# Patient Record
Sex: FEMALE | Race: WHITE | Employment: OTHER | URBAN - METROPOLITAN AREA
[De-identification: names, ages, dates, MRNs, and addresses within clinical notes are randomized per-mention and may not be internally consistent; named-entity substitution may affect disease eponyms.]

---

## 2017-01-20 ENCOUNTER — OFFICE VISIT (OUTPATIENT)
Dept: INTERNAL MEDICINE CLINIC | Facility: CLINIC | Age: 82
End: 2017-01-20

## 2017-01-20 VITALS
BODY MASS INDEX: 31.41 KG/M2 | DIASTOLIC BLOOD PRESSURE: 84 MMHG | HEIGHT: 64 IN | HEART RATE: 65 BPM | TEMPERATURE: 97 F | WEIGHT: 184 LBS | SYSTOLIC BLOOD PRESSURE: 131 MMHG

## 2017-01-20 DIAGNOSIS — I44.7 LEFT BUNDLE BRANCH BLOCK: Primary | ICD-10-CM

## 2017-01-20 DIAGNOSIS — I71.2 THORACIC AORTIC ANEURYSM WITHOUT RUPTURE (HCC): ICD-10-CM

## 2017-01-20 DIAGNOSIS — L98.9 SKIN LESION OF BACK: ICD-10-CM

## 2017-01-20 PROBLEM — I71.20 THORACIC AORTIC ANEURYSM WITHOUT RUPTURE: Status: ACTIVE | Noted: 2017-01-20

## 2017-01-20 PROBLEM — I71.20 THORACIC AORTIC ANEURYSM WITHOUT RUPTURE (HCC): Status: ACTIVE | Noted: 2017-01-20

## 2017-01-20 PROCEDURE — 99214 OFFICE O/P EST MOD 30 MIN: CPT | Performed by: INTERNAL MEDICINE

## 2017-01-20 PROCEDURE — G0463 HOSPITAL OUTPT CLINIC VISIT: HCPCS | Performed by: INTERNAL MEDICINE

## 2017-01-20 RX ORDER — ALENDRONATE SODIUM 70 MG/1
70 TABLET ORAL WEEKLY
Qty: 12 TABLET | Refills: 3 | Status: SHIPPED | OUTPATIENT
Start: 2017-01-20 | End: 2018-01-27

## 2017-01-20 RX ORDER — AMLODIPINE BESYLATE 5 MG/1
TABLET ORAL
Qty: 90 TABLET | Refills: 3 | Status: SHIPPED | OUTPATIENT
Start: 2017-01-20 | End: 2018-02-02

## 2017-01-20 RX ORDER — PRAVASTATIN SODIUM 40 MG
TABLET ORAL
Qty: 90 TABLET | Refills: 3 | Status: SHIPPED | OUTPATIENT
Start: 2017-01-20 | End: 2017-03-24

## 2017-01-20 NOTE — PROGRESS NOTES
See notes from Dr Mary Castro  Has a Thoracic Art Anuerysm  Has a LBB  Had a nl cardiac lexiscan 60%LVEF nl wall motion  To get a CT of Thoracic Aorat and see dr Ojeda Letters    Blood pressure 131/84, pulse 65, temperature 97.4 °F (36.3 °C), temperature source Vivian Morales

## 2017-02-03 ENCOUNTER — HOSPITAL ENCOUNTER (OUTPATIENT)
Dept: CT IMAGING | Facility: HOSPITAL | Age: 82
Discharge: HOME | End: 2017-02-03
Attending: INTERNAL MEDICINE
Payer: MEDICARE

## 2017-02-03 DIAGNOSIS — I71.2 THORACIC AORTIC ANEURYSM WITHOUT RUPTURE (HCC): ICD-10-CM

## 2017-02-03 LAB — CREAT BLD-MCNC: 1 MG/DL (ref 0.5–1.5)

## 2017-02-03 PROCEDURE — 82565 ASSAY OF CREATININE: CPT

## 2017-02-03 PROCEDURE — 71260 CT THORAX DX C+: CPT

## 2017-03-20 ENCOUNTER — TELEPHONE (OUTPATIENT)
Dept: INTERNAL MEDICINE CLINIC | Facility: CLINIC | Age: 82
End: 2017-03-20

## 2017-03-20 NOTE — TELEPHONE ENCOUNTER
Pt stts she needs refill on Rx Pravastatin 40 MG.  Please advise          Current outpatient prescriptions:     •  Pravastatin Sodium 40 MG Oral Tab, TAKE ONE TABLET BY MOUTH EVERY NIGHT AT BEDTIME, Disp: 90 tablet, Rfl: 3

## 2017-03-24 RX ORDER — PRAVASTATIN SODIUM 40 MG
TABLET ORAL
Qty: 90 TABLET | Refills: 0 | Status: SHIPPED | OUTPATIENT
Start: 2017-03-24 | End: 2018-03-26

## 2017-03-24 NOTE — TELEPHONE ENCOUNTER
Cholesterol Medications: Refilled per protocol    Protocol Criteria:  · Appointment scheduled in the past 12 months or in the next 3 months  · ALT & LDL on file in the past 12 months  · ALT result < 80  · LDL result <130   Recent Visits       Provider Verito

## 2017-06-02 RX ORDER — METOPROLOL SUCCINATE 50 MG/1
TABLET, EXTENDED RELEASE ORAL
Qty: 180 TABLET | Refills: 0 | Status: SHIPPED | OUTPATIENT
Start: 2017-06-02 | End: 2017-09-06

## 2017-06-21 ENCOUNTER — OFFICE VISIT (OUTPATIENT)
Dept: INTERNAL MEDICINE CLINIC | Facility: CLINIC | Age: 82
End: 2017-06-21

## 2017-06-21 VITALS
TEMPERATURE: 98 F | WEIGHT: 183.63 LBS | HEIGHT: 63 IN | OXYGEN SATURATION: 96 % | HEART RATE: 56 BPM | SYSTOLIC BLOOD PRESSURE: 152 MMHG | BODY MASS INDEX: 32.54 KG/M2 | DIASTOLIC BLOOD PRESSURE: 78 MMHG

## 2017-06-21 DIAGNOSIS — Z12.39 BREAST CANCER SCREENING: ICD-10-CM

## 2017-06-21 DIAGNOSIS — R53.83 MALAISE AND FATIGUE: ICD-10-CM

## 2017-06-21 DIAGNOSIS — R53.81 MALAISE AND FATIGUE: ICD-10-CM

## 2017-06-21 DIAGNOSIS — M80.80XD OTHER OSTEOPOROSIS WITH CURRENT PATHOLOGICAL FRACTURE WITH ROUTINE HEALING, SUBSEQUENT ENCOUNTER: ICD-10-CM

## 2017-06-21 DIAGNOSIS — I71.2 THORACIC AORTIC ANEURYSM WITHOUT RUPTURE (HCC): ICD-10-CM

## 2017-06-21 DIAGNOSIS — L98.9 SKIN LESION OF BACK: Primary | ICD-10-CM

## 2017-06-21 PROCEDURE — 99213 OFFICE O/P EST LOW 20 MIN: CPT | Performed by: INTERNAL MEDICINE

## 2017-06-21 PROCEDURE — G0463 HOSPITAL OUTPT CLINIC VISIT: HCPCS | Performed by: INTERNAL MEDICINE

## 2017-06-21 NOTE — PROGRESS NOTES
Stable  Patient Active Problem List:     Knee pain     Arthritis of knee, degenerative     Hip pain     Ankle arthritis     Lumbar radiculopathy     Lumbar stenosis     Abnormal EKG     ACE inhibitor intolerance     Left bundle branch block     Urinary tra

## 2017-06-22 ENCOUNTER — TELEPHONE (OUTPATIENT)
Dept: INTERNAL MEDICINE CLINIC | Facility: CLINIC | Age: 82
End: 2017-06-22

## 2017-06-22 ENCOUNTER — LAB ENCOUNTER (OUTPATIENT)
Dept: LAB | Age: 82
End: 2017-06-22
Attending: INTERNAL MEDICINE
Payer: MEDICARE

## 2017-06-22 DIAGNOSIS — R53.83 MALAISE AND FATIGUE: ICD-10-CM

## 2017-06-22 DIAGNOSIS — R53.81 MALAISE AND FATIGUE: ICD-10-CM

## 2017-06-22 DIAGNOSIS — E03.9 HYPOTHYROIDISM, UNSPECIFIED TYPE: Primary | ICD-10-CM

## 2017-06-22 DIAGNOSIS — Z12.39 BREAST CANCER SCREENING: ICD-10-CM

## 2017-06-22 DIAGNOSIS — M80.80XD OTHER OSTEOPOROSIS WITH CURRENT PATHOLOGICAL FRACTURE WITH ROUTINE HEALING, SUBSEQUENT ENCOUNTER: ICD-10-CM

## 2017-06-22 DIAGNOSIS — I71.2 THORACIC AORTIC ANEURYSM WITHOUT RUPTURE (HCC): ICD-10-CM

## 2017-06-22 PROCEDURE — 36415 COLL VENOUS BLD VENIPUNCTURE: CPT

## 2017-06-22 PROCEDURE — 84443 ASSAY THYROID STIM HORMONE: CPT | Performed by: INTERNAL MEDICINE

## 2017-06-22 PROCEDURE — 85025 COMPLETE CBC W/AUTO DIFF WBC: CPT

## 2017-06-22 PROCEDURE — 82306 VITAMIN D 25 HYDROXY: CPT

## 2017-06-22 PROCEDURE — 80053 COMPREHEN METABOLIC PANEL: CPT

## 2017-06-22 PROCEDURE — 81015 MICROSCOPIC EXAM OF URINE: CPT

## 2017-06-22 PROCEDURE — 84439 ASSAY OF FREE THYROXINE: CPT | Performed by: INTERNAL MEDICINE

## 2017-06-22 RX ORDER — LEVOTHYROXINE SODIUM 0.1 MG/1
100 TABLET ORAL DAILY
Qty: 90 TABLET | Refills: 3 | Status: SHIPPED | OUTPATIENT
Start: 2017-06-22 | End: 2017-06-26

## 2017-06-22 NOTE — TELEPHONE ENCOUNTER
Please see other 06/22 results telephone encounter. Pt already spoke with Dr. Grupo Wood regarding her results. Pt is asking if we could please mail her a copy of her results?  Home address has been verified

## 2017-06-26 ENCOUNTER — PATIENT MESSAGE (OUTPATIENT)
Dept: INTERNAL MEDICINE CLINIC | Facility: CLINIC | Age: 82
End: 2017-06-26

## 2017-06-26 ENCOUNTER — TELEPHONE (OUTPATIENT)
Dept: INTERNAL MEDICINE CLINIC | Facility: CLINIC | Age: 82
End: 2017-06-26

## 2017-06-26 RX ORDER — LEVOTHYROXINE SODIUM 0.05 MG/1
50 TABLET ORAL
Qty: 30 TABLET | Refills: 2 | Status: SHIPPED | OUTPATIENT
Start: 2017-06-26 | End: 2017-07-11

## 2017-06-26 RX ORDER — LEVOTHYROXINE SODIUM 0.03 MG/1
25 TABLET ORAL
Qty: 30 TABLET | Refills: 2 | Status: CANCELLED | OUTPATIENT
Start: 2017-06-26

## 2017-06-26 NOTE — TELEPHONE ENCOUNTER
Katie Martínez MD   [facility] 14 minutes ago (2:08 PM)      Completed call patient   (Routing comment)        LMTCB. May transfer to Triage any time. Dr Gómez Rayo sent levothyroxine 50 mcg dose instead.

## 2017-06-26 NOTE — TELEPHONE ENCOUNTER
Pt stts she asked her nephew who is a Dr as well and they are concerned with the RX Levothyroxine 100 MCG. She was thinking it should be 50 MCG.  Please advise          Current Outpatient Prescriptions:   •  Levothyroxine Sodium 100 MCG Oral Tab, Take 1 tab

## 2017-06-26 NOTE — TELEPHONE ENCOUNTER
Dr Medina Fails please see pt's message below and advise in regards the levothyroxine Rx you sent for pt on 6/22/17 because of TSH= 6.83, as per result note read by pt on MyChart:    Patient Result Comments     Entered by Sirisha Le MD at 6/22/2017  3:13 PM

## 2017-06-26 NOTE — TELEPHONE ENCOUNTER
Pt called back. She is reluctant to take 50mcg of levothyroxine. After consulting with her nephew who is a physician, she is requesting that her rx be reduced to 25mcg. \"I would like to be very conservative in the treatment of my thyroid. \"  She has no

## 2017-06-27 NOTE — TELEPHONE ENCOUNTER
From: Austin Figueredo  To: Angella Ruggiero MD  Sent: 6/26/2017 11:27 AM CDT  Subject: Prescription Question    I KNOW THAT YOU HAVE MY REQUEST FOR A LOWER DOSAGE OF THE THYROID MEDICINE WHICH YOU PRESCRIBED .  MICROGRAMS HAS PROBABLY YOCASTA BROUGHT TO Y

## 2017-06-27 NOTE — TELEPHONE ENCOUNTER
pt stated that it will take her some time to find a new PCP. She asked that I send you the message again asking if you can give her a 30 day supply of 25 mcg.  Pls Advise

## 2017-06-28 ENCOUNTER — PATIENT MESSAGE (OUTPATIENT)
Dept: INTERNAL MEDICINE CLINIC | Facility: CLINIC | Age: 82
End: 2017-06-28

## 2017-07-01 NOTE — TELEPHONE ENCOUNTER
From: Antoine Alvarez  To: Ted Desouza MD  Sent: 6/28/2017 12:50 PM CDT  Subject: Other    DEAR DR Foreign Stuart.  I DON't KNOW IF I ASKED YOU FOR A PRINTED COPY OF MY TEST RESULTS TO BE MAILEDTO MY. HOME AT [address] [place] [address] [address] [address]

## 2017-07-11 ENCOUNTER — OFFICE VISIT (OUTPATIENT)
Dept: INTERNAL MEDICINE CLINIC | Facility: CLINIC | Age: 82
End: 2017-07-11

## 2017-07-11 VITALS
HEIGHT: 63 IN | HEART RATE: 60 BPM | DIASTOLIC BLOOD PRESSURE: 81 MMHG | SYSTOLIC BLOOD PRESSURE: 153 MMHG | WEIGHT: 178 LBS | BODY MASS INDEX: 31.54 KG/M2

## 2017-07-11 DIAGNOSIS — E55.9 VITAMIN D DEFICIENCY: ICD-10-CM

## 2017-07-11 DIAGNOSIS — E78.2 MIXED HYPERLIPIDEMIA: ICD-10-CM

## 2017-07-11 DIAGNOSIS — R53.83 FATIGUE, UNSPECIFIED TYPE: Primary | ICD-10-CM

## 2017-07-11 PROCEDURE — 99214 OFFICE O/P EST MOD 30 MIN: CPT | Performed by: INTERNAL MEDICINE

## 2017-07-11 PROCEDURE — G0463 HOSPITAL OUTPT CLINIC VISIT: HCPCS | Performed by: INTERNAL MEDICINE

## 2017-07-11 NOTE — PROGRESS NOTES
Katt Daily is a [de-identified]year old female. HPI:   1. Fatigue, unspecified type    Has been somewhat tired despite taking exercise classes and being quite active. Recent TSH minimally elevated and ws going to start synthroid but hesitated to do so.      2. Mixe Smokeless tobacco: Never Used                      Alcohol use: Yes           0.0 oz/week     Comment: wine once every 2 mos       REVIEW OF SYSTEMS:   GENERAL HEALTH: feels well otherwise  SKIN: denies any unusual skin lesions or rashes to the plan.     The patient is asked to return in 3months

## 2017-07-15 ENCOUNTER — HOSPITAL ENCOUNTER (OUTPATIENT)
Dept: MAMMOGRAPHY | Facility: HOSPITAL | Age: 82
Discharge: HOME | End: 2017-07-15
Attending: INTERNAL MEDICINE
Payer: MEDICARE

## 2017-07-15 DIAGNOSIS — Z12.39 BREAST CANCER SCREENING: ICD-10-CM

## 2017-07-15 PROCEDURE — 77067 SCR MAMMO BI INCL CAD: CPT | Performed by: INTERNAL MEDICINE

## 2017-09-02 ENCOUNTER — TELEPHONE (OUTPATIENT)
Dept: INTERNAL MEDICINE CLINIC | Facility: CLINIC | Age: 82
End: 2017-09-02

## 2017-09-02 NOTE — TELEPHONE ENCOUNTER
Current Outpatient Prescriptions:   •  METOPROLOL SUCCINATE ER 50 MG Oral Tablet 24 Hr, TAKE 2 TABLETS BY MOUTH EVERY DAY, Disp: 180 tablet, Rfl: 0

## 2017-09-05 ENCOUNTER — OFFICE VISIT (OUTPATIENT)
Dept: INTERNAL MEDICINE CLINIC | Facility: CLINIC | Age: 82
End: 2017-09-05

## 2017-09-05 ENCOUNTER — NURSE TRIAGE (OUTPATIENT)
Dept: OTHER | Age: 82
End: 2017-09-05

## 2017-09-05 VITALS
DIASTOLIC BLOOD PRESSURE: 62 MMHG | RESPIRATION RATE: 16 BRPM | HEART RATE: 60 BPM | SYSTOLIC BLOOD PRESSURE: 122 MMHG | BODY MASS INDEX: 31.07 KG/M2 | WEIGHT: 182 LBS | HEIGHT: 64 IN

## 2017-09-05 DIAGNOSIS — R53.83 FATIGUE, UNSPECIFIED TYPE: ICD-10-CM

## 2017-09-05 DIAGNOSIS — B02.9 HERPES ZOSTER WITHOUT COMPLICATION: Primary | ICD-10-CM

## 2017-09-05 DIAGNOSIS — E78.2 MIXED HYPERLIPIDEMIA: ICD-10-CM

## 2017-09-05 PROCEDURE — G0463 HOSPITAL OUTPT CLINIC VISIT: HCPCS | Performed by: INTERNAL MEDICINE

## 2017-09-05 PROCEDURE — 99214 OFFICE O/P EST MOD 30 MIN: CPT | Performed by: INTERNAL MEDICINE

## 2017-09-05 RX ORDER — FAMCICLOVIR 500 MG/1
500 TABLET, FILM COATED ORAL 3 TIMES DAILY
Qty: 21 TABLET | Refills: 0 | Status: SHIPPED | OUTPATIENT
Start: 2017-09-05 | End: 2019-08-24 | Stop reason: ALTCHOICE

## 2017-09-05 NOTE — PROGRESS NOTES
Brayden Ball is a 81year old female. HPI:   1. Rash    Developed a rash on the left side of her face the last 3 days. Has felt no pain in the area. Was not anywhere she may have been bit by an insect.  Rash seems to be spreading into her left side of her Comment: wine once every 2 mos       REVIEW OF SYSTEMS:   GENERAL HEALTH: feels well otherwise  SKIN: denies any unusual skin lesions or rashes  RESPIRATORY: denies shortness of breath with exertion  CARDIOVASCULAR: denies chest pain on exertion  GI: denie exercise at least 3 times weekly to build strength, burn calories and help to achieve ideal body weight. The patient indicates understanding of these issues and agrees to the plan.     The patient is asked to return in 3months

## 2017-09-05 NOTE — TELEPHONE ENCOUNTER
LMTCB. When pt calls back please assist her in scheduling an appt with [facility] today at 1:30pm for insect bite, OK per PVR. Thank you.

## 2017-09-06 RX ORDER — METOPROLOL SUCCINATE 50 MG/1
100 TABLET, EXTENDED RELEASE ORAL
Qty: 180 TABLET | Refills: 0 | Status: SHIPPED | OUTPATIENT
Start: 2017-09-06 | End: 2017-11-15

## 2017-09-06 NOTE — TELEPHONE ENCOUNTER
Requesting Metoprolol refill    Prescription refilled per IM/FM refill protocol    Hypertensive Medications  Protocol Criteria:  · Appointment scheduled in the past 6 months or in the next 3 months  · BMP or CMP in the past 12 months  · Creatinine result <

## 2017-11-16 RX ORDER — METOPROLOL SUCCINATE 50 MG/1
TABLET, EXTENDED RELEASE ORAL
Qty: 180 TABLET | Refills: 0 | Status: SHIPPED | OUTPATIENT
Start: 2017-11-16 | End: 2018-03-04

## 2018-01-27 NOTE — TELEPHONE ENCOUNTER
Refill Protocol Appointment Criteria  · Appointment scheduled in the past 6 months or in the next 3 months  Recent Outpatient Visits            4 months ago Herpes zoster without complication    [facility], [facility], [address] [facility], [address]

## 2018-01-27 NOTE — TELEPHONE ENCOUNTER
Per pt she states she contacted the pharmacy to request a refill request and the pharmacy has attempted 3 times already with no response. It has been 4 days. pls advise.  Thank you      Current Outpatient Prescriptions:  Alendronate Sodium (FOSAMAX) 70 MG O

## 2018-01-30 RX ORDER — ALENDRONATE SODIUM 70 MG/1
70 TABLET ORAL WEEKLY
Qty: 12 TABLET | Refills: 0 | Status: SHIPPED | OUTPATIENT
Start: 2018-01-30 | End: 2018-04-14

## 2018-01-30 NOTE — TELEPHONE ENCOUNTER
Refill Protocol Appointment Criteria  · Appointment scheduled in the past 12 months or in the next 3 months  Recent Outpatient Visits            4 months ago Herpes zoster without complication    [facility], [facility], [address] [facility], [address]

## 2018-02-02 ENCOUNTER — TELEPHONE (OUTPATIENT)
Dept: INTERNAL MEDICINE CLINIC | Facility: CLINIC | Age: 83
End: 2018-02-02

## 2018-02-02 RX ORDER — AMLODIPINE BESYLATE 5 MG/1
TABLET ORAL
Qty: 90 TABLET | Refills: 3 | Status: SHIPPED | OUTPATIENT
Start: 2018-02-02 | End: 2019-02-21

## 2018-02-02 NOTE — TELEPHONE ENCOUNTER
Son 48 sudden death found in the bathroom today  Pt is shaking would like to know if she should take another BP med    I asked to monitor BP  I dont advised in adding BP med  But if it is high may take an extra amlodipine for today  Follow up with Dr Hope Rod

## 2018-02-28 RX ORDER — AMLODIPINE BESYLATE 5 MG/1
TABLET ORAL
Qty: 90 TABLET | Refills: 3
Start: 2018-02-28

## 2018-02-28 NOTE — TELEPHONE ENCOUNTER
[address] [facility], [phone]   Medication Detail    Disp Refills Start End    AmLODIPine Besylate 5 MG Oral Tab 90 tablet 3 2/2/2018     Sig: TAKE 1 TABLET BY MOUTH ONCE DAILY.     E-Prescribing Statu

## 2018-02-28 NOTE — TELEPHONE ENCOUNTER
From: Apollo Gibson  Sent: 2/27/2018 5:32 PM CST  Subject: Medication Renewal Request    Shyla REGGIE Griggs would like a refill of the following medications:      AmLODIPine Besylate 5 MG Oral Tab Raffaele Doe MD]   Patient Comment: I need a refill of this

## 2018-03-05 RX ORDER — METOPROLOL SUCCINATE 50 MG/1
TABLET, EXTENDED RELEASE ORAL
Qty: 180 TABLET | Refills: 0 | Status: SHIPPED | OUTPATIENT
Start: 2018-03-05 | End: 2018-06-02

## 2018-03-24 ENCOUNTER — PATIENT MESSAGE (OUTPATIENT)
Dept: INTERNAL MEDICINE CLINIC | Facility: CLINIC | Age: 83
End: 2018-03-24

## 2018-03-26 NOTE — TELEPHONE ENCOUNTER
From: Julius Benz  To: Anastasia Penny MD  Sent: 3/24/2018 8:16 PM CDT  Subject: Other    DEAR DR Amberly Devi. I NEED A REFILL ON PRAVASTATIN SODIUM 40mg tab. I USE THE [facility] [address] [facility]. THNK YOU.  Torrey Ill

## 2018-03-26 NOTE — TELEPHONE ENCOUNTER
Pending Prescriptions Disp Refills    Pravastatin Sodium 40 MG Oral Tab 90 tablet 0     Sig: TAKE ONE TABLET BY MOUTH EVERY NIGHT AT BEDTIME         Cholesterol Medication Protocol Failed NO LDL IN PAST 12 MONTHS      Cholesterol Medications  Protocol Cr

## 2018-03-27 RX ORDER — PRAVASTATIN SODIUM 40 MG
TABLET ORAL
Qty: 90 TABLET | Refills: 0 | Status: SHIPPED | OUTPATIENT
Start: 2018-03-27 | End: 2018-06-24

## 2018-03-27 NOTE — TELEPHONE ENCOUNTER
Pending Prescriptions Disp Refills    Pravastatin Sodium 40 MG Oral Tab 90 tablet 0     Sig: TAKE ONE TABLET BY MOUTH EVERY NIGHT AT BEDTIME       Cholesterol Medication Protocol Failed, no LDL in last 12 months  Cholesterol Medications  Protocol Criteri

## 2018-04-16 RX ORDER — ALENDRONATE SODIUM 70 MG/1
70 TABLET ORAL WEEKLY
Qty: 12 TABLET | Refills: 0 | Status: SHIPPED
Start: 2018-04-16 | End: 2018-07-07

## 2018-04-16 NOTE — TELEPHONE ENCOUNTER
From: Trina Stevens  Sent: 4/14/2018 10:48 AM CDT  Subject: Medication Renewal Request    Shyla Arriaza would like a refill of the following medications:     Alendronate Sodium (FOSAMAX) 70 MG Oral Tab [Scooby hSahid MD]    Preferred pharmacy: [facility]

## 2018-05-07 ENCOUNTER — PATIENT MESSAGE (OUTPATIENT)
Dept: INTERNAL MEDICINE CLINIC | Facility: CLINIC | Age: 83
End: 2018-05-07

## 2018-05-08 NOTE — TELEPHONE ENCOUNTER
From: Austin Figueredo  To: Primo Escamilla MD  Sent: 5/7/2018 1:53 PM CDT  Subject: Other    DEAR DR Enriqueta Saleh. I NEED TO HAVE MY HANDICAP PERMENANT  RENEWED. I WOULD LIKE TO DROP OFF THE FORM SO THAT YOU COULD FILL OUT THE FORM FOR.  THE RENEW

## 2018-05-09 ENCOUNTER — TELEPHONE (OUTPATIENT)
Dept: INTERNAL MEDICINE CLINIC | Facility: CLINIC | Age: 83
End: 2018-05-09

## 2018-05-15 NOTE — TELEPHONE ENCOUNTER
Sent as Selena Fallon    ----- Message -----     From: Asha Kerns.  Cinthia Gamble Sent: 9/84/6130  7:75 PM CDT       To: Nav Forman MD  Subject: RE: Other    DEAR DR Kimberlyn Chowdary  I DROPPED OFF THE FORM  LAST WEEK WHERE I LEFT IT AT THE FRONT [facility] [address]

## 2018-06-02 RX ORDER — METOPROLOL SUCCINATE 50 MG/1
TABLET, EXTENDED RELEASE ORAL
Qty: 180 TABLET | Refills: 0 | Status: SHIPPED | OUTPATIENT
Start: 2018-06-02 | End: 2018-09-01

## 2018-06-02 NOTE — TELEPHONE ENCOUNTER
Metoprolol Succinate ER 50 MG Oral Tablet 24 Hr  N/A       Disp: 180 tablet Refills: 0    Class: Script not printed Start: 6/2/2018   Documented:3 years ago  Hypertensive Medications Protocol Passed6/2 9:37 AM   CMP or BMP in past 12 months    Serum Creati

## 2018-06-06 ENCOUNTER — OFFICE VISIT (OUTPATIENT)
Dept: INTERNAL MEDICINE CLINIC | Facility: CLINIC | Age: 83
End: 2018-06-06

## 2018-06-06 ENCOUNTER — APPOINTMENT (OUTPATIENT)
Dept: LAB | Age: 83
End: 2018-06-06
Attending: INTERNAL MEDICINE
Payer: MEDICARE

## 2018-06-06 VITALS
DIASTOLIC BLOOD PRESSURE: 72 MMHG | HEART RATE: 67 BPM | HEIGHT: 65 IN | BODY MASS INDEX: 29.49 KG/M2 | SYSTOLIC BLOOD PRESSURE: 144 MMHG | RESPIRATION RATE: 16 BRPM | WEIGHT: 177 LBS

## 2018-06-06 DIAGNOSIS — E03.9 HYPOTHYROIDISM, UNSPECIFIED TYPE: Primary | ICD-10-CM

## 2018-06-06 DIAGNOSIS — E55.9 VITAMIN D DEFICIENCY: ICD-10-CM

## 2018-06-06 DIAGNOSIS — R53.83 FATIGUE, UNSPECIFIED TYPE: ICD-10-CM

## 2018-06-06 DIAGNOSIS — M54.50 MIDLINE LOW BACK PAIN WITHOUT SCIATICA, UNSPECIFIED CHRONICITY: ICD-10-CM

## 2018-06-06 DIAGNOSIS — E78.2 MIXED HYPERLIPIDEMIA: ICD-10-CM

## 2018-06-06 PROCEDURE — 82306 VITAMIN D 25 HYDROXY: CPT

## 2018-06-06 PROCEDURE — 99214 OFFICE O/P EST MOD 30 MIN: CPT | Performed by: INTERNAL MEDICINE

## 2018-06-06 PROCEDURE — 84443 ASSAY THYROID STIM HORMONE: CPT

## 2018-06-06 PROCEDURE — 36415 COLL VENOUS BLD VENIPUNCTURE: CPT

## 2018-06-06 PROCEDURE — 84439 ASSAY OF FREE THYROXINE: CPT

## 2018-06-06 PROCEDURE — G0463 HOSPITAL OUTPT CLINIC VISIT: HCPCS | Performed by: INTERNAL MEDICINE

## 2018-06-06 NOTE — PROGRESS NOTES
Thony Arce is a 81year old female. HPI:   1. Hypothyroidism, unspecified type    Has been somewhat tired despite taking exercise classes and being quite active. Recent TSH minimally elevated and ws going to start synthroid but hesitated to do so.  Will mouth daily.  Disp:  Rfl:       Past Medical History:   Diagnosis Date   • Enlarged aorta (HCC)    • Glaucoma    • Other and unspecified hyperlipidemia    • Right groin pain    • Unspecified essential hypertension       Social History:  Smoking status: Darvin Willett vitamin D deficiency. Taking vitamin D over the counter daily along with 1500 mg calcium. Advised to do weight bearing exercises as well to improve and maintain bone strength. - VITAMIN D, 25-HYDROXY; Future    4.  Midline low back pain without sciatica

## 2018-06-25 RX ORDER — PRAVASTATIN SODIUM 40 MG
TABLET ORAL
Qty: 90 TABLET | Refills: 0 | Status: SHIPPED
Start: 2018-06-25 | End: 2018-09-20

## 2018-06-25 NOTE — TELEPHONE ENCOUNTER
From: Thony Arce  Sent: 6/24/2018 5:01 PM CDT  Subject: Medication Renewal Request    Shyla Fuentes would like a refill of the following medications:     Pravastatin Sodium 40 MG Oral Tab [Scooby Bryant MD]   Patient Comment: PLEASE REFILL THIS

## 2018-07-07 RX ORDER — ALENDRONATE SODIUM 70 MG/1
70 TABLET ORAL WEEKLY
Qty: 12 TABLET | Refills: 0 | Status: SHIPPED
Start: 2018-07-07 | End: 2018-10-09

## 2018-07-07 NOTE — TELEPHONE ENCOUNTER
From: Nya Chavez  Sent: 7/7/2018 7:39 AM CDT  Subject: Medication Renewal Request    Shyla Linder would like a refill of the following medications:     Alendronate Sodium (FOSAMAX) 70 MG Oral Tab [Scooby Benson MD]   Patient Comment: PLEASE CARLOS

## 2018-07-07 NOTE — TELEPHONE ENCOUNTER
Pending Prescriptions Disp Refills    alendronate (FOSAMAX) 70 MG Oral Tab 12 tablet 0     Sig: Take 1 tablet (70 mg total) by mouth once a week. Refill approved per protocol.     Refill Protocol Appointment Criteria  · Appointment scheduled in

## 2018-08-28 NOTE — TELEPHONE ENCOUNTER
From: Dixon Chawla  Sent: 8/28/2018 8:15 AM CDT  Subject: Medication Renewal Request    Shlya REGGIE Pruett would like a refill of the following medications:     Metoprolol Succinate ER 50 MG Oral Tablet 24 Hr [Philip Darryle Colace, MD]    Preferred pharmacy: [facility]

## 2018-09-04 RX ORDER — METOPROLOL SUCCINATE 50 MG/1
TABLET, EXTENDED RELEASE ORAL
Qty: 180 TABLET | Refills: 0 | Status: SHIPPED | OUTPATIENT
Start: 2018-09-04 | End: 2019-02-22

## 2018-09-04 RX ORDER — METOPROLOL SUCCINATE 50 MG/1
TABLET, EXTENDED RELEASE ORAL
Qty: 180 TABLET | Refills: 0 | Status: SHIPPED | OUTPATIENT
Start: 2018-09-04 | End: 2019-08-24

## 2018-09-18 ENCOUNTER — PATIENT MESSAGE (OUTPATIENT)
Dept: INTERNAL MEDICINE CLINIC | Facility: CLINIC | Age: 83
End: 2018-09-18

## 2018-09-18 NOTE — PROGRESS NOTES
Cholesterol Medications  Protocol Criteria:  · Appointment scheduled in the past 12 months or in the next 3 months  · ALT & LDL on file in the past 12 months  · ALT result < 80  · LDL result <130   Recent Outpatient Visits            3 months ago Hypothyro

## 2018-09-18 NOTE — PROGRESS NOTES
See pt my chart request to refill Pravastatin. Pt failed refill protocol since last Lipid panel was 2016. Please advise. Med pended.

## 2018-09-18 NOTE — TELEPHONE ENCOUNTER
From: Hussein Spencer  To: Kayode Zeng MD  Sent: 9/18/2018 8:43 AM CDT  Subject: Prescription Question    DEAR DR Chan Riddle. I AM HAVING TROUBLE WITH MY CHART. I NEED A REFILL ON THE FOLLING MEDICINE PRAVASTATIN SODIUM 40 MG AT BEDTIME.  THE REFILL S

## 2018-09-20 RX ORDER — PRAVASTATIN SODIUM 40 MG
TABLET ORAL
Qty: 90 TABLET | Refills: 0 | Status: SHIPPED | OUTPATIENT
Start: 2018-09-20 | End: 2018-12-14

## 2018-10-06 ENCOUNTER — PATIENT MESSAGE (OUTPATIENT)
Dept: INTERNAL MEDICINE CLINIC | Facility: CLINIC | Age: 83
End: 2018-10-06

## 2018-10-06 NOTE — TELEPHONE ENCOUNTER
From: Thais Chow  To: Fany Mcpherson MD  Sent: 10/6/2018 1:00 PM CDT  Subject: Prescription Question    Dear DR Ramon Echavarria. I NEED A REFILL OF ALENDRONATE SODIUM 70MGTAB. I TRIED TO DO THIS THROUGH THE REFILL ON MY CHART, BUT IT DID NOT GO THROUGH.

## 2018-10-09 RX ORDER — ALENDRONATE SODIUM 70 MG/1
70 TABLET ORAL WEEKLY
Qty: 12 TABLET | Refills: 0 | Status: SHIPPED | OUTPATIENT
Start: 2018-10-09 | End: 2018-12-22

## 2018-12-14 RX ORDER — PRAVASTATIN SODIUM 40 MG
TABLET ORAL
Qty: 90 TABLET | Refills: 0 | Status: SHIPPED | OUTPATIENT
Start: 2018-12-14 | End: 2019-03-05

## 2018-12-23 NOTE — TELEPHONE ENCOUNTER
Refill Protocol Appointment Criteria  · Appointment scheduled in the past 12 months or in the next 3 months  Recent Outpatient Visits            6 months ago Hypothyroidism, unspecified type    [facility], [facility], [address] [facility], [address]

## 2018-12-26 RX ORDER — ALENDRONATE SODIUM 70 MG/1
TABLET ORAL
Qty: 12 TABLET | Refills: 0 | Status: SHIPPED | OUTPATIENT
Start: 2018-12-26 | End: 2019-03-24

## 2019-02-22 RX ORDER — AMLODIPINE BESYLATE 5 MG/1
TABLET ORAL
Qty: 90 TABLET | Refills: 0 | Status: SHIPPED | OUTPATIENT
Start: 2019-02-22 | End: 2019-05-20

## 2019-02-23 NOTE — TELEPHONE ENCOUNTER
Please review; protocol failed.     Hypertensive Medications  Protocol Criteria:  · Appointment scheduled in the past 6 months or in the next 3 months  · BMP or CMP in the past 12 months  · Creatinine result < 2  Recent Outpatient Visits            8 months

## 2019-02-25 RX ORDER — METOPROLOL SUCCINATE 50 MG/1
TABLET, EXTENDED RELEASE ORAL
Qty: 180 TABLET | Refills: 0 | Status: SHIPPED | OUTPATIENT
Start: 2019-02-25 | End: 2019-05-30

## 2019-03-05 RX ORDER — PRAVASTATIN SODIUM 40 MG
TABLET ORAL
Qty: 90 TABLET | Refills: 0 | Status: SHIPPED | OUTPATIENT
Start: 2019-03-05 | End: 2019-05-30

## 2019-03-05 NOTE — TELEPHONE ENCOUNTER
Okay to refill prescription for up to 90 days. Patient required to make appointment for further refills.

## 2019-03-07 ENCOUNTER — TELEPHONE (OUTPATIENT)
Dept: INTERNAL MEDICINE CLINIC | Facility: CLINIC | Age: 84
End: 2019-03-07

## 2019-03-25 RX ORDER — ALENDRONATE SODIUM 70 MG/1
TABLET ORAL
Qty: 12 TABLET | Refills: 0 | Status: SHIPPED | OUTPATIENT
Start: 2019-03-25 | End: 2019-06-15

## 2019-03-26 ENCOUNTER — TELEPHONE (OUTPATIENT)
Dept: INTERNAL MEDICINE CLINIC | Facility: CLINIC | Age: 84
End: 2019-03-26

## 2019-03-26 NOTE — TELEPHONE ENCOUNTER
PA for Alendronate Sodium 70 mg tab completed with Gideon via CMM response time 3-5 business days [address].

## 2019-03-29 ENCOUNTER — TELEPHONE (OUTPATIENT)
Dept: INTERNAL MEDICINE CLINIC | Facility: CLINIC | Age: 84
End: 2019-03-29

## 2019-03-29 DIAGNOSIS — Z12.31 VISIT FOR SCREENING MAMMOGRAM: Primary | ICD-10-CM

## 2019-04-01 NOTE — TELEPHONE ENCOUNTER
PA approved effective 3/26/2019-3/26/2020 #12/84 days; [address] notified of the approval.

## 2019-04-08 ENCOUNTER — OFFICE VISIT (OUTPATIENT)
Dept: INTERNAL MEDICINE CLINIC | Facility: CLINIC | Age: 84
End: 2019-04-08
Payer: MEDICARE

## 2019-04-08 VITALS
DIASTOLIC BLOOD PRESSURE: 82 MMHG | HEART RATE: 72 BPM | WEIGHT: 183 LBS | HEIGHT: 65 IN | SYSTOLIC BLOOD PRESSURE: 138 MMHG | RESPIRATION RATE: 16 BRPM | BODY MASS INDEX: 30.49 KG/M2

## 2019-04-08 DIAGNOSIS — E78.2 MIXED HYPERLIPIDEMIA: ICD-10-CM

## 2019-04-08 DIAGNOSIS — E55.9 VITAMIN D DEFICIENCY: ICD-10-CM

## 2019-04-08 DIAGNOSIS — B35.6 FUNGAL INFECTION OF THE GROIN: ICD-10-CM

## 2019-04-08 DIAGNOSIS — E03.9 ACQUIRED HYPOTHYROIDISM: Primary | ICD-10-CM

## 2019-04-08 PROCEDURE — G0463 HOSPITAL OUTPT CLINIC VISIT: HCPCS | Performed by: INTERNAL MEDICINE

## 2019-04-08 PROCEDURE — 99214 OFFICE O/P EST MOD 30 MIN: CPT | Performed by: INTERNAL MEDICINE

## 2019-04-08 NOTE — PROGRESS NOTES
Tyler Young is a 81year old female. HPI:   1. Hypothyroidism, unspecified type    Has been somewhat tired despite taking exercise classes and being quite active. Recent TSH minimally elevated and ws going to start synthroid but hesitated to do so. Social History:  Social History    Tobacco Use      Smoking status: Never Smoker      Smokeless tobacco: Never Used    Alcohol use:  Yes      Alcohol/week: 0.0 oz      Comment: wine once every 2 mos    Drug use: No       REVIEW OF SYSTEMS:     GENERAL HEA Fungal infection of the groin    Has some irritation in groin area from frequent pads for urinary incontinence. Will try anti fungal cream and will see GYN if not feeling better.     The patient indicates understanding of these issues and agrees to the plan

## 2019-05-20 RX ORDER — AMLODIPINE BESYLATE 5 MG/1
TABLET ORAL
Qty: 90 TABLET | Refills: 0 | Status: SHIPPED | OUTPATIENT
Start: 2019-05-20 | End: 2019-08-14

## 2019-05-20 NOTE — TELEPHONE ENCOUNTER
Please review; protocol failed.   Hypertensive Medications  Protocol Criteria:  · Appointment scheduled in the past 6 months or in the next 3 months  · BMP or CMP in the past 12 months  · Creatinine result < 2  Recent Outpatient Visits            1 month ag Willy Anders MD [facility]   Showing recent visits within past 540 days with a meds authorizing provider and meeting all other requirements     Future Appointments  Date Type Provider Dept   07/16/19 Appointment Emily Quintero, Laury Ontiveros MD [facility]

## 2019-05-21 RX ORDER — AMLODIPINE BESYLATE 5 MG/1
TABLET ORAL
Qty: 90 TABLET | Refills: 0 | OUTPATIENT
Start: 2019-05-21

## 2019-05-30 RX ORDER — PRAVASTATIN SODIUM 40 MG
40 TABLET ORAL NIGHTLY
Qty: 90 TABLET | Refills: 1 | Status: SHIPPED | OUTPATIENT
Start: 2019-05-30 | End: 2019-11-20

## 2019-05-30 RX ORDER — METOPROLOL SUCCINATE 50 MG/1
TABLET, EXTENDED RELEASE ORAL
Qty: 180 TABLET | Refills: 1 | Status: SHIPPED | OUTPATIENT
Start: 2019-05-30 | End: 2019-11-20

## 2019-05-30 NOTE — TELEPHONE ENCOUNTER
Please review; protocol failed.   No current labs    Cholesterol Medications  Protocol Criteria:  · Appointment scheduled in the past 12 months or in the next 3 months  · ALT & LDL on file in the past 12 months  · ALT result < 80  · LDL result <130   Recent ago Skin lesion of back    [address], Duncan Kendrick MD    Office Visit        Future Appointments       Provider Department Appt Notes    In 1 month Laury Mir MD [facility], [facility], [address] [place] [address]

## 2019-06-10 ENCOUNTER — TELEPHONE (OUTPATIENT)
Dept: OTHER | Age: 84
End: 2019-06-10

## 2019-06-10 NOTE — TELEPHONE ENCOUNTER
Pt states pharmacy told her they don't have refill for metoprolol. Per med chart was sent 5/30/19. Spoke with Pharmacist at [facility] and per pharmacist they do have refill and it is in process. Pharmacy will contact pt when ready for .

## 2019-06-15 RX ORDER — ALENDRONATE SODIUM 70 MG/1
TABLET ORAL
Qty: 12 TABLET | Refills: 1 | Status: SHIPPED | OUTPATIENT
Start: 2019-06-15 | End: 2019-12-03

## 2019-08-15 RX ORDER — AMLODIPINE BESYLATE 5 MG/1
TABLET ORAL
Qty: 90 TABLET | Refills: 1 | Status: SHIPPED | OUTPATIENT
Start: 2019-08-15 | End: 2020-02-10

## 2019-08-15 NOTE — TELEPHONE ENCOUNTER
Please review; protocol failed.     Requested Prescriptions   Pending Prescriptions Disp Refills   • AMLODIPINE BESYLATE 5 MG Oral Tab [Pharmacy Med Name: Amlodipine Besylate 5 Mg Tab Asce] 90 tablet 0     Sig: TAKE ONE TABLET BY MOUTH ONE TIME DAILY

## 2019-08-24 ENCOUNTER — NURSE TRIAGE (OUTPATIENT)
Dept: OTHER | Age: 84
End: 2019-08-24

## 2019-08-24 ENCOUNTER — OFFICE VISIT (OUTPATIENT)
Dept: FAMILY MEDICINE CLINIC | Facility: CLINIC | Age: 84
End: 2019-08-24
Payer: MEDICARE

## 2019-08-24 VITALS
HEIGHT: 65 IN | RESPIRATION RATE: 16 BRPM | OXYGEN SATURATION: 96 % | TEMPERATURE: 98 F | WEIGHT: 176 LBS | DIASTOLIC BLOOD PRESSURE: 76 MMHG | SYSTOLIC BLOOD PRESSURE: 144 MMHG | HEART RATE: 85 BPM | BODY MASS INDEX: 29.32 KG/M2

## 2019-08-24 DIAGNOSIS — B02.9 HERPES ZOSTER WITHOUT COMPLICATION: Primary | ICD-10-CM

## 2019-08-24 PROCEDURE — 99213 OFFICE O/P EST LOW 20 MIN: CPT | Performed by: NURSE PRACTITIONER

## 2019-08-24 RX ORDER — VALACYCLOVIR HYDROCHLORIDE 1 G/1
1 TABLET, FILM COATED ORAL 3 TIMES DAILY
Qty: 21 TABLET | Refills: 0 | Status: SHIPPED | OUTPATIENT
Start: 2019-08-24 | End: 2019-08-31

## 2019-08-24 NOTE — PATIENT INSTRUCTIONS
Patient Instructions      Follow up with your doctor tomorrow    Shingles:  AVOID contact with immunocompromised people, pregnant women, and infants, children or people with no history of chickenpox/chickenpox vaccine    Take antivirals as prescribed    Tr feel as if you have the flu, with fever and chills. · A red rash with small blisters appears within a few days. The rash may appear as follows:   ? The blisters can occur anywhere, but they’re most common on the back, chest, or abdomen. ?  They usually ap his or her healthcare provider right away. Shingles can cause serious problems with vision, and even blindness.   Very rarely shingles can also lead to pneumonia, hearing problems, brain inflammation, or even death.   When to seek medical care  Contact your infections include herpes and shingles. This medicine will not cure herpes. How should I use this medicine? Take this medicine by mouth with a glass of water. Follow the directions on the prescription label.  You can take this medicine with or without everett have any of these conditions:  · acquired immunodeficiency syndrome (AIDS)  · any other condition that may weaken the immune system  · bone marrow or kidney transplant  · kidney disease  · an unusual or allergic reaction to valacyclovir, acyclovir, gancicl

## 2019-08-24 NOTE — PROGRESS NOTES
CHIEF COMPLAINT:   Patient presents with:  Shingles: red spot on abd area, denies any pain or itching in area,  noticed 1 dy          HPI:    Braulio Ang is a 81year old female who presents for evaluation of a rash.   Per patient rash started in the past MULTIVITAMIN TAB/CAP Take 1 tablet by mouth daily.  Disp:  Rfl:       Past Medical History:   Diagnosis Date   • Enlarged aorta (HCC)    • Glaucoma    • Other and unspecified hyperlipidemia    • Right groin pain    • Unspecified essential hypertension EYES: PERRLA, EOMI, conjunctiva are clear  HENT: Head atraumatic, normocephalic. TM's WNL bilaterally. Normal external nose. Nasal mucosa pink without edema. No erythema of the throat. Oropharynx moist without lesions. NECK:  Supple. Non tender.   LUNGS: C May consider OTC tylenol as needed and directed on packaging     Risk and benefits and side effects of medication discussed. PT verbalized understanding.      Patient Instructions     Patient Instructions      Follow up with your doctor tomorrow    Shingles · Take medicines that weaken your immune system  What are the symptoms of shingles? · The first sign of shingles is usually pain, burning, tingling, or itching on one part of your face or body.  You may also feel as if you have the flu, with fever and chil · Postherpetic neuralgia. This is the most common complication. It is severe nerve pain at the place where the rash used to be. It can last for months, or even years after you have had shingles.  Medicines can be prescribed to help relieve the pain and impr © 1564-2393 [employer] [address]. [address] [facility], [address]. All rights reserved. This information is not intended as a substitute for professional medical care. Always follow your healthcare professional's instructions.         [facility] If you miss a dose, take it as soon as you can. If it is almost time for your next dose, take only that dose. Do not take double or extra doses. Where should I keep my medicine? Keep out of the reach of children.   Store at room temperature between 15 and

## 2019-08-24 NOTE — TELEPHONE ENCOUNTER
Action Requested: Summary for Provider     []  Critical Lab, Recommendations Needed  [] Need Additional Advice  []   FYI    []   Need Orders  [] Need Medications Sent to Pharmacy  []  Other     SUMMARY: Pt stated that yesterday she noticed that in between

## 2019-09-12 ENCOUNTER — OFFICE VISIT (OUTPATIENT)
Dept: INTERNAL MEDICINE CLINIC | Facility: CLINIC | Age: 84
End: 2019-09-12
Payer: MEDICARE

## 2019-09-12 VITALS
DIASTOLIC BLOOD PRESSURE: 83 MMHG | SYSTOLIC BLOOD PRESSURE: 138 MMHG | WEIGHT: 179 LBS | BODY MASS INDEX: 29.82 KG/M2 | RESPIRATION RATE: 16 BRPM | HEART RATE: 62 BPM | HEIGHT: 65 IN

## 2019-09-12 DIAGNOSIS — E78.2 MIXED HYPERLIPIDEMIA: ICD-10-CM

## 2019-09-12 DIAGNOSIS — Z00.00 ROUTINE HISTORY AND PHYSICAL EXAMINATION OF ADULT: Primary | ICD-10-CM

## 2019-09-12 DIAGNOSIS — E03.9 ACQUIRED HYPOTHYROIDISM: ICD-10-CM

## 2019-09-12 DIAGNOSIS — E55.9 VITAMIN D DEFICIENCY: ICD-10-CM

## 2019-09-12 PROCEDURE — G0439 PPPS, SUBSEQ VISIT: HCPCS | Performed by: INTERNAL MEDICINE

## 2019-09-12 NOTE — PROGRESS NOTES
REASON FOR VISIT:    Thais Chow is a 81year old female who presents for a Medicare Annual Wellness visit.      Patient Care Team: Patient Care Team:  Fany Mcpherson MD as PCP - General (Internal Medicine)  Fany Mcpherson MD as PCP - José Plummer 12/4/2012   Glucose 70 - 99 mg/dL 89 89 87 84     Cholesterol Latest Ref Rng & Units 4/28/2016 12/4/2012 10/14/2011   Total Cholesterol 110 - 200 mg/dL 170 144 149   Triglycerides 1 - 149 mg/dL 46 57 51   HDL mg/dL 74 67(H) 73(H)   LDL 0 - 99 mg/dL 87 66 6 daily activities? : No  Memory Problems?: No      Fall/Risk Assessment     Have you fallen in the last 12 months?: 0-No  Fall/Risk Scorin        Depression Screening (PHQ-2/PHQ-9): Over the LAST 2 WEEKS   Little interest or pleasure in doing things (ov • Right groin pain    • Unspecified essential hypertension       Past Surgical History:   Procedure Laterality Date   • CORRECT BUNION,SIMPLE Right 1999    Bunionectomy   • ELECTROCARDIOGRAM, COMPLETE  08/18/2010    Scanned to media tab    • KNEE SURGERY atraumatic, normocephalic, ears and throat are clear                Hearing Assessed via: [place]  EYES: PERRLA, EOMI, conjunctiva are clear    NECK: supple, no adenopathy, no bruits  CHEST: no chest tenderness  BREAST:deferred   LUNGS: clear to auscul REFLEX TO FREE T4; Future    4. Vitamin D deficiency    Has been diagnosed with vitamin D deficiency Taking vitamin D over the counter daily along with 1500 mg calcium. Advised to do weight bearing exercises as well to improve and maintain bone strength.

## 2019-09-16 ENCOUNTER — HOSPITAL ENCOUNTER (EMERGENCY)
Facility: HOSPITAL | Age: 84
Discharge: HOME | End: 2019-09-16
Payer: MEDICARE

## 2019-09-16 ENCOUNTER — NURSE TRIAGE (OUTPATIENT)
Dept: INTERNAL MEDICINE CLINIC | Facility: CLINIC | Age: 84
End: 2019-09-16

## 2019-09-16 ENCOUNTER — APPOINTMENT (OUTPATIENT)
Dept: GENERAL RADIOLOGY | Facility: HOSPITAL | Age: 84
End: 2019-09-16
Payer: MEDICARE

## 2019-09-16 ENCOUNTER — APPOINTMENT (OUTPATIENT)
Dept: GENERAL RADIOLOGY | Facility: HOSPITAL | Age: 84
End: 2019-09-16
Attending: EMERGENCY MEDICINE
Payer: MEDICARE

## 2019-09-16 VITALS
BODY MASS INDEX: 29.32 KG/M2 | OXYGEN SATURATION: 97 % | WEIGHT: 176 LBS | HEIGHT: 65 IN | SYSTOLIC BLOOD PRESSURE: 143 MMHG | DIASTOLIC BLOOD PRESSURE: 75 MMHG | RESPIRATION RATE: 17 BRPM | TEMPERATURE: 98 F | HEART RATE: 63 BPM

## 2019-09-16 DIAGNOSIS — S52.501A CLOSED FRACTURE OF DISTAL END OF RIGHT RADIUS, UNSPECIFIED FRACTURE MORPHOLOGY, INITIAL ENCOUNTER: Primary | ICD-10-CM

## 2019-09-16 DIAGNOSIS — S20.212A RIB CONTUSION, LEFT, INITIAL ENCOUNTER: ICD-10-CM

## 2019-09-16 PROCEDURE — 73110 X-RAY EXAM OF WRIST: CPT

## 2019-09-16 PROCEDURE — 29125 APPL SHORT ARM SPLINT STATIC: CPT

## 2019-09-16 PROCEDURE — 99284 EMERGENCY DEPT VISIT MOD MDM: CPT

## 2019-09-16 PROCEDURE — 71101 X-RAY EXAM UNILAT RIBS/CHEST: CPT | Performed by: EMERGENCY MEDICINE

## 2019-09-16 NOTE — ED INITIAL ASSESSMENT (HPI)
Patient presents to ER with c/o right wrist pain/deformity after falling due to \"floppy slippers\". Denies head injury. CMS intact.

## 2019-09-16 NOTE — ED PROVIDER NOTES
Patient Seen in: [facility] Emergency Department    History   Patient presents with:  Fall (musculoskeletal, neurologic)    Stated Complaint:     HPI    61-year-old female with history of hypertension and hyperlipidemia presents with complaints of round no injection  Respiratory: chest is tender to palpation to the left lateral rib cage at the areas of ribs 6 through 8 at the posterior axillary line, breath sounds are equal  Cardiac: regular rate and rhythm  Gastrointestinal:  soft and non tender, t MD  [address] [facility]  [address]  [place] [address] [address]  [phone]          We recommend that you schedule follow up care with a primary care provider within the next three months to obtain basic health screening including reassessment of your blood pressure.

## 2019-09-16 NOTE — TELEPHONE ENCOUNTER
Action Requested: Summary for Provider     []  Critical Lab, Recommendations Needed  [] Need Additional Advice  []   FYI    []   Need Orders  [] Need Medications Sent to Pharmacy  []  Other     SUMMARY: Pt c/o R wrist pain, following a fall this morning, t

## 2019-09-16 NOTE — ED NOTES
Pt to ER with c/o right wrist pain and right rib pain s/p trip and fall in her slippers today prior to arrival.  No respiratory distress noted. 96% on room air. Chest expansion equal. Pt denies cough or fever. Pt denies hitting head or LOC.  Pt denies thinn

## 2019-09-17 NOTE — TELEPHONE ENCOUNTER
Noted and closing encounter:    Jolie Gregg Rn Triage Just now (9:58 AM)      Pt declined the apt.      Routing comment

## 2019-09-17 NOTE — TELEPHONE ENCOUNTER
Per chart review, pt was seen yesterday at [facility] ER. CSS, please contact pt and assist in scheduling f/u as per ER recommendation copied below--for blood pressure f/u (was referred to ortho for fracture):      Disposition and Plan      Clinical Impression:

## 2019-09-18 ENCOUNTER — TELEPHONE (OUTPATIENT)
Dept: OTHER | Age: 84
End: 2019-09-18

## 2019-09-18 NOTE — TELEPHONE ENCOUNTER
Pt states she did not go to the ED. Pt states her daughter cut the cast where she still have support and placed ice and the swelling went down. Pt has half a cast. Pt has an appt with ortho tomorrow.  DARREN

## 2019-09-18 NOTE — TELEPHONE ENCOUNTER
Pt report fractured left hand after fall. Pt states her left hand is swollen with a huge lump below the cast and fingers are purple. pt states she has been icing the area. Has appt tomorrow with ortho.  Advised pt to go back to the ED for them to loosing the

## 2019-09-19 ENCOUNTER — OFFICE VISIT (OUTPATIENT)
Dept: ORTHOPEDICS CLINIC | Facility: CLINIC | Age: 84
End: 2019-09-19
Payer: MEDICARE

## 2019-09-19 VITALS — WEIGHT: 180 LBS | HEIGHT: 62.5 IN | BODY MASS INDEX: 32.3 KG/M2

## 2019-09-19 DIAGNOSIS — S52.571A OTHER CLOSED INTRA-ARTICULAR FRACTURE OF DISTAL END OF RIGHT RADIUS, INITIAL ENCOUNTER: Primary | ICD-10-CM

## 2019-09-19 PROCEDURE — 25600 CLTX DST RDL FX/EPHYS SEP WO: CPT | Performed by: ORTHOPAEDIC SURGERY

## 2019-09-19 PROCEDURE — G0463 HOSPITAL OUTPT CLINIC VISIT: HCPCS | Performed by: ORTHOPAEDIC SURGERY

## 2019-09-19 PROCEDURE — 99204 OFFICE O/P NEW MOD 45 MIN: CPT | Performed by: ORTHOPAEDIC SURGERY

## 2019-09-19 NOTE — H&P
NURSING INTAKE COMMENTS: Patient presents with:  Consult: right wrist pain -- Went to [facility] ED and xrays taken 09/16/19. Onset 09/16/19 from tripping slippers at home. Rates pain 5/10. Denies any numbness or tingling. Taking 1 Tylenol 500 mg every 4 hours.  Constance Garcia Disease   • Heart Attack Mother    • Hypertension Mother    • Breast Cancer Sister 66        2 sisters   • Arthritis Sister    • Heart Attack Brother    • Cancer Brother         pancreatic cancer       Social History    Occupational History      Not on becca RIGHT (CPT=73110)  COMPARISON: None. INDICATIONS: Right medial wrist pain, bruising, and swelling post fall today. TECHNIQUE: 3 views were obtained.    FINDINGS:  BONES: Mildly impacted fracture of the distal radius with slight comminution and extension o 06/22/2017    HGB 12.7 06/22/2017     06/22/2017      Lab Results   Component Value Date    GLU 89 06/22/2017    BUN 23 (H) 06/22/2017    CREATSERUM 0.90 06/22/2017    GFRNAA 60 06/22/2017    GFRAA >60 06/22/2017        Assessment and Plan:  There a

## 2019-09-19 NOTE — PROGRESS NOTES
NURSING INTAKE COMMENTS: Patient presents with:  Consult: right wrist pain -- Went to [facility] ED and xrays taken 09/16/19. Onset 09/16/19 from tripping slippers at home. Rates pain 5/10. Denies any numbness or tingling. Taking 1 Tylenol 500 mg every 4 hours.  Josh Leon Amoxicillin             NAUSEA ONLY  Family History   Problem Relation Age of Onset   • Heart Attack Father    • Heart Disease Father         Coronary Artery Disease   • Heart Attack Mother    • Hypertension Mother    • Breast Cancer Sister 66        2 Right wrist diffusely swollen, skin intact. No significant deformity. Thumb extension intact. Tender over distal radius and distal ulna. Neurological: Fingers neurologically intact light touch sensory motor strength testing.     Imaging:   Xr Wrist Comp Moderate tortuosity ascending and descending thoracic aorta. Large retrocardiac hiatal hernia. PLEURA: Normal. OTHER: Negative. CONCLUSION:  1. No left-sided rib fracture.     Dictated by (CST): Nina Gauthier MD on 9/16/2019 at 14:39     Approved

## 2019-10-03 ENCOUNTER — OFFICE VISIT (OUTPATIENT)
Dept: ORTHOPEDICS CLINIC | Facility: CLINIC | Age: 84
End: 2019-10-03
Payer: MEDICARE

## 2019-10-03 ENCOUNTER — HOSPITAL ENCOUNTER (OUTPATIENT)
Dept: GENERAL RADIOLOGY | Facility: HOSPITAL | Age: 84
Discharge: HOME | End: 2019-10-03
Attending: ORTHOPAEDIC SURGERY | Admitting: PHYSICIAN ASSISTANT
Payer: MEDICARE

## 2019-10-03 DIAGNOSIS — S52.614D CLOSED NONDISPLACED FRACTURE OF STYLOID PROCESS OF RIGHT ULNA WITH ROUTINE HEALING, SUBSEQUENT ENCOUNTER: ICD-10-CM

## 2019-10-03 DIAGNOSIS — Z47.89 ORTHOPEDIC AFTERCARE: ICD-10-CM

## 2019-10-03 DIAGNOSIS — S52.571D OTHER CLOSED INTRA-ARTICULAR FRACTURE OF DISTAL END OF RIGHT RADIUS WITH ROUTINE HEALING, SUBSEQUENT ENCOUNTER: Primary | ICD-10-CM

## 2019-10-03 PROCEDURE — 73110 X-RAY EXAM OF WRIST: CPT | Performed by: ORTHOPAEDIC SURGERY

## 2019-10-03 PROCEDURE — G0463 HOSPITAL OUTPT CLINIC VISIT: HCPCS | Performed by: ORTHOPAEDIC SURGERY

## 2019-10-03 PROCEDURE — 99024 POSTOP FOLLOW-UP VISIT: CPT | Performed by: ORTHOPAEDIC SURGERY

## 2019-10-03 PROCEDURE — 29075 APPL CST ELBW FNGR SHORT ARM: CPT | Performed by: PHYSICIAN ASSISTANT

## 2019-10-03 NOTE — PROGRESS NOTES
NURSING INTAKE COMMENTS: Patient presents with: Follow - Up: 2 week f/u on right wrist fracture.   Stts she has noticed an on/off ache in the wrist.  Denies any swelling or numbness    HPI: Ms. Nir Barros is an 81year old female who presents to the office tod twice daily Disp: 21 g Rfl: 1   Calcium Carb-Cholecalciferol (CALCIUM 600+D3) 600-200 MG-UNIT Oral Tab Take  by mouth. Disp:  Rfl:    Sennosides-Docusate Sodium (STOOL SOFTENER & LAXATIVE) 8.6-50 MG Oral Tab Take 1 tablet by mouth daily.  Disp:  Rfl:    TRA supination of the right forearm. The right hand is grossly neurologically intact to light touch sensory and motor strength testing in the median, ulnar, and radial nerve distributions.  She is able to actively flex, extend, adduct, and abduct all the digits right upper extremity bony prominences well with cast padding and placed her in a short arm cast on the right. I discussed cast care with her. I advised her to keep the cast clean and dry.  I recommended elevation of the right upper extremity for pain and s

## 2019-10-03 NOTE — PATIENT INSTRUCTIONS
Keep right upper extremity cast clean and dry. Remain non-weightbearing on the right upper extremity. No lifting, pushing, or pulling with the right upper extremity. Continue to elevate the right upper extremity for swelling control.  Continue with Tylenol

## 2019-10-17 ENCOUNTER — HOSPITAL ENCOUNTER (OUTPATIENT)
Dept: GENERAL RADIOLOGY | Facility: HOSPITAL | Age: 84
Discharge: HOME | End: 2019-10-17
Attending: ORTHOPAEDIC SURGERY
Payer: MEDICARE

## 2019-10-17 ENCOUNTER — OFFICE VISIT (OUTPATIENT)
Dept: ORTHOPEDICS CLINIC | Facility: CLINIC | Age: 84
End: 2019-10-17
Payer: MEDICARE

## 2019-10-17 DIAGNOSIS — Z47.89 ORTHOPEDIC AFTERCARE: ICD-10-CM

## 2019-10-17 DIAGNOSIS — S52.571D OTHER CLOSED INTRA-ARTICULAR FRACTURE OF DISTAL END OF RIGHT RADIUS WITH ROUTINE HEALING, SUBSEQUENT ENCOUNTER: Primary | ICD-10-CM

## 2019-10-17 DIAGNOSIS — S52.614D CLOSED NONDISPLACED FRACTURE OF STYLOID PROCESS OF RIGHT ULNA WITH ROUTINE HEALING, SUBSEQUENT ENCOUNTER: ICD-10-CM

## 2019-10-17 PROCEDURE — L3908 WHO COCK-UP NONMOLDE PRE OTS: HCPCS | Performed by: ORTHOPAEDIC SURGERY

## 2019-10-17 PROCEDURE — 99024 POSTOP FOLLOW-UP VISIT: CPT | Performed by: ORTHOPAEDIC SURGERY

## 2019-10-17 PROCEDURE — 73110 X-RAY EXAM OF WRIST: CPT | Performed by: ORTHOPAEDIC SURGERY

## 2019-10-17 PROCEDURE — G0463 HOSPITAL OUTPT CLINIC VISIT: HCPCS | Performed by: ORTHOPAEDIC SURGERY

## 2019-10-17 NOTE — PROGRESS NOTES
NURSING INTAKE COMMENTS: Patient presents with: Follow - Up: 2 week f/u fractured right wrist.  Denies any swelling or pain. Has noticed some tingling in the fingers but it'll go away.         HPI: This 81year old female presents today with complaints of Sister 66        2 sisters   • Arthritis Sister    • Heart Attack Brother    • Cancer Brother         pancreatic cancer       Social History    Occupational History      Not on file    Tobacco Use      Smoking status: Never Smoker      Smokeless tobacco: N Right (cpt=73110)    Result Date: 10/3/2019  PROCEDURE: XR WRIST COMPLETE (MIN 3 VIEWS), RIGHT (CPT=73110)  COMPARISON: [facility], XR WRIST COMPLETE (MIN 3 VIEWS), RIGHT (CPT=73110), 9/16/2019, 10:57.   INDICATIONS: Follow up right radial f the office today. They demonstrate good maintenance alignment of the fracture site there is suggestion of callus formation.     Labs:  Lab Results   Component Value Date    WBC 5.3 06/22/2017    HGB 12.7 06/22/2017     06/22/2017      Lab Results

## 2019-10-23 ENCOUNTER — TELEPHONE (OUTPATIENT)
Dept: ORTHOPEDICS CLINIC | Facility: CLINIC | Age: 84
End: 2019-10-23

## 2019-10-23 NOTE — TELEPHONE ENCOUNTER
Symptoms are likely related to swelling and mild median nerve irritation. Advise pt to ice wrist and wear splint full time until follow up appt.

## 2019-10-23 NOTE — TELEPHONE ENCOUNTER
Spoke to pt and she states that her hand, fingers and wrist have tingling and pain. States there is a little bit of swelling. Rates pain 6/10. Taking one 500 mg Tylenol 3 times per day and applying Aspercreme. Wearing splint.  Denies applying splint too tig

## 2019-10-24 NOTE — TELEPHONE ENCOUNTER
Spoke to pt and informed her of Rudi's and Javier [place], messages and instructions. Pt states she cannot take the Advil because of some of the meds she takes. Pt will continue taking Tylenol and using the Aspercreme for pain. Pt verbalized understanding.

## 2019-10-24 NOTE — TELEPHONE ENCOUNTER
Per Pepe Motley [place]:  - Pt may take Advil 400 mg every 8 hours prn pain, Tylenol 500 mg, and apply Aspercreme to wrist.   - Wear splint full time to decrease pressure on nerve  - Elevate as needed for swelling.

## 2019-11-01 ENCOUNTER — HOSPITAL ENCOUNTER (OUTPATIENT)
Dept: GENERAL RADIOLOGY | Facility: HOSPITAL | Age: 84
Discharge: HOME | End: 2019-11-01
Attending: ORTHOPAEDIC SURGERY | Admitting: ORTHOPAEDIC SURGERY
Payer: MEDICARE

## 2019-11-01 ENCOUNTER — OFFICE VISIT (OUTPATIENT)
Dept: ORTHOPEDICS CLINIC | Facility: CLINIC | Age: 84
End: 2019-11-01
Payer: MEDICARE

## 2019-11-01 DIAGNOSIS — Z47.89 ORTHOPEDIC AFTERCARE: ICD-10-CM

## 2019-11-01 DIAGNOSIS — S52.571D OTHER CLOSED INTRA-ARTICULAR FRACTURE OF DISTAL END OF RIGHT RADIUS WITH ROUTINE HEALING, SUBSEQUENT ENCOUNTER: Primary | ICD-10-CM

## 2019-11-01 DIAGNOSIS — S52.614D CLOSED NONDISPLACED FRACTURE OF STYLOID PROCESS OF RIGHT ULNA WITH ROUTINE HEALING, SUBSEQUENT ENCOUNTER: ICD-10-CM

## 2019-11-01 PROCEDURE — 99024 POSTOP FOLLOW-UP VISIT: CPT | Performed by: ORTHOPAEDIC SURGERY

## 2019-11-01 PROCEDURE — G0463 HOSPITAL OUTPT CLINIC VISIT: HCPCS | Performed by: ORTHOPAEDIC SURGERY

## 2019-11-01 PROCEDURE — 73110 X-RAY EXAM OF WRIST: CPT | Performed by: ORTHOPAEDIC SURGERY

## 2019-11-01 NOTE — PROGRESS NOTES
NURSING INTAKE COMMENTS: Patient presents with: Follow - Up: LOV 10/17/19, fx right wrist. pt to office wearing removable splint.  pt states her wrist is stiff. sometimes her fingers throb and tingle, but she takes a tylenol and uses aspercream and that se Heart Attack Father    • Heart Disease Father         Coronary Artery Disease   • Heart Attack Mother    • Hypertension Mother    • Breast Cancer Sister 66        2 sisters   • Arthritis Sister    • Heart Attack Brother    • Cancer Brother         pancreat fully flexing the fingers into the palm. She flexion measures 50 degrees palmar flexion 40 degrees. Neurological: Things are neurologically intact light at sensory motor strength testing.     Imaging:   Xr Wrist Complete (min 3 Views), Right (cpt=73110) VIEWS), RIGHT (CPT=73110)  COMPARISON: [facility], XR WRIST COMPLETE (MIN 3 VIEWS), RIGHT (CPT=73110), 9/16/2019, 10:57. INDICATIONS: Follow up right radial fracture on 9/16/19. TECHNIQUE: 3 views were obtained.    FINDINGS:  BONES: Mayank Hobbs the fracture site. There is callus formation consistent with healing both involving the radius and ulna.       Labs:  Lab Results   Component Value Date    WBC 5.3 06/22/2017    HGB 12.7 06/22/2017     06/22/2017      Lab Results   Component Value D

## 2019-11-05 ENCOUNTER — OFFICE VISIT (OUTPATIENT)
Dept: OCCUPATIONAL MEDICINE | Facility: HOSPITAL | Age: 84
End: 2019-11-05
Attending: ORTHOPAEDIC SURGERY
Payer: MEDICARE

## 2019-11-05 DIAGNOSIS — S52.571D OTHER CLOSED INTRA-ARTICULAR FRACTURE OF DISTAL END OF RIGHT RADIUS WITH ROUTINE HEALING, SUBSEQUENT ENCOUNTER: ICD-10-CM

## 2019-11-05 PROCEDURE — 97110 THERAPEUTIC EXERCISES: CPT | Performed by: OCCUPATIONAL THERAPIST

## 2019-11-05 PROCEDURE — 97167 OT EVAL HIGH COMPLEX 60 MIN: CPT | Performed by: OCCUPATIONAL THERAPIST

## 2019-11-05 NOTE — PROGRESS NOTES
OCCUPATIONAL THERAPY UPPER EXTREMITY EVALUATION:   Referring Physician: Dr. Saavedra Loss  Date of onset: 09/16/19  Diagnosis: Closed intra-articular fracture of distal end of right radius with routine healing, subsequent encounter as well as ulna fracture Duane also does own food prep and housekeeping tasks. Currently family members assist with housekeeping and meal prep. Assessment indicates significant edema in wrist and digits as well as decreased digit, wrist and forearm AROM.  Patient also complains of parest 5 min                                                         Ther ex                   Tendon glides  x 5                 Wrist AAROM  x10                  digit PROM   x5 each digit possible to [phone].  If you have any questions, please contact me at Dept: [phone]    Sincerely,  Electronically signed by therapist: HANNAH Durbin CHT    [de-identified] certification required: Yes  I certify the need for these services fur

## 2019-11-07 ENCOUNTER — OFFICE VISIT (OUTPATIENT)
Dept: OCCUPATIONAL MEDICINE | Facility: HOSPITAL | Age: 84
End: 2019-11-07
Attending: ORTHOPAEDIC SURGERY
Payer: MEDICARE

## 2019-11-07 PROCEDURE — 97110 THERAPEUTIC EXERCISES: CPT | Performed by: OCCUPATIONAL THERAPIST

## 2019-11-07 PROCEDURE — 97140 MANUAL THERAPY 1/> REGIONS: CPT | Performed by: OCCUPATIONAL THERAPIST

## 2019-11-07 NOTE — PROGRESS NOTES
Dx:   Closed intra-articular fracture of distal end of right radius with routine healing, subsequent encounter as well as ulna fracture   Authorized # of Visits:  12        Next MD visit: none scheduled  Fall Risk: standard         Precautions: n/a digits will decrease at worst to 1/10. Pt will be independent and compliant with comprehensive HEP to maintain progress achieved in OT. Patient will demonstrate increase in right digit D2- D5 CAGLE to at least 200 degrees for ease in holding a knife.   Jessica

## 2019-11-13 ENCOUNTER — APPOINTMENT (OUTPATIENT)
Dept: OCCUPATIONAL MEDICINE | Facility: HOSPITAL | Age: 84
End: 2019-11-13
Attending: ORTHOPAEDIC SURGERY
Payer: MEDICARE

## 2019-11-14 ENCOUNTER — OFFICE VISIT (OUTPATIENT)
Dept: OCCUPATIONAL MEDICINE | Facility: HOSPITAL | Age: 84
End: 2019-11-14
Attending: ORTHOPAEDIC SURGERY
Payer: MEDICARE

## 2019-11-14 PROCEDURE — 97110 THERAPEUTIC EXERCISES: CPT | Performed by: OCCUPATIONAL THERAPIST

## 2019-11-14 PROCEDURE — 97140 MANUAL THERAPY 1/> REGIONS: CPT | Performed by: OCCUPATIONAL THERAPIST

## 2019-11-14 NOTE — PROGRESS NOTES
Dx:   Closed intra-articular fracture of distal end of right radius with routine healing, subsequent encounter as well as ulna fracture   Authorized # of Visits:  12        Next MD visit: December?   Fall Risk: standard         Precautions: None     Medic Improved right wrist CAGLE by 40 degrees and edema reduced in wrist by 0.7 cm. kinesiotape across dorsum of wrist to release pressure in carpal tunnel    Goals:   Pt complaints of pain in right wrist and digits will decrease at worst to 1/10.   Pt will be ind

## 2019-11-15 ENCOUNTER — APPOINTMENT (OUTPATIENT)
Dept: OCCUPATIONAL MEDICINE | Facility: HOSPITAL | Age: 84
End: 2019-11-15
Attending: ORTHOPAEDIC SURGERY
Payer: MEDICARE

## 2019-11-18 ENCOUNTER — TELEPHONE (OUTPATIENT)
Dept: OCCUPATIONAL MEDICINE | Facility: HOSPITAL | Age: 84
End: 2019-11-18

## 2019-11-19 ENCOUNTER — APPOINTMENT (OUTPATIENT)
Dept: OCCUPATIONAL MEDICINE | Facility: HOSPITAL | Age: 84
End: 2019-11-19
Attending: ORTHOPAEDIC SURGERY
Payer: MEDICARE

## 2019-11-21 ENCOUNTER — OFFICE VISIT (OUTPATIENT)
Dept: OCCUPATIONAL MEDICINE | Facility: HOSPITAL | Age: 84
End: 2019-11-21
Attending: ORTHOPAEDIC SURGERY
Payer: MEDICARE

## 2019-11-21 DIAGNOSIS — S52.571D OTHER CLOSED INTRA-ARTICULAR FRACTURE OF DISTAL END OF RIGHT RADIUS WITH ROUTINE HEALING, SUBSEQUENT ENCOUNTER: ICD-10-CM

## 2019-11-21 PROCEDURE — 97110 THERAPEUTIC EXERCISES: CPT | Performed by: OCCUPATIONAL THERAPIST

## 2019-11-21 PROCEDURE — 97140 MANUAL THERAPY 1/> REGIONS: CPT | Performed by: OCCUPATIONAL THERAPIST

## 2019-11-21 RX ORDER — METOPROLOL SUCCINATE 50 MG/1
TABLET, EXTENDED RELEASE ORAL
Qty: 180 TABLET | Refills: 0 | Status: SHIPPED | OUTPATIENT
Start: 2019-11-21 | End: 2020-02-16

## 2019-11-21 RX ORDER — PRAVASTATIN SODIUM 40 MG
TABLET ORAL
Qty: 90 TABLET | Refills: 0 | Status: SHIPPED | OUTPATIENT
Start: 2019-11-21 | End: 2020-02-16

## 2019-11-21 NOTE — PROGRESS NOTES
Dx:   Closed intra-articular fracture of distal end of right radius with routine healing, subsequent encounter as well as ulna fracture   Authorized # of Visits:  12        Next MD visit: December?   Fall Risk: standard         Precautions: HTN  Medicatio Modalities                    Moist heat  5 min  5 min  5 min  5 min                                       Assessment: Patient complaining of shoulder pain lately.  Observed her functional reaching technique with right hand- patient beginning to compensat

## 2019-11-21 NOTE — TELEPHONE ENCOUNTER
Please review; protocol failed.     Requested Prescriptions     Pending Prescriptions Disp Refills   • PRAVASTATIN SODIUM 40 MG Oral Tab [Pharmacy Med Name: Pravastatin Sodium 40 Mg Tab ] 90 tablet 0     Sig: TAKE ONE TABLET BY MOUTH AT BEDTIME   • DAYSI

## 2019-12-03 ENCOUNTER — OFFICE VISIT (OUTPATIENT)
Dept: OCCUPATIONAL MEDICINE | Facility: HOSPITAL | Age: 84
End: 2019-12-03
Attending: ORTHOPAEDIC SURGERY
Payer: MEDICARE

## 2019-12-03 DIAGNOSIS — S52.571D OTHER CLOSED INTRA-ARTICULAR FRACTURE OF DISTAL END OF RIGHT RADIUS WITH ROUTINE HEALING, SUBSEQUENT ENCOUNTER: ICD-10-CM

## 2019-12-03 PROCEDURE — 97140 MANUAL THERAPY 1/> REGIONS: CPT | Performed by: OCCUPATIONAL THERAPIST

## 2019-12-03 PROCEDURE — 97110 THERAPEUTIC EXERCISES: CPT | Performed by: OCCUPATIONAL THERAPIST

## 2019-12-03 RX ORDER — ALENDRONATE SODIUM 70 MG/1
TABLET ORAL
Qty: 12 TABLET | Refills: 0 | Status: SHIPPED | OUTPATIENT
Start: 2019-12-03 | End: 2020-02-17

## 2019-12-03 NOTE — PROGRESS NOTES
Dx:   Closed intra-articular fracture of distal end of right radius with routine healing, subsequent encounter as well as ulna fracture   Authorized # of Visits:  12        Next MD visit: December?   Fall Risk: standard         Precautions: HTN  Medicatio Neuromuscular Re-education                                                             Modalities                    Moist heat  5 min  5 min  5 min  5 min  5 min                                     Assessment: Patient initially reports absence of parest worst to 1/10. Markel Mackenzieo ..(made progress toward)  Pt will be independent and compliant with comprehensive HEP to maintain progress achieved in OT. ...(ongoing)  Patient will demonstrate increase in right digit D2- D5 CAGLE to at least 200 degrees for ease in holding a

## 2019-12-04 ENCOUNTER — OFFICE VISIT (OUTPATIENT)
Dept: ORTHOPEDICS CLINIC | Facility: CLINIC | Age: 84
End: 2019-12-04
Payer: MEDICARE

## 2019-12-04 DIAGNOSIS — S52.614D CLOSED NONDISPLACED FRACTURE OF STYLOID PROCESS OF RIGHT ULNA WITH ROUTINE HEALING, SUBSEQUENT ENCOUNTER: Primary | ICD-10-CM

## 2019-12-04 PROCEDURE — 99024 POSTOP FOLLOW-UP VISIT: CPT | Performed by: ORTHOPAEDIC SURGERY

## 2019-12-04 PROCEDURE — G0463 HOSPITAL OUTPT CLINIC VISIT: HCPCS | Performed by: ORTHOPAEDIC SURGERY

## 2019-12-04 NOTE — PROGRESS NOTES
NURSING INTAKE COMMENTS: Patient presents with: Follow - Up: F/u on right wrist fracture. Has been going to physical therapy twice a week with improvement. Denies any swelling, or pain. Has not been wearing splint.       HPI: This 81year old female pre Breast Cancer Sister 66        2 sisters   • Arthritis Sister    • Heart Attack Brother    • Cancer Brother         pancreatic cancer       Social History    Occupational History      Not on file    Tobacco Use      Smoking status: Never Smoker      Smokel Normal    Imaging:   No results found. X-rays of the right wrist were obtained in the office today AP and lateral views demonstrate good maintenance of alignment of the fracture site. There is mild apex volar angulatory deformity.   There is abundant c

## 2019-12-10 ENCOUNTER — OFFICE VISIT (OUTPATIENT)
Dept: OCCUPATIONAL MEDICINE | Facility: HOSPITAL | Age: 84
End: 2019-12-10
Attending: ORTHOPAEDIC SURGERY
Payer: MEDICARE

## 2019-12-10 PROCEDURE — 97110 THERAPEUTIC EXERCISES: CPT | Performed by: OCCUPATIONAL THERAPIST

## 2019-12-10 PROCEDURE — 97140 MANUAL THERAPY 1/> REGIONS: CPT | Performed by: OCCUPATIONAL THERAPIST

## 2019-12-10 NOTE — PROGRESS NOTES
Dx:   Closed intra-articular fracture of distal end of right radius with routine healing, subsequent encounter as well as ulna fracture   Authorized # of Visits:  12        Next MD visit: none scheduled  Fall Risk: standard         Precautions: HTN  Medi remove nut from hammer and replace               Digit blocking ex, x 10 eacgh  Digit blocking ex, x 10 eacgh  Digit blocking ex, x 10 each  Digit blocking ex, x 10 each       HEP instruction             forearm/pronation with 1 wt x 20        wrist and di Time: 50 min

## 2019-12-12 ENCOUNTER — OFFICE VISIT (OUTPATIENT)
Dept: OCCUPATIONAL MEDICINE | Facility: HOSPITAL | Age: 84
End: 2019-12-12
Attending: ORTHOPAEDIC SURGERY
Payer: MEDICARE

## 2019-12-12 PROCEDURE — 97140 MANUAL THERAPY 1/> REGIONS: CPT | Performed by: OCCUPATIONAL THERAPIST

## 2019-12-12 PROCEDURE — 97110 THERAPEUTIC EXERCISES: CPT | Performed by: OCCUPATIONAL THERAPIST

## 2019-12-12 NOTE — PROGRESS NOTES
Dx:   Closed intra-articular fracture of distal end of right radius with routine healing, subsequent encounter as well as ulna fracture   Authorized # of Visits:  12        Next MD visit: none scheduled  Fall Risk: standard         Precautions: HTN  Medi Digit blocking ex, x 10 eacgh  Digit blocking ex, x 10 eacgh  Digit blocking ex, x 10 each  Digit blocking ex, x 10 each  Digit blocking ex, x 10 each     HEP instruction             forearm/pronation with 1 wt x 20  Forearm/pronation with 1 wt x groceries. Plan:Continue to focus on pain management and promoting AROM and strengthening for functional grasping. Paraffin heated stretch next visit.     Charges: MT,TE2      Total Timed Treatment: 45 min    Total Treatment Time: 50 min

## 2019-12-16 ENCOUNTER — OFFICE VISIT (OUTPATIENT)
Dept: OCCUPATIONAL MEDICINE | Facility: HOSPITAL | Age: 84
End: 2019-12-16
Attending: ORTHOPAEDIC SURGERY
Payer: MEDICARE

## 2019-12-16 PROCEDURE — 97110 THERAPEUTIC EXERCISES: CPT | Performed by: OCCUPATIONAL THERAPIST

## 2019-12-16 PROCEDURE — 97140 MANUAL THERAPY 1/> REGIONS: CPT | Performed by: OCCUPATIONAL THERAPIST

## 2019-12-16 NOTE — PROGRESS NOTES
Dx:   Closed intra-articular fracture of distal end of right radius with routine healing, subsequent encounter as well as ulna fracture   Authorized # of Visits:  12        Next MD visit: none scheduled  Fall Risk: standard         Precautions: HTN  Medi remove nut from hammer and replace   Fine motor remove nut from hammer and replace   Fine motor remove nut from hammer and replace          Digit blocking ex, x 10 eacgh  Digit blocking ex, x 10 eacgh  Digit blocking ex, x 10 each  Digit blocking ex, x 10 nicole...(Achieved)    12/03/19 new Goal  Patient to be assessed for  and pinch strength when appropriate. Sandra Smith .(Achieved)  12/10/19 new goal  Patient will demonstrate increase in right  strength to at least 15 lbs for ease in carrying bag of groceries.

## 2019-12-18 ENCOUNTER — OFFICE VISIT (OUTPATIENT)
Dept: OCCUPATIONAL MEDICINE | Facility: HOSPITAL | Age: 84
End: 2019-12-18
Attending: ORTHOPAEDIC SURGERY
Payer: MEDICARE

## 2019-12-18 PROCEDURE — 97110 THERAPEUTIC EXERCISES: CPT | Performed by: OCCUPATIONAL THERAPIST

## 2019-12-18 PROCEDURE — 97140 MANUAL THERAPY 1/> REGIONS: CPT | Performed by: OCCUPATIONAL THERAPIST

## 2019-12-18 NOTE — PROGRESS NOTES
Dx:  Closed intra-articular fracture of distal end of right radius with routine healing, subsequent encounter as well as ulna fracture     Authorized # of Visits:  12    Next MD visit: none scheduled  Fall Risk: standard         Precautions: n/a demonstrate increase in right wrist flexion to at least 65 degrees and extension to 70 degrees for ease in holding steering wheel while driving. ...(made progress toward)  Patient will demonstrate decrease in right wrist circumference by 1.0 cm. Cecily Castellon .(Achieved)

## 2019-12-30 ENCOUNTER — OFFICE VISIT (OUTPATIENT)
Dept: OCCUPATIONAL MEDICINE | Facility: HOSPITAL | Age: 84
End: 2019-12-30
Attending: ORTHOPAEDIC SURGERY
Payer: MEDICARE

## 2019-12-30 PROCEDURE — 97110 THERAPEUTIC EXERCISES: CPT | Performed by: OCCUPATIONAL THERAPIST

## 2019-12-30 PROCEDURE — 97140 MANUAL THERAPY 1/> REGIONS: CPT | Performed by: OCCUPATIONAL THERAPIST

## 2019-12-30 NOTE — PROGRESS NOTES
Dx:  Closed intra-articular fracture of distal end of right radius with routine healing, subsequent encounter as well as ulna fracture     Authorized # of Visits:  12    Next MD visit: none scheduled  Fall Risk: standard         Precautions: n/a will be independent and compliant with comprehensive HEP to maintain progress achieved in OT. ...(ongoing)  Patient will demonstrate increase in right digit D2- D5 CAGLE to at least 200 degrees for ease in holding a knife. .. Mahogany Spar (Achieved for D2, D4 and D5)  Jessica

## 2020-01-02 ENCOUNTER — OFFICE VISIT (OUTPATIENT)
Dept: OCCUPATIONAL MEDICINE | Facility: HOSPITAL | Age: 85
End: 2020-01-02
Attending: ORTHOPAEDIC SURGERY
Payer: MEDICARE

## 2020-01-02 PROCEDURE — 97110 THERAPEUTIC EXERCISES: CPT | Performed by: OCCUPATIONAL THERAPIST

## 2020-01-02 PROCEDURE — 97140 MANUAL THERAPY 1/> REGIONS: CPT | Performed by: OCCUPATIONAL THERAPIST

## 2020-01-02 NOTE — PROGRESS NOTES
Dx:  Closed intra-articular fracture of distal end of right radius with routine healing, subsequent encounter as well as ulna fracture     Authorized # of Visits:  12    Next MD visit: none scheduled  Fall Risk: standard         Precautions: n/a volar D1- D3 digits. Goals:   Pt complaints of pain in right wrist and digits will decrease at worst to 1/10. Rubi [place] ..(made progress toward)  Pt will be independent and compliant with comprehensive HEP to maintain progress achieved in OT. ...(ongoing)  Patient

## 2020-01-08 ENCOUNTER — OFFICE VISIT (OUTPATIENT)
Dept: OCCUPATIONAL MEDICINE | Facility: HOSPITAL | Age: 85
End: 2020-01-08
Attending: ORTHOPAEDIC SURGERY
Payer: MEDICARE

## 2020-01-08 PROCEDURE — 97110 THERAPEUTIC EXERCISES: CPT | Performed by: OCCUPATIONAL THERAPIST

## 2020-01-08 PROCEDURE — 97140 MANUAL THERAPY 1/> REGIONS: CPT | Performed by: OCCUPATIONAL THERAPIST

## 2020-01-08 NOTE — PROGRESS NOTES
Dx:  Closed intra-articular fracture of distal end of right radius with routine healing, subsequent encounter as well as ulna fracture     Authorized # of Visits:  12    Next MD visit: none scheduled  Fall Risk: standard         Precautions: n/a Modalities                    Moist heat  5 min  5 min  5 min                                     Assessment: Patient successful with completing all resistive exercises, however on several occasions compla complaints of pain in right wrist and digits will decrease at worst to 1/10. .. .(Achieved)  Pt will be independent and compliant with comprehensive HEP to maintain progress achieved in OT. ...(ongoing)  Patient will demonstrate increase in right digit D2- D5

## 2020-02-10 RX ORDER — AMLODIPINE BESYLATE 5 MG/1
TABLET ORAL
Qty: 90 TABLET | Refills: 1 | Status: SHIPPED | OUTPATIENT
Start: 2020-02-10 | End: 2020-08-09

## 2020-02-17 RX ORDER — PRAVASTATIN SODIUM 40 MG
TABLET ORAL
Qty: 90 TABLET | Refills: 1 | Status: SHIPPED | OUTPATIENT
Start: 2020-02-17 | End: 2020-08-09

## 2020-02-17 RX ORDER — METOPROLOL SUCCINATE 50 MG/1
TABLET, EXTENDED RELEASE ORAL
Qty: 180 TABLET | Refills: 1 | Status: SHIPPED | OUTPATIENT
Start: 2020-02-17 | End: 2020-08-09

## 2020-02-17 RX ORDER — ALENDRONATE SODIUM 70 MG/1
TABLET ORAL
Qty: 12 TABLET | Refills: 1 | Status: SHIPPED | OUTPATIENT
Start: 2020-02-17

## 2020-02-17 NOTE — TELEPHONE ENCOUNTER
Refill passed per [facility], [facility] protocol.   Refill Protocol Appointment Criteria  · Appointment scheduled in the past 12 months or in the next 3 months  Recent Outpatient Visits            1 month ago     [address]

## 2020-06-03 NOTE — TELEPHONE ENCOUNTER
Failed per nursing protocol - please advise on refill request     Hypertensive Medications  Protocol Criteria:  · Appointment scheduled in the past 6 months or in the next 3 months  · BMP or CMP in the past 12 months  · Creatinine result < 2  Recent Outpa Patient slept greater than 5 hours on most rounds.       Will continue to monitor.

## 2020-08-10 RX ORDER — METOPROLOL SUCCINATE 50 MG/1
100 TABLET, EXTENDED RELEASE ORAL DAILY
Qty: 180 TABLET | Refills: 1 | Status: SHIPPED | OUTPATIENT
Start: 2020-08-10 | End: 2021-02-15

## 2020-08-10 RX ORDER — PRAVASTATIN SODIUM 40 MG
40 TABLET ORAL NIGHTLY
Qty: 90 TABLET | Refills: 1 | Status: SHIPPED | OUTPATIENT
Start: 2020-08-10 | End: 2021-03-11

## 2020-08-10 RX ORDER — AMLODIPINE BESYLATE 5 MG/1
5 TABLET ORAL DAILY
Qty: 90 TABLET | Refills: 1 | Status: SHIPPED | OUTPATIENT
Start: 2020-08-10 | End: 2021-01-27

## 2020-09-10 DIAGNOSIS — E78.2 MIXED HYPERLIPIDEMIA: Primary | ICD-10-CM

## 2021-01-27 RX ORDER — AMLODIPINE BESYLATE 5 MG/1
5 TABLET ORAL DAILY
Qty: 90 TABLET | Refills: 1 | Status: SHIPPED | OUTPATIENT
Start: 2021-01-27 | End: 2021-08-06

## 2021-02-15 RX ORDER — METOPROLOL SUCCINATE 50 MG/1
100 TABLET, EXTENDED RELEASE ORAL DAILY
Qty: 180 TABLET | Refills: 1 | Status: SHIPPED | OUTPATIENT
Start: 2021-02-15 | End: 2021-02-17

## 2021-02-15 NOTE — TELEPHONE ENCOUNTER
Current Outpatient Medications:   •  Metoprolol Succinate ER 50 MG Oral Tablet 24 Hr, Take 2 tablets (100 mg total) by mouth daily. , Disp: 180 tablet, Rfl: 1

## 2021-02-18 RX ORDER — METOPROLOL SUCCINATE 50 MG/1
100 TABLET, EXTENDED RELEASE ORAL DAILY
Qty: 180 TABLET | Refills: 1 | Status: SHIPPED | OUTPATIENT
Start: 2021-02-18

## 2021-03-10 ENCOUNTER — PATIENT MESSAGE (OUTPATIENT)
Dept: INTERNAL MEDICINE CLINIC | Facility: CLINIC | Age: 86
End: 2021-03-10

## 2021-03-11 RX ORDER — PRAVASTATIN SODIUM 40 MG
40 TABLET ORAL NIGHTLY
Qty: 90 TABLET | Refills: 1 | Status: SHIPPED | OUTPATIENT
Start: 2021-03-11 | End: 2021-09-08

## 2021-03-11 NOTE — TELEPHONE ENCOUNTER
Brennan sent for COVID VACCINE first dose. .  RN=please update the immunization record     Omar Brigitte Kam=medication pended.     From: Ok Beams  To: Ricki Carlson MD  Sent: 3/10/2021 12:50 PM CST  Subject: Prescription Question    DEAR DR Brigitte Cruz

## 2021-06-29 ENCOUNTER — PATIENT MESSAGE (OUTPATIENT)
Dept: INTERNAL MEDICINE CLINIC | Facility: CLINIC | Age: 86
End: 2021-06-29

## 2021-06-30 NOTE — TELEPHONE ENCOUNTER
From: Trina Stevens  To: Yehuda Soulier, MD  Sent: 6/29/2021 5:55 PM CDT  Subject: Referral Request    DEAR DR Nuvia Johnston. I WOULD APPRECIATE IT IF YOU COULD RECOMMEND AN ORTHOPEDIC DOCTOR FOR MY. BACK ISSUES.  I WOULD APPRECIATE IT IF IT WAS ON BRUSH H

## 2021-07-04 ENCOUNTER — HOSPITAL ENCOUNTER (EMERGENCY)
Facility: HOSPITAL | Age: 86
Discharge: HOME | End: 2021-07-04
Attending: EMERGENCY MEDICINE
Payer: MEDICARE

## 2021-07-04 ENCOUNTER — APPOINTMENT (OUTPATIENT)
Dept: GENERAL RADIOLOGY | Facility: HOSPITAL | Age: 86
End: 2021-07-04
Attending: EMERGENCY MEDICINE
Payer: MEDICARE

## 2021-07-04 VITALS
BODY MASS INDEX: 28.32 KG/M2 | SYSTOLIC BLOOD PRESSURE: 154 MMHG | HEIGHT: 65 IN | HEART RATE: 64 BPM | DIASTOLIC BLOOD PRESSURE: 73 MMHG | RESPIRATION RATE: 20 BRPM | WEIGHT: 170 LBS | TEMPERATURE: 98 F | OXYGEN SATURATION: 95 %

## 2021-07-04 DIAGNOSIS — B37.2 CUTANEOUS CANDIDIASIS: ICD-10-CM

## 2021-07-04 DIAGNOSIS — K59.00 CONSTIPATION, UNSPECIFIED CONSTIPATION TYPE: ICD-10-CM

## 2021-07-04 DIAGNOSIS — N30.00 ACUTE CYSTITIS WITHOUT HEMATURIA: ICD-10-CM

## 2021-07-04 DIAGNOSIS — S39.012A STRAIN OF LUMBAR REGION, INITIAL ENCOUNTER: Primary | ICD-10-CM

## 2021-07-04 LAB
BILIRUB UR QL: NEGATIVE
CLARITY UR: CLEAR
COLOR UR: YELLOW
GLUCOSE UR-MCNC: NEGATIVE MG/DL
HGB UR QL STRIP.AUTO: NEGATIVE
NITRITE UR QL STRIP.AUTO: NEGATIVE
PH UR: 6 [PH] (ref 5–8)
PROT UR-MCNC: NEGATIVE MG/DL
SP GR UR STRIP: 1.02 (ref 1–1.03)
UROBILINOGEN UR STRIP-ACNC: <2
WBC #/AREA URNS AUTO: >50 /HPF

## 2021-07-04 PROCEDURE — 72100 X-RAY EXAM L-S SPINE 2/3 VWS: CPT | Performed by: EMERGENCY MEDICINE

## 2021-07-04 PROCEDURE — 74018 RADEX ABDOMEN 1 VIEW: CPT | Performed by: EMERGENCY MEDICINE

## 2021-07-04 PROCEDURE — 87086 URINE CULTURE/COLONY COUNT: CPT | Performed by: EMERGENCY MEDICINE

## 2021-07-04 PROCEDURE — 81001 URINALYSIS AUTO W/SCOPE: CPT | Performed by: EMERGENCY MEDICINE

## 2021-07-04 PROCEDURE — 99284 EMERGENCY DEPT VISIT MOD MDM: CPT

## 2021-07-04 RX ORDER — ASPIRIN 81 MG/1
81 TABLET ORAL DAILY
COMMUNITY

## 2021-07-04 RX ORDER — HYDROCODONE BITARTRATE AND ACETAMINOPHEN 5; 325 MG/1; MG/1
1 TABLET ORAL ONCE
Status: COMPLETED | OUTPATIENT
Start: 2021-07-04 | End: 2021-07-04

## 2021-07-04 RX ORDER — HYDROCODONE BITARTRATE AND ACETAMINOPHEN 5; 325 MG/1; MG/1
1 TABLET ORAL EVERY 6 HOURS PRN
Qty: 16 TABLET | Refills: 0 | Status: SHIPPED | OUTPATIENT
Start: 2021-07-04 | End: 2021-07-07

## 2021-07-04 RX ORDER — IBUPROFEN 600 MG/1
600 TABLET ORAL ONCE
Status: DISCONTINUED | OUTPATIENT
Start: 2021-07-04 | End: 2021-07-05

## 2021-07-04 RX ORDER — CEPHALEXIN 500 MG/1
500 CAPSULE ORAL 2 TIMES DAILY
Qty: 10 CAPSULE | Refills: 0 | Status: SHIPPED | OUTPATIENT
Start: 2021-07-04 | End: 2021-07-09

## 2021-07-04 RX ORDER — POLYETHYLENE GLYCOL 3350 17 G/17G
17 POWDER, FOR SOLUTION ORAL 2 TIMES DAILY PRN
Qty: 30 EACH | Refills: 0 | Status: SHIPPED | OUTPATIENT
Start: 2021-07-04 | End: 2021-08-03

## 2021-07-04 RX ORDER — CLOTRIMAZOLE 1 %
1 CREAM (GRAM) TOPICAL 2 TIMES DAILY
Qty: 14 G | Refills: 0 | Status: SHIPPED | OUTPATIENT
Start: 2021-07-04

## 2021-07-04 NOTE — ED PROVIDER NOTES
Patient Seen in: [facility] AND [facility] Emergency Department      History   Patient presents with:  Back Pain    Stated Complaint: back pain/ constipation     HPI/Subjective:   HPI    52-year-old female with past medical history significant for high blood pre 98.6 °F (37 °C)   Temp src Oral   SpO2 95 %   O2 Device None (Room air)       Current:/73   Pulse 64   Temp 97.5 °F (36.4 °C) (Oral)   Resp 20   Ht 165.1 cm (5' 5\")   Wt 77.1 kg   SpO2 95%   BMI 28.29 kg/m²         Physical Exam    Physical Exam   C of L1 and L2. 3. Dextroscoliosis and advanced multilevel degenerative disease. 4. Old right pubic fracture. 5. Large hiatal hernia.     Dictated by (CST): Patt Olszewski, MD on 7/04/2021 at 8:54 PM     Finalized by (CST): Patt Olszewski, MD on 7/04/2021 at 8:

## 2021-07-04 NOTE — ED INITIAL ASSESSMENT (HPI)
Patient here with back pain for approximately 1 week which is affecting her ability to ambulate. Patient denies trauma or injury. Patient also states she has been constipated for 3 days.

## 2021-07-07 ENCOUNTER — VIRTUAL PHONE E/M (OUTPATIENT)
Dept: INTERNAL MEDICINE CLINIC | Facility: CLINIC | Age: 86
End: 2021-07-07
Payer: MEDICARE

## 2021-07-07 DIAGNOSIS — M54.50 ACUTE BILATERAL LOW BACK PAIN WITHOUT SCIATICA: Primary | ICD-10-CM

## 2021-07-07 PROCEDURE — 99202 OFFICE O/P NEW SF 15 MIN: CPT | Performed by: NURSE PRACTITIONER

## 2021-07-07 RX ORDER — HYDROCODONE BITARTRATE AND ACETAMINOPHEN 5; 325 MG/1; MG/1
1 TABLET ORAL EVERY 6 HOURS PRN
Qty: 10 TABLET | Refills: 0 | Status: SHIPPED | OUTPATIENT
Start: 2021-07-07 | End: 2021-07-14

## 2021-07-07 NOTE — PROGRESS NOTES
HPI:    Patient ID: Elizabeth Jose is a 81year old female. Please note that the following visit was completed using two-way, real-time interactive audio and video communication.   This has been done in good steven to provide continuity of care in the best i that the hydrocodone has helped her sleep. She is requesting more pain medication. She is taking miralax and is having a daily bowel movement. Her daughter is on the call. We discussed the importance of weaning off opiates.     Plan  1) Ice the back 15 to 2 constipation, diarrhea, nausea and vomiting. Endocrine: Negative for cold intolerance and heat intolerance. Genitourinary: Negative for dysuria and hematuria. Musculoskeletal: Positive for back pain. Negative for joint swelling.    Skin: Negative for Amoxicillin             NAUSEA ONLY   PHYSICAL EXAM:   Physical Exam  Constitutional:       Appearance: Normal appearance. HENT:      Head: Normocephalic.       Mouth/Throat:      Mouth: Mucous membranes are moist.   Pulmonary:      Effort: Pulm Sig: Take 1 tablet by mouth every 6 (six) hours as needed for Pain.        Imaging & Referrals:  None         Alexis Healy, APRN

## 2021-07-07 NOTE — ASSESSMENT & PLAN NOTE
A/P 81year old female who went to the emergency room for low back pain. She also had a UTI and is currently taking Keflex. She states she is still having the pain with movement. She said that the hydrocodone has helped her sleep.  She is requesting more pa

## 2021-07-20 ENCOUNTER — TELEPHONE (OUTPATIENT)
Dept: INTERNAL MEDICINE CLINIC | Facility: CLINIC | Age: 86
End: 2021-07-20

## 2021-07-20 NOTE — TELEPHONE ENCOUNTER
Patient called and all her questions answered.     Stacia Avendano, ANP  Working with Veronica Alexander

## 2021-07-20 NOTE — TELEPHONE ENCOUNTER
Ro Blevinsu  Patient would like to speak to you today. She has less back pain and has decreased Norco to once a day . Although not sharp pain, she still has tightness, stiffness, pain with movement.  She also notices incontinence of urine and has noticed dime siz

## 2021-08-06 NOTE — TELEPHONE ENCOUNTER
•  amLODIPine Besylate 5 MG Oral Tab, Take 1 tablet (5 mg total) by mouth daily. , Disp: 90 tablet, Rfl: 1

## 2021-08-07 NOTE — TELEPHONE ENCOUNTER
CSS=please call and assist to schedule for an establish care appointment-former patient of Dr Azra Hare. No future appointments. Please review; protocol failed/no protocol.      Requested Prescriptions   Pending Prescriptions Disp Refills    amLOD

## 2021-08-09 RX ORDER — AMLODIPINE BESYLATE 5 MG/1
5 TABLET ORAL DAILY
Qty: 90 TABLET | Refills: 0 | Status: SHIPPED | OUTPATIENT
Start: 2021-08-09 | End: 2021-10-27

## 2021-08-09 NOTE — TELEPHONE ENCOUNTER
Please schedule a phone visit, video visit or in person visit.     MARQUEZ Graham with Dr. Vincent Lee

## 2021-08-10 RX ORDER — AMLODIPINE BESYLATE 5 MG/1
5 TABLET ORAL DAILY
Qty: 90 TABLET | Refills: 0 | OUTPATIENT
Start: 2021-08-10

## 2021-08-13 ENCOUNTER — TELEPHONE (OUTPATIENT)
Dept: INTERNAL MEDICINE CLINIC | Facility: CLINIC | Age: 86
End: 2021-08-13

## 2021-08-13 NOTE — TELEPHONE ENCOUNTER
I have not seen the patient yet. The patient has been evaluated via a virtual visit by LOR Overton. It can wait for her return. However we may need to see her in person before we decide what is safe for her at home. Last time she was seen in person here is a couple of years ago by her former primary care doctor.

## 2021-08-19 NOTE — TELEPHONE ENCOUNTER
Spoke, with the patient and informed her of the message below. Patient, states that she will check with her daughter and call back to schedule an appointment.

## 2021-09-08 NOTE — TELEPHONE ENCOUNTER
Please review. Protocol failed / No protocol. Requested Prescriptions   Pending Prescriptions Disp Refills    pravastatin 40 MG Oral Tab 90 tablet 1     Sig: Take 1 tablet (40 mg total) by mouth nightly.         Cholesterol Medication Protocol Failed - 9/8/2021  2:05 PM        Failed - ALT in past 12 months        Failed - LDL in past 12 months        Failed - Last ALT < 80       Lab Results   Component Value Date    ALT 19 06/22/2017             Failed - Last LDL < 130     Lab Results   Component Value Date    LDL 87 04/28/2016             Failed - Appointment in past 12 or next 3 months                  Recent Outpatient Visits              2 months ago Acute bilateral low back pain without sciatica    Lisa Alves Diana, APRN    Virtual Phone E/M    1 year ago     [address] [place] [address] Yadi Fernandez, OT    Office Visit    1 year ago     [address] [place] [address] Yadi Fernandez, OT    Office Visit    1 year ago     [address] [place], [place] [address]Yadi, OT    Office Visit    1 year ago     [address] [place], [place] [address]Yadi, [place]    Office Visit

## 2021-09-09 RX ORDER — PRAVASTATIN SODIUM 40 MG
40 TABLET ORAL NIGHTLY
Qty: 90 TABLET | Refills: 1 | Status: SHIPPED | OUTPATIENT
Start: 2021-09-09 | End: 2021-10-04

## 2021-09-30 ENCOUNTER — TELEPHONE (OUTPATIENT)
Dept: INTERNAL MEDICINE CLINIC | Facility: CLINIC | Age: 86
End: 2021-09-30

## 2021-09-30 DIAGNOSIS — E78.2 MIXED HYPERLIPIDEMIA: Primary | ICD-10-CM

## 2021-10-04 RX ORDER — PRAVASTATIN SODIUM 40 MG
40 TABLET ORAL NIGHTLY
Qty: 90 TABLET | Refills: 0 | Status: SHIPPED | OUTPATIENT
Start: 2021-10-04 | End: 2021-12-29

## 2021-10-04 NOTE — TELEPHONE ENCOUNTER
Please review. Protocol failed / No protocol. Requested Prescriptions   Pending Prescriptions Disp Refills    pravastatin 40 MG Oral Tab 90 tablet 1     Sig: Take 1 tablet (40 mg total) by mouth nightly.         Cholesterol Medication Protocol Failed - 10/4/2021  8:24 AM        Failed - ALT in past 12 months        Failed - LDL in past 12 months        Failed - Last ALT < 80       Lab Results   Component Value Date    ALT 19 06/22/2017             Failed - Last LDL < 130     Lab Results   Component Value Date    LDL 87 04/28/2016               Failed - Appointment in past 12 or next 3 months                  Recent Outpatient Visits              2 months ago Acute bilateral low back pain without sciatica    [facility], [facility]    Virtual Phone E/M    1 year ago     [address] [place], [place] [address]Aureliano, OT    Office Visit    1 year ago     [address] [place], [place] [address]Aureliano, OT    Office Visit    1 year ago     [address] [place], [place] [address]Aureliano, OT    Office Visit    1 year ago     [address] [place], [place] [address]Aureliano [place]    Office Visit

## 2021-10-15 ENCOUNTER — LAB ENCOUNTER (OUTPATIENT)
Dept: LAB | Facility: HOSPITAL | Age: 86
End: 2021-10-15
Attending: INTERNAL MEDICINE
Payer: MEDICARE

## 2021-10-15 DIAGNOSIS — E78.2 MIXED HYPERLIPIDEMIA: ICD-10-CM

## 2021-10-15 PROCEDURE — 84439 ASSAY OF FREE THYROXINE: CPT

## 2021-10-15 PROCEDURE — 36415 COLL VENOUS BLD VENIPUNCTURE: CPT

## 2021-10-15 PROCEDURE — 84443 ASSAY THYROID STIM HORMONE: CPT

## 2021-10-15 PROCEDURE — 85025 COMPLETE CBC W/AUTO DIFF WBC: CPT

## 2021-10-15 PROCEDURE — 80061 LIPID PANEL: CPT

## 2021-10-15 PROCEDURE — 80053 COMPREHEN METABOLIC PANEL: CPT

## 2021-10-15 PROCEDURE — 81001 URINALYSIS AUTO W/SCOPE: CPT

## 2021-10-28 RX ORDER — AMLODIPINE BESYLATE 5 MG/1
5 TABLET ORAL DAILY
Qty: 30 TABLET | Refills: 1 | Status: SHIPPED | OUTPATIENT
Start: 2021-10-28 | End: 2021-11-24

## 2021-10-28 NOTE — TELEPHONE ENCOUNTER
Note to pharmacy: Patient has not been seen in the office in two years. Her previous doctor has retired. She will be given decreasing amounts of refills until she is seen.

## 2021-11-08 ENCOUNTER — OFFICE VISIT (OUTPATIENT)
Dept: INTERNAL MEDICINE CLINIC | Facility: CLINIC | Age: 86
End: 2021-11-08
Payer: MEDICARE

## 2021-11-08 VITALS
SYSTOLIC BLOOD PRESSURE: 165 MMHG | BODY MASS INDEX: 28.82 KG/M2 | DIASTOLIC BLOOD PRESSURE: 85 MMHG | HEIGHT: 65 IN | HEART RATE: 64 BPM | WEIGHT: 173 LBS

## 2021-11-08 DIAGNOSIS — N39.44 NOCTURNAL ENURESIS: ICD-10-CM

## 2021-11-08 DIAGNOSIS — N39.498 OTHER URINARY INCONTINENCE: Primary | ICD-10-CM

## 2021-11-08 PROCEDURE — 99213 OFFICE O/P EST LOW 20 MIN: CPT | Performed by: NURSE PRACTITIONER

## 2021-11-08 NOTE — ASSESSMENT & PLAN NOTE
18-year-old female who was having urge incontinence and nocturnal incontinence. This has been going on for almost two years. She lives alone and her daughter lives near by. She has some excoriation in her perineal area from the incontinence.   Patient is not

## 2021-11-08 NOTE — PROGRESS NOTES
HPI:    Patient ID: Eros Turipn is a 81year old female. HPI Urinary  Incontinence  This started one year and half ago. 18-year-old female I saw several months ago for back pain when she was getting tizanidine.   She is here today for urinary incontin nausea and vomiting. Endocrine: Negative for cold intolerance, heat intolerance, polydipsia, polyphagia and polyuria. Genitourinary: Negative for difficulty urinating, dysuria, pelvic pain and vaginal bleeding.    Musculoskeletal: Negative for back pain tracheal deviation. Cardiovascular:      Rate and Rhythm: Normal rate and regular rhythm. Chest Wall: PMI is not displaced. Pulses: Normal pulses. Dorsalis pedis pulses are 2+ on the right side and 2+ on the left side.         Posterio Coordination: Coordination normal.      Gait: Gait normal.      Deep Tendon Reflexes:      Reflex Scores:       Tricep reflexes are 2+ on the right side and 2+ on the left side. Bicep reflexes are 2+ on the right side and 2+ on the left side.   Psychi ASSESSMENT/PLAN:     Problem List Items Addressed This Visit        Unprioritized    Nocturnal enuresis     60-year-old female who was having urge incontinence and nocturnal incontinence. This has been going on for almost two years.  She lives alone and he

## 2021-11-24 RX ORDER — AMLODIPINE BESYLATE 5 MG/1
5 TABLET ORAL DAILY
Qty: 90 TABLET | Refills: 1 | Status: SHIPPED | OUTPATIENT
Start: 2021-11-24

## 2021-11-24 NOTE — TELEPHONE ENCOUNTER
Refill passed per [place] [employer], [facility] protocol. Requested Prescriptions   Pending Prescriptions Disp Refills    amLODIPine 5 MG Oral Tab 30 tablet 1     Sig: Take 1 tablet (5 mg total) by mouth daily.         Hypertensive Medications Protocol Passed - 11/24/2

## 2021-12-29 RX ORDER — PRAVASTATIN SODIUM 40 MG
40 TABLET ORAL NIGHTLY
Qty: 90 TABLET | Refills: 0 | Status: SHIPPED | OUTPATIENT
Start: 2021-12-29

## 2022-02-17 RX ORDER — METOPROLOL SUCCINATE 50 MG/1
100 TABLET, EXTENDED RELEASE ORAL DAILY
Qty: 180 TABLET | Refills: 1 | Status: SHIPPED | OUTPATIENT
Start: 2022-02-17

## 2022-02-17 NOTE — TELEPHONE ENCOUNTER
Refill passed per [facility] protocol. Requested Prescriptions   Pending Prescriptions Disp Refills    metoprolol succinate 50 MG Oral Tablet 24 Hr 180 tablet 1     Sig: Take 2 tablets (100 mg total) by mouth daily.         Hypertensive Medications Protocol Passed - 2/17/2022  9:00 AM        Passed - CMP or BMP in past 12 months        Passed - Appointment in past 6 or next 3 months        Passed - GFR Non- > 50     Lab Results   Component Value Date    GFRNAA 56 (L) 10/15/2021                      Recent Outpatient Visits              3 months ago Other urinary incontinence    [facility], [place]    Office Visit    7 months ago Acute bilateral low back pain without sciatica    [facility], [place] APR[place]    Virtual Phone E/M    2 years ago     [facility], [facility], [address] [place] [address] Visit    2 years ago     [facility], [address] [place] [address] Visit    2 years ago     [facility], [facility]    Office Visit

## 2022-02-17 NOTE — TELEPHONE ENCOUNTER
Patient called asking for refill on Metoprolol Succinate ER 50 MG Oral Tablet 24 Hr    Late fill 02/18/2021 - #180 tablets, 1 refill    Please review and sign if appropriate.

## 2022-04-01 RX ORDER — PRAVASTATIN SODIUM 40 MG
40 TABLET ORAL NIGHTLY
Qty: 90 TABLET | Refills: 1 | Status: SHIPPED | OUTPATIENT
Start: 2022-04-01 | End: 2022-10-03

## 2022-04-01 NOTE — TELEPHONE ENCOUNTER
Refill passed per [place] [employer] [place], [facility] protocol. Requested Prescriptions   Pending Prescriptions Disp Refills    PRAVASTATIN 40 MG Oral Tab [Pharmacy Med Name: Pravastatin Sodium 40 Mg Tab Arnoldo] 90 tablet 0     Sig: Take 1 tablet by mouth nightly.         Cholesterol Medication Protocol Passed - 4/1/2022  8:02 AM        Passed - ALT in past 12 months        Passed - LDL in past 12 months        Passed - Last ALT < 80       Lab Results   Component Value Date    ALT 22 10/15/2021             Passed - Last LDL < 130     Lab Results   Component Value Date    LDL 70 10/15/2021               Passed - Appointment in past 12 or next 3 months                Recent Outpatient Visits              4 months ago Other urinary incontinence    [facility], [facility], [address] [facility], Stephanie Gonzalez APRN    Office Visit    8 months ago Acute bilateral low back pain without sciatica    [address] [facility], Rupinder Gonzalez APRN    Virtual Phone E/M    2 years ago     [address] [place] [address], [address] [place] [address] Visit    2 years ago     [address] [place] [address], [address] [place] [address] Visit    2 years ago     [address] [place] [address]    Office Visit

## 2022-06-01 RX ORDER — AMLODIPINE BESYLATE 5 MG/1
TABLET ORAL
Qty: 90 TABLET | Refills: 0 | Status: SHIPPED | OUTPATIENT
Start: 2022-06-01 | End: 2022-08-26

## 2022-06-02 NOTE — TELEPHONE ENCOUNTER
1st attempt - ZOOM TVt message sent for patient to contact the office to schedule an appointment; see notes below.

## 2022-08-15 RX ORDER — METOPROLOL SUCCINATE 50 MG/1
TABLET, EXTENDED RELEASE ORAL
Qty: 180 TABLET | Refills: 0 | Status: SHIPPED | OUTPATIENT
Start: 2022-08-15 | End: 2022-11-15

## 2022-08-26 RX ORDER — AMLODIPINE BESYLATE 5 MG/1
TABLET ORAL
Qty: 90 TABLET | Refills: 0 | Status: SHIPPED | OUTPATIENT
Start: 2022-08-26

## 2022-08-26 NOTE — TELEPHONE ENCOUNTER
Please review Protocol Failed/No Protocol        Requested Prescriptions   Pending Prescriptions Disp Refills    AMLODIPINE 5 MG Oral Tab [Pharmacy Med Name: Amlodipine Besylate 5 Mg Tab Unic] 90 tablet 0     Sig: Take 1 tablet by mouth daily. Hypertensive Medications Protocol Failed - 8/25/2022  8:27 AM        Failed - Last BP reading less than 140/90     BP Readings from Last 1 Encounters:  11/08/21 : (!) 165/85                Failed - CMP or BMP in past 6 months     No results found for this or any previous visit (from the past 4392 hour(s)).               Failed - In person appointment or virtual visit in the past 6 months       Recent Outpatient Visits              9 months ago Other urinary incontinence    [address] [place], [address], [place], [place], APRN    Office Visit    1 year ago Acute bilateral low back pain without sciatica    [address] [place], [address], [place], [place], APRN    Virtual Phone E/M    2 years ago     [address], [place], [address] Visit    2 years ago     [address], [place], [address] Visit    2 years ago     [address] Jana Nerudy Adelita - In person appointment in the past 12 or next 3 months       Recent Outpatient Visits              9 months ago Other urinary incontinence    [address] [place], [address], [place], [place], APRN    Office Visit    1 year ago Acute bilateral low back pain without sciatica    [address] [place], [address], [place], Rupinder, APRN    Virtual Phone E/M    2 years ago     [address], [place], [address] Visit    2 years ago     [address], [place], [address] Visit    2 years ago     [address] [place]    Office Visit Passed - GFR > 50     No results found for: [facility]                        Recent Outpatient Visits              9 months ago Other urinary incontinence    [facility] [place]    Office Visit    1 year ago Acute bilateral low back pain without sciatica    [facility], Rupinder Gonzalez APRN    Virtual Phone E/M    2 years ago     [address], [place], OT    Office Visit    2 years ago     [address], [place], OT    Office Visit    2 years ago     [address], [place], [facility]    Office Visit

## 2022-10-03 RX ORDER — PRAVASTATIN SODIUM 40 MG
40 TABLET ORAL NIGHTLY
Qty: 90 TABLET | Refills: 1 | Status: SHIPPED | OUTPATIENT
Start: 2022-10-03

## 2022-10-03 NOTE — TELEPHONE ENCOUNTER
Refill passed per [address] [place] protocol.    Requested Prescriptions   Pending Prescriptions Disp Refills    PRAVASTATIN 40 MG Oral Tab [Pharmacy Med Name: Pravastatin Sodium 40 Mg Tab Arnoldo] 90 tablet 0     Sig: TAKE ONE TABLET BY MOUTH AT BEDTIME        Cholesterol Medication Protocol Passed - 10/3/2022  1:33 AM        Passed - ALT in past 12 months        Passed - LDL in past 12 months        Passed - Last ALT < 80       Lab Results   Component Value Date    ALT 22 10/15/2021             Passed - Last LDL < 130     Lab Results   Component Value Date    LDL 70 10/15/2021               Passed - In person appointment or virtual visit in the past 12 mos or appointment in next 3 mos       Recent Outpatient Visits              10 months ago Other urinary incontinence    [address] [place], [address], [place], [place], APRN    Office Visit    1 year ago Acute bilateral low back pain without sciatica    [address] [place], [address], [place], Rupinder, APRN    Virtual Phone E/M    2 years ago     [address], [place], [address] Visit    2 years ago     [address], [place], [address] Visit    2 years ago     [address], [place], [address] Visit                      Recent Outpatient Visits              10 months ago Other urinary incontinence    [address] [place], [address], [place], [place], APRN    Office Visit    1 year ago Acute bilateral low back pain without sciatica    [address] [place], [address], [place], Rupinder, APRN    Virtual Phone E/M    2 years ago     [address], [place], [address] Visit    2 years ago     [address], [place], [address] Visit    2 years ago     [address], [place], [facility]    Office Visit

## 2022-11-28 RX ORDER — AMLODIPINE BESYLATE 5 MG/1
TABLET ORAL
Qty: 90 TABLET | Refills: 0 | Status: SHIPPED | OUTPATIENT
Start: 2022-11-28

## 2023-03-09 RX ORDER — AMLODIPINE BESYLATE 5 MG/1
5 TABLET ORAL DAILY
Qty: 90 TABLET | Refills: 0 | OUTPATIENT
Start: 2023-03-09

## 2023-03-11 RX ORDER — AMLODIPINE BESYLATE 5 MG/1
5 TABLET ORAL DAILY
Qty: 90 TABLET | Refills: 1 | Status: SHIPPED | OUTPATIENT
Start: 2023-03-11

## 2023-03-11 NOTE — TELEPHONE ENCOUNTER
Please review. Protocol failed / No protocol. Requested Prescriptions   Pending Prescriptions Disp Refills    amLODIPine 5 MG Oral Tab 90 tablet 0     Sig: Take 1 tablet (5 mg total) by mouth daily. Hypertensive Medications Protocol Failed - 3/11/2023 11:33 AM        Failed - Last BP reading less than 140/90     BP Readings from Last 1 Encounters:  11/08/21 : (!) 165/85              Failed - CMP or BMP in past 6 months     No results found for this or any previous visit (from the past 4392 hour(s)).             Failed - In person appointment or virtual visit in the past 6 months     Recent Outpatient Visits              1 year ago Other urinary incontinence    [address] [facility], [place], APRN    Office Visit    1 year ago Acute bilateral low back pain without sciatica    [facility], [address], [place], RAUL    Virtual Phone E/M    3 years ago     [facility]    Office Visit    3 years ago     [facility] [facility]    Office Visit    3 years ago     [facility], Ej TRONCOSO, [facility]    Office Visit          Future Appointments         Provider Department Appt Notes    In 2 months Guy Overton MD [facility], [address] [facility], [place], \"policy informed\"               Failed - EGFRCR or GFRNAA > 50     GFR Evaluation            Passed - In person appointment in the past 12 or next 3 months     Recent Outpatient Visits              1 year ago Other urinary incontinence    [address] [facility], [place], [place], APRN    Office Visit    1 year ago Acute bilateral low back pain without sciatica    [facility], [address], [place], APRN    Virtual Phone E/M    3 years ago     [facility] [address] Judy Clark, OT    Office Visit    3 years ago     [address], [place], [place]    Office Visit    3 years ago     [address], [place], [place]    Office Visit          Future Appointments         Provider Department Appt Notes    In 2 months MD Jose Arriola, [facility] [place], \"policy informed\"                     Recent Outpatient Visits              1 year ago Other urinary incontinence    [facility], [address], [place], APRN    Office Visit    1 year ago Acute bilateral low back pain without sciatica    [facility], [address], [place], [facility], APRN    Virtual Phone E/M    3 years ago     [address] [place], OT    Office Visit    3 years ago     [address], OT    Office Visit    3 years ago     [address], Zam[place], [place]    Office Visit             Future Appointments         Provider Department Appt Notes    In 2 months MD Jose Arriola, [facility] [place], Laura Gonzalez informed\"

## 2023-03-11 NOTE — TELEPHONE ENCOUNTER
Future Appointments   Date Time Provider Wilda Schaefer   5/31/2023  2:40 PM Jose Weathers MD [facility] Zari Bettencourt

## 2023-03-11 NOTE — TELEPHONE ENCOUNTER
Patient is calling to request a refill on her medication below. She has scheduled her Px, for 5-31. She is placed on a wait list to come sooner.     Patient is out of medication    AMLODIPINE 5 MG Oral Tab

## 2023-04-13 ENCOUNTER — TELEPHONE (OUTPATIENT)
Dept: INTERNAL MEDICINE CLINIC | Facility: CLINIC | Age: 88
End: 2023-04-13

## 2023-04-13 RX ORDER — METOPROLOL SUCCINATE 50 MG/1
100 TABLET, EXTENDED RELEASE ORAL DAILY
Qty: 60 TABLET | Refills: 1 | Status: SHIPPED | OUTPATIENT
Start: 2023-04-13

## 2023-04-13 NOTE — TELEPHONE ENCOUNTER
Protocol failed or has No Protocol, please review  Requested Prescriptions   Pending Prescriptions Disp Refills    metoprolol succinate ER 50 MG Oral Tablet 24 Hr 60 tablet 1     Sig: Take 2 tablets (100 mg total) by mouth daily. Hypertensive Medications Protocol Failed - 4/13/2023  5:04 PM        Failed - Last BP reading less than 140/90     BP Readings from Last 1 Encounters:  11/08/21 : (!) 165/85                Failed - CMP or BMP in past 6 months     No results found for this or any previous visit (from the past 4392 hour(s)).               Failed - In person appointment or virtual visit in the past 6 months     Recent Outpatient Visits              1 year ago Other urinary incontinence    Lisa Br[place], APRN    Office Visit    1 year ago Acute bilateral low back pain without sciatica    [facility], [address], [place]phanie [place], RAUL    Virtual Phone E/M    3 years ago     [address], [place], OT    Office Visit    3 years ago     [facility]    Office Visit    3 years ago     [facility] [facility], [facility]    Office Visit          Future Appointments         Provider Department Appt Notes    In 1 month Jose Weathers MD [address] [place],[address], [address] [facility] [facility], \"policy informed\"               Failed - EGFRCR or GFRNAA > 50     GFR Evaluation              Passed - In person appointment in the past 12 or next 3 months     Recent Outpatient Visits              1 year ago Other urinary incontinence    Lisa Briones [place], APRN    Office Visit    1 year ago Acute bilateral low back pain without sciatica    [facility], [address], Stephanie Gonzalez, RAUL    [employer] E/M    3 years ago     [address] [facility] Jeni Tavera, OT    Office Visit    3 years ago     [address] [place]    Office Visit    3 years ago     Camron Schuster [place]    Office Visit          Future Appointments         Provider Department Appt Notes    In 1 month Guy Overton MD [address], [address] [place], [facility] Liliana Passer, [employer] informed\"                  Future Appointments         Provider Department Appt Notes    In 1 month Guy Overton MD [address], [address] [place], [facility] Liliana Hiltoner, \"policy informed\"          Recent Outpatient Visits              1 year ago Other urinary incontinence    [facility], [address], [place], [facility]    Office Visit    1 year ago Acute bilateral low back pain without sciatica    [facility], [address], [facility], APRN    Virtual Phone E/M    3 years ago     Camron Schuster, CHRISTIANA    Office Visit    3 years ago     Camron Schuster OT    Office Visit    3 years ago     [address] [place]    Office Visit

## 2023-04-20 RX ORDER — PRAVASTATIN SODIUM 40 MG
40 TABLET ORAL NIGHTLY
Qty: 90 TABLET | Refills: 0 | Status: SHIPPED | OUTPATIENT
Start: 2023-04-20

## 2023-04-20 NOTE — TELEPHONE ENCOUNTER
Calli Brochure  =are you able to refill a short supply until next visit ? Last office visit was 11/8/2021. See also medication refill  request 4/10/23. Future Appointments   Date Time Provider Wilda Schaefer   5/31/2023  2:40 PM Lissette Sandy MD [facility] MACK [place]         Please review; protocol failed/no protocol. Requested Prescriptions   Pending Prescriptions Disp Refills    pravastatin 40 MG Oral Tab 90 tablet 1     Sig: Take 1 tablet (40 mg total) by mouth nightly.        Cholesterol Medication Protocol Failed - 4/20/2023  1:26 PM        Failed - ALT in past 12 months        Failed - LDL in past 12 months        Failed - Last ALT < 80     Lab Results   Component Value Date    ALT 22 10/15/2021             Failed - Last LDL < 130     Lab Results   Component Value Date    LDL 70 10/15/2021               Passed - In person appointment or virtual visit in the past 12 mos or appointment in next 3 mos     Recent Outpatient Visits              1 year ago Other urinary incontinence    [facility], [place], [place], APRN    Office Visit    1 year ago Acute bilateral low back pain without sciatica    [facility], [address], [place], Rupinder, APRN    Virtual Phone E/M    3 years ago     [facility], OT    Office Visit    3 years ago     [address], OT    Office Visit    3 years ago     [facility], [facility], [facility]    Office Visit          Future Appointments         Provider Department Appt Notes    In 1 month Lissette Sandy MD [address] [place], [address] [facility], \"policy informed\"                     Recent Outpatient Visits              1 year ago Other urinary incontinence    [address] [facility], [place], [place], APRN    Office Visit    1 year ago Acute bilateral low back pain without sciatica    [facility], [address], Katerina Gonzalez, RAUL    Virtual Phone E/M    3 years ago     [address] [place] [address] Street, [place], [place]    Office Visit    3 years ago     [address] [place], [place] [address]Ej,     Office Visit    3 years ago     [address] [place], [place] [address]Ej, [place]    Office Visit             Future Appointments         Provider Department Appt Notes    In 1 month Luis Arenas MD [facility], [address] [place] [address], Laura Gonzalez informed\"

## 2023-06-11 NOTE — TELEPHONE ENCOUNTER
Please review. Protocol failed / No protocol. Future Appointments   Date Time Provider Wilda Schaefer   8/7/2023  9:40 AM Roney Mcadams MD [facility] Miguel MENESES       Requested Prescriptions   Pending Prescriptions Disp Refills    METOPROLOL SUCCINATE ER 50 MG Oral Tablet 24 Hr [Pharmacy Med Name: Metoprolol Succinate Er 24hr 50 Mg Tab Nort] 60 tablet 0     Sig: Take 2 tablets (100 mg total) by mouth daily. Hypertensive Medications Protocol Failed - 6/10/2023  6:40 PM        Failed - In person appointment in the past 12 or next 3 months     Recent Outpatient Visits              1 year ago Other urinary incontinence    [facility], APRN    Office Visit    1 year ago Acute bilateral low back pain without sciatica    [facility], [address] [place] [address],[address], [facility], APR    Virtual Phone E/M    3 years ago     [facility], Ej K, OT    Office Visit    3 years ago     [facility]    Office Visit    3 years ago     [facility], Ej TRONCOSO, [facility]    Office Visit          Future Appointments         Provider Department Appt Notes    In 1 month Roney Mcadams MD [address] [facility],[address], [address] last px 0/95/2069  \"policy informed\"               Failed - Last BP reading less than 140/90     BP Readings from Last 1 Encounters:  11/08/21 : (!) 165/85                Failed - CMP or BMP in past 6 months     No results found for this or any previous visit (from the past 4392 hour(s)).               Failed - In person appointment or virtual visit in the past 6 months     Recent Outpatient Visits              1 year ago Other urinary incontinence    [facility], [place], APRN    Office Visit    1 year ago Acute bilateral low back pain without sciatica    [facility], [address], [place], Marta Renae, APRN    Virtual Phone E/M    3 years ago     [address], [place], [place]    Office Visit    3 years ago     [address], [place], [place]    Office Visit    3 years ago     [address], [place], [place]    Office Visit          Future Appointments         Provider Department Appt Notes    In 1 month Rusty Dubois MD [employer], [facility] last px 5/81/8186  \"policy informed\"               Failed - EGFRCR or GFRNAA > 50     GFR Evaluation                  Future Appointments         Provider Department Appt Notes    In 1 month Rusty Dubois MD [employer], [address] [place] last px 0/14/8586  \"policy informed\"           Recent Outpatient Visits              1 year ago Other urinary incontinence    [facility], [address], [place], APRN    Office Visit    1 year ago Acute bilateral low back pain without sciatica    [facility], [address], [place], Marta Renae, APRN    Virtual Phone E/M    3 years ago     [address], [place], OT    Office Visit    3 years ago     [address], [place], OT    Office Visit    3 years ago     [address], [place], [place]    Office Visit

## 2023-06-12 RX ORDER — METOPROLOL SUCCINATE 50 MG/1
100 TABLET, EXTENDED RELEASE ORAL DAILY
Qty: 120 TABLET | Refills: 0 | Status: SHIPPED | OUTPATIENT
Start: 2023-06-12

## 2023-06-13 NOTE — TELEPHONE ENCOUNTER
Please call patient and schedule patient for an annual physical with Dr. Jones Hubbard or Sergey Borden DNP.

## 2023-07-12 RX ORDER — PRAVASTATIN SODIUM 40 MG
40 TABLET ORAL NIGHTLY
Qty: 90 TABLET | Refills: 0 | Status: SHIPPED | OUTPATIENT
Start: 2023-07-12

## 2023-07-12 NOTE — TELEPHONE ENCOUNTER
Call center please call and schedule an appointment. Thank You. Also  Please request who her PCP is.    Listed as RAUL Mcclain   to [facility] Triage Support         7/12/23  2:02 PM  Please make an Appointment with her PCP

## 2023-07-12 NOTE — TELEPHONE ENCOUNTER
Please review. Protocol failed or has no protocol.     Requested Prescriptions   Pending Prescriptions Disp Refills    PRAVASTATIN 40 MG Oral Tab [Pharmacy Med Name: Pravastatin Sodium 40 Mg Tab Nort] 90 tablet 0     Sig: TAKE ONE TABLET BY MOUTH AT BEDTIME       Cholesterol Medication Protocol Failed - 7/10/2023  6:18 PM        Failed - ALT in past 12 months        Failed - LDL in past 12 months        Failed - Last ALT < 80     Lab Results   Component Value Date    ALT 22 10/15/2021             Failed - Last LDL < 130     Lab Results   Component Value Date    LDL 70 10/15/2021             Failed - In person appointment or virtual visit in the past 12 mos or appointment in next 3 mos     Recent Outpatient Visits              1 year ago Other urinary incontinence    [facility], [address], [place]    Office Visit    2 years ago Acute bilateral low back pain without sciatica    [facility], [address], [place], RAUL    Virtual Phone E/M    3 years ago     [address], [place], OT    Office Visit    3 years ago     [address], [place], OT    Office Visit    3 years ago     [address], [place], [place]    Office Visit          Future Appointments         Provider Department Appt Notes    In 3 weeks Bhargavi Walters MD [address] [facility],[address], [address] [place] [address] last px 2/12/8825  \"policy informed\"                    Recent Outpatient Visits              1 year ago Other urinary incontinence    [address] [facility], Stephanie Gonzalez, RAUL    Office Visit    2 years ago Acute bilateral low back pain without sciatica    [facility], [address], [place], RAUL    Virtual Phone E/M    3 years ago     [address] [place] Office Visit    3 years ago     [address] [facility], Lisa House, [facility] Visit    3 years ago     [address] [place] [address], Lisa House [facility]    Office Visit            Future Appointments         Provider Department Appt Notes    In 3 weeks Cardell Essex, MD [address], [address] [facility] last px 9/47/5475  \"policy informed\"

## 2023-08-07 ENCOUNTER — OFFICE VISIT (OUTPATIENT)
Facility: CLINIC | Age: 88
End: 2023-08-07

## 2023-08-07 VITALS
BODY MASS INDEX: 28.49 KG/M2 | WEIGHT: 171 LBS | SYSTOLIC BLOOD PRESSURE: 146 MMHG | OXYGEN SATURATION: 95 % | HEIGHT: 65 IN | DIASTOLIC BLOOD PRESSURE: 76 MMHG | HEART RATE: 67 BPM

## 2023-08-07 DIAGNOSIS — E78.5 HYPERLIPIDEMIA, UNSPECIFIED HYPERLIPIDEMIA TYPE: ICD-10-CM

## 2023-08-07 DIAGNOSIS — R32 URINARY INCONTINENCE, UNSPECIFIED TYPE: ICD-10-CM

## 2023-08-07 DIAGNOSIS — Z00.00 ENCOUNTER FOR ANNUAL HEALTH EXAMINATION: Primary | ICD-10-CM

## 2023-08-07 DIAGNOSIS — E55.9 VITAMIN D DEFICIENCY: ICD-10-CM

## 2023-08-07 DIAGNOSIS — Z78.0 POST-MENOPAUSAL: ICD-10-CM

## 2023-08-07 DIAGNOSIS — M81.0 OSTEOPOROSIS, UNSPECIFIED OSTEOPOROSIS TYPE, UNSPECIFIED PATHOLOGICAL FRACTURE PRESENCE: ICD-10-CM

## 2023-08-07 DIAGNOSIS — Z12.31 ENCOUNTER FOR SCREENING MAMMOGRAM FOR BREAST CANCER: ICD-10-CM

## 2023-08-07 DIAGNOSIS — I10 ESSENTIAL HYPERTENSION: ICD-10-CM

## 2023-08-07 RX ORDER — METOPROLOL SUCCINATE 50 MG/1
100 TABLET, EXTENDED RELEASE ORAL DAILY
Qty: 120 TABLET | Refills: 1 | Status: SHIPPED | OUTPATIENT
Start: 2023-08-07

## 2023-08-29 RX ORDER — AMLODIPINE BESYLATE 5 MG/1
5 TABLET ORAL DAILY
Qty: 90 TABLET | Refills: 1 | Status: SHIPPED | OUTPATIENT
Start: 2023-08-29

## 2023-10-22 NOTE — TELEPHONE ENCOUNTER
Please review. Protocol failed/ No protocol.     Requested Prescriptions   Pending Prescriptions Disp Refills    PRAVASTATIN 40 MG Oral Tab [Pharmacy Med Name: Pravastatin Sodium 40 Mg Tab Nort] 90 tablet 0     Sig: TAKE ONE TABLET BY MOUTH AT BEDTIME       Cholesterol Medication Protocol Failed - 10/21/2023  8:08 AM        Failed - ALT in past 12 months        Failed - LDL in past 12 months        Failed - Last ALT < 80     Lab Results   Component Value Date    ALT 22 10/15/2021             Failed - Last LDL < 130     Lab Results   Component Value Date    LDL 70 10/15/2021             Failed - In person appointment or virtual visit in the past 12 mos or appointment in next 3 mos     Recent Outpatient Visits              2 months ago Encounter for annual health examination    [facility] Seth, Kalin Eli MD    Office Visit    1 year ago Other urinary incontinence    [address] [facility], Stephanie Goznalez APRN    Office Visit    2 years ago Acute bilateral low back pain without sciatica    [facility], [address] [facility],[address], [place] Gonzalez APRN    Virtual Phone E/M    3 years ago     [address] [facility]     Office Visit    3 years ago     [address] [facility]    Office Visit

## 2023-10-23 RX ORDER — PRAVASTATIN SODIUM 40 MG
40 TABLET ORAL NIGHTLY
Qty: 90 TABLET | Refills: 0 | Status: SHIPPED | OUTPATIENT
Start: 2023-10-23

## 2023-12-04 RX ORDER — METOPROLOL SUCCINATE 50 MG/1
100 TABLET, EXTENDED RELEASE ORAL DAILY
Qty: 120 TABLET | Refills: 0 | Status: SHIPPED | OUTPATIENT
Start: 2023-12-04

## 2023-12-04 NOTE — TELEPHONE ENCOUNTER
Please review; protocol failed. Message sent for patient to have labs done. Requested Prescriptions   Pending Prescriptions Disp Refills    METOPROLOL SUCCINATE ER 50 MG Oral Tablet 24 Hr [Pharmacy Med Name: Metoprolol Succinate Er 24hr 50 Mg Tab Nort] 120 tablet 0     Sig: Take 2 tablets  by mouth daily. Hypertensive Medications Protocol Failed - 12/1/2023 12:47 PM        Failed - Last BP reading less than 140/90     BP Readings from Last 1 Encounters:   08/07/23 146/76               Failed - CMP or BMP in past 6 months     No results found for this or any previous visit (from the past 4392 hour(s)).             Failed - EGFRCR or GFRNAA > 50     GFR Evaluation            Passed - In person appointment in the past 12 or next 3 months     Recent Outpatient Visits              3 months ago Encounter for annual health examination    Wyatt Edmond Lan Brownie, MD    Office Visit    2 years ago Other urinary incontinence    [address] [facility], [place] RAUL    Office Visit    2 years ago Acute bilateral low back pain without sciatica    [facility], [address] [place] [address],[address], [place], RAUL    Virtual Phone E/M    3 years ago     [address] [facility]    Office Visit    3 years ago     [address] [facility], [address] [place] [address] - In person appointment or virtual visit in the past 6 months     Recent Outpatient Visits              3 months ago Encounter for annual health examination    Jon Edmond MD    Office Visit    2 years ago Other urinary incontinence    [address] [facility], Stephanie Gonzalez, APRKEM    Office Visit    2 years ago Acute bilateral low back pain without sciatica    [facility], [address] [facility] [address], Rup[place] Gonzalez, APRN    Virtual Phone E/M    3 years ago     [address], [place], [place]    Office Visit    3 years ago     [address], [place], [place]    Office Visit                         Recent Outpatient Visits              3 months ago Encounter for annual health examination    [facility]yatt Nevarez, Lore Alanis MD    Office Visit    2 years ago Other urinary incontinence    Lisa Rodríguez [place], APRN    Office Visit    2 years ago Acute bilateral low back pain without sciatica    [facility], [address] [facility] [address], [place], [place], APRN    Virtual Phone E/M    3 years ago     [address], [place], OT    Office Visit    3 years ago     [address], [place], [place]    Office Visit

## 2023-12-04 NOTE — TELEPHONE ENCOUNTER
Please contact patient to see if she is monitoring her BP at home and what is her current readings. Her BP was elevated at the time of the last visit and she was told to follow up in 3 months.

## 2023-12-10 RX ORDER — METOPROLOL SUCCINATE 50 MG/1
100 TABLET, EXTENDED RELEASE ORAL DAILY
Qty: 120 TABLET | Refills: 0 | OUTPATIENT
Start: 2023-12-10

## 2023-12-10 NOTE — TELEPHONE ENCOUNTER
Per 12/1 refill request. Rx was sent.   Mychart sent  Mago RubiRAUL         12/4/23  1:37 PM  Note  Please contact patient to see if she is monitoring her BP at home and what is her current readings. Her BP was elevated at the time of the last visit and she was told to follow up in 3 months.          Disp Refills Start End    metoprolol succinate ER 50 MG Oral Tablet 24 Hr 120 tablet 0 12/4/2023     Sig - Route: Take 2 tablets (100 mg total) by mouth daily. - Oral    Sent to pharmacy as: Metoprolol Succinate ER 50 MG Oral Tablet Extended Release 24 Hour (Toprol XL)    E-Prescribing Status: Receipt confirmed by pharmacy (12/4/2023  1:37 PM CST)    Renewals    Renewal provider: Rya Goldman MD         Pharmacy    [id number] - [address] [phone], [phone]

## 2024-01-17 RX ORDER — PRAVASTATIN SODIUM 40 MG
40 TABLET ORAL NIGHTLY
Qty: 90 TABLET | Refills: 1 | Status: SHIPPED | OUTPATIENT
Start: 2024-01-17

## 2024-01-17 NOTE — TELEPHONE ENCOUNTER
Please review.  Protocol failed / No protocol.    Requested Prescriptions   Pending Prescriptions Disp Refills    PRAVASTATIN 40 MG Oral Tab [Pharmacy Med Name: Pravastatin Sodium 40 Mg Tab Nort] 90 tablet 0     Sig: TAKE ONE TABLET BY MOUTH AT BEDTIME       Cholesterol Medication Protocol Failed - 1/16/2024  5:25 PM        Failed - ALT in past 12 months        Failed - LDL in past 12 months        Failed - Last ALT < 80     Lab Results   Component Value Date    ALT 22 10/15/2021             Failed - Last LDL < 130     Lab Results   Component Value Date    LDL 70 10/15/2021             Failed - In person appointment or virtual visit in the past 12 mos or appointment in next 3 mos     Recent Outpatient Visits              5 months ago Encounter for annual health examination    [facility] Ray Goldman MD    Office Visit    2 years ago [facility]    [facility]    2 years ago Acute bilateral low back pain without sciatica    [facility] Rupinder Unger APRN    Virtual Phone E/M    4 years ago     [facility] Sh[place] Duncan, [place]    Office Visit    4 years ago     [facility] Sh[facility] Duncan, OT    Office Visit                               Recent Outpatient Visits              5 months ago Encounter for annual health examination    [facility] [place] Ray Goldman MD    Office Visit    2 years ago Other urinary incontinence    [facility]    Office Visit    2 years ago Acute bilateral low back pain without sciatica    [facility]    Virtual Phone E/M    4 years ago     [facility]  Shama Duncan, OT    Office Visit    4 years ago     [facility] Shama Duncan, OT    Office Visit

## 2024-01-18 NOTE — ADDENDUM NOTE
Addended by: CONRAD LEVINE on: 1/17/2024 06:47 PM     Modules accepted: Orders     terminal meconium/abnormal fetal heart rate tracing/meconium stained fluid

## 2024-02-07 RX ORDER — METOPROLOL SUCCINATE 50 MG/1
100 TABLET, EXTENDED RELEASE ORAL DAILY
Qty: 180 TABLET | Refills: 1 | Status: SHIPPED | OUTPATIENT
Start: 2024-02-07

## 2024-02-07 NOTE — TELEPHONE ENCOUNTER
Please review; protocol failed/ has no protocol    Message sent for patient to have labs done.    Requested Prescriptions   Pending Prescriptions Disp Refills    METOPROLOL SUCCINATE ER 50 MG Oral Tablet 24 Hr [Pharmacy Med Name: Metoprolol Succinate Er 24hr 50 Mg Tab Nort] 120 tablet 0     Sig: Take 2 tablets  by mouth daily.       Hypertension Medications Protocol Failed - 2/7/2024  3:31 PM        Failed - CMP or BMP in past 12 months        Failed - Last BP reading less than 140/90     BP Readings from Last 1 Encounters:   08/07/23 146/76               Failed - EGFRCR or GFRNAA > 50     GFR Evaluation            Passed - In person appointment or virtual visit in the past 12 mos or appointment in next 3 mos     Recent Outpatient Visits              6 months ago Encounter for annual health examination    [facility] Ray Goldman MD    Office Visit    2 years ago [facility] incontin[facility]    Office Visit    2 years ago Acute bilateral low back pain without sciatica    [facility]    Virtual Phone E/M    4 years ago     [facility]    Office Visit    4 years ago     [facility] Duncan, [facility]    Office Visit                         Recent Outpatient Visits              6 months ago Encounter for annual health examination    [facility]    Office Visit    2 years ago Other urinary incontinence    [facility]    Office Visit    2 years ago Acute bilateral low back pain without sciatica    [facility]    Virtual Phone E/M    4 years ago     [facility] Shama Duncan, OT    Office Visit    4 years ago     [facility] Shama Duncan, OT    Office Visit

## 2024-02-27 NOTE — TELEPHONE ENCOUNTER
Current Outpatient Medications:       amLODIPine 5 MG Oral Tab, Take 1 tablet (5 mg total) by mouth daily., Disp: 90 tablet, Rfl: 1

## 2024-02-28 RX ORDER — AMLODIPINE BESYLATE 5 MG/1
5 TABLET ORAL DAILY
Qty: 90 TABLET | Refills: 0 | Status: SHIPPED | OUTPATIENT
Start: 2024-02-28

## 2024-02-28 NOTE — TELEPHONE ENCOUNTER
Please review. Protocol failed or has no protocol.    Requested Prescriptions   Pending Prescriptions Disp Refills    amLODIPine 5 MG Oral Tab 90 tablet 1     Sig: Take 1 tablet (5 mg total) by mouth daily.       Hypertension Medications Protocol Failed - 2/27/2024  2:59 PM        Failed - CMP or BMP in past 12 months        Failed - Last BP reading less than 140/90     BP Readings from Last 1 Encounters:   08/07/23 146/76               Failed - EGFRCR or GFRNAA > 50     GFR Evaluation            Passed - In person appointment or virtual visit in the past 12 mos or appointment in next 3 mos     Recent Outpatient Visits              6 months ago Encounter for annual health examination    [facility] [place] Ray Goldman MD    Office Visit    2 years ago [facility] incontinence    [facility]    [facility]    2 years ago Acute bilateral low back pain without sciatica    [facility]    Virtual Phone E/M    4 years ago     [facility] Shama Duncan, CHRISTIANA    Office Visit    4 years ago     [facility] Shama Duncan, OT    Office Visit                           Recent Outpatient Visits              6 months ago Encounter for annual health examination    [facility] [place] Ray Goldman MD    Office Visit    2 years ago Other urinary incontinence    [facility]    Office Visit    2 years ago Acute bilateral low back pain without sciatica    [facility]    Virtual Phone E/M    4 years ago     [facility] Sh[facility] Duncan, [facility]    Office Visit    4 years ago     [facility]  Shama Duncan, OT    Office Visit

## 2024-04-30 RX ORDER — PRAVASTATIN SODIUM 40 MG
40 TABLET ORAL NIGHTLY
Qty: 90 TABLET | Refills: 1 | OUTPATIENT
Start: 2024-04-30

## 2024-06-04 RX ORDER — AMLODIPINE BESYLATE 5 MG/1
5 TABLET ORAL DAILY
Qty: 90 TABLET | Refills: 0 | Status: SHIPPED | OUTPATIENT
Start: 2024-06-04

## 2024-06-04 NOTE — TELEPHONE ENCOUNTER
Please Review. Protocol Failed; No Protocol   My chart messaged patient to complete labs   Requested Prescriptions   Pending Prescriptions Disp Refills    AMLODIPINE 5 MG Oral Tab [Pharmacy Med Name: Amlodipine Besylate 5 Mg Tab Unic] 90 tablet 0     Sig: TAKE ONE TABLET BY MOUTH DAILY.       Hypertension Medications Protocol Failed - 5/31/2024  7:29 AM        Failed - CMP or BMP in past 12 months        Failed - Last BP reading less than 140/90     BP Readings from Last 1 Encounters:   08/07/23 146/76               Failed - EGFRCR or GFRNAA > 50     GFR Evaluation            Passed - In person appointment or virtual visit in the past 12 mos or appointment in next 3 mos     Recent Outpatient Visits              10 months ago Encounter for annual health examination    [facility] [place] Ray Goldman MD    Office Visit    2 years ago Other urinary incontinence    [facility] [place] [address] [facility]    Office Visit    2 years ago Acute bilateral low back pain without sciatica    [facility] Unger [facility]    Virtual Phone E/M    4 years ago     [facility]    Office Visit    4 years ago     [facility] Sh[facility] Duncan OT    Office Visit                               Recent Outpatient Visits              10 months ago Encounter for annual health examination    [facility], Ray Ritter MD    Office Visit    2 years ago Other urinary incontinence    [facility] [place] [facility]    Office Visit    2 years ago Acute bilateral low back pain without sciatica    [facility]    Virtual Phone E/M    4 years ago     [facility] [place]  Shama, OT    Office Visit    4 years ago     [facility] Occup[facility] Shama Duncan, OT    Office Visit

## 2024-06-05 NOTE — TELEPHONE ENCOUNTER
Patient called back, will talk with her daughter when she can make an appointment since she brings her

## 2024-07-25 ENCOUNTER — TELEPHONE (OUTPATIENT)
Dept: INTERNAL MEDICINE CLINIC | Facility: CLINIC | Age: 89
End: 2024-07-25

## 2024-07-29 RX ORDER — PRAVASTATIN SODIUM 40 MG
40 TABLET ORAL NIGHTLY
Qty: 90 TABLET | Refills: 0 | Status: SHIPPED | OUTPATIENT
Start: 2024-07-29 | End: 2024-10-21

## 2024-07-29 NOTE — TELEPHONE ENCOUNTER
Please Review. Protocol Failed; No Protocol   Lab Results   Component Value Date     ALT 22 10/15/2021       ALT resulted within past year    Requested Prescriptions   Pending Prescriptions Disp Refills    PRAVASTATIN 40 MG Oral Tab [Pharmacy Med Name: Pravastatin Sodium 40 Mg Tab Nort] 90 tablet 0     Sig: Take 1 tablet by mouth nightly.       Cholesterol Medication Protocol Failed - 7/25/2024  8:35 AM        Failed - ALT < 80     Lab Results   Component Value Date    ALT 22 10/15/2021             Failed - ALT resulted within past year        Failed - Lipid panel within past 12 months     Lab Results   Component Value Date    CHOLEST 163 10/15/2021    TRIG 51 10/15/2021    HDL 82 (H) 10/15/2021    LDL 70 10/15/2021    VLDL 8 10/15/2021    NONHDLC 81 10/15/2021             Passed - In person appointment or virtual visit in the past 12 mos or appointment in next 3 mos     Recent Outpatient Visits              11 months ago Encounter for annual health examination    [facility] Ray Goldman MD    Office Visit    2 years ago Other urinary incontinence    [facility] Rupinder Unger APRN    Office Visit    3 years ago Acute bilateral low back pain without sciatica    [facility] Rupinder nUger APRN    Virtual Phone E/M    4 years ago     [facility] Occup[facility] Shama Duncan OT    Office Visit    4 years ago     [facility]    Office Visit          Future Appointments         Provider Department Appt Notes    In 1 week Michelle Arevalo DO [facility] for [facility] - [facility]ro[facility]ne[facility] incont.                           Future Appointments         Provider Department Appt Notes    In 1 week Michelle Arevalo DO [facility] - [facility] Urogynecology incont.           Recent Outpatient Visits              11 months ago Encounter for annual health examination    [facility] [place] Ray Goldman MD    Office Visit    2 years ago Other urinary incontinence    [facility] [place] APRN    Office Visit    3 years ago Acute bilateral low back pain without sciatica    [facility], Rupinder Castellano APRN    Virtual Phone E/M    4 years ago     [facility] Occupational Therapy Shama Duncan, CHRISTIANA    Office Visit    4 years ago     [facility] Occupational Therapy Shama Duncan, CHRISTIANA    Office Visit

## 2024-07-31 RX ORDER — PRAVASTATIN SODIUM 40 MG
40 TABLET ORAL NIGHTLY
Qty: 90 TABLET | Refills: 1 | OUTPATIENT
Start: 2024-07-31

## 2024-08-02 ENCOUNTER — TELEPHONE (OUTPATIENT)
Facility: CLINIC | Age: 89
End: 2024-08-02

## 2024-08-05 ENCOUNTER — LAB ENCOUNTER (OUTPATIENT)
Dept: LAB | Age: 89
End: 2024-08-05
Attending: INTERNAL MEDICINE
Payer: MEDICARE

## 2024-08-05 DIAGNOSIS — E55.9 VITAMIN D DEFICIENCY: ICD-10-CM

## 2024-08-05 DIAGNOSIS — I10 ESSENTIAL HYPERTENSION: ICD-10-CM

## 2024-08-05 LAB
ALBUMIN SERPL-MCNC: 4.4 G/DL (ref 3.2–4.8)
ALBUMIN/GLOB SERPL: 1.4 {RATIO} (ref 1–2)
ALP LIVER SERPL-CCNC: 71 U/L
ALT SERPL-CCNC: 12 U/L
ANION GAP SERPL CALC-SCNC: 8 MMOL/L (ref 0–18)
AST SERPL-CCNC: 22 U/L (ref ?–34)
BASOPHILS # BLD AUTO: 0.04 X10(3) UL (ref 0–0.2)
BASOPHILS NFR BLD AUTO: 0.6 %
BILIRUB SERPL-MCNC: 0.7 MG/DL (ref 0.2–1.1)
BILIRUB UR QL: NEGATIVE
BUN BLD-MCNC: 27 MG/DL (ref 9–23)
BUN/CREAT SERPL: 29.3 (ref 10–20)
CALCIUM BLD-MCNC: 9.9 MG/DL (ref 8.7–10.4)
CHLORIDE SERPL-SCNC: 108 MMOL/L (ref 98–112)
CHOLEST SERPL-MCNC: 157 MG/DL (ref ?–200)
CLARITY UR: CLEAR
CO2 SERPL-SCNC: 26 MMOL/L (ref 21–32)
CREAT BLD-MCNC: 0.92 MG/DL
DEPRECATED RDW RBC AUTO: 50.8 FL (ref 35.1–46.3)
EGFRCR SERPLBLD CKD-EPI 2021: 60 ML/MIN/1.73M2 (ref 60–?)
EOSINOPHIL # BLD AUTO: 0.32 X10(3) UL (ref 0–0.7)
EOSINOPHIL NFR BLD AUTO: 4.9 %
ERYTHROCYTE [DISTWIDTH] IN BLOOD BY AUTOMATED COUNT: 14.5 % (ref 11–15)
FASTING PATIENT LIPID ANSWER: YES
FASTING STATUS PATIENT QL REPORTED: YES
GLOBULIN PLAS-MCNC: 3.2 G/DL (ref 2–3.5)
GLUCOSE BLD-MCNC: 89 MG/DL (ref 70–99)
GLUCOSE UR-MCNC: NORMAL MG/DL
HCT VFR BLD AUTO: 38.5 %
HDLC SERPL-MCNC: 71 MG/DL (ref 40–59)
HGB BLD-MCNC: 12.4 G/DL
HGB UR QL STRIP.AUTO: NEGATIVE
IMM GRANULOCYTES # BLD AUTO: 0.02 X10(3) UL (ref 0–1)
IMM GRANULOCYTES NFR BLD: 0.3 %
KETONES UR-MCNC: NEGATIVE MG/DL
LDLC SERPL CALC-MCNC: 75 MG/DL (ref ?–100)
LEUKOCYTE ESTERASE UR QL STRIP.AUTO: NEGATIVE
LYMPHOCYTES # BLD AUTO: 1.05 X10(3) UL (ref 1–4)
LYMPHOCYTES NFR BLD AUTO: 16 %
MCH RBC QN AUTO: 30.8 PG (ref 26–34)
MCHC RBC AUTO-ENTMCNC: 32.2 G/DL (ref 31–37)
MCV RBC AUTO: 95.8 FL
MONOCYTES # BLD AUTO: 0.48 X10(3) UL (ref 0.1–1)
MONOCYTES NFR BLD AUTO: 7.3 %
NEUTROPHILS # BLD AUTO: 4.64 X10 (3) UL (ref 1.5–7.7)
NEUTROPHILS # BLD AUTO: 4.64 X10(3) UL (ref 1.5–7.7)
NEUTROPHILS NFR BLD AUTO: 70.9 %
NITRITE UR QL STRIP.AUTO: NEGATIVE
NONHDLC SERPL-MCNC: 86 MG/DL (ref ?–130)
OSMOLALITY SERPL CALC.SUM OF ELEC: 299 MOSM/KG (ref 275–295)
PH UR: 5.5 [PH] (ref 5–8)
PLATELET # BLD AUTO: 290 10(3)UL (ref 150–450)
POTASSIUM SERPL-SCNC: 4.6 MMOL/L (ref 3.5–5.1)
PROT SERPL-MCNC: 7.6 G/DL (ref 5.7–8.2)
PROT UR-MCNC: NEGATIVE MG/DL
RBC # BLD AUTO: 4.02 X10(6)UL
SODIUM SERPL-SCNC: 142 MMOL/L (ref 136–145)
SP GR UR STRIP: 1.02 (ref 1–1.03)
T4 FREE SERPL-MCNC: 1.1 NG/DL (ref 0.8–1.7)
TRIGL SERPL-MCNC: 54 MG/DL (ref 30–149)
TSI SER-ACNC: 7.32 MIU/ML (ref 0.55–4.78)
UROBILINOGEN UR STRIP-ACNC: NORMAL
VIT B12 SERPL-MCNC: 562 PG/ML (ref 211–911)
VIT D+METAB SERPL-MCNC: 39.5 NG/ML (ref 30–100)
VLDLC SERPL CALC-MCNC: 8 MG/DL (ref 0–30)
WBC # BLD AUTO: 6.6 X10(3) UL (ref 4–11)

## 2024-08-05 PROCEDURE — 80053 COMPREHEN METABOLIC PANEL: CPT

## 2024-08-05 PROCEDURE — 84443 ASSAY THYROID STIM HORMONE: CPT

## 2024-08-05 PROCEDURE — 82306 VITAMIN D 25 HYDROXY: CPT

## 2024-08-05 PROCEDURE — 82607 VITAMIN B-12: CPT

## 2024-08-05 PROCEDURE — 36415 COLL VENOUS BLD VENIPUNCTURE: CPT

## 2024-08-05 PROCEDURE — 84439 ASSAY OF FREE THYROXINE: CPT

## 2024-08-05 PROCEDURE — 80061 LIPID PANEL: CPT

## 2024-08-05 PROCEDURE — 81003 URINALYSIS AUTO W/O SCOPE: CPT | Performed by: INTERNAL MEDICINE

## 2024-08-05 PROCEDURE — 85025 COMPLETE CBC W/AUTO DIFF WBC: CPT

## 2024-08-06 RX ORDER — METOPROLOL SUCCINATE 50 MG/1
100 TABLET, EXTENDED RELEASE ORAL DAILY
Qty: 180 TABLET | Refills: 0 | Status: SHIPPED | OUTPATIENT
Start: 2024-08-06

## 2024-08-06 NOTE — TELEPHONE ENCOUNTER
Please Review. Protocol Failed; No Protocol   Mycharted patient to schedule an appointment.     Requested Prescriptions   Pending Prescriptions Disp Refills    METOPROLOL SUCCINATE ER 50 MG Oral Tablet 24 Hr [Pharmacy Med Name: Metoprolol Succinate Er 24hr 50 Mg Tab Nort] 180 tablet 0     Sig: Take 2 tablets (100 mg total) by mouth daily.       Hypertension Medications Protocol Failed - 8/6/2024 10:20 AM        Failed - Last BP reading less than 140/90     BP Readings from Last 1 Encounters:   08/07/23 146/76               Failed - In person appointment or virtual visit in the past 12 mos or appointment in next 3 mos     Recent Outpatient Visits              1 year ago Encounter for annual health examination    [facility] [place] Ray Goldman MD    Office Visit    2 years ago Other urinary incontinence    [facility] Rupinder Castellano APRN    Office Visit    3 years ago Acute bilateral low back pain without sciatica    [facility] Rupinder Castellano APRN    Virtual Phone E/M    4 years ago     [facility]    Office Visit    4 years ago     [facility]    [facility] Visit                      Passed - CMP or BMP in past 12 months        Passed - EGFRCR or GFRNAA > 50     GFR Evaluation  EGFRCR: 60 , resulted on 8/5/2024                   Recent Outpatient Visits              1 year ago Encounter for annual health examination    [facility] [place] Ray Goldman MD    Office Visit    2 years ago Other urinary incontinence    [facility] Rupinder Castellano APRN    Office Visit    3 years ago Acute bilateral low back pain without sciatica    [facility]    Virtual Phone E/M     4 years ago     [facility] Occupational Therapy Shama Duncan, OT    Office Visit    4 years ago     [facility] Occupational Therapy Shama Duncan, OT    Office Visit

## 2024-08-09 RX ORDER — METOPROLOL SUCCINATE 50 MG/1
100 TABLET, EXTENDED RELEASE ORAL DAILY
Qty: 180 TABLET | Refills: 1 | OUTPATIENT
Start: 2024-08-09

## 2024-08-09 RX ORDER — METOPROLOL SUCCINATE 50 MG/1
100 TABLET, EXTENDED RELEASE ORAL DAILY
Qty: 180 TABLET | Refills: 0 | OUTPATIENT
Start: 2024-08-09

## 2024-09-02 NOTE — TELEPHONE ENCOUNTER
Please review. Protocol Failed; No Protocol    No future appointments.    [employer] message sent to patient to schedule an office visit with Provider and/or  Routed to Patient  for assistance with appointment.     Requested Prescriptions   Pending Prescriptions Disp Refills    AMLODIPINE 5 MG Oral Tab [Pharmacy Med Name: Amlodipine Besylate 5 Mg Tab Unic] 90 tablet 0     Sig: Take 1 tablet  by mouth daily.       Hypertension Medications Protocol Failed - 8/30/2024  8:58 AM        Failed - Last BP reading less than 140/90     BP Readings from Last 1 Encounters:   08/07/23 146/76               Failed - In person appointment or virtual visit in the past 12 mos or appointment in next 3 mos     Recent Outpatient Visits              1 year ago Encounter for annual health examination    [facility], Ray Ritter MD    Office Visit    2 years ago Other urinary incontinence    [facility] Rupinder Castellano APRN    Office Visit    3 years ago Acute bilateral low back pain without sciatica    [facility] Rupinder Castellano APRN    Virtual Phone E/M    4 years ago     [facility] Shama Duncan, [facility]    Office Visit    4 years ago     [facility] HeShama jewell, OT    Office Visit                      Passed - CMP or BMP in past 12 months        Passed - EGFRCR or GFRNAA > 50     GFR Evaluation  EGFRCR: 60 , resulted on 8/5/2024                   Recent Outpatient Visits              1 year ago Encounter for annual health examination    [facility] Ray Ritter MD    Office Visit    2 years ago Other urinary incontinence    [facility] Rupinder Castellano APRN    Office Visit    3 years ago Acute bilateral low back pain without sciatica     [facility] Rupinder Unger APRN    Virtual Phone E/M    4 years ago     [facility] Occupational Therapy Shama Duncan, OT    Office Visit    4 years ago     [facility] Occupational Therapy Shama Duncan, OT    Office Visit

## 2024-09-03 RX ORDER — AMLODIPINE BESYLATE 5 MG/1
5 TABLET ORAL DAILY
Qty: 90 TABLET | Refills: 0 | Status: SHIPPED | OUTPATIENT
Start: 2024-09-03

## 2024-09-03 NOTE — TELEPHONE ENCOUNTER
Talked to patient told her message below understood and she says she will call back to make an appointment but for the meantime she needs her prescription medication.

## 2024-10-23 RX ORDER — PRAVASTATIN SODIUM 40 MG
40 TABLET ORAL NIGHTLY
Qty: 90 TABLET | Refills: 1 | Status: SHIPPED | OUTPATIENT
Start: 2024-10-23

## 2024-10-23 NOTE — TELEPHONE ENCOUNTER
Asking for refill to get to appointment date:  Future Appointments   Date Time Provider Department Center   1/22/2025  1:20 PM Ray Goldman MD [facility]

## 2024-10-23 NOTE — TELEPHONE ENCOUNTER
Please review; protocol failed/ has no protocol    Message sent for patient to make an appointment.     Requested Prescriptions   Pending Prescriptions Disp Refills    pravastatin 40 MG Oral Tab 90 tablet 0     Sig: Take 1 tablet (40 mg total) by mouth nightly.       Cholesterol Medication Protocol Failed - 10/23/2024  9:01 AM        Failed - In person appointment or virtual visit in the past 12 mos or appointment in next 3 mos     Recent Outpatient Visits              1 year ago Encounter for annual health examination    [facility] Rya Goldman MD    Office Visit    2 [facility]    3 years ago Acute bilateral low back pain without sciatica    [facility] APRN    Virtual Phone E/M    4 years [facility]    Office Visit    4 [facility]     [facility]    Office Visit                      Passed - ALT < 80     Lab Results   Component Value Date    ALT 12 08/05/2024             Passed - ALT resulted within past year        Passed - Lipid panel within past 12 months     Lab Results   Component Value Date    CHOLEST 157 08/05/2024    TRIG 54 08/05/2024    HDL 71 (H) 08/05/2024    LDL 75 08/05/2024    VLDL 8 08/05/2024    NONHDLC 86 08/05/2024                Recent Outpatient Visits              1 year ago Encounter for annual health examination    [facility] Ray Goldman MD    Office Visit    2 years ago Other urinary incontinence    [facility] Rupinder Castellano APRN    Office Visit    3 years ago Acute bilateral low back pain without sciatica    [facility] Steven Unger  RAUL Bucio    Virtual Phone E/M    4 years ago     [facility] Shama Duncan, OT    Office Visit    4 years ago     [facility] Shama Duncan, OT    Office Visit

## 2024-10-25 RX ORDER — PRAVASTATIN SODIUM 40 MG
40 TABLET ORAL NIGHTLY
Qty: 90 TABLET | Refills: 0 | OUTPATIENT
Start: 2024-10-25

## 2024-11-05 RX ORDER — METOPROLOL SUCCINATE 50 MG/1
100 TABLET, EXTENDED RELEASE ORAL DAILY
Qty: 180 TABLET | Refills: 3 | Status: SHIPPED | OUTPATIENT
Start: 2024-11-05

## 2024-11-05 NOTE — TELEPHONE ENCOUNTER
Please review; protocol failed/ has no protocol      Please see message below for upcoming appointment.    Future Appointments   Date Time Provider Department Center   1/22/2025  1:20 PM Ray Goldman MD [facility]       Requested Prescriptions   Pending Prescriptions Disp Refills    metoprolol succinate ER 50 MG Oral Tablet 24 Hr 180 tablet 0     Sig: Take 2 tablets (100 mg total) by mouth daily.       Hypertension Medications Protocol Failed - 11/5/2024  3:05 PM        Failed - Last BP reading less than 140/90     BP Readings from Last 1 Encounters:   08/07/23 146/76               Passed - CMP or BMP in past 12 months        Passed - In person appointment or virtual visit in the past 12 mos or appointment in next 3 mos     Recent Outpatient Visits              1 year ago Encounter for annual health examination    [facility]    Office Visit    2 years ago Other urinary incontinence    [facility] Rupinder Unger APRN    Office Visit    3 years ago Acute bilateral low back pain without sciatica    [facility] Rupinder Unger APRN    Virtual Phone E/M    4 years ago     [facility] Shama Duncan, OT    Office Visit    4 years ago     [facility] Shama Duncan, OT    Office Visit          Future Appointments         Provider Department Appt Notes    In 2 months Ray Goldman MD [facility] px, last px: 08/07/23, policy informed                    Passed - EGFRCR or GFRNAA > 50     GFR Evaluation  EGFRCR: 60 , resulted on 8/5/2024             Recent Outpatient Visits              1 year ago Encounter for annual health examination    [facility] Capps MD    Office Visit    2 years ago Other  urinary incontinence    [facility] Rupinder Unger APRN    Office Visit    3 years ago Acute bilateral low back pain without sciatica    [facility] Rupinder Unger APRN    Virtual Phone E/M    4 years ago     [facility] Occupational Therapy Shama Duncan, OT    Office Visit    4 years ago     [facility] Occupational [facility] Shama Duncan, OT    Office Visit          Future Appointments         Provider Department Appt Notes    In 2 months Ray Goldman MD [facility] px, last px: 08/07/23, policy informed

## 2024-12-02 ENCOUNTER — TELEPHONE (OUTPATIENT)
Dept: INTERNAL MEDICINE CLINIC | Facility: CLINIC | Age: 89
End: 2024-12-02

## 2024-12-02 NOTE — TELEPHONE ENCOUNTER
Current Outpatient Medications:     amLODIPine 5 MG Oral Tab, Take 1 tablet (5 mg total) by mouth daily., Disp: 90 tablet, Rfl: 0

## 2024-12-05 RX ORDER — AMLODIPINE BESYLATE 5 MG/1
5 TABLET ORAL DAILY
Qty: 90 TABLET | Refills: 0 | OUTPATIENT
Start: 2024-12-05

## 2024-12-05 NOTE — TELEPHONE ENCOUNTER
Please review; protocol failed/No Protocol    Last Office Visit: 08/07/2023  Future Appointments   Date Time Provider Department Center   1/22/2025  1:20 PM Ray Goldman MD [facility]       Requested Prescriptions   Pending Prescriptions Disp Refills    amLODIPine 5 MG Oral Tab 90 tablet 0     Sig: Take 1 tablet (5 mg total) by mouth daily.       Hypertension Medications Protocol Failed - 12/5/2024  9:13 AM        Failed - Last BP reading less than 140/90     BP Readings from Last 1 Encounters:   08/07/23 146/76               Passed - CMP or BMP in past 12 months        Passed - In person appointment or virtual visit in the past 12 mos or appointment in next 3 mos     Recent Outpatient Visits              1 year ago Encounter for annual health examination    [facility] Ray Goldman MD    Office Visit    3 years ago Other urinary incontinence    [facility] Rupinder Unger APRN    Office Visit    3 years ago Acute bilateral low back pain without sciatica    [facility] Rupinder Unger APRN    Virtual Phone E/M    4 years ago     [facility] Re[facility] Occupational Therapy Shama Duncan, OT    Office Visit    4 years ago     [facility] Occupational Therapy Shama Duncan, OT    Office Visit          Future Appointments         Provider Department Appt Notes    In 1 month Ray Goldman MD [facility] px, last px: 08/07/23, policy informed                    Passed - EGFRCR or GFRNAA > 50     GFR Evaluation  EGFRCR: 60 , resulted on 8/5/2024             Future Appointments         Provider Department Appt Notes    In 1 month Ray Goldman MD [facility] px, last px: 08/07/23, policy informed          Recent Outpatient Visits              1 year ago Encounter for annual  health examination    [facility] Ray Goldman MD    Office Visit    3 years ago Other urinary incontinence    [facility] Rupinder [facility]ger APRN    Office Visit    3 years ago Acute bilateral low back pain without sciatica    [facility]    Virtual Phone E/M    4 years ago     [facility] Occupational Therapy Shama Duncan, CHRISTIANA    Office Visit    4 years ago     [facility] Re[facility] Occupational Therapy Shama Duncan, CHRISTIANA    Office Visit

## 2024-12-06 RX ORDER — AMLODIPINE BESYLATE 5 MG/1
5 TABLET ORAL DAILY
Qty: 90 TABLET | Refills: 0 | Status: SHIPPED | OUTPATIENT
Start: 2024-12-06

## 2025-01-01 ENCOUNTER — EXTERNAL LAB (OUTPATIENT)
Dept: HEALTH INFORMATION MANAGEMENT | Facility: OTHER | Age: 89
End: 2025-01-01

## 2025-01-01 ENCOUNTER — APPOINTMENT (OUTPATIENT)
Dept: GENERAL RADIOLOGY | Age: 89
DRG: 871 | End: 2025-01-01
Attending: INTERNAL MEDICINE

## 2025-01-01 ENCOUNTER — APPOINTMENT (OUTPATIENT)
Dept: ULTRASOUND IMAGING | Age: 89
DRG: 871 | End: 2025-01-01
Attending: INTERNAL MEDICINE

## 2025-01-01 ENCOUNTER — TELEPHONE (OUTPATIENT)
Dept: INTERNAL MEDICINE CLINIC | Facility: CLINIC | Age: 89
End: 2025-01-01

## 2025-01-01 ENCOUNTER — APPOINTMENT (OUTPATIENT)
Dept: GENERAL RADIOLOGY | Age: 89
DRG: 871 | End: 2025-01-01
Attending: EMERGENCY MEDICINE

## 2025-01-01 LAB
ALBUMIN SERPL-MCNC: 3.8 G/DL (ref 3.2–4.8)
ALBUMIN/GLOB SERPL: 1.5 {RATIO} (ref 1–2)
ALP SERPL-CCNC: 135 U/L (ref 55–142)
ALT SERPL-CCNC: 95 U/L (ref 10–49)
ANION GAP SERPL CALC-SCNC: 11 MMOL/L (ref 0–18)
AST SERPL-CCNC: 57 U/L
BASOPHILS # BLD: 0.02 X10(3) UL (ref 0–0.2)
BASOPHILS NFR BLD: 0.3 %
BILIRUB SERPL-MCNC: 1.1 MG/DL (ref 0.2–1.1)
BNP SERPL-MCNC: ABNORMAL PG/ML
BUN SERPL-MCNC: 51 MG/DL (ref 9–23)
BUN/CREAT SERPL: 25.5 (ref 10–20)
CALCIUM SERPL-MCNC: 9.6 MG/DL (ref 8.7–10.4)
CALCULATED OSMO: 304 MOSM/KG (ref 275–295)
CHLORIDE SERPL-SCNC: 99 MMOL/L (ref 98–112)
CO2 SERPL-SCNC: 29 MMOL/L (ref 21–32)
CREAT SERPL-MCNC: 2 MG/DL (ref 0.55–1.02)
EOSINOPHIL # BLD: 0.01 X10(3) UL (ref 0–0.7)
EOSINOPHIL NFR BLD: 0.2 %
ERYTHROCYTE [DISTWIDTH] IN BLOOD BY AUTOMATED COUNT: 54.8 FL (ref 35.1–46.3)
ERYTHROCYTE [DISTWIDTH] IN BLOOD: 16.6 % (ref 11–15)
GFR SERPLBLD SCHWARTZ-ARVRAT: 24 ML/MIN/1.73M2
GLOBULIN SER-MCNC: 2.5 G/DL (ref 2–3.5)
GLUCOSE SERPL-MCNC: 134 MG/DL (ref 70–99)
HCT VFR BLD CALC: 38.1 % (ref 35–48)
HGB BLD-MCNC: 12.6 G/DL (ref 12–16)
IMM GRANULOCYTES # BLD: 0.02 X10(3) UL (ref 0–1)
IMM GRANULOCYTES NFR BLD: 0.3 %
LENGTH OF FAST TIME PATIENT: NO H
LYMPHOCYTES # BLD: 0.8 X10(3) UL (ref 1–4)
LYMPHOCYTES NFR BLD: 13.2 %
MCH RBC QN AUTO: 30.5 PG (ref 26–34)
MCHC RBC AUTO-ENTMCNC: 33.1 G/DL (ref 31–37)
MCV RBC AUTO: 92.3 FL (ref 80–100)
MONOCYTES # BLD: 0.38 X10(3) UL (ref 0.1–1)
MONOCYTES NFR BLD: 6.3 %
NEUTROPHILS # BLD: 4.83 X10(3) UL (ref 1.5–7.7)
NEUTROPHILS NFR BLD: 79.7 %
PLATELET # BLD: 196 10(3)UL (ref 150–450)
POTASSIUM SERPL-SCNC: 4.9 MMOL/L (ref 3.5–5.1)
PROT SERPL-MCNC: 6.3 G/DL (ref 5.7–8.2)
RBC # BLD: 4.13 X10(6)UL (ref 3.8–5.3)
SODIUM SERPL-SCNC: 139 MMOL/L (ref 136–145)
WBC # BLD: 6.1 X10(3) UL (ref 4–11)

## 2025-01-01 PROCEDURE — 74230 X-RAY XM SWLNG FUNCJ C+: CPT

## 2025-01-01 PROCEDURE — 76775 US EXAM ABDO BACK WALL LIM: CPT

## 2025-01-01 PROCEDURE — 71045 X-RAY EXAM CHEST 1 VIEW: CPT

## 2025-01-18 ENCOUNTER — APPOINTMENT (OUTPATIENT)
Dept: CT IMAGING | Facility: HOSPITAL | Age: 89
End: 2025-01-18
Attending: EMERGENCY MEDICINE
Payer: MEDICARE

## 2025-01-18 ENCOUNTER — APPOINTMENT (OUTPATIENT)
Dept: GENERAL RADIOLOGY | Facility: HOSPITAL | Age: 89
End: 2025-01-18
Attending: EMERGENCY MEDICINE
Payer: MEDICARE

## 2025-01-18 ENCOUNTER — HOSPITAL ENCOUNTER (INPATIENT)
Facility: HOSPITAL | Age: 89
LOS: 3 days | Discharge: HOME | End: 2025-01-21
Attending: EMERGENCY MEDICINE | Admitting: HOSPITALIST
Payer: MEDICARE

## 2025-01-18 DIAGNOSIS — I48.91 ATRIAL FIBRILLATION, NEW ONSET (HCC): Primary | ICD-10-CM

## 2025-01-18 DIAGNOSIS — J96.01 ACUTE HYPOXEMIC RESPIRATORY FAILURE (HCC): ICD-10-CM

## 2025-01-18 DIAGNOSIS — I48.91 ATRIAL FIBRILLATION WITH RAPID VENTRICULAR RESPONSE (HCC): ICD-10-CM

## 2025-01-18 DIAGNOSIS — I50.9 ACUTE ON CHRONIC CONGESTIVE HEART FAILURE, UNSPECIFIED HEART FAILURE TYPE (HCC): ICD-10-CM

## 2025-01-18 LAB
ALBUMIN SERPL-MCNC: 4.4 G/DL (ref 3.2–4.8)
ALBUMIN/GLOB SERPL: 1.4 {RATIO} (ref 1–2)
ALP LIVER SERPL-CCNC: 68 U/L
ALT SERPL-CCNC: 63 U/L
ANION GAP SERPL CALC-SCNC: 11 MMOL/L (ref 0–18)
APTT PPP: 24.4 SECONDS (ref 23–36)
AST SERPL-CCNC: 68 U/L (ref ?–34)
BASE EXCESS BLD CALC-SCNC: 1.4 MMOL/L (ref ?–2)
BASOPHILS # BLD AUTO: 0.02 X10(3) UL (ref 0–0.2)
BASOPHILS NFR BLD AUTO: 0.2 %
BILIRUB SERPL-MCNC: 0.9 MG/DL (ref 0.2–1.1)
BILIRUB UR QL: NEGATIVE
BUN BLD-MCNC: 42 MG/DL (ref 9–23)
BUN/CREAT SERPL: 42.4 (ref 10–20)
CALCIUM BLD-MCNC: 9.6 MG/DL (ref 8.7–10.4)
CHLORIDE SERPL-SCNC: 108 MMOL/L (ref 98–112)
CLARITY UR: CLEAR
CO2 SERPL-SCNC: 21 MMOL/L (ref 21–32)
CREAT BLD-MCNC: 0.99 MG/DL
DEPRECATED RDW RBC AUTO: 46.9 FL (ref 35.1–46.3)
EGFRCR SERPLBLD CKD-EPI 2021: 55 ML/MIN/1.73M2 (ref 60–?)
EOSINOPHIL # BLD AUTO: 0 X10(3) UL (ref 0–0.7)
EOSINOPHIL NFR BLD AUTO: 0 %
ERYTHROCYTE [DISTWIDTH] IN BLOOD BY AUTOMATED COUNT: 13.9 % (ref 11–15)
FLUAV + FLUBV RNA SPEC NAA+PROBE: NEGATIVE
FLUAV + FLUBV RNA SPEC NAA+PROBE: NEGATIVE
GLOBULIN PLAS-MCNC: 3.1 G/DL (ref 2–3.5)
GLUCOSE BLD-MCNC: 147 MG/DL (ref 70–99)
GLUCOSE UR-MCNC: NORMAL MG/DL
HCO3 BLDV-SCNC: 25.3 MEQ/L (ref 22–26)
HCT VFR BLD AUTO: 36.2 %
HGB BLD-MCNC: 12.2 G/DL
HGB UR QL STRIP.AUTO: NEGATIVE
IMM GRANULOCYTES # BLD AUTO: 0.05 X10(3) UL (ref 0–1)
IMM GRANULOCYTES NFR BLD: 0.4 %
LEUKOCYTE ESTERASE UR QL STRIP.AUTO: NEGATIVE
LYMPHOCYTES # BLD AUTO: 0.46 X10(3) UL (ref 1–4)
LYMPHOCYTES NFR BLD AUTO: 3.9 %
MAGNESIUM SERPL-MCNC: 2.1 MG/DL (ref 1.6–2.6)
MCH RBC QN AUTO: 30.8 PG (ref 26–34)
MCHC RBC AUTO-ENTMCNC: 33.7 G/DL (ref 31–37)
MCV RBC AUTO: 91.4 FL
MONOCYTES # BLD AUTO: 0.33 X10(3) UL (ref 0.1–1)
MONOCYTES NFR BLD AUTO: 2.8 %
NEUTROPHILS # BLD AUTO: 11.07 X10 (3) UL (ref 1.5–7.7)
NEUTROPHILS # BLD AUTO: 11.07 X10(3) UL (ref 1.5–7.7)
NEUTROPHILS NFR BLD AUTO: 92.7 %
NITRITE UR QL STRIP.AUTO: NEGATIVE
NT-PROBNP SERPL-MCNC: ABNORMAL PG/ML (ref ?–450)
OSMOLALITY SERPL CALC.SUM OF ELEC: 303 MOSM/KG (ref 275–295)
PCO2 BLDV: 35 MM HG (ref 38–50)
PH BLDV: 7.46 [PH] (ref 7.32–7.43)
PH UR: 5.5 [PH] (ref 5–8)
PLATELET # BLD AUTO: 241 10(3)UL (ref 150–450)
PO2 BLDV: 34 MM HG (ref 35–40)
POTASSIUM SERPL-SCNC: 4.8 MMOL/L (ref 3.5–5.1)
PROCALCITONIN SERPL-MCNC: 0.08 NG/ML (ref ?–0.05)
PROT SERPL-MCNC: 7.5 G/DL (ref 5.7–8.2)
PROT UR-MCNC: NEGATIVE MG/DL
PUNCTURE CHARGE: YES
Q-T INTERVAL: 364 MS
QRS DURATION: 146 MS
QTC CALCULATION (BEZET): 527 MS
R AXIS: -39 DEGREES
RBC # BLD AUTO: 3.96 X10(6)UL
RSV RNA SPEC NAA+PROBE: NEGATIVE
SAO2 % BLDV: 67.6 % (ref 60–85)
SARS-COV-2 RNA RESP QL NAA+PROBE: NOT DETECTED
SODIUM SERPL-SCNC: 140 MMOL/L (ref 136–145)
SP GR UR STRIP: 1.01 (ref 1–1.03)
T AXIS: 126 DEGREES
TROPONIN I SERPL HS-MCNC: 16 NG/L
TSI SER-ACNC: 4.42 UIU/ML (ref 0.55–4.78)
UROBILINOGEN UR STRIP-ACNC: NORMAL
VENTRICULAR RATE: 126 BPM
WBC # BLD AUTO: 11.9 X10(3) UL (ref 4–11)

## 2025-01-18 PROCEDURE — 71275 CT ANGIOGRAPHY CHEST: CPT | Performed by: EMERGENCY MEDICINE

## 2025-01-18 PROCEDURE — 71045 X-RAY EXAM CHEST 1 VIEW: CPT | Performed by: EMERGENCY MEDICINE

## 2025-01-18 PROCEDURE — 99223 1ST HOSP IP/OBS HIGH 75: CPT | Performed by: HOSPITALIST

## 2025-01-18 RX ORDER — AZITHROMYCIN 250 MG/1
250 TABLET, FILM COATED ORAL DAILY
Status: DISCONTINUED | OUTPATIENT
Start: 2025-01-19 | End: 2025-01-21

## 2025-01-18 RX ORDER — AZITHROMYCIN 250 MG/1
500 TABLET, FILM COATED ORAL DAILY
Status: COMPLETED | OUTPATIENT
Start: 2025-01-18 | End: 2025-01-18

## 2025-01-18 RX ORDER — LATANOPROST 50 UG/ML
1 SOLUTION/ DROPS OPHTHALMIC NIGHTLY
Status: DISCONTINUED | OUTPATIENT
Start: 2025-01-18 | End: 2025-01-21

## 2025-01-18 RX ORDER — METOPROLOL SUCCINATE 100 MG/1
100 TABLET, EXTENDED RELEASE ORAL
Status: DISCONTINUED | OUTPATIENT
Start: 2025-01-19 | End: 2025-01-21

## 2025-01-18 RX ORDER — DILTIAZEM HYDROCHLORIDE 5 MG/ML
10 INJECTION INTRAVENOUS ONCE
Status: COMPLETED | OUTPATIENT
Start: 2025-01-18 | End: 2025-01-18

## 2025-01-18 RX ORDER — PRAVASTATIN SODIUM 20 MG
20 TABLET ORAL NIGHTLY
Status: DISCONTINUED | OUTPATIENT
Start: 2025-01-18 | End: 2025-01-21

## 2025-01-18 RX ORDER — FUROSEMIDE 10 MG/ML
40 INJECTION INTRAMUSCULAR; INTRAVENOUS ONCE
Status: COMPLETED | OUTPATIENT
Start: 2025-01-18 | End: 2025-01-18

## 2025-01-18 NOTE — ED INITIAL ASSESSMENT (HPI)
Via wheelchair c/o worsening sob, weakness, and cough xmonths. Patient went to IC and diagnosed with new onset atrial fibrillation with rvr.

## 2025-01-18 NOTE — ED PROVIDER NOTES
Oh patient Seen in: [facility] Emergency Department    History     Chief Complaint   Patient presents with    Difficulty Breathing    Arrythmia/Palpitations     Stated Complaint: new a fib/ from IC; EKG done 20 min ago     HPI    89 yo F with PMH HTN, HL presenting with several months of cough/dyspnea now with worsneing symptoms over the past several days.  No fevers or chills.  No sick contacts/recent travel.  No pain. No urinary change. No vomiting/diarrhea.    Past Medical History:    Enlarged aorta (HCC)    Glaucoma    Other and unspecified hyperlipidemia    Right groin pain    Unspecified essential hypertension       Past Surgical History:   Procedure Laterality Date    Correct bunion,simple Right 1999    Bunionectomy    Electrocardiogram, complete  08/18/2010    Scanned to media tab     Knee surgery      right knee            Family History   Problem Relation Age of Onset    Heart Attack Father     Heart Disease Father         Coronary Artery Disease    Heart Attack Mother     Hypertension Mother     Breast Cancer Sister 78        2 sisters    Arthritis Sister     Heart Attack Brother     Cancer Brother         pancreatic cancer       Social History     Socioeconomic History    Marital status:    Tobacco Use    Smoking status: Never    Smokeless tobacco: Never   Substance and Sexual Activity    Alcohol use: Yes     Alcohol/week: 0.0 standard drinks of alcohol     Comment: wine once every 2 mos    Drug use: No   Other Topics Concern    Caffeine Concern No     Comment: decaf coffee daily    Exercise Yes     Comment: water aerobics 2-3x week   Social History Narrative    The patient uses the following assistive device(s):  rolling walker.      The patient does live in a home with stairs. 12 stairs       Review of Systems :  Constitutional: As per HPI  Respiratory: (+) cough and shortness of breath.    Cardiovascular: Negative for chest pain and palpitations.   Gastrointestinal: Negative for vomiting  and abdominal pain.     Positive for stated complaint: new a fib/ from IC; EKG done 20 min ago  Other systems are as noted in HPI.  Constitutional and vital signs reviewed.      All other systems reviewed and negative except as noted above.    PSFH elements reviewed from today and agreed except as otherwise stated in HPI.    Physical Exam     ED Triage Vitals [01/18/25 0929]   /85   Pulse (!) 154   Resp 22   Temp 97.6 °F (36.4 °C)   Temp src Oral   SpO2 91 %   O2 Device None (Room air)       Current:/85   Pulse (!) 154   Temp 97.6 °F (36.4 °C) (Oral)   Resp 22   Ht 165.1 cm (5' 5\")   Wt 73.3 kg   SpO2 91%   BMI 26.89 kg/m²         Physical Exam   Constitutional: No distress.   HEENT: MMM.  Head: Normocephalic.   Eyes: No injection.   Cardiovascular: Tachycardic, irregular irregular.   Pulmonary/Chest: Tachypneic, basilar crackles.  Abdominal: Soft. Nontender.  Musculoskeletal: No gross deformity. BLE with 1+ edema.  Neurological: Alert.   Skin: Skin is warm.   Psychiatric: Cooperative.  Nursing note and vitals reviewed.        ED Course     Labs Reviewed   CBC WITH DIFFERENTIAL WITH PLATELET - Abnormal; Notable for the following components:       Result Value    WBC 11.9 (*)     RDW-SD 46.9 (*)     Neutrophil Absolute Prelim 11.07 (*)     Neutrophil Absolute 11.07 (*)     Lymphocyte Absolute 0.46 (*)     All other components within normal limits   COMP METABOLIC PANEL (14)   PRO BETA NATRIURETIC PEPTIDE   MAGNESIUM   TSH W REFLEX TO FREE T4   RAINBOW DRAW LAVENDER   RAINBOW DRAW LIGHT GREEN   RAINBOW DRAW BLUE   SARS-COV-2/FLU A AND B/RSV BY PCR (GENEXPERT)     CTA CHEST (CPT=71275)    Result Date: 1/18/2025  PROCEDURE: CT ANGIOGRAPHY CHEST (CPT=71275)  COMPARISON: [facility], CT CHEST(CONTRAST ONLY) (CPT=71260), 2/03/2017, 10:19 AM.  [facility], CT CHEST WWO CONTRAST, 12/03/2010, 9:24 AM.  INDICATIONS: Abnormal electrocardiogram (ECG/EKG; R94.31).  New-onset atrial fibrillation.  TECHNIQUE: Multidetector CT images of the chest were obtained delete with non-ionic intravenous contrast material. Multi-planar reformatted and 3-D images were created with vessel analysis performed on an independent workstation. Volumetric 3D reconstructions were rendered on independent workstation under concurrent supervision, and images were archived. Automated exposure control for dose reduction was used. Adjustment of the mA and/or kV was done based on the patient's size. Iterative reconstruction technique for dose reduction was employed. Dose information was transmitted to the ACR (American College of Radiology) NRDR (National Radiology Data Registry), which includes the Dose Index Registry.   FINDINGS:  CARDIAC: The heart is significantly enlarged with biatrial dilatation. There is no bowing of the interventricular septum to suggest right ventricular strain. Mitral annular calcifications are observed.  Atherosclerotic vascular calcifications are present  in the coronary vessels. THORACIC AORTA: Unremarkable configuration without gross evidence of dissection flap. Atherosclerotic vascular calcifications are seen about the aortic arch. The aortic root measures 3.4 x 3.5 cm. The sino-tubular junction measures 3.5 x 3.3 cm. The mid ascending aorta measures 5.7 cm in diameter. At the level of the anterior arch, the aorta measures 4.2 cm. At the mid-arch, the aorta measures 4.0 cm. The isthmic portion measures 2.8 cm. The descending thoracic aorta measures 2.5 cm. At the level of the  esophageal hiatus, the aorta measures 2.4 cm.  VASCULATURE: There is adequate opacification of the pulmonary arterial tree. No suspicious filling defects are identified in the main, lobar, or proximal segmental pulmonary artery branches to suggest acute pulmonary embolism. The distal segmental and subsegmental branches are less well assessed. The main pulmonary artery trunk is substantially dilated in  caliber, measuring 4.0 cm. LUNGS/PLEURA: Multifocal patchy ground-glass airspace consolidation is demonstrated in the upper lobes bilaterally and multifocal nodular and partially confluent ground-glass opacities involving the lower lobes bilaterally. Interlobular septal thickening  is demonstrated with the dependent and basilar predominance. Trace bilateral pleural effusions are seen with associated compressive atelectasis, with or without superimposed airspace consolidation. No pneumothorax is detected.  AIRWAYS: The tracheobronchial tree is without central mass or obstructing lesion. MEDIASTINUM/FRANCOISE: Right upper paratracheal lymphadenopathy measures 1.5 x 2.4 cm in aggregate. Right lower paratracheal lymphadenopathy is seen measuring up to 1.4 x 3.3 cm in aggregate and a 1.8 x 1.7 cm.  An aortopulmonary window lymph node measures 1.3 x 2.0 cm.  CHEST WALL: No axillary mass or lymphadenopathy.  LIMITED ABDOMEN: Within the parameters of the phase of contrast-enhancement, the included portion of the upper abdomen is notable for small splenule in left upper quadrant.  BONES: Kyphoplasty cement material is demonstrated the lower thoracic levels. Moderate compression fracture deformity of the T7 vertebral segment appears chronic. Multilevel degenerative are seen throughout the spine associated vacuum disc phenomena. Degenerative changes of the shoulders are evident bilaterally. OTHER: A large hiatal hernia is evident with near-complete intrathoracic herniation of the stomach.          CONCLUSION:  1. No evidence of aortic dissection.  2. Ascending thoracic aortic aneurysm measuring up to 5.7 cm.  3. No evidence of acute pulmonary embolism to the level of the proximal segmental pulmonary artery branches.  4. Extensive multifocal bilateral airspace opacities, which may reflect multifocal pneumonia. Follow-up is recommended to document resolution.  5. Small bilateral pleural effusions and associated basilar atelectasis,  with or without concurrent infectious process.  6. Mediastinal lymphadenopathy, potentially reactive.  7. Mild interlobular septal thickening, suggesting pulmonary interstitial edema.  8. Dilatation of the main pulmonary artery trunk may relate to underlying pulmonary hypertension.   9. Large hiatal hernia with essentially complete intrathoracic herniation of the stomach.  10. Cardiomegaly with biatrial dilatation.  11. Kyphoplasty changes of the lower thoracic spine.  12. Lesser incidental findings as above.    Dictated by (CST): Ricardo Pdailla MD on 1/18/2025 at 1:22 PM     Finalized by (CST): Ricardo Padilla MD on 1/18/2025 at 1:31 PM          XR CHEST AP PORTABLE  (CPT=71045)    Result Date: 1/18/2025  PROCEDURE: XR CHEST AP PORTABLE  (CPT=71045) TIME: 1022.   COMPARISON: [facility], XR RIBS WITH CHEST (3 VIEWS), LEFT (CPT=71101), 9/16/2019, 1:51 PM.  [facility], X CHEST PORTABLE, 1/07/2015, 1:44 PM.  INDICATIONS: Atrial fibrillation.  TECHNIQUE:   Single view.   FINDINGS:  CARDIAC/VASC: The cardiomediastinal silhouette is accentuated by AP technique, but likely stably enlarged. There is moderate tortuosity of the thoracic aorta with peripheral atherosclerotic vascular calcification. The pulmonary vascularity is borderline caliber. MEDIAST/FRANCOISE: No visible mass or adenopathy. LUNGS/PLEURA: Elevation right hemidiaphragm is seen. A background of mild pulmonary interstitial opacities is suggested. Blunting of the costophrenic angles is seen, compatible with accumulating pleural effusions. Associated bibasilar opacities may reflect compressive atelectasis, with or without superimposed airspace consolidation. No pneumothorax is evident.  BONES: Mild scoliosis and multilevel degenerative changes of the thoracic spine are apparent. There is suggestion thoracolumbar kyphoplasty material. Degenerative changes of shoulders are OTHER: Leads overlie the chest and obscure underlying detail.            CONCLUSION:  1. Minimal blunting of the costophrenic angles suggests small pleural effusions and associated basilar atelectasis, with or without superimposed pneumonia.  2. Cardiomegaly with borderline pulmonary vascular congestion and mild pulmonary interstitial edema.    Dictated by (CST): Ricardo Padilla MD on 1/18/2025 at 10:49 AM     Finalized by (CST): Ricardo Padilla MD on 1/18/2025 at 10:51 AM           EKG    Rate, intervals and axes as noted on EKG Report.  Rate: 126  Rhythm: Atrial Fibrillation  Reading: afib 120s, LBBB unchanged from 11/206 without Sgarbossa change as independently interpreted by myself                MDM   DIFFERENTIAL DIAGNOSIS: After history and physical exam differential diagnosis includes but is not limited to ACS, myocarditis, VTE, CHF, dysrhythmia.    Pulse ox: 91%:Abnormal and Improved after treatment on RA/NA, as independently interpreted by myself    Cardiac Monitor Interpretation:   Pulse Readings from Last 1 Encounters:   01/18/25 (!) 154   , atrial fibrillation, with rapid ventiricular response     Medical Decision Making  Evaluation for subacute cough/dypsnea found to be in new onset AF/RVR and HF with borderline oxygenation. Oxygen/diuresis initiated in addition to rate control - will admit for ongoing management; case d/w hospitalist Dr. Arredondo for admission, YANY Blum in consultation.    Problems Addressed:  Acute hypoxemic respiratory failure (HCC): acute illness or injury  Acute on chronic congestive heart failure, unspecified heart failure type (HCC): acute illness or injury  Atrial fibrillation with rapid ventricular response (HCC): acute illness or injury  Atrial fibrillation, new onset (HCC): acute illness or injury    Amount and/or Complexity of Data Reviewed  Independent Historian: caregiver     Details: Son at bedside for collateral history  External Data Reviewed: ECG and notes.     Details: IC paper note/EKG reviewed  Labs: ordered.  Decision-making details documented in ED Course.  Radiology: ordered and independent interpretation performed. Decision-making details documented in ED Course.     Details: CXR without obvious pneumothorax as independently interpreted by myself    ECG/medicine tests: ordered and independent interpretation performed.  Discussion of management or test interpretation with external provider(s): Case d/w hospitalist Dr. Arredondo for admission, YANY Blum in consultation    Risk  Prescription drug management.  Drug therapy requiring intensive monitoring for toxicity.  Decision regarding hospitalization.    Critical Care  Total time providing critical care: 33 minutes        A total of 33 minutes of critical care time (exclusive of billable procedures) was administered to manage the patient's unstable vital signs, respiratory instability, and cardiovascular instability due to her new onset atrial fibrillation with rapid ventricular response, acute congestive heart failure, acute hypoxemic respiratory failure.  This involved direct patient intervention, complex decision making, and/or extensive discussions with the patient, family, and clinical staff.      I was wearing at minimum a facemask and eye protection throughout this encounter with handwashing performed prior and after patient evaluation without personal hand/facial/oropharyngeal contact and gloves worn throughout encounter. See note and/or contact this provider for further PPE details.    Disposition and Plan     Clinical Impression:  1. Atrial fibrillation, new onset (HCC)    2. Acute hypoxemic respiratory failure (HCC)    3. Acute on chronic congestive heart failure, unspecified heart failure type (HCC)    4. Atrial fibrillation with rapid ventricular response (HCC)        Disposition:  Admit    Follow-up:  No follow-up provider specified.    Medications Prescribed:  Current Discharge Medication List

## 2025-01-18 NOTE — ED QUICK NOTES
Orders for admission, patient is aware of plan and ready to go upstairs. Any questions, please call ED RN Kathy at extension [phone].     Patient Covid vaccination status: Fully vaccinated     COVID Test Ordered in ED: SARS-CoV-2/Flu A and B/RSV by PCR ([facility])    COVID Suspicion at Admission: N/A    Running Infusions:    [id number]      None    Mental Status/LOC at time of transport: AOx4    Other pertinent information:   CIWA score: N/A   NIH score:  N/A

## 2025-01-18 NOTE — PLAN OF CARE
Pt. Shyla is A&Ox 4. Lives at home alone. Patient is a max assist. Pt states she walks without a walker at home. Patient isn't experiencing pain. Continent of bowel. Incontinent of bladder, purewick. Cardizem gtt running. Call light within reach, fall precautions maintained.     Plan is PT/OT eval and cardizem gtt.    Problem: Patient Centered Care  Goal: Patient preferences are identified and integrated in the patient's plan of care  Description: Interventions:  - What would you like us to know as we care for you? I live at home alone  - Provide timely, complete, and accurate information to patient/family  - Incorporate patient and family knowledge, values, beliefs, and cultural backgrounds into the planning and delivery of care  - Encourage patient/family to participate in care and decision-making at the level they choose  - Honor patient and family perspectives and choices  Outcome: Progressing     Problem: CARDIOVASCULAR - ADULT  Goal: Maintains optimal cardiac output and hemodynamic stability  Description: INTERVENTIONS:  - Monitor vital signs, rhythm, and trends  - Monitor for bleeding, hypotension and signs of decreased cardiac output  - Evaluate effectiveness of vasoactive medications to optimize hemodynamic stability  - Monitor arterial and/or venous puncture sites for bleeding and/or hematoma  - Assess quality of pulses, skin color and temperature  - Assess for signs of decreased coronary artery perfusion - ex. Angina  - Evaluate fluid balance, assess for edema, trend weights  Outcome: Progressing  Goal: Absence of cardiac arrhythmias or at baseline  Description: INTERVENTIONS:  - Continuous cardiac monitoring, monitor vital signs, obtain 12 lead EKG if indicated  - Evaluate effectiveness of antiarrhythmic and heart rate control medications as ordered  - Initiate emergency measures for life threatening arrhythmias  - Monitor electrolytes and administer replacement therapy as ordered  Outcome: Progressing

## 2025-01-18 NOTE — CONSULTS
[place]/[facility]  Cardiology Consultation    Shyla Wheeler Patient Status:  Emergency    1935 MRN [id number]   Location [facility] EMERGENCY DEPARTMENT Attending Jorge L Weller MD   Hosp Day # 0 PCP Ray Goldman MD     Reason for Consultation:  A-fib with controlled ventricular response, new diagnosis    History of Present Illness:  Shyla Wheeler is a a(n) 89 year old female who presents with complaint of not feeling well for last few months, short of breath for last 2 to 3 days.  Presented to the emergency room, noted to have A-fib with controlled ventricular response.  Has history of chronic left bundle branch block.  Denies any chest pain shortness of breath dizziness lightheadedness syncope near syncope.  Denies any history of CVA or TIA.    History:  Past Medical History:    Enlarged aorta (HCC)    Glaucoma    Other and unspecified hyperlipidemia    Right groin pain    Unspecified essential hypertension     Past Surgical History:   Procedure Laterality Date    Correct bunion,simple Right 1999    Bunionectomy    Electrocardiogram, complete  2010    Scanned to media tab     Knee surgery      right knee     Family History   Problem Relation Age of Onset    Heart Attack Father     Heart Disease Father         Coronary Artery Disease    Heart Attack Mother     Hypertension Mother     Breast Cancer Sister 78        2 sisters    Arthritis Sister     Heart Attack Brother     Cancer Brother         pancreatic cancer      reports that she has never smoked. She has never used smokeless tobacco. She reports current alcohol use. She reports that she does not use drugs.    Allergies:  Allergies[1]    Medications:    Current Facility-Administered Medications:     dilTIAZem 10 mg BOLUS FROM BAG infusion, 10 mg, Intravenous, Q1H PRN    dilTIAZem (cardIZEM) 100 mg in sodium chloride 0.9% 100 mL IVPB-ADDV, 2.5-20 mg/hr, Intravenous, Continuous    Review of Systems:  A comprehensive review of  systems was negative if not otherwise mention in above HPI.    /69   Pulse 78   Temp 97.6 °F (36.4 °C) (Oral)   Resp 17   Ht 5' 5\" (1.651 m)   Wt 161 lb 9.6 oz (73.3 kg)   SpO2 96%   BMI 26.89 kg/m²   Temp (24hrs), Av.6 °F (36.4 °C), Min:97.6 °F (36.4 °C), Max:97.6 °F (36.4 °C)     No intake or output data in the 24 hours ending 25 1422  Wt Readings from Last 3 Encounters:   25 161 lb 9.6 oz (73.3 kg)   23 171 lb (77.6 kg)   21 173 lb (78.5 kg)       Physical Exam:   General: Alert and oriented x 3. No apparent distress. No respiratory or constitutional distress.  HEENT: Normocephalic, anicteric sclera, neck supple.  Neck: No JVD, carotids 2+, no bruits.  Cardiac: Regular rate and rhythm. S1, S2 normal. No murmur, pericardial rub, S3.  Lungs: Clear without wheezes, rales, rhonchi or dullness.  Normal excursions and effort.  Abdomen: Soft, non-tender. BS-present.  Extremities: Without clubbing, cyanosis or edema.  Peripheral pulses are 2+.  Neurologic: Alert and oriented, normal affect.  Skin: Warm and dry.     Laboratory Data:  Lab Results   Component Value Date    WBC 11.9 2025    HGB 12.2 2025    HCT 36.2 2025    .0 2025    CREATSERUM 0.99 2025    BUN 42 2025     2025    K 4.8 2025     2025    CO2 21.0 2025     2025    CA 9.6 2025    ALB 4.4 2025    ALKPHO 68 2025    BILT 0.9 2025    TP 7.5 2025    AST 68 2025    ALT 63 2025    PTT 24.4 2025    TSH 4.417 2025    MG 2.1 2025       Imaging:  EKG, blood test and chest x-ray reviewed  Impression:  New A-fib diagnosis, unknown duration  Left bundle branch block    Recommendations:  Continue current medications, heart rate is well-controlled  Discussed briefly regarding starting anticoagulation, patient and family agreeable  Start Eliquis 5 mg twice daily      Thank you for  allowing me to participate in the care of your patient.    Minh Mcintyre MD  1/18/2025  2:22 PM    5       [1]   Allergies  Allergen Reactions    Ace Inhibitors     Amoxicillin NAUSEA ONLY

## 2025-01-18 NOTE — H&P
[facility]  part of [facility]    History & Physical    Shyla Wheeler Patient Status:  Inpatient    1935 MRN [id number]   Location [facility] 3W/SW Attending Bhumika Arredondo MD   Hosp Day # 0 PCP Ray Goldman MD     Date:  2025  Date of Admission:  2025    History provided by:patient and dtr  Chief Complaint:     Chief Complaint   Patient presents with    Difficulty Breathing    Arrythmia/Palpitations       HPI:   Shyla Wheeler is a(n) 89 year old female who lives independently, able to take care of her daily activities, walks, cleans, goes shopping with her family, presents with complaint of not feeling well for a while, but much worse over the last few days, mostly short of breath and coughing, both with phlegm and dry cough.  No high fevers.  In the ER noted to have A-fib with controlled ventricular response.  Has history of chronic left bundle branch block.  Cardiology called.  Denies any chest pain or dizziness lightheadedness syncope near syncope.  Denies any history of CVA or TIA.     Seen with daughter at bedside    History     Past Medical History:    Enlarged aorta (HCC)    Glaucoma    Other and unspecified hyperlipidemia    Right groin pain    Unspecified essential hypertension     Past Surgical History:   Procedure Laterality Date    Correct bunion,simple Right 1999    Bunionectomy    Electrocardiogram, complete  2010    Scanned to media tab     Knee surgery      right knee     Family History   Problem Relation Age of Onset    Heart Attack Father     Heart Disease Father         Coronary Artery Disease    Heart Attack Mother     Hypertension Mother     Breast Cancer Sister 78        2 sisters    Arthritis Sister     Heart Attack Brother     Cancer Brother         pancreatic cancer   Son  6 years ago from acute MI in young age  Social History:  Social History     Socioeconomic History    Marital status:    Tobacco Use    Smoking status:  Never    Smokeless tobacco: Never   Substance and Sexual Activity    Alcohol use: Yes     Alcohol/week: 0.0 standard drinks of alcohol     Comment: wine once every 2 mos    Drug use: No   Other Topics Concern    Caffeine Concern No     Comment: decaf coffee daily    Exercise Yes     Comment: water aerobics 2-3x week   Social History Narrative    The patient uses the following assistive device(s):  rolling walker.      The patient does live in a home with stairs. 12 stairs     Social Drivers of Health     Food Insecurity: No Food Insecurity (1/18/2025)    Food Insecurity     Food Insecurity: Never true   Transportation Needs: No Transportation Needs (1/18/2025)    Transportation Needs     Lack of Transportation: No   Housing Stability: Low Risk  (1/18/2025)    Housing Stability     Housing Instability: No     Allergies/Medications:   Allergies: Allergies[1]  Medications Prior to Admission   Medication Sig    amLODIPine 5 MG Oral Tab Take 1 tablet (5 mg total) by mouth daily.    metoprolol succinate ER 50 MG Oral Tablet 24 Hr Take 2 tablets (100 mg total) by mouth daily.    pravastatin 40 MG Oral Tab Take 1 tablet (40 mg total) by mouth nightly.    aspirin 81 MG Oral Tab EC Take 1 tablet (81 mg total) by mouth daily.    Calcium Carb-Cholecalciferol 600-200 MG-UNIT Oral Tab Take 1 tablet by mouth daily.    TRAVATAN Z 0.004 % Ophthalmic Solution Place 1 drop into both eyes at bedtime.    MULTIVITAMIN TAB/CAP Take 1 tablet by mouth daily.       Review of Systems:   Pertinent items are noted in HPI.  Otherwise negative  Comprehensive 12 point ROS performed      Physical Exam:   Vital Signs:  Blood pressure 125/86, pulse 96, temperature 98 °F (36.7 °C), temperature source Oral, resp. rate 20, height 5' 5\" (1.651 m), weight 156 lb 6.4 oz (70.9 kg), SpO2 96%, not currently breastfeeding.     General:  Alert and oriented.  Conversant, somewhat dyspneic  Diffuse skin problem:  None.  Eye:  Pupils are equal, round and reactive  to light, extraocular movements are intact, Normal conjunctiva.  HENT:  Normocephalic, oral mucosa is moist.  Head:  Normocephalic, atraumatic.  Neck:  Supple, non-tender  Respiratory:  Lungs are congested, diminished with crackles in lower lungs bilaterally to auscultation, respirations are mildly labored,  symmetrical chest wall expansion.  Cardiovascular:  irregular rhythm, no murmur, trace edema.  Gastrointestinal:  Soft, non-tender, non-distended, normal bowel sounds, no organomegaly.  Musculoskeletal: Normal range of motion.  normal strength.  Feet:  Normal pulses.  Neurologic:  Alert, oriented, no focal deficits, cranial nerves II-XII are grossly intact.  Cognition and Speech:  Oriented, speech clear and coherent.  Psychiatric:  Cooperative, appropriate mood & affect.    Cervical Papanicolaou to be done in MD's office    Results:     Lab Results   Component Value Date    WBC 11.9 (H) 01/18/2025    HGB 12.2 01/18/2025    HCT 36.2 01/18/2025    .0 01/18/2025    CREATSERUM 0.99 01/18/2025    BUN 42 (H) 01/18/2025     01/18/2025    K 4.8 01/18/2025     01/18/2025    CO2 21.0 01/18/2025     (H) 01/18/2025    CA 9.6 01/18/2025    ALB 4.4 01/18/2025    ALKPHO 68 01/18/2025    BILT 0.9 01/18/2025    TP 7.5 01/18/2025    AST 68 (H) 01/18/2025    ALT 63 (H) 01/18/2025    PTT 24.4 01/18/2025    T4F 1.1 08/05/2024    TSH 4.417 01/18/2025    MG 2.1 01/18/2025    B12 562 08/05/2024       CTA CHEST (CPT=71275)    Result Date: 1/18/2025  CONCLUSION:  1. No evidence of aortic dissection.  2. Ascending thoracic aortic aneurysm measuring up to 5.7 cm.  3. No evidence of acute pulmonary embolism to the level of the proximal segmental pulmonary artery branches.  4. Extensive multifocal bilateral airspace opacities, which may reflect multifocal pneumonia. Follow-up is recommended to document resolution.  5. Small bilateral pleural effusions and associated basilar atelectasis, with or without concurrent  infectious process.  6. Mediastinal lymphadenopathy, potentially reactive.  7. Mild interlobular septal thickening, suggesting pulmonary interstitial edema.  8. Dilatation of the main pulmonary artery trunk may relate to underlying pulmonary hypertension.   9. Large hiatal hernia with essentially complete intrathoracic herniation of the stomach.  10. Cardiomegaly with biatrial dilatation.  11. Kyphoplasty changes of the lower thoracic spine.  12. Lesser incidental findings as above.    Dictated by (CST): Ricardo Padilla MD on 1/18/2025 at 1:22 PM     Finalized by (CST): Ricardo Padilla MD on 1/18/2025 at 1:31 PM          XR CHEST AP PORTABLE  (CPT=71045)    Result Date: 1/18/2025  CONCLUSION:  1. Minimal blunting of the costophrenic angles suggests small pleural effusions and associated basilar atelectasis, with or without superimposed pneumonia.  2. Cardiomegaly with borderline pulmonary vascular congestion and mild pulmonary interstitial edema.    Dictated by (CST): Ricardo Padilla MD on 1/18/2025 at 10:49 AM     Finalized by (CST): Ricardo Padilla MD on 1/18/2025 at 10:51 AM         EKG 12 Lead    Result Date: 1/18/2025  Atrial fibrillation with rapid ventricular response Left axis deviation Left bundle branch block Abnormal ECG No previous ECGs found in [facility] Confirmed by MANNY MCINTYRE, FRED ([id number]) on 1/18/2025 2:33:20 PM     Assessment/Plan:       Atrial fibrillation, new onset (HCC), initial RVR with tachycardia 154, improved quickly  LBBB  Electrolytes, troponin, TSH wnl  -Rates reasonably controlled now  -Seen by cardiology in the ER, Dr. Mcintyre  -Continue home medication with metoprolol  -Hold amlodipine for now, BP on lower side  -Started on Eliquis  -Hold aspirin for now, will clarify with cardiology ifaspirin also needed  -Telemetry  -Echo      Acute hypoxemic respiratory failure (HCC)  Possible pneumonia, h/o cough and congestion, leukocytosis  CHF/pulmonary edema contributing  -CXR   1. Minimal blunting  of the costophrenic angles suggests small pleural effusions and associated basilar atelectasis, with or without superimposed pneumonia.   2. Cardiomegaly with borderline pulmonary vascular congestion and mild pulmonary interstitial edema.   -CT chest: Negative for PE. Extensive multifocal bilateral airspace opacities, which may reflect multifocal pneumonia. Mediastinal lymphadenopathy, potentially reactive. Follow-up is recommended to document resolution. Small bilateral pleural effusions and associated basilar atelectasis, with or without concurrent infectious process.   Ascending thoracic aortic aneurysm measuring up to 5.7 cm. -->f/u cardiology  Dilatation of the main pulmonary artery trunk may relate to underlying pulmonary hypertension. -O2 protocol, currently on 2 L  -Antibiotics  -Diuretics per cardiology, s/p dose in the ER, reevaluate in the morning      Acute on chronic congestive heart failure, unspecified heart failure type (HCC)  -proBNP over 13,000  -Per cardiology  -Echo  -Diuretics per cardiology  -Monitor electrolytes and renal functions    Generalized fatigue  Cough/congestion  -UA nl  -Procalcitonin borderline at 0.08  -Respiratory panel normal  -Mild leukocytosis, WBC 11.9, repeat in the morning  -Abx  -TSH, recent B12 normal    Abnormal liver function tests with elevated ALT/AST  Previously normal, ?Secondary congestion from CHF vs primary liver issue  -Repeat in the morning  -Liver/GB ultrasound    DVT prophylaxis: Eliquis      Chart reviewed, including current admission vitals, notes, labs and imaging  Pertinent past medical records reviewed  Labs ordered and medications adjusted as outlined above  Coordinate care with care team/consultants  Discussed with patient and family at bedside available results of tests, management plan as outlined above, and the need for hospitalization  D/w RN     MDM high  severe acute illness/or exacerbation of chronic illness posing a threat to life, IV  medication, requiring close monitoring in hospital.    JOSE DO MD  1/18/2025    **Certification      PHYSICIAN Certification of Need for Inpatient Hospitalization - Initial Certification    Patient will require inpatient services that will reasonably be expected to span two midnight's based on the clinical documentation in H+P.   Based on patients current state of illness, I anticipate that, after discharge, patient will require TBD.         [1]   Allergies  Allergen Reactions    Benadryl [Diphenhydramine] SWELLING    Ace Inhibitors     Amoxicillin NAUSEA ONLY

## 2025-01-18 NOTE — ED QUICK NOTES
Escorted pt to room with transport and family    Once on the floor, this RN picked up remote tele and switched out ED cardiac monitors

## 2025-01-19 ENCOUNTER — APPOINTMENT (OUTPATIENT)
Dept: ULTRASOUND IMAGING | Facility: HOSPITAL | Age: 89
End: 2025-01-19
Attending: HOSPITALIST
Payer: MEDICARE

## 2025-01-19 LAB
ALBUMIN SERPL-MCNC: 4.1 G/DL (ref 3.2–4.8)
ALBUMIN/GLOB SERPL: 1.6 {RATIO} (ref 1–2)
ALP LIVER SERPL-CCNC: 60 U/L
ALT SERPL-CCNC: 40 U/L
ANION GAP SERPL CALC-SCNC: 8 MMOL/L (ref 0–18)
AST SERPL-CCNC: 36 U/L (ref ?–34)
BASOPHILS # BLD AUTO: 0.03 X10(3) UL (ref 0–0.2)
BASOPHILS NFR BLD AUTO: 0.4 %
BILIRUB SERPL-MCNC: 0.6 MG/DL (ref 0.2–1.1)
BUN BLD-MCNC: 44 MG/DL (ref 9–23)
BUN/CREAT SERPL: 40.4 (ref 10–20)
CALCIUM BLD-MCNC: 9.1 MG/DL (ref 8.7–10.4)
CHLORIDE SERPL-SCNC: 104 MMOL/L (ref 98–112)
CO2 SERPL-SCNC: 28 MMOL/L (ref 21–32)
CREAT BLD-MCNC: 1.09 MG/DL
DEPRECATED RDW RBC AUTO: 48 FL (ref 35.1–46.3)
EGFRCR SERPLBLD CKD-EPI 2021: 49 ML/MIN/1.73M2 (ref 60–?)
EOSINOPHIL # BLD AUTO: 0.11 X10(3) UL (ref 0–0.7)
EOSINOPHIL NFR BLD AUTO: 1.4 %
ERYTHROCYTE [DISTWIDTH] IN BLOOD BY AUTOMATED COUNT: 14.1 % (ref 11–15)
GLOBULIN PLAS-MCNC: 2.6 G/DL (ref 2–3.5)
GLUCOSE BLD-MCNC: 107 MG/DL (ref 70–99)
HCT VFR BLD AUTO: 35 %
HGB BLD-MCNC: 11.4 G/DL
IMM GRANULOCYTES # BLD AUTO: 0.02 X10(3) UL (ref 0–1)
IMM GRANULOCYTES NFR BLD: 0.3 %
LYMPHOCYTES # BLD AUTO: 0.79 X10(3) UL (ref 1–4)
LYMPHOCYTES NFR BLD AUTO: 10 %
MCH RBC QN AUTO: 30.2 PG (ref 26–34)
MCHC RBC AUTO-ENTMCNC: 32.6 G/DL (ref 31–37)
MCV RBC AUTO: 92.8 FL
MONOCYTES # BLD AUTO: 0.53 X10(3) UL (ref 0.1–1)
MONOCYTES NFR BLD AUTO: 6.7 %
NEUTROPHILS # BLD AUTO: 6.4 X10 (3) UL (ref 1.5–7.7)
NEUTROPHILS # BLD AUTO: 6.4 X10(3) UL (ref 1.5–7.7)
NEUTROPHILS NFR BLD AUTO: 81.2 %
OSMOLALITY SERPL CALC.SUM OF ELEC: 302 MOSM/KG (ref 275–295)
PLATELET # BLD AUTO: 230 10(3)UL (ref 150–450)
POTASSIUM SERPL-SCNC: 4.8 MMOL/L (ref 3.5–5.1)
PROT SERPL-MCNC: 6.7 G/DL (ref 5.7–8.2)
RBC # BLD AUTO: 3.77 X10(6)UL
SODIUM SERPL-SCNC: 140 MMOL/L (ref 136–145)
WBC # BLD AUTO: 7.9 X10(3) UL (ref 4–11)

## 2025-01-19 PROCEDURE — 99233 SBSQ HOSP IP/OBS HIGH 50: CPT | Performed by: HOSPITALIST

## 2025-01-19 PROCEDURE — 76705 ECHO EXAM OF ABDOMEN: CPT | Performed by: HOSPITALIST

## 2025-01-19 RX ORDER — ACETAMINOPHEN 325 MG/1
650 TABLET ORAL EVERY 6 HOURS PRN
Status: DISCONTINUED | OUTPATIENT
Start: 2025-01-19 | End: 2025-01-21

## 2025-01-19 RX ORDER — DILTIAZEM HCL 60 MG
60 TABLET ORAL EVERY 6 HOURS SCHEDULED
Status: DISCONTINUED | OUTPATIENT
Start: 2025-01-19 | End: 2025-01-21

## 2025-01-19 NOTE — PHYSICAL THERAPY NOTE
Physical Therapy Contact Note    Orders received and chart reviewed. Attempted to see patient for Physical Therapy services. Patient not available secondary to patient off unit at ultrasound. Will re-attempt pending patient availability/appropriateness as schedule allows, otherwise will re-schedule visit.    PHYSICAL THERAPY EVALUATION - INPATIENT     Room Number: 303/303-A  Evaluation Date: 1/19/2025  Type of Evaluation: Initial   Physician Order: PT Eval and Treat (functional mobility screen)    Presenting Problem: Afib, respiratory failure, CHF exacerbation  Co-Morbidities : HTN, enlarged aorta, R knee surgery  Reason for Therapy: Mobility Dysfunction and Discharge Planning    PHYSICAL THERAPY ASSESSMENT   Patient is a 89 year old female admitted 1/18/2025 for Afib, respiratory failure, CHF exacerbation.  Prior to admission, patient's baseline is independent without an assistive device.  Patient is currently functioning near baseline with bed mobility, transfers, and gait.  Patient is requiring stand-by assist and contact guard assist using a rolling walker as a result of the following impairments: decreased endurance/aerobic capacity, medical status, and increased O2 needs from baseline.  Physical Therapy will continue to follow for duration of hospitalization.    Patient will benefit from continued skilled PT Services for duration of hospitalization, however, given the patient is functioning near baseline level do not anticipate skilled therapy needs at discharge .    PLAN DURING HOSPITALIZATION  Nursing Mobility Recommendation : 1 Assist     PT Treatment Plan: Body mechanics;Endurance;Gait training;Stoop training  Rehab Potential : Good  Frequency (Obs): 3-5x/week     PHYSICAL THERAPY MEDICAL/SOCIAL HISTORY     Problem List  Principal Problem:    Atrial fibrillation, new onset (HCC)  Active Problems:    Acute hypoxemic respiratory failure (HCC)    Acute on chronic congestive heart failure, unspecified heart  failure type (HCC)      HOME SITUATION  Type of Home: House  Home Layout: One level  Stairs to Enter : 1   Railing: No    Stairs to Bedroom: 0    Railing: No    Lives With: Alone (daughter lives nearby)    Drives: Yes   Patient Regularly Uses: Rolling walker;Glasses (walker occasionally at night)     Prior Level of [place]: independent no device    SUBJECTIVE  \"I manage it.\"    PHYSICAL THERAPY EXAMINATION   OBJECTIVE  Precautions: Cardiac  Fall Risk: Standard fall risk    WEIGHT BEARING RESTRICTION  none    PAIN ASSESSMENT  Ratin  Location: denies       COGNITION  Overall Cognitive Status:  WFL - within functional limits    RANGE OF MOTION AND STRENGTH ASSESSMENT  Upper extremity ROM and strength are within functional limits  BUEs  Lower extremity ROM is within functional limits  BLEs  Lower extremity strength is within functional limits  BLEs    BALANCE  Static Sitting: Normal  Dynamic Sitting: Normal  Static Standing: Normal  Dynamic Standing: Good    ACTIVITY TOLERANCE  Pulse: 76  Heart Rate Source: Monitor     BP: 145/69  BP Location: Right arm  BP Method: Automatic  Patient Position: Lying    O2 WALK  Oxygen Therapy  SPO2% on Oxygen at Rest: 93  At rest oxygen flow (liters per minute): 2    AM-PAC '6-Clicks' INPATIENT SHORT FORM - BASIC MOBILITY  How much difficulty does the patient currently have...  Patient Difficulty: Turning over in bed (including adjusting bedclothes, sheets and blankets)?: None   Patient Difficulty: Sitting down on and standing up from a chair with arms (e.g., wheelchair, bedside commode, etc.): None   Patient Difficulty: Moving from lying on back to sitting on the side of the bed?: A Little   How much help from another person does the patient currently need...   Help from Another: Moving to and from a bed to a chair (including a wheelchair)?: A Little   Help from Another: Need to walk in hospital room?: A Little   Help from Another: Climbing 3-5 steps with a railing?: A Little      AM-PAC Score:  Raw Score: 20   Approx Degree of Impairment: 35.83%   Standardized Score (AM-PAC Scale): 47.67   CMS Modifier (G-Code): CJ    FUNCTIONAL ABILITY STATUS  Functional Mobility/Gait Assessment  Gait Assistance: Other (Comment);Supervision (SBA)  Distance (ft): 48  Assistive Device: Rolling walker  Pattern: Within Functional Limits  Supine to Sit: modified independent - head of bed elevated  Sit to Stand: modified independent    Exercise/Education Provided:  Education Provided To: Patient  Patient Education: Role of Physical Therapy;Plan of Care;Discharge Recommendations;Functional Transfer Techniques;Fall Prevention;Energy Conservation;Proper Body Mechanics;Gait Training  Patient's Response to Education: Verbalized Understanding;Returned Demonstration          Skilled Therapy Provided: Patient tolerating household distances using rolling walker, distance limited at this time 2/2 hunger, declining to attempt ambulating dorado but doing laps in room.  Patient received semi-fowlers in bed, agreeable to physical therapy evaluation. Vital signs monitored as noted above, no adverse symptoms and patient stable during session. Next session anticipate to progress gait and stair negotiation.    Patient history and/or personal factors that may impact the plan of care include limited social support and co-morbidities (HTN, enlarged aorta, R knee surgery) affecting endurance . Based on the physical therapy examination of the noted systems and functional activity/participation limitations, the patient presentation is stable given the patient demonstrates no significant barriers to meeting therapy goals.    The patient's Approx Degree of Impairment: 35.83% has been calculated based on documentation in the [facility] '6 clicks' Inpatient Basic Mobility Short Form.  Research supports that patients with this level of impairment may benefit from no services.  Final disposition will be made by interdisciplinary medical  team.    Patient End of Session: Up in chair;Needs met;Call light within reach;RN aware of session/findings;All patient questions and concerns addressed;Hospital anti-slip socks    CURRENT GOALS  Goals to be met by: 2/5/25  Patient Goal Patient's self-stated goal is: return home safely   Goal #1 Patient is able to demonstrate supine - sit EOB @ level: independent     Goal #1   Current Status    Goal #2 Patient is able to demonstrate transfers EOB to/from [facility] at assistance level: modified independent with  least restrictive assistive device     Goal #2  Current Status    Goal #3 Patient is able to ambulate 150 feet with assist device:  least restrictive assistive device  at assistance level: modified independent   Goal #3   Current Status    Goal #4 Patient will negotiate 1 stairs/one curb w/ assistive device and supervision   Goal #4   Current Status    Goal #5 Patient to demonstrate independence with home activity/exercise instructions provided to patient in preparation for discharge.   Goal #5   Current Status    Goal #6    Goal #6  Current Status      Patient Evaluation Complexity Level:  History Moderate - 1 or 2 personal factors and/or co-morbidities   Examination of body systems Moderate - addressing a total of 3 or more elements   Clinical Presentation Low- Stable   Clinical Decision Making  Low Complexity     Gait Training: 15 minutes        Katya Santana, PT, DPT  [facility]  Rehab Services - Physical Therapy  [phone]

## 2025-01-19 NOTE — PLAN OF CARE
Patient affirms pain and discomfort right neck lidocaine patch place, Cardizem drip. Frequent rounding ,Safety measures in place call light with reach.plan ultrasound abd.  Problem: Patient Centered Care  Goal: Patient preferences are identified and integrated in the patient's plan of care  Description: Interventions:  - What would you like us to know as we care for you? Home alone  - Provide timely, complete, and accurate information to patient/family  - Incorporate patient and family knowledge, values, beliefs, and cultural backgrounds into the planning and delivery of care  - Encourage patient/family to participate in care and decision-making at the level they choose  - Honor patient and family perspectives and choices  Outcome: Progressing     Problem: Patient/Family Goals  Goal: Patient/Family Long Term Goal  Description: Patient's Long Term Goal: go home    Interventions:  - see MD plan of care  - See additional Care Plan goals for specific interventions  Outcome: Progressing  Goal: Patient/Family Short Term Goal  Description: Patient's Short Term Goal: feel better    Interventions:   - see MD plan of care  - See additional Care Plan goals for specific interventions  Outcome: Progressing     Problem: CARDIOVASCULAR - ADULT  Goal: Maintains optimal cardiac output and hemodynamic stability  Description: INTERVENTIONS:  - Monitor vital signs, rhythm, and trends  - Monitor for bleeding, hypotension and signs of decreased cardiac output  - Evaluate effectiveness of vasoactive medications to optimize hemodynamic stability  - Monitor arterial and/or venous puncture sites for bleeding and/or hematoma  - Assess quality of pulses, skin color and temperature  - Assess for signs of decreased coronary artery perfusion - ex. Angina  - Evaluate fluid balance, assess for edema, trend weights  Outcome: Progressing  Goal: Absence of cardiac arrhythmias or at baseline  Description: INTERVENTIONS:  - Continuous cardiac  monitoring, monitor vital signs, obtain 12 lead EKG if indicated  - Evaluate effectiveness of antiarrhythmic and heart rate control medications as ordered  - Initiate emergency measures for life threatening arrhythmias  - Monitor electrolytes and administer replacement therapy as ordered  Outcome: Progressing

## 2025-01-19 NOTE — PLAN OF CARE
Called by the nurse because patient is refusing to take a therapeutic dose of Eliquis because she had a \"very scary bleeding event\" earlier today. Per nursing report patient has a nose bleed and cracked dry lip that was bleeding. It did take good 10 min for nose bleed to stop.     Patient and patient's dtr were educated about the increased risk for stroke even with the lower dose of eliquis given she is currently in afib. Patient verbalized understanding and accepts the risk. Will decrease eliquis to 2.5 mg bid.

## 2025-01-19 NOTE — PROGRESS NOTES
Cardiology Progress Note    Shyla Wheeler Patient Status:  Inpatient    1935 MRN [id number]   Location [facility] 3W/SW Attending Bhumika Arredondo MD   Hosp Day # 1 PCP Ray Goldman MD       Subjective: Doing better, eager to eat    Objective:   Temp: 97.8 °F (36.6 °C)  Pulse: 94  Resp: 20  BP: 132/69    Intake/Output:     Intake/Output Summary (Last 24 hours) at 2025 0849  Last data filed at 2025 0700  Gross per 24 hour   Intake 240 ml   Output 300 ml   Net -60 ml       Last 3 Weights   25 0501 163 lb 6.4 oz (74.1 kg)   25 1457 156 lb 6.4 oz (70.9 kg)   25 1451 156 lb 6.4 oz (70.9 kg)   25 0956 161 lb 9.6 oz (73.3 kg)   25 0929 162 lb (73.5 kg)   23 0936 171 lb (77.6 kg)   21 1417 173 lb (78.5 kg)       Tele: A-fib with controlled ventricular response    Physical Exam:     General: Alert and oriented x 3. No apparent distress. No respiratory or constitutional distress.  HEENT: Normocephalic, anicteric sclera, neck supple.  Neck: No JVD, carotids 2+, no bruits.  Cardiac: Regular rate and rhythm. S1, S2 normal. No murmur, pericardial rub, S3.  Lungs: Clear without wheezes, rales, rhonchi or dullness.  Normal excursions and effort.  Abdomen: Soft, non-tender. BS-present.  Extremities: Without clubbing, cyanosis or edema.  Peripheral pulses are 2+.  Neurologic: Alert and oriented, normal affect.  Skin: Warm and dry.     Laboratory/Data:    Labs:    Lab Results   Component Value Date    TSH 4.417 2025       Recent Labs   Lab 25  0940   WBC 11.9*   HGB 12.2   MCV 91.4   .0       Recent Labs   Lab 25  0940      K 4.8      CO2 21.0   BUN 42*   CREATSERUM 0.99   CA 9.6   MG 2.1   *       Recent Labs   Lab 25  0940   ALT 63*   AST 68*   ALB 4.4       No results for input(s): \"TROP\" in the last 168 hours.    Allergies:   Allergies[1]    Medications:  Current Facility-Administered Medications    Medication Dose Route Frequency    acetaminophen (Tylenol) tab 650 mg  650 mg Oral Q6H PRN    dilTIAZem (cardIZEM) 100 mg in sodium chloride 0.9% 100 mL IVPB-ADDV  2.5-20 mg/hr Intravenous Continuous    apixaban (Eliquis) tab 5 mg  5 mg Oral BID    metoprolol succinate ER (Toprol XL) 24 hr tab 100 mg  100 mg Oral Daily Beta Blocker    pravastatin (Pravachol) tab 20 mg  20 mg Oral Nightly    latanoprost (Xalatan) 0.005 % ophthalmic solution 1 drop  1 drop Both Eyes Nightly    cefTRIAXone (Rocephin) 2 g in sodium chloride 0.9% 100 mL IVPB-ADDV  2 g Intravenous Q24H    azithromycin (Zithromax) tab 250 mg  250 mg Oral Daily    lidocaine-menthol 4-1 % patch 1 patch  1 patch Transdermal Daily         Assessment:    New diagnosis of A-fib  Echo pending      Plan:  Continue Eliquis  Start Cardizem, discontinue drip  Await echo    Minh Mcitnyre MD  1/19/2025  8:49 AM    3         [1]   Allergies  Allergen Reactions    Benadryl [Diphenhydramine] SWELLING    Ace Inhibitors     Amoxicillin NAUSEA ONLY

## 2025-01-20 ENCOUNTER — APPOINTMENT (OUTPATIENT)
Dept: CV DIAGNOSTICS | Facility: HOSPITAL | Age: 89
End: 2025-01-20
Attending: EMERGENCY MEDICINE
Payer: MEDICARE

## 2025-01-20 PROCEDURE — 93306 TTE W/DOPPLER COMPLETE: CPT | Performed by: EMERGENCY MEDICINE

## 2025-01-20 PROCEDURE — 99233 SBSQ HOSP IP/OBS HIGH 50: CPT | Performed by: HOSPITALIST

## 2025-01-20 RX ORDER — FUROSEMIDE 10 MG/ML
20 INJECTION INTRAMUSCULAR; INTRAVENOUS ONCE
Status: COMPLETED | OUTPATIENT
Start: 2025-01-20 | End: 2025-01-20

## 2025-01-20 NOTE — PLAN OF CARE
Problem: Patient Centered Care  Goal: Patient preferences are identified and integrated in the patient's plan of care  Description: Interventions:  - What would you like us to know as we care for you? From home alone   - Provide timely, complete, and accurate information to patient/family  - Incorporate patient and family knowledge, values, beliefs, and cultural backgrounds into the planning and delivery of care  - Encourage patient/family to participate in care and decision-making at the level they choose  - Honor patient and family perspectives and choices  Outcome: Progressing     Problem: Patient/Family Goals  Goal: Patient/Family Long Term Goal  Description: Patient's Long Term Goal: to go home     Interventions:  - follow MD orders  - See additional Care Plan goals for specific interventions  Outcome: Progressing  Goal: Patient/Family Short Term Goal  Description: Patient's Short Term Goal: manage sob     Interventions:   - follow MD orders  - See additional Care Plan goals for specific interventions  Outcome: Progressing     Problem: CARDIOVASCULAR - ADULT  Goal: Maintains optimal cardiac output and hemodynamic stability  Description: INTERVENTIONS:  - Monitor vital signs, rhythm, and trends  - Monitor for bleeding, hypotension and signs of decreased cardiac output  - Evaluate effectiveness of vasoactive medications to optimize hemodynamic stability  - Monitor arterial and/or venous puncture sites for bleeding and/or hematoma  - Assess quality of pulses, skin color and temperature  - Assess for signs of decreased coronary artery perfusion - ex. Angina  - Evaluate fluid balance, assess for edema, trend weights  Outcome: Progressing  Goal: Absence of cardiac arrhythmias or at baseline  Description: INTERVENTIONS:  - Continuous cardiac monitoring, monitor vital signs, obtain 12 lead EKG if indicated  - Evaluate effectiveness of antiarrhythmic and heart rate control medications as ordered  - Initiate emergency  measures for life threatening arrhythmias  - Monitor electrolytes and administer replacement therapy as ordered  Outcome: Progressing

## 2025-01-20 NOTE — PROGRESS NOTES
Progress Note  Shyla Wheeler Patient Status:  Inpatient    1935 MRN [id number]   Location [facility] 3W/SW Attending Bhumika Arredondo MD   Hosp Day # 2 PCP Ray Goldman MD     Subjective:  Pt c/o SOB, fatigue. Denies chest pain, palpitations, orthopnea    Objective:  /78 (BP Location: Right arm)   Pulse 81   Temp 97.6 °F (36.4 °C) (Oral)   Resp 18   Ht 5' 5\" (1.651 m)   Wt 163 lb 6.4 oz (74.1 kg)   SpO2 94%   BMI 27.19 kg/m²     Telemetry: afib 80's    Intake/Output:    Intake/Output Summary (Last 24 hours) at 2025 0744  Last data filed at 2025 0347  Gross per 24 hour   Intake 220 ml   Output 300 ml   Net -80 ml       Last 3 Weights   25 0501 163 lb 6.4 oz (74.1 kg)   25 1457 156 lb 6.4 oz (70.9 kg)   25 1451 156 lb 6.4 oz (70.9 kg)   25 0956 161 lb 9.6 oz (73.3 kg)   25 0929 162 lb (73.5 kg)   23 0936 171 lb (77.6 kg)   21 1417 173 lb (78.5 kg)       Labs:  Recent Labs   Lab 25  0940 25  0817   * 107*   BUN 42* 44*   CREATSERUM 0.99 1.09*   EGFRCR 55* 49*   CA 9.6 9.1    140   K 4.8 4.8    104   CO2 21.0 28.0     Recent Labs   Lab 25  0940 25  0817   RBC 3.96 3.77*   HGB 12.2 11.4*   HCT 36.2 35.0   MCV 91.4 92.8   MCH 30.8 30.2   MCHC 33.7 32.6   RDW 13.9 14.1   NEPRELIM 11.07* 6.40   WBC 11.9* 7.9   .0 230.0         Recent Labs   Lab 25  0940   TROPHS 16       Diagnostics:  No results found.   Review of Systems   Constitutional: Positive for malaise/fatigue.   Cardiovascular:  Positive for dyspnea on exertion. Negative for chest pain, leg swelling, orthopnea and palpitations.   Respiratory:  Positive for cough and shortness of breath.        Physical Exam:    Gen: alert, oriented x 3, NAD  Heent: pupils equal, reactive. Mucous membranes moist.   Neck: no jvd  Cardiac: irregular rate and rhythm, normal S1,S2, no murmur, clicks, rub or gallop  Lungs: diminished  Abd: soft,  NT/ND +bs  Ext: trace BLE edema  Skin: Warm, dry  Neuro: No focal deficits      Medications:     dilTIAZem  60 mg Oral 4 times per day    apixaban  2.5 mg Oral BID    metoprolol succinate ER  100 mg Oral Daily Beta Blocker    pravastatin  20 mg Oral Nightly    latanoprost  1 drop Both Eyes Nightly    cefTRIAXone  2 g Intravenous Q24H    azithromycin  250 mg Oral Daily    lidocaine-menthol  1 patch Transdermal Daily       Assessment:  Acute Hypoxemic Respiratory Failure - Possible Multifocal PNA  CTA chest neg for PE, multifocal opacities, small bilateral pleural effusions  Antibx per primary  S/P IV lasix x1   New Onset Atrial Fibrillation, Initially RVR  Remains afib, now controlled HR  On toprol 100mg po daily  TSH unremarkable  Echo pending this am   2016 Stress test w/normal perfusion  [id number] - initially on Eliquis 5mg po bid; now on 2.5mg po bid d/t oral bleeding yesterday  Chronic LBBB  Acute on Chronic HFpEF  proBNP elevated, CXR w/mild pulm edema, small kal effusion  S/P IV Lasix x1 w/slight bump in Cr  Net neg 140ml  Hypertension  On toprol 100mg po daily  Hx on amlodipine - on hold  Ascending Aortic Aneurysm - 5.7cm    Plan:  Pt is rate controlled on toprol 100mg po daily  Continue eliquis - initially on 5mg po BID; now reduced to 2.5mg po BID d/t nose/oral bleeding yesterday. Pt is aware that this is below recommended therapeutic dosing and verbalizes understanding of elevated risk of stroke. We discussed future evaluation for possible Watchman as outpatient.  Antibiotics per primary  Echocardiogram pending - further recommendations to follow    ADDENDUM 1415:  Echo results reviewed - reduced LVEF 30-35% w/akinesis of anteroseptal, inferoseptal walls. Severe hypokinesis of entire inferior wall. These findings are new compared to previous echo in 2016. Will discuss further recommendations and possible ischemic evaluation with attending cardiologist.     Plan of care discussed with patient,  RN.    Sommer Tripp, APRN  1/20/2025  7:44 AM  [phone] [facility]  [phone] [facility]        Seen/examined patient independently.  Agree with findings, assessment and plan as outlined above.     TTE reviewed, compared to prior echo in 2016 there is new LV dysfunction with EF down to 30-35% with regional wall motion abnormalities  Telemetry with rate controlled atrial fibrillation  Patient reports improved breathing, back to baseline.  Denies any chest pain.    In regards to new LV dysfunction, there is concern for tachycardia induced cardiomyopathy, however given the regional wall motion abnormalities and multiple CV risk factors, obstructive CAD is likely.  Extensively discussed ischemic evaluation, however patient would like to defer at later time, understanding risks/benefits.    Rest of plan, as above.    Ray Daniel, DO

## 2025-01-20 NOTE — PROGRESS NOTES
[facility]  part of [facility]    Progress Note    Shyla Wheeler Patient Status:  Inpatient    1935 MRN [id number]   Location [facility] 3W/SW Attending Bhumika Arredondo MD   Hosp Day # 1 PCP Ray Goldman MD     Chief complaint: sob  Subjective:     Overall feels a little bit more comfortable less short of breath, still some cough, no high fevers  Weaned off Cardizem drip  No dizziness    Daughter and granddaughter at the bedside    Objective:   Blood pressure 128/81, pulse 97, temperature 98.2 °F (36.8 °C), temperature source Oral, resp. rate 23, height 5' 5\" (1.651 m), weight 163 lb 6.4 oz (74.1 kg), SpO2 93%, not currently breastfeeding.    HEENT: conjunctivae/corneas clear. PERRL, EOM's intact.  Neck: no adenopathy, no carotid bruit, no JVD, supple  Pulmonary: Diminished to auscultation bilaterally, aeration improved compared to yesterday, still some bibasilar crepitations  Cardiovascular: irregular rate and rhythm  Abdominal: soft, non-tender; bowel sounds normal; no masses,  no organomegaly  Extremities: no cyanosis or edema  Pulses: palpable and symmetric  Skin: No rashes or lesions, warm and dry  Neurologic: Alert and oriented X 3, moves all extremities  Psychiatric: calm, cooperative    Assessment and Plan:     Atrial fibrillation, new onset (HCC), initial RVR with tachycardia 154, improved quickly  LBBB  Electrolytes, troponin, TSH wnl  -Rates reasonably controlled now  -Seen by cardiology in the ER, Dr. Mcintyre  -Continue home medication with metoprolol  -Hold amlodipine for now, BP on lower side  -Started on Eliquis  -Hold aspirin for now, will clarify with cardiology if aspirin also needed  -Telemetry  -Echo        Acute hypoxemic respiratory failure (HCC)  Possible pneumonia, h/o cough and congestion, leukocytosis  CHF/pulmonary edema contributing  -CXR   1. Minimal blunting of the costophrenic angles suggests small pleural effusions and associated basilar  atelectasis, with or without superimposed pneumonia.   2. Cardiomegaly with borderline pulmonary vascular congestion and mild pulmonary interstitial edema.   -CT chest: Negative for PE. Extensive multifocal bilateral airspace opacities, which may reflect multifocal pneumonia. Mediastinal lymphadenopathy, potentially reactive. Follow-up is recommended to document resolution. Small bilateral pleural effusions and associated basilar atelectasis, with or without concurrent infectious process.   Ascending thoracic aortic aneurysm measuring up to 5.7 cm. -->f/u cardiology  Dilatation of the main pulmonary artery trunk may relate to underlying pulmonary hypertension. -O2 protocol, currently on 2 L  -Antibiotics  -Diuretics per cardiology, s/p dose in the ER, reevaluate in the morning       Acute on chronic congestive heart failure, unspecified heart failure type (HCC)  -proBNP over 13,000  -Per cardiology  -Echo  -Diuretics per cardiology  -Monitor electrolytes and renal functions     Generalized fatigue  Cough/congestion  -UA nl  -Procalcitonin borderline at 0.08  -Respiratory panel normal  -Mild leukocytosis, WBC 11.9, repeat in the morning  -Abx (ceftriaxone and azithromycin)  -TSH, recent B12 normal     Abnormal liver function tests with elevated ALT/AST  Previously normal, ?Secondary congestion from CHF vs primary liver issue  -Repeat in the morning better  -Liver/GB ultrasound unremarkable     DVT prophylaxis: Eliquis          Dispo: Home once okay with cardiology, possibly tomorrow        Supplementary Documentation:   DVT Mechanical Prophylaxis:        DVT Pharmacologic Prophylaxis   Medication    apixaban (Eliquis) tab 2.5 mg                Code Status: Not on file  Vázquez: External urinary catheter in place  Vázquez Duration (in days):   Central line:    HILLARY:                         Chart reviewed, including current vitals, notes, labs and imaging  Pertinent past medical records reviewed  Labs ordered and  medications adjusted as outlined above  Home medications reconciliation completed  Coordinate care with care team/consultants  Discussed with patient and family at bedside results of tests, management plan as outlined above, and the need for ongoing hospitalization  D/w RN     MDM high  severe acute illness/or exacerbation of chronic illness posing a threat to life, IV medications, requiring close monitoring in hospital.       Results:     Lab Results   Component Value Date    WBC 7.9 01/19/2025    HGB 11.4 (L) 01/19/2025    HCT 35.0 01/19/2025    .0 01/19/2025    CREATSERUM 1.09 (H) 01/19/2025    BUN 44 (H) 01/19/2025     01/19/2025    K 4.8 01/19/2025     01/19/2025    CO2 28.0 01/19/2025     (H) 01/19/2025    CA 9.1 01/19/2025    ALB 4.1 01/19/2025    ALKPHO 60 01/19/2025    BILT 0.6 01/19/2025    TP 6.7 01/19/2025    AST 36 (H) 01/19/2025    ALT 40 01/19/2025    PTT 24.4 01/18/2025    T4F 1.1 08/05/2024    TSH 4.417 01/18/2025    MG 2.1 01/18/2025    B12 562 08/05/2024       CTA CHEST (CPT=71275)    Result Date: 1/18/2025  CONCLUSION:  1. No evidence of aortic dissection.  2. Ascending thoracic aortic aneurysm measuring up to 5.7 cm.  3. No evidence of acute pulmonary embolism to the level of the proximal segmental pulmonary artery branches.  4. Extensive multifocal bilateral airspace opacities, which may reflect multifocal pneumonia. Follow-up is recommended to document resolution.  5. Small bilateral pleural effusions and associated basilar atelectasis, with or without concurrent infectious process.  6. Mediastinal lymphadenopathy, potentially reactive.  7. Mild interlobular septal thickening, suggesting pulmonary interstitial edema.  8. Dilatation of the main pulmonary artery trunk may relate to underlying pulmonary hypertension.   9. Large hiatal hernia with essentially complete intrathoracic herniation of the stomach.  10. Cardiomegaly with biatrial dilatation.  11. Kyphoplasty  changes of the lower thoracic spine.  12. Lesser incidental findings as above.    Dictated by (CST): Ricardo Padilla MD on 1/18/2025 at 1:22 PM     Finalized by (CST): Ricardo Padilla MD on 1/18/2025 at 1:31 PM          XR CHEST AP PORTABLE  (CPT=71045)    Result Date: 1/18/2025  CONCLUSION:  1. Minimal blunting of the costophrenic angles suggests small pleural effusions and associated basilar atelectasis, with or without superimposed pneumonia.  2. Cardiomegaly with borderline pulmonary vascular congestion and mild pulmonary interstitial edema.    Dictated by (CST): Ricardo Padilla MD on 1/18/2025 at 10:49 AM     Finalized by (CST): Ricardo Padilla MD on 1/18/2025 at 10:51 AM         EKG 12 Lead    Result Date: 1/18/2025  Atrial fibrillation with rapid ventricular response Left axis deviation Left bundle branch block Abnormal ECG No previous ECGs found in [facility] Confirmed by MANNY MANNING, FRED ([id number]) on 1/18/2025 2:33:20 PM       JOSE DO MD  1/19/2025

## 2025-01-20 NOTE — PLAN OF CARE
Pt. Shyla is A&Ox 4. Lives at home alone. Patient is ambulating using SBA with a walker. Patient isn't experiencing pain. Continent of bowel. Incontinent of bladder. Purewick. IV antibitoics given. Ultrasound abdomen completed, results in chart. Seen by PT, results in chart. Jose Roberto light within reach, fall precautions maintained.     Plan is OT eval and echo.     Problem: Patient Centered Care  Goal: Patient preferences are identified and integrated in the patient's plan of care  Description: Interventions:  - What would you like us to know as we care for you? I live at home alone  - Provide timely, complete, and accurate information to patient/family  - Incorporate patient and family knowledge, values, beliefs, and cultural backgrounds into the planning and delivery of care  - Encourage patient/family to participate in care and decision-making at the level they choose  - Honor patient and family perspectives and choices  Outcome: Progressing     Problem: CARDIOVASCULAR - ADULT  Goal: Maintains optimal cardiac output and hemodynamic stability  Description: INTERVENTIONS:  - Monitor vital signs, rhythm, and trends  - Monitor for bleeding, hypotension and signs of decreased cardiac output  - Evaluate effectiveness of vasoactive medications to optimize hemodynamic stability  - Monitor arterial and/or venous puncture sites for bleeding and/or hematoma  - Assess quality of pulses, skin color and temperature  - Assess for signs of decreased coronary artery perfusion - ex. Angina  - Evaluate fluid balance, assess for edema, trend weights  Outcome: Progressing  Goal: Absence of cardiac arrhythmias or at baseline  Description: INTERVENTIONS:  - Continuous cardiac monitoring, monitor vital signs, obtain 12 lead EKG if indicated  - Evaluate effectiveness of antiarrhythmic and heart rate control medications as ordered  - Initiate emergency measures for life threatening arrhythmias  - Monitor electrolytes and administer replacement  therapy as ordered  Outcome: Progressing

## 2025-01-20 NOTE — PROGRESS NOTES
[facility]  part of [facility]    Progress Note    Shyla Wheeler Patient Status:  Inpatient    1935 MRN [id number]   Location [facility] 3W/SW Attending Bhumika Arredondo MD   Hosp Day # 2 PCP Ray Goldman MD     Chief complaint: sob  Subjective:     Overall feels a little bit more comfortable, but still winded  Didn't sleep well (\"wind was so loud\")  no high fevers  Weaned off Cardizem drip, rates ok  No dizziness  Still on supplemental O2    GUERLINE at the bedside    Objective:   Blood pressure (!) 140/92, pulse 85, temperature 97.6 °F (36.4 °C), temperature source Oral, resp. rate 18, height 5' 5\" (1.651 m), weight 163 lb 6.4 oz (74.1 kg), SpO2 95%, not currently breastfeeding.    HEENT: conjunctivae/corneas clear. PERRL, EOM's intact.  Neck: no adenopathy, no carotid bruit, no JVD, supple  Pulmonary: Diminished to auscultation bilaterally, aeration improved compared to yesterday, still some bibasilar crepitations  Cardiovascular: irregular rate and rhythm  Abdominal: soft, non-tender; bowel sounds normal; no masses,  no organomegaly  Extremities: no cyanosis or edema  Pulses: palpable and symmetric  Skin: No rashes or lesions, warm and dry  Neurologic: Alert and oriented X 3, moves all extremities  Psychiatric: calm, cooperative    Assessment and Plan:     Atrial fibrillation, new onset (HCC), initial RVR with tachycardia 154, improved quickly  LBBB  Electrolytes, troponin, TSH wnl  -Rates reasonably controlled now  -Seen by cardiology in the ER, Dr. Mcintyre  -Continue home medication with metoprolol  -Hold amlodipine for now, BP on lower side  -Started on Eliquis-->dose decr because of nosebleed yesterday  -Hold aspirin for now, will clarify with cardiology if aspirin also needed  -Telemetry  -Echo        Acute hypoxemic respiratory failure (HCC)  Possible pneumonia, h/o cough and congestion, leukocytosis  CHF/pulmonary edema contributing  -CXR   1. Minimal blunting of the  costophrenic angles suggests small pleural effusions and associated basilar atelectasis, with or without superimposed pneumonia.   2. Cardiomegaly with borderline pulmonary vascular congestion and mild pulmonary interstitial edema.   -CT chest: Negative for PE. Extensive multifocal bilateral airspace opacities, which may reflect multifocal pneumonia. Mediastinal lymphadenopathy, potentially reactive. Follow-up is recommended to document resolution. Small bilateral pleural effusions and associated basilar atelectasis, with or without concurrent infectious process.   Ascending thoracic aortic aneurysm measuring up to 5.7 cm. -->f/u cardiology  Dilatation of the main pulmonary artery trunk may relate to underlying pulmonary hypertension. -O2 protocol, currently on 2 L  -Antibiotics  -discussed diuretics with cardiology, s/p 40 mg dose x1 in the ER, none since, Cr slightly up yesterday, clinically pt still overloaded, dyspneic, still on supplemental O2-->lasix 20 mg x1 today, reevaluate in the morning  -CXR and labs in am       Acute on chronic congestive heart failure, unspecified heart failure type (HCC)  -proBNP over 13,000  -Per cardiology  -Echo  -Diuretics per cardiology  -Monitor electrolytes and renal functions     Generalized fatigue  Cough/congestion  -UA nl  -Procalcitonin borderline at 0.08  -Respiratory panel normal  -Mild leukocytosis, WBC 11.9, repeat in the morning  -Abx (ceftriaxone and azithromycin)  -TSH, recent B12 normal     Abnormal liver function tests with elevated ALT/AST  Previously normal, ?Secondary congestion from CHF vs primary liver issue  -Repeat in the morning better  -Liver/GB ultrasound unremarkable     DVT prophylaxis: Eliquis          Dispo: Home once breathing improves, and okay with cardiology, possibly tomorrow        Supplementary Documentation:   DVT Mechanical Prophylaxis:        DVT Pharmacologic Prophylaxis   Medication    apixaban (Eliquis) tab 2.5 mg                Code  Status: Not on file  Vázquez: External urinary catheter in place  Vázquez Duration (in days):   Central line:    HILLARY: 1/21/2025                        Chart reviewed, including current vitals, notes, labs and imaging  Pertinent past medical records reviewed  Labs ordered and medications adjusted as outlined above  Home medications reconciliation completed  Coordinate care with care team/consultants  Discussed with patient and family at bedside results of tests, management plan as outlined above, and the need for ongoing hospitalization  D/w RN     MDM high  severe acute illness/or exacerbation of chronic illness posing a threat to life, IV medications, requiring close monitoring in hospital.       Results:     Lab Results   Component Value Date    WBC 7.9 01/19/2025    HGB 11.4 (L) 01/19/2025    HCT 35.0 01/19/2025    .0 01/19/2025    CREATSERUM 1.09 (H) 01/19/2025    BUN 44 (H) 01/19/2025     01/19/2025    K 4.8 01/19/2025     01/19/2025    CO2 28.0 01/19/2025     (H) 01/19/2025    CA 9.1 01/19/2025    ALB 4.1 01/19/2025    ALKPHO 60 01/19/2025    BILT 0.6 01/19/2025    TP 6.7 01/19/2025    AST 36 (H) 01/19/2025    ALT 40 01/19/2025    PTT 24.4 01/18/2025    T4F 1.1 08/05/2024    TSH 4.417 01/18/2025    MG 2.1 01/18/2025    B12 562 08/05/2024       CTA CHEST (CPT=71275)    Result Date: 1/18/2025  CONCLUSION:  1. No evidence of aortic dissection.  2. Ascending thoracic aortic aneurysm measuring up to 5.7 cm.  3. No evidence of acute pulmonary embolism to the level of the proximal segmental pulmonary artery branches.  4. Extensive multifocal bilateral airspace opacities, which may reflect multifocal pneumonia. Follow-up is recommended to document resolution.  5. Small bilateral pleural effusions and associated basilar atelectasis, with or without concurrent infectious process.  6. Mediastinal lymphadenopathy, potentially reactive.  7. Mild interlobular septal thickening, suggesting pulmonary  interstitial edema.  8. Dilatation of the main pulmonary artery trunk may relate to underlying pulmonary hypertension.   9. Large hiatal hernia with essentially complete intrathoracic herniation of the stomach.  10. Cardiomegaly with biatrial dilatation.  11. Kyphoplasty changes of the lower thoracic spine.  12. Lesser incidental findings as above.    Dictated by (CST): Ricardo Padilla MD on 1/18/2025 at 1:22 PM     Finalized by (CST): Ricardo Padilla MD on 1/18/2025 at 1:31 PM                 JOSE DO MD  1/20/2025

## 2025-01-21 ENCOUNTER — APPOINTMENT (OUTPATIENT)
Dept: GENERAL RADIOLOGY | Facility: HOSPITAL | Age: 89
End: 2025-01-21
Attending: HOSPITALIST
Payer: MEDICARE

## 2025-01-21 VITALS
OXYGEN SATURATION: 92 % | HEIGHT: 65 IN | HEART RATE: 70 BPM | WEIGHT: 167.5 LBS | RESPIRATION RATE: 18 BRPM | TEMPERATURE: 98 F | SYSTOLIC BLOOD PRESSURE: 117 MMHG | BODY MASS INDEX: 27.91 KG/M2 | DIASTOLIC BLOOD PRESSURE: 64 MMHG

## 2025-01-21 LAB
ALBUMIN SERPL-MCNC: 4.1 G/DL (ref 3.2–4.8)
ANION GAP SERPL CALC-SCNC: 10 MMOL/L (ref 0–18)
BASOPHILS # BLD AUTO: 0.03 X10(3) UL (ref 0–0.2)
BASOPHILS NFR BLD AUTO: 0.4 %
BUN BLD-MCNC: 29 MG/DL (ref 9–23)
BUN/CREAT SERPL: 33 (ref 10–20)
CALCIUM BLD-MCNC: 8.9 MG/DL (ref 8.7–10.4)
CHLORIDE SERPL-SCNC: 101 MMOL/L (ref 98–112)
CO2 SERPL-SCNC: 27 MMOL/L (ref 21–32)
CREAT BLD-MCNC: 0.88 MG/DL
DEPRECATED RDW RBC AUTO: 47.4 FL (ref 35.1–46.3)
EGFRCR SERPLBLD CKD-EPI 2021: 63 ML/MIN/1.73M2 (ref 60–?)
EOSINOPHIL # BLD AUTO: 0.26 X10(3) UL (ref 0–0.7)
EOSINOPHIL NFR BLD AUTO: 3.8 %
ERYTHROCYTE [DISTWIDTH] IN BLOOD BY AUTOMATED COUNT: 13.7 % (ref 11–15)
GLUCOSE BLD-MCNC: 104 MG/DL (ref 70–99)
HCT VFR BLD AUTO: 36.4 %
HGB BLD-MCNC: 12 G/DL
IMM GRANULOCYTES # BLD AUTO: 0.02 X10(3) UL (ref 0–1)
IMM GRANULOCYTES NFR BLD: 0.3 %
LYMPHOCYTES # BLD AUTO: 0.71 X10(3) UL (ref 1–4)
LYMPHOCYTES NFR BLD AUTO: 10.5 %
MAGNESIUM SERPL-MCNC: 2.2 MG/DL (ref 1.6–2.6)
MCH RBC QN AUTO: 31.3 PG (ref 26–34)
MCHC RBC AUTO-ENTMCNC: 33 G/DL (ref 31–37)
MCV RBC AUTO: 94.8 FL
MONOCYTES # BLD AUTO: 0.47 X10(3) UL (ref 0.1–1)
MONOCYTES NFR BLD AUTO: 6.9 %
NEUTROPHILS # BLD AUTO: 5.3 X10 (3) UL (ref 1.5–7.7)
NEUTROPHILS # BLD AUTO: 5.3 X10(3) UL (ref 1.5–7.7)
NEUTROPHILS NFR BLD AUTO: 78.1 %
OSMOLALITY SERPL CALC.SUM OF ELEC: 292 MOSM/KG (ref 275–295)
PHOSPHATE SERPL-MCNC: 3.2 MG/DL (ref 2.4–5.1)
PLATELET # BLD AUTO: 267 10(3)UL (ref 150–450)
POTASSIUM SERPL-SCNC: 3.9 MMOL/L (ref 3.5–5.1)
RBC # BLD AUTO: 3.84 X10(6)UL
SODIUM SERPL-SCNC: 138 MMOL/L (ref 136–145)
WBC # BLD AUTO: 6.8 X10(3) UL (ref 4–11)

## 2025-01-21 PROCEDURE — 99239 HOSP IP/OBS DSCHRG MGMT >30: CPT | Performed by: HOSPITALIST

## 2025-01-21 PROCEDURE — 71045 X-RAY EXAM CHEST 1 VIEW: CPT | Performed by: HOSPITALIST

## 2025-01-21 RX ORDER — FUROSEMIDE 20 MG/1
20 TABLET ORAL EVERY OTHER DAY
Status: SHIPPED | COMMUNITY
Start: 2025-01-22 | End: 2025-01-21

## 2025-01-21 RX ORDER — FUROSEMIDE 20 MG/1
20 TABLET ORAL DAILY
Qty: 30 TABLET | Refills: 2 | Status: SHIPPED | OUTPATIENT
Start: 2025-01-21 | End: 2025-01-21

## 2025-01-21 RX ORDER — FUROSEMIDE 20 MG/1
20 TABLET ORAL DAILY
Status: DISCONTINUED | OUTPATIENT
Start: 2025-01-21 | End: 2025-01-21

## 2025-01-21 RX ORDER — FUROSEMIDE 20 MG/1
20 TABLET ORAL EVERY OTHER DAY
Qty: 30 TABLET | Refills: 1 | Status: ON HOLD | OUTPATIENT
Start: 2025-01-22

## 2025-01-21 RX ORDER — LEVOFLOXACIN 500 MG/1
500 TABLET, FILM COATED ORAL DAILY
Qty: 3 TABLET | Refills: 0 | Status: SHIPPED | OUTPATIENT
Start: 2025-01-22 | End: 2025-01-25

## 2025-01-21 NOTE — DISCHARGE INSTRUCTIONS
YOUR CT CHEST was abnormal, need to repeat chest CT in 1-2 months to assure improvement after your antibiotics course and heart medications. Have a referral from your PCP.

## 2025-01-21 NOTE — DISCHARGE PLANNING
Patient was provided with discharge instructions, education, and follow up information. Patient's daughter present for discharge instructions with patient's consent. Prescriptions were already sent electronically to patient's pharmacy. Patient verbalizes understanding of follow up information, specifically understanding of afib, medications prescribed and when to take them, stroke prevention, bleeding precautions, follow ups, signs and symptoms of when to call MD or EMS. Patient has no questions after reviewing all instructions and will be going home with her daughter.     Natalia ATKINSON, Discharge Leader [phone]

## 2025-01-21 NOTE — PLAN OF CARE
Patient alert and oriented x 4. Vitals stable on 1L O2. Patient denies pain. Patient received IV diuretics dose today. Plan to continue to monitor and discharge once medically cleared. Call light within reach.    Problem: Patient Centered Care  Goal: Patient preferences are identified and integrated in the patient's plan of care  Description: Interventions:  - What would you like us to know as we care for you? From home alone  - Provide timely, complete, and accurate information to patient/family  - Incorporate patient and family knowledge, values, beliefs, and cultural backgrounds into the planning and delivery of care  - Encourage patient/family to participate in care and decision-making at the level they choose  - Honor patient and family perspectives and choices  Outcome: Progressing     Problem: Patient/Family Goals  Goal: Patient/Family Long Term Goal  Description: Patient's Long Term Goal: Be able to discharge    Interventions:  - medication administration  - monitoring labs  - See additional Care Plan goals for specific interventions  Outcome: Progressing  Goal: Patient/Family Short Term Goal  Description: Patient's Short Term Goal: Resolve O2 demand    Interventions:   - Wean as tolerated  - See additional Care Plan goals for specific interventions  Outcome: Progressing     Problem: CARDIOVASCULAR - ADULT  Goal: Maintains optimal cardiac output and hemodynamic stability  Description: INTERVENTIONS:  - Monitor vital signs, rhythm, and trends  - Monitor for bleeding, hypotension and signs of decreased cardiac output  - Evaluate effectiveness of vasoactive medications to optimize hemodynamic stability  - Monitor arterial and/or venous puncture sites for bleeding and/or hematoma  - Assess quality of pulses, skin color and temperature  - Assess for signs of decreased coronary artery perfusion - ex. Angina  - Evaluate fluid balance, assess for edema, trend weights  Outcome: Progressing  Goal: Absence of cardiac  arrhythmias or at baseline  Description: INTERVENTIONS:  - Continuous cardiac monitoring, monitor vital signs, obtain 12 lead EKG if indicated  - Evaluate effectiveness of antiarrhythmic and heart rate control medications as ordered  - Initiate emergency measures for life threatening arrhythmias  - Monitor electrolytes and administer replacement therapy as ordered  Outcome: Progressing

## 2025-01-21 NOTE — DISCHARGE PLANNING
I called the pharmacy below to inquire about the copay of Eliquis before patient discharges.    [id number] - [address] [phone]     The patient's copay is $567.89 for 30 day supply. Pharmacy tech reports that it is in stock and almost ready. Unsure if the high copay is due to an unmet deductible.     Patient notified of cost and provided a 30 day free trial coupon for eliquis.     Natalia ATKINSON, Discharge Leader [phone]

## 2025-01-21 NOTE — DISCHARGE SUMMARY
[facility]     Shyla Wheleer Patient Status:  Inpatient    1935 MRN [id number]   Location [facility] 3W/SW Attending No att. providers found   Hosp Day # 3 PCP Ray Goldman MD     Date of Admission: 2025  Date of Discharge: 2025  Discharge Disposition: Home or Self Care    Admitting Chief Complaint:   Atrial fibrillation, new onset (HCC) [I48.91]  Acute hypoxemic respiratory failure (HCC) [J96.01]  Acute on chronic congestive heart failure, unspecified heart failure type (HCC) [I50.9]    PCP: Ray Goldman MD    Discharge Diagnosis:   Atrial fibrillation, new onset (HCC),  RVR        Acute hypoxemic respiratory failure (HCC), resolved  Possible pneumonia   Acute on chronic systolic congestive heart failure, (HCC)     Generalized fatigue            History of Present Illness:   89 year old female who lives independently, able to take care of her daily activities, walks, cleans, goes shopping with her family, presents with complaint of not feeling well for a while, but much worse over the last few days, mostly short of breath and coughing, both with phlegm and dry cough.  No high fevers.  In the ER noted to have A-fib with controlled ventricular response.  Has history of chronic left bundle branch block.  Cardiology called.  Denies any chest pain or dizziness lightheadedness syncope near syncope.  Denies any history of CVA or TIA.      Seen with daughter at bedside    Brief Synopsis:   Atrial fibrillation, new onset (HCC), initial RVR with tachycardia 154, improved quickly  LBBB  Electrolytes, troponin, TSH wnl  -Rates reasonably controlled now  -Seen by cardiology in the ER, Dr. Mcintyre  -Continue home medication with metoprolol  -Hold amlodipine for now, BP on lower side  -Started on Eliquis-->dose decr because of nosebleed yesterday  -Hold aspirin for now   -Telemetry      Acute hypoxemic respiratory failure (HCC)  Possible pneumonia, h/o cough and  congestion, leukocytosis  CHF/pulmonary edema contributing  -CXR   1. Minimal blunting of the costophrenic angles suggests small pleural effusions and associated basilar atelectasis, with or without superimposed pneumonia.   2. Cardiomegaly with borderline pulmonary vascular congestion and mild pulmonary interstitial edema.   -CT chest: Negative for PE. Extensive multifocal bilateral airspace opacities, which may reflect multifocal pneumonia. Mediastinal lymphadenopathy, potentially reactive. Follow-up is recommended to document resolution. Small bilateral pleural effusions and associated basilar atelectasis, with or without concurrent infectious process.   Ascending thoracic aortic aneurysm measuring up to 5.7 cm. -->f/u cardiology  Dilatation of the main pulmonary artery trunk may relate to underlying pulmonary hypertension. -O2 protocol, currently on 2 L  -Antibiotics  -discussed diuretics with cardiology, s/p 40 mg dose x1 in the ER, none since, Cr slightly up yesterday, clinically pt still overloaded, dyspneic, still on supplemental O2-->lasix 20 mg x1 today, reevaluate in the morning  -CXR and labs in am       Acute on chronic congestive heart failure, unspecified heart failure type (HCC)  -proBNP over 13,000  -Per cardiology  -Echo shows reduced LVEF 30-35% w/akinesis of anteroseptal, inferoseptal walls. Severe hypokinesis of entire inferior wall. These findings are new compared to previous echo in 2016.   -per cardiology, Dr. Daniel--> concern for tachycardia induced cardiomyopathy, however given the regional wall motion abnormalities and multiple CV risk factors, obstructive CAD is likely. Extensively discussed ischemic evaluation, however patient would like to defer at later time, understanding risks/benefits.   ---> Follow-up outpatient with cardiology  -Dyspnea improved with Lasix, seems like patient is sensitive to small doses of medications, will continue reduced dose outpatient every other day, follow  closely with PCP and cardiology to monitor electrolytes and renal functions     Generalized fatigue  Cough/congestion  -UA nl  -Procalcitonin borderline at 0.08  -Respiratory panel normal  -Mild leukocytosis, WBC 11.9, repeat in the morning  -Abx (ceftriaxone and azithromycin)  -TSH, recent B12 normal     Abnormal liver function tests with elevated ALT/AST  Previously normal, ?Secondary congestion from CHF vs primary liver issue  -Repeat in the morning better  -Liver/GB ultrasound unremarkable     DVT prophylaxis: Eliquis           Dispo: Home           Discharge Medication List:     Discharge Medications        START taking these medications        Instructions Prescription details   apixaban 2.5 MG Tabs  Commonly known as: Eliquis      Take 1 tablet (2.5 mg total) by mouth 2 (two) times daily.   Quantity: 60 tablet  Refills: 2     furosemide 20 MG Tabs  Commonly known as: Lasix  Start taking on: January 22, 2025      Take 1 tablet (20 mg total) by mouth every other day. OR as directed by cardiology   Quantity: 30 tablet  Refills: 1     levoFLOXacin 500 MG Tabs  Commonly known as: Levaquin  Start taking on: January 22, 2025      Take 1 tablet (500 mg total) by mouth daily for 3 days.   Stop taking on: January 25, 2025  Quantity: 3 tablet  Refills: 0            CONTINUE taking these medications        Instructions Prescription details   aspirin 81 MG Tbec      Take 1 tablet (81 mg total) by mouth daily.   Refills: 0     Calcium Carb-Cholecalciferol 600-200 MG-UNIT Tabs      Take 1 tablet by mouth daily.   Refills: 0     metoprolol succinate ER 50 MG Tb24  Commonly known as: Toprol XL      Take 2 tablets (100 mg total) by mouth daily.   Quantity: 180 tablet  Refills: 3     MULTIVITAMIN TAB/CAP      Take 1 tablet by mouth daily.   Refills: 0     pravastatin 40 MG Tabs  Commonly known as: Pravachol      Take 1 tablet (40 mg total) by mouth nightly.   Quantity: 90 tablet  Refills: 1     Travatan Z 0.004 % Soln  Generic  drug: travoprost (MARLEY free)      Place 1 drop into both eyes at bedtime.   Refills: 0            STOP taking these medications      amLODIPine 5 MG Tabs  Commonly known as: Norvasc                  Where to Get Your Medications        These medications were sent to [id number] - [place], [place] - [address] [phone], [phone]  [address], [facility] [address]      Phone: [phone]   apixaban 2.5 MG Tabs  furosemide 20 MG Tabs  levoFLOXacin 500 MG Tabs         Follow-up appointment:   Minh Mcintyre MD  [address] [facility]  [address]  [facility] [address]  [phone]    Follow up in 1 week(s)  Office will call you to schedule follow up    Ray Goldman MD  [address]  [place] [address]  [phone]    Follow up        Vital signs:  Temp:  [97.5 °F (36.4 °C)-98.3 °F (36.8 °C)] 98.3 °F (36.8 °C)  Pulse:  [67-91] 70  Resp:  [18] 18  BP: (108-130)/(62-82) 117/64  SpO2:  [92 %-96 %] 92 %    Physical Exam:    General: No acute distress.  Much better today, eager to go home  Respiratory: Clear to auscultation bilaterally. No wheezes. No rhonchi.  Cardiovascular: irregular   Abdomen: Soft, nontender, nondistended.  Positive bowel sounds. No rebound or guarding.  Neurologic: No new focal neurological deficits.   Musculoskeletal: Moves all extremities.  Extremities: No edema.  -----------------------------------------------------------------------------------------------  PATIENT DISCHARGE INSTRUCTIONS: See electronic chart    I d/w pt and family the available results, management plan, medications changes, and discharge instructions including follow ups as outlined above.   Scripts sent to pharmacy.      Hospital Discharge Diagnoses:  afib    Lace+ Score: 64  59-90 High Risk  29-58 Medium Risk  0-28   Low Risk.    TCM Follow-Up Recommendation:  LACE > 58: High Risk of readmission after discharge from the hospital.        JOSE DO MD 1/21/2025    Time spent:  > 30 minutes

## 2025-01-21 NOTE — OCCUPATIONAL THERAPY NOTE
OCCUPATIONAL THERAPY EVALUATION - INPATIENT     Room Number: 303/303-A  Evaluation Date: 2025  Type of Evaluation: Initial  Presenting Problem: Afib-new onset    Physician Order: IP Consult to Occupational Therapy  Reason for Therapy: ADL/IADL Dysfunction and Discharge Planning    OCCUPATIONAL THERAPY ASSESSMENT   Patient is a 89 year old female admitted 2025 for new onset of A-fib.  Prior to admission, patient's baseline is home alone and independent.  Patient is currently functioning at baseline with self care and basic mobility; pt has been evaluated by OT and presents with no functional needs at the acute care level; she ambulated with RW >150 feet, managing self care at functional baseline and completing all transfers at baseline; no further OT contact planned and pt verbalizes no concerns with returning home; if change in function occurs, please re-order OT.    PLAN  Patient has been evaluated and presents with no skilled Occupational Therapy needs  at this time.  Patient will be discharged from Occupational Therapy services. Please re-order if a new functional limitation presents during this admission.    OT Device Recommendations: None    OCCUPATIONAL THERAPY MEDICAL/SOCIAL HISTORY   Problem List  Principal Problem:    Atrial fibrillation, new onset (HCC)  Active Problems:    Acute hypoxemic respiratory failure (HCC)    Acute on chronic congestive heart failure, unspecified heart failure type (HCC)    HOME SITUATION  Type of Home: House  Home Layout: One level  Lives With: Alone  Drives: Yes  Patient Regularly Uses: Rolling walker    SUBJECTIVE  I play my cello, I go out with my friends, I stay busy     OCCUPATIONAL THERAPY EXAMINATION    OBJECTIVE  Precautions: Cardiac  Fall Risk: Standard fall risk    WEIGHT BEARING RESTRICTION     PAIN ASSESSMENT  Ratin      ACTIVITIES OF DAILY LIVING ASSESSMENT  AM-PAC ‘6-Clicks’ Inpatient Daily Activity Short Form  How much help from another person does  the patient currently need…  -   Putting on and taking off regular lower body clothing?: None  -   Bathing (including washing, rinsing, drying)?: None  -   Toileting, which includes using toilet, bedpan or urinal? : None  -   Putting on and taking off regular upper body clothing?: None  -   Taking care of personal grooming such as brushing teeth?: None  -   Eating meals?: None    AM-PAC Score:  Score: 24  Approx Degree of Impairment: 0%  Standardized Score (AM-PAC Scale): 57.54  CMS Modifier (G-Code): CH    FUNCTIONAL TRANSFER ASSESSMENT  Sit to Stand: Chair  Chair: Supervision  Toilet Transfer: Supervision    BED MOBILITY     BALANCE ASSESSMENT  Static Sitting: Supervision  Static Standing: Supervision    FUNCTIONAL ADL ASSESSMENT  Eating: Independent  Grooming Seated: Supervision  Bathing Seated: Supervision  UB Dressing Seated: Supervision  LB Dressing Seated: Supervision  Toileting Seated: Supervision    EDUCATION PROVIDED  Patient Education : Role of Occupational Therapy; Plan of Care; Discharge Recommendations; Functional Transfer Techniques; Posture/Positioning  Patient's Response to Education: Verbalized Understanding    The patient's Approx Degree of Impairment: 0% has been calculated based on documentation in the [facility] '6 clicks' Inpatient Daily Activity Short Form.  Research supports that patients with this level of impairment may benefit from return home with support as needed; no further OT needs..  Final disposition will be made by interdisciplinary medical team.    Patient End of Session: Up in chair;Needs met;Call light within reach;All patient questions and concerns addressed;RN aware of session/findings        Patient Evaluation Complexity Level:   Occupational Profile/Medical History LOW - Brief history including review of medical or therapy records    Specific performance deficits impacting engagement in ADL/IADL LOW  1 - 3 performance deficits    Client Assessment/Performance Deficits LOW - No  comorbidities nor modifications of tasks    Clinical Decision Making LOW - Analysis of occupational profile, problem-focused assessments, limited treatment options    Overall Complexity LOW     OT Session Time: 18 minutes  Self-Care Home Management: 18 minutes    Nilson Chau, Occupational Therapist, OTR/L ext [phone]

## 2025-01-21 NOTE — PLAN OF CARE
Problem: Patient Centered Care  Goal: Patient preferences are identified and integrated in the patient's plan of care  Description: Interventions:  - What would you like us to know as we care for you? From home alone   - Provide timely, complete, and accurate information to patient/family  - Incorporate patient and family knowledge, values, beliefs, and cultural backgrounds into the planning and delivery of care  - Encourage patient/family to participate in care and decision-making at the level they choose  - Honor patient and family perspectives and choices  Outcome: Progressing     Problem: Patient/Family Goals  Goal: Patient/Family Long Term Goal  Description: Patient's Long Term Goal: to go home     Interventions:  - follow MD orders  - See additional Care Plan goals for specific interventions  Outcome: Progressing  Goal: Patient/Family Short Term Goal  Description: Patient's Short Term Goal: manage sob     Interventions:   - follow MD orders  - See additional Care Plan goals for specific interventions  Outcome: Progressing     Problem: CARDIOVASCULAR - ADULT  Goal: Maintains optimal cardiac output and hemodynamic stability  Description: INTERVENTIONS:  - Monitor vital signs, rhythm, and trends  - Monitor for bleeding, hypotension and signs of decreased cardiac output  - Evaluate effectiveness of vasoactive medications to optimize hemodynamic stability  - Monitor arterial and/or venous puncture sites for bleeding and/or hematoma  - Assess quality of pulses, skin color and temperature  - Assess for signs of decreased coronary artery perfusion - ex. Angina  - Evaluate fluid balance, assess for edema, trend weights  Outcome: Progressing  Goal: Absence of cardiac arrhythmias or at baseline  Description: INTERVENTIONS:  - Continuous cardiac monitoring, monitor vital signs, obtain 12 lead EKG if indicated  - Evaluate effectiveness of antiarrhythmic and heart rate control medications as ordered  - Initiate emergency  measures for life threatening arrhythmias  - Monitor electrolytes and administer replacement therapy as ordered  Outcome: Progressing      Unknown if ever smoked

## 2025-01-21 NOTE — PLAN OF CARE
Shyla A&Demetrio4. Family at bedside during discharge Patient being discharge home with family. IV and telebox removed at time of discharge. All discharge paperwork reviewed and all questions and concerned answered and all paper prescription sent with patient if applicable Natalia discharge RN. Patient brought down to exit via wheelchair by medical staff.

## 2025-01-21 NOTE — PROGRESS NOTES
Progress Note  Shyla Wheeler Patient Status:  Inpatient    1935 MRN [id number]   Location [facility] 3W/SW Attending Bhumika Arredondo MD   Hosp Day # 3 PCP Ray Goldman MD     Subjective:  Pt sitting up in chair. Denies chest pain, SOB or palpitations. No recurrence of nose bleed    Objective:  /62 (BP Location: Right arm)   Pulse 74   Temp 98.3 °F (36.8 °C) (Oral)   Resp 18   Ht 5' 5\" (1.651 m)   Wt 167 lb 8 oz (76 kg)   SpO2 94%   BMI 27.87 kg/m²     Telemetry: afib 60's-70's, occasional nocturnal renetta    Intake/Output:    Intake/Output Summary (Last 24 hours) at 2025 0906  Last data filed at 2025 0535  Gross per 24 hour   Intake 480 ml   Output 600 ml   Net -120 ml       Last 3 Weights   25 0313 167 lb 8 oz (76 kg)   25 0501 163 lb 6.4 oz (74.1 kg)   25 1457 156 lb 6.4 oz (70.9 kg)   25 1451 156 lb 6.4 oz (70.9 kg)   25 0956 161 lb 9.6 oz (73.3 kg)   25 0929 162 lb (73.5 kg)   23 0936 171 lb (77.6 kg)   21 1417 173 lb (78.5 kg)       Labs:  Recent Labs   Lab 25  0940 25  0817 25  0641   * 107* 104*   BUN 42* 44* 29*   CREATSERUM 0.99 1.09* 0.88   EGFRCR 55* 49* 63   CA 9.6 9.1 8.9    140 138   K 4.8 4.8 3.9    104 101   CO2 21.0 28.0 27.0     Recent Labs   Lab 25  0940 25  0817 25  0641   RBC 3.96 3.77* 3.84   HGB 12.2 11.4* 12.0   HCT 36.2 35.0 36.4   MCV 91.4 92.8 94.8   MCH 30.8 30.2 31.3   MCHC 33.7 32.6 33.0   RDW 13.9 14.1 13.7   NEPRELIM 11.07* 6.40 5.30   WBC 11.9* 7.9 6.8   .0 230.0 267.0         Recent Labs   Lab 25  0940   TROPHS 16       Diagnostics:  No results found.   Review of Systems   Cardiovascular:  Negative for chest pain, dyspnea on exertion, leg swelling and palpitations.   Respiratory:  Negative for cough and shortness of breath.        Physical Exam:    Gen: alert, oriented x 3, NAD  Heent: pupils equal, reactive. Mucous  membranes moist.   Neck: no jvd  Cardiac: irregular rate and rhythm, normal S1,S2, no murmur, clicks, rub or gallop  Lungs: diminished  Abd: soft, NT/ND +bs  Ext: no edema  Skin: Warm, dry  Neuro: No focal deficits      Medications:     melatonin  3 mg Oral Nightly    dilTIAZem  60 mg Oral 4 times per day    apixaban  2.5 mg Oral BID    metoprolol succinate ER  100 mg Oral Daily Beta Blocker    pravastatin  20 mg Oral Nightly    latanoprost  1 drop Both Eyes Nightly    cefTRIAXone  2 g Intravenous Q24H    azithromycin  250 mg Oral Daily    lidocaine-menthol  1 patch Transdermal Daily       Assessment:  Acute Hypoxemic Respiratory Failure - Possible Multifocal PNA  CTA chest neg for PE, multifocal opacities, small bilateral pleural effusions  Antibx per primary  S/P IV lasix x1   New Onset Atrial Fibrillation, Initially RVR  Remains afib, rate controlled  On toprol 100mg po daily, diltiazem 60mg q 6 hrs  TSH unremarkable  2016 Stress test w/normal perfusion  [id number] - initially on Eliquis 5mg po bid; now on 2.5mg po bid d/t nasal/oral bleeding   Chronic LBBB  Acute on Chronic HFpEF  proBNP elevated, CXR w/mild pulm edema, small kal effusion  Echo LVEF 30-35% w/akinesis of anteroseptal, inferoseptal walls; severe hypokinesis of inferior wall (new since 2016)  S/P IV Lasix - I&O incomplete; appears compensated on exam  Hypertension  On toprol 100mg po daily, diltiazem 60mg q 6 hrs  Hx on amlodipine - on hold  Ascending Aortic Aneurysm - 5.7cm    Plan:  Echo results w/newly reduced LVEF and wall motion abnormalities reviewed with patient. We have recommended an angiogram for ischemic evaluation - pt would prefer to defer evaluation until outpatient.  Pt is improved from volume standpoint - transition to lasix 20mg po daily  Continue toprol 100mg po daily  Discontinue oral diltiazem w/intermittent bradycardia (nocturnal) and reduced LVEF.   Continue eliquis - initially on 5mg po BID; now reduced to 2.5mg po BID d/t  nose/oral bleeding episode. Pt is aware that this is below recommended therapeutic dosing and verbalizes understanding of elevated risk of stroke. We discussed future evaluation for possible Watchman as outpatient.  Pt feeling great this morning and hopeful to go home - ok from cardiology standpoint. F/U in [facility] clinic in 1 week with Dr Mcintyre.     Plan of care discussed with patient, RN.    Sommer Tripp, APRN  1/21/2025  9:06 AM  [phone] [facility]  [phone] [facility]

## 2025-01-22 ENCOUNTER — PATIENT OUTREACH (OUTPATIENT)
Dept: CASE MANAGEMENT | Age: 89
End: 2025-01-22

## 2025-01-22 NOTE — PROGRESS NOTES
Transitions of Care Navigation  Discharge Date: 25  Contact Date: 2025    Transitions of Care Assessment:  FREEMAN Initial Assessment    General:  Assessment completed with: Patient  Patient Subjective: NCM spoke with pt states continues to feel tired. Patient has 2 more days left of the antibiotic. She denies any chest pain, shortness of breath, heart palpitations or any other cardiac complaints. She denies any fevers, chills, nausea, vomiting, or any others. She declined PCP appointment, states will have daughter schedule cardiology appointment. Pt denies any further questions or concerns.  Chief Complaint: Atrial fibrillation, new onset  Verify patient name and  with patient/ caregiver: Yes    Hospital Stay/Discharge:  Tell me what you understand of why you were in the hospital or emergency department: Pt reports to ED with c/o not feeling well for a while.  Prior to leaving the hospital were your Discharge Instructions reviewed with you?: Yes  Did you receive a copy of your written Discharge Instructions?: Yes  What questions do you have about your Discharge Instructions?: none  Do you feel better or worse since you left the hospital or emergency department?: Better    Follow - Up Appointment:  Do you have a follow-up appointment?: No  Are there any barriers to getting to your follow-up appointment?: No    Home Health/DME:  Prior to leaving the hospital was Home Health (HH) arranged for you?: N/A  Are HH needs identified by staff during the assessment?: No     Prior to leaving the hospital or emergency department was Durable Medical Equipment (DME), medical supplies, or infusions arranged for you?: N/A  Are DME/medical supply/infusions needs identified by staff during this assessment?: No     Medications/Diet:  Did any of your medications change, during or after your hospital stay or ED visit?: Yes  Do you have your new or updated medications?: Yes  Do you understand what your medications are for and  possible side effects?: Yes  Are there any reasons that keep you from taking your medication as prescribed?: No  Any concerns about medication refills?: No    Were you given a different diet per your Discharge Instructions?: No  Reason: n/a     Questions/Concerns:  Do you have any questions or concerns that have not been discussed?: No   FREEMAN Follow-up Assessment    General:  Assessment completed with: Patient  Patient Subjective: NCM spoke with pt states continues to feel tired. Patient has 2 more days left of the antibiotic. She denies any chest pain, shortness of breath, heart palpitations or any other cardiac complaints. She denies any fevers, chills, nausea, vomiting, or any others. She declined PCP appointment, states will have daughter schedule cardiology appointment. Pt denies any further questions or concerns.  Chief Complaint: Atrial fibrillation, new onset      Nursing Interventions:  All discharge instructions reviewed with the patient. Reviewed when to call MD vs when to call 911 or go the ED. Educated patient on the importance of taking all meds as prescribed as well as close f/u with PCP/specialists. Pt verbalized understanding and will contact the office with any further questions or concerns. Patient denies fevers, chills, nausea, vomiting, shortness of breath, chest pain, or any other symptoms at this time.  NCM attempted to schedule HFU, patient declined will call PCP/TCC office directly. NC sent TE to PCP office re: assistance in scheduling HFU appt. [facility] provided contact information for any further questions/concerns. Patient verbalized understanding and agreeable.         Medications:  Medication Reconciliation:  I am aware of an inpatient discharge within the last 30 days.  The discharge medication list has been reconciled with the patient's current medication list and reviewed by me. See medication list for additions of new medication, and changes to current doses of medications and  discontinued medications.  Current Outpatient Medications   Medication Sig Dispense Refill    apixaban 2.5 MG Oral Tab Take 1 tablet (2.5 mg total) by mouth 2 (two) times daily. 60 tablet 2    levoFLOXacin 500 MG Oral Tab Take 1 tablet (500 mg total) by mouth daily for 3 days. 3 tablet 0    furosemide 20 MG Oral Tab Take 1 tablet (20 mg total) by mouth every other day. OR as directed by cardiology 30 tablet 1    metoprolol succinate ER 50 MG Oral Tablet 24 Hr Take 2 tablets (100 mg total) by mouth daily. 180 tablet 3    pravastatin 40 MG Oral Tab Take 1 tablet (40 mg total) by mouth nightly. 90 tablet 1    aspirin 81 MG Oral Tab EC Take 1 tablet (81 mg total) by mouth daily.      Calcium Carb-Cholecalciferol 600-200 MG-UNIT Oral Tab Take 1 tablet by mouth daily.      TRAVATAN Z 0.004 % Ophthalmic Solution Place 1 drop into both eyes at bedtime.      MULTIVITAMIN TAB/CAP Take 1 tablet by mouth daily.         Diagnosis specifics:  (Please enter following SmartPhrase as applicable)  AMI: .TOCAMI  CHF: .TOCCHF  COPD: .TOCCOPD  CVA: .TOCCVA  CV surgery: .TOCCVSURGERY  Pneumonia: .TOCPNEUMONIA  Re-admit: .TOCREADMIT  Warfarin/Coumadin: .TOCWARFARIN    Follow-up Appointments:  Your appointments       Date & Time Appointment Department (Center)    May 12, 2025 9:20 AM T Medicare Annual Well Visit with Ray Goldman MD [facility]              [address] [address]  [phone]            Transitional Care Clinic  Was TCC Ordered: No      Primary Care Provider (If no TCC appointment)  Does patient already have a PCP appointment scheduled? No  Nurse Care Manager Attempted to schedule PCP office TCM/FREEMAN appointment with patient   -If no appointment scheduled: Explain : pt declined will have daughter schedule her appointment.     Specialist  Does  the patient have any other follow-up appointment(s) need to be scheduled? Yes   -If yes: Nurse Care Manager reviewed upcoming specialist appointments with patient: Yes   -Does the patient need assistance scheduling appointment(s): No    [x]  Patient verbally agrees to additional follow-up calls from Nurse Care Manager    Book By Date: 1/28/25

## 2025-01-22 NOTE — TELEPHONE ENCOUNTER
Spoke to patient for Transitions of Care call today.  Patient does not have an appointment scheduled at this time.  TCM/FREEMAN appointment needed by 1/28/25.  Please advise.    BOOK BY DATE: 1/28/25  NCM attempted to schedule HFU, pt declined. FYI.   Clinical staff:  Please follow-up with patient and try to get them to schedule as patient would greatly benefit from TCM/FREEMAN.  Thank you!

## 2025-01-26 ENCOUNTER — APPOINTMENT (OUTPATIENT)
Dept: GENERAL RADIOLOGY | Facility: HOSPITAL | Age: 89
End: 2025-01-26
Attending: EMERGENCY MEDICINE
Payer: MEDICARE

## 2025-01-26 ENCOUNTER — HOSPITAL ENCOUNTER (INPATIENT)
Facility: HOSPITAL | Age: 89
LOS: 13 days | Discharge: HOME HEALTH | End: 2025-02-08
Attending: EMERGENCY MEDICINE | Admitting: HOSPITALIST
Payer: MEDICARE

## 2025-01-26 DIAGNOSIS — J96.01 ACUTE HYPOXEMIC RESPIRATORY FAILURE (HCC): Primary | ICD-10-CM

## 2025-01-26 DIAGNOSIS — I50.9 ACUTE ON CHRONIC HEART FAILURE, UNSPECIFIED HEART FAILURE TYPE (HCC): ICD-10-CM

## 2025-01-26 LAB
ALBUMIN SERPL-MCNC: 4.2 G/DL (ref 3.2–4.8)
ALBUMIN/GLOB SERPL: 1.6 {RATIO} (ref 1–2)
ALP LIVER SERPL-CCNC: 115 U/L
ALT SERPL-CCNC: 144 U/L
ANION GAP SERPL CALC-SCNC: 11 MMOL/L (ref 0–18)
AST SERPL-CCNC: 135 U/L (ref ?–34)
BASE EXCESS BLD CALC-SCNC: 1 MMOL/L (ref ?–2)
BASOPHILS # BLD AUTO: 0.03 X10(3) UL (ref 0–0.2)
BASOPHILS NFR BLD AUTO: 0.4 %
BILIRUB SERPL-MCNC: 0.5 MG/DL (ref 0.2–1.1)
BUN BLD-MCNC: 51 MG/DL (ref 9–23)
BUN/CREAT SERPL: 34.2 (ref 10–20)
CALCIUM BLD-MCNC: 9.6 MG/DL (ref 8.7–10.4)
CHLORIDE SERPL-SCNC: 101 MMOL/L (ref 98–112)
CO2 SERPL-SCNC: 22 MMOL/L (ref 21–32)
CREAT BLD-MCNC: 1.49 MG/DL
DEPRECATED RDW RBC AUTO: 46.7 FL (ref 35.1–46.3)
EGFRCR SERPLBLD CKD-EPI 2021: 34 ML/MIN/1.73M2 (ref 60–?)
EOSINOPHIL # BLD AUTO: 0.02 X10(3) UL (ref 0–0.7)
EOSINOPHIL NFR BLD AUTO: 0.2 %
ERYTHROCYTE [DISTWIDTH] IN BLOOD BY AUTOMATED COUNT: 13.8 % (ref 11–15)
FLUAV + FLUBV RNA SPEC NAA+PROBE: NEGATIVE
FLUAV + FLUBV RNA SPEC NAA+PROBE: NEGATIVE
GLOBULIN PLAS-MCNC: 2.7 G/DL (ref 2–3.5)
GLUCOSE BLD-MCNC: 188 MG/DL (ref 70–99)
HCO3 BLDA-SCNC: 25.6 MEQ/L (ref 21–27)
HCT VFR BLD AUTO: 33.6 %
HGB BLD-MCNC: 11.6 G/DL
IMM GRANULOCYTES # BLD AUTO: 0.04 X10(3) UL (ref 0–1)
IMM GRANULOCYTES NFR BLD: 0.5 %
LYMPHOCYTES # BLD AUTO: 0.68 X10(3) UL (ref 1–4)
LYMPHOCYTES NFR BLD AUTO: 8.1 %
MCH RBC QN AUTO: 32 PG (ref 26–34)
MCHC RBC AUTO-ENTMCNC: 34.5 G/DL (ref 31–37)
MCV RBC AUTO: 92.8 FL
MODIFIED ALLEN TEST: POSITIVE
MONOCYTES # BLD AUTO: 0.22 X10(3) UL (ref 0.1–1)
MONOCYTES NFR BLD AUTO: 2.6 %
NEUTROPHILS # BLD AUTO: 7.44 X10 (3) UL (ref 1.5–7.7)
NEUTROPHILS # BLD AUTO: 7.44 X10(3) UL (ref 1.5–7.7)
NEUTROPHILS NFR BLD AUTO: 88.2 %
NT-PROBNP SERPL-MCNC: ABNORMAL PG/ML (ref ?–450)
O2 CT BLD-SCNC: 15.5 VOL% (ref 15–23)
OSMOLALITY SERPL CALC.SUM OF ELEC: 297 MOSM/KG (ref 275–295)
OXYGEN LITERS/MINUTE: 3 L/MIN
PCO2 BLDA: 38 MM HG (ref 35–45)
PH BLDA: 7.43 [PH] (ref 7.35–7.45)
PLATELET # BLD AUTO: 304 10(3)UL (ref 150–450)
PO2 BLDA: 72 MM HG (ref 80–100)
POTASSIUM SERPL-SCNC: 5 MMOL/L (ref 3.5–5.1)
PROCALCITONIN SERPL-MCNC: 0.1 NG/ML (ref ?–0.05)
PROT SERPL-MCNC: 6.9 G/DL (ref 5.7–8.2)
PUNCTURE CHARGE: YES
RBC # BLD AUTO: 3.62 X10(6)UL
RSV RNA SPEC NAA+PROBE: NEGATIVE
SAO2 % BLDA: 96.2 % (ref 94–100)
SARS-COV-2 RNA RESP QL NAA+PROBE: NOT DETECTED
SODIUM SERPL-SCNC: 134 MMOL/L (ref 136–145)
TROPONIN I SERPL HS-MCNC: 26 NG/L
WBC # BLD AUTO: 8.4 X10(3) UL (ref 4–11)

## 2025-01-26 PROCEDURE — 99223 1ST HOSP IP/OBS HIGH 75: CPT | Performed by: HOSPITALIST

## 2025-01-26 PROCEDURE — 71045 X-RAY EXAM CHEST 1 VIEW: CPT | Performed by: EMERGENCY MEDICINE

## 2025-01-26 RX ORDER — ATORVASTATIN CALCIUM 10 MG/1
10 TABLET, FILM COATED ORAL NIGHTLY
Status: DISCONTINUED | OUTPATIENT
Start: 2025-01-26 | End: 2025-02-08

## 2025-01-26 RX ORDER — ASPIRIN 81 MG/1
81 TABLET ORAL DAILY
Status: DISCONTINUED | OUTPATIENT
Start: 2025-01-27 | End: 2025-02-08

## 2025-01-26 RX ORDER — ONDANSETRON 2 MG/ML
4 INJECTION INTRAMUSCULAR; INTRAVENOUS EVERY 6 HOURS PRN
Status: DISCONTINUED | OUTPATIENT
Start: 2025-01-26 | End: 2025-02-08

## 2025-01-26 RX ORDER — CODEINE PHOSPHATE AND GUAIFENESIN 10; 100 MG/5ML; MG/5ML
5 SOLUTION ORAL EVERY 4 HOURS PRN
Status: DISCONTINUED | OUTPATIENT
Start: 2025-01-26 | End: 2025-02-08

## 2025-01-26 RX ORDER — HYDRALAZINE HYDROCHLORIDE 20 MG/ML
10 INJECTION INTRAMUSCULAR; INTRAVENOUS EVERY 4 HOURS PRN
Status: DISCONTINUED | OUTPATIENT
Start: 2025-01-26 | End: 2025-02-08

## 2025-01-26 RX ORDER — MAGNESIUM HYDROXIDE/ALUMINUM HYDROXICE/SIMETHICONE 120; 1200; 1200 MG/30ML; MG/30ML; MG/30ML
30 SUSPENSION ORAL 4 TIMES DAILY PRN
Status: DISCONTINUED | OUTPATIENT
Start: 2025-01-26 | End: 2025-02-08

## 2025-01-26 RX ORDER — FUROSEMIDE 10 MG/ML
40 INJECTION INTRAMUSCULAR; INTRAVENOUS ONCE
Status: COMPLETED | OUTPATIENT
Start: 2025-01-26 | End: 2025-01-26

## 2025-01-26 RX ORDER — METOPROLOL SUCCINATE 50 MG/1
100 TABLET, EXTENDED RELEASE ORAL DAILY
Status: DISCONTINUED | OUTPATIENT
Start: 2025-01-27 | End: 2025-01-31

## 2025-01-26 RX ORDER — FUROSEMIDE 10 MG/ML
40 INJECTION INTRAMUSCULAR; INTRAVENOUS
Status: DISCONTINUED | OUTPATIENT
Start: 2025-01-27 | End: 2025-01-28

## 2025-01-26 RX ORDER — ACETAMINOPHEN 325 MG/1
650 TABLET ORAL EVERY 6 HOURS PRN
Status: DISCONTINUED | OUTPATIENT
Start: 2025-01-26 | End: 2025-02-08

## 2025-01-26 RX ORDER — LATANOPROST 50 UG/ML
1 SOLUTION/ DROPS OPHTHALMIC NIGHTLY
Status: DISCONTINUED | OUTPATIENT
Start: 2025-01-26 | End: 2025-02-08

## 2025-01-26 RX ORDER — ZOLPIDEM TARTRATE 5 MG/1
5 TABLET ORAL NIGHTLY PRN
Status: DISCONTINUED | OUTPATIENT
Start: 2025-01-26 | End: 2025-02-08

## 2025-01-26 RX ORDER — PANTOPRAZOLE SODIUM 40 MG/1
40 TABLET, DELAYED RELEASE ORAL
Status: DISCONTINUED | OUTPATIENT
Start: 2025-01-27 | End: 2025-02-08

## 2025-01-26 NOTE — ED INITIAL ASSESSMENT (HPI)
Patient to ED with son for hypoxia and shortness of breath and fatigue worsening in the last 24 hours. Patient discharged from hospital Tuesday. Prior to being discharged she did not require oxygen. Patient's breathing is labored, irregular and dyspnea noted at rest. Patient is alert and oriented x4.

## 2025-01-27 ENCOUNTER — PATIENT OUTREACH (OUTPATIENT)
Dept: CASE MANAGEMENT | Age: 89
End: 2025-01-27

## 2025-01-27 LAB
ANION GAP SERPL CALC-SCNC: 9 MMOL/L (ref 0–18)
BASOPHILS # BLD AUTO: 0.02 X10(3) UL (ref 0–0.2)
BASOPHILS NFR BLD AUTO: 0.2 %
BUN BLD-MCNC: 54 MG/DL (ref 9–23)
BUN/CREAT SERPL: 38.3 (ref 10–20)
CALCIUM BLD-MCNC: 10.2 MG/DL (ref 8.7–10.4)
CHLORIDE SERPL-SCNC: 101 MMOL/L (ref 98–112)
CO2 SERPL-SCNC: 26 MMOL/L (ref 21–32)
CREAT BLD-MCNC: 1.41 MG/DL
DEPRECATED RDW RBC AUTO: 47.7 FL (ref 35.1–46.3)
EGFRCR SERPLBLD CKD-EPI 2021: 36 ML/MIN/1.73M2 (ref 60–?)
EOSINOPHIL # BLD AUTO: 0.03 X10(3) UL (ref 0–0.7)
EOSINOPHIL NFR BLD AUTO: 0.4 %
ERYTHROCYTE [DISTWIDTH] IN BLOOD BY AUTOMATED COUNT: 13.8 % (ref 11–15)
GLUCOSE BLD-MCNC: 127 MG/DL (ref 70–99)
HCT VFR BLD AUTO: 34.6 %
HGB BLD-MCNC: 11.5 G/DL
IMM GRANULOCYTES # BLD AUTO: 0.03 X10(3) UL (ref 0–1)
IMM GRANULOCYTES NFR BLD: 0.4 %
L PNEUMO AG UR QL: NEGATIVE
LYMPHOCYTES # BLD AUTO: 0.86 X10(3) UL (ref 1–4)
LYMPHOCYTES NFR BLD AUTO: 10.2 %
MAGNESIUM SERPL-MCNC: 2.3 MG/DL (ref 1.6–2.6)
MCH RBC QN AUTO: 31.4 PG (ref 26–34)
MCHC RBC AUTO-ENTMCNC: 33.2 G/DL (ref 31–37)
MCV RBC AUTO: 94.5 FL
MONOCYTES # BLD AUTO: 0.45 X10(3) UL (ref 0.1–1)
MONOCYTES NFR BLD AUTO: 5.3 %
NEUTROPHILS # BLD AUTO: 7.03 X10 (3) UL (ref 1.5–7.7)
NEUTROPHILS # BLD AUTO: 7.03 X10(3) UL (ref 1.5–7.7)
NEUTROPHILS NFR BLD AUTO: 83.5 %
OSMOLALITY SERPL CALC.SUM OF ELEC: 298 MOSM/KG (ref 275–295)
PLATELET # BLD AUTO: 280 10(3)UL (ref 150–450)
POTASSIUM SERPL-SCNC: 4.6 MMOL/L (ref 3.5–5.1)
RBC # BLD AUTO: 3.66 X10(6)UL
SODIUM SERPL-SCNC: 136 MMOL/L (ref 136–145)
STREP PNEUMO ANTIGEN, URINE: NEGATIVE
TROPONIN I SERPL HS-MCNC: 28 NG/L
TROPONIN I SERPL HS-MCNC: 31 NG/L
WBC # BLD AUTO: 8.4 X10(3) UL (ref 4–11)

## 2025-01-27 PROCEDURE — 99233 SBSQ HOSP IP/OBS HIGH 50: CPT | Performed by: INTERNAL MEDICINE

## 2025-01-27 RX ORDER — METHYLPREDNISOLONE SODIUM SUCCINATE 40 MG/ML
40 INJECTION INTRAMUSCULAR; INTRAVENOUS EVERY 12 HOURS
Status: DISCONTINUED | OUTPATIENT
Start: 2025-01-27 | End: 2025-01-27

## 2025-01-27 RX ORDER — IPRATROPIUM BROMIDE AND ALBUTEROL SULFATE 2.5; .5 MG/3ML; MG/3ML
3 SOLUTION RESPIRATORY (INHALATION) EVERY 6 HOURS PRN
Status: DISCONTINUED | OUTPATIENT
Start: 2025-01-27 | End: 2025-02-08

## 2025-01-27 RX ORDER — IPRATROPIUM BROMIDE AND ALBUTEROL SULFATE 2.5; .5 MG/3ML; MG/3ML
3 SOLUTION RESPIRATORY (INHALATION) EVERY 6 HOURS
Status: DISCONTINUED | OUTPATIENT
Start: 2025-01-27 | End: 2025-02-06

## 2025-01-27 NOTE — DISCHARGE INSTRUCTIONS
Going Home Instructions  In this section you will find the tools which will guide you through the first few days after you leave the hospital. Continued use of these tools will help you develop the skills necessary to keep your heart failure under control.     Home Care Instructions Following Heart Failure - the most important things to do every day include:   Weigh yourself and review the “Self-Check Plan” sheet every morning.   Call your cardiologist office if you are in the “Pay Attention-Use Caution” (yellow zone) or “Medical Alert-Warning!” (red zone) as outlined in the Self-Check Plan sheet.  Take your medicines as prescribed.  Limit your sodium (salt) intake.  Know when to call your cardiologist, primary doctor, or nurse.  Know when to seek emergency care.      Things for You to Remember:   1. See your doctor or healthcare provider as written on your discharge instructions.  It is important that you attend this appointment to make sure your symptoms are under control.     2. Your recommended sodium intake is 7312-6879 mg daily.    3.  Weigh yourself every day.    4. Some exercise and activity is important to help keep your heart functioning and strong. Unless instructed not to exercise, you may walk at a slow to moderate pace for 10-15 minutes 2-3 days per week to start. Pace your activity to prevent shortness of breath or fatigue. Stop exercising if you develop chest pain, lightheadedness, or significant shortness of breath.       Call Your Cardiologist If:   You gain 2-3 pounds in one day or 5 pounds in one week.  You have more difficulty breathing.  You are getting more tired with normal activity.  You are more short of breath lying down, or awaken at night short of breath.  You have swelling of your feet or legs.  You urinate less often during the day and more often at night.  You have cramps in your legs.  You have blurred vision or see yellowish-green halos around objects of lights.    Go to the  Emergency Room If:   You have pain or tightness in your chest  You are extremely short of breath  You are coughing up pink-frothy mucus  You are traveling and develop symptoms of worsening heart failure      ** Please follow up with your cardiologist or Advanced Practice Provider as written on your discharge instructions. If you are not provided with an appointment, let your nurse know so you can get an appointment**  HOME HEALTH:  Sometimes managing your health at home requires assistance.  The [facility] team has recognized your preference to use [facility].  They can be reached by phone at [phone].  The fax number for your reference is [phone].  A representative from the home health agency will contact you or your family to schedule your first visit.

## 2025-01-27 NOTE — PROGRESS NOTES
Patient has been readmitted to [facility] on 1/26/25, [facility] closing encounter.

## 2025-01-27 NOTE — OCCUPATIONAL THERAPY NOTE
OCCUPATIONAL THERAPY EVALUATION - INPATIENT     Room Number: 338/338-A  Evaluation Date: 2025  Type of Evaluation: Initial  Presenting Problem: Respiratory failure    Physician Order: IP Consult to Occupational Therapy  Reason for Therapy: ADL/IADL Dysfunction and Discharge Planning    OCCUPATIONAL THERAPY ASSESSMENT   Patient is a 89 year old female admitted 2025 for respiratory failure.  Prior to admission, patient's baseline is independent and home alone.  Patient is currently functioning near baseline with self care and basic mobility.  Patient is requiring up to Min A as a result of the following impairments: deconditioning, endurance, activity tolerance. Occupational Therapy will continue to follow for duration of hospitalization.    Patient will benefit from continued skilled OT Services at discharge to promote prior level of function and safety with additional support and return home with home health OT.    PLAN DURING HOSPITALIZATION  OT Device Recommendations: None  OT Treatment Plan: Balance activities;Energy conservation/work simplification techniques;ADL training;Functional transfer training;Endurance training;Patient/Family education;Patient/Family training;Compensatory technique education     OCCUPATIONAL THERAPY MEDICAL/SOCIAL HISTORY   Problem List  Principal Problem:    Acute hypoxemic respiratory failure (HCC)  Active Problems:    Acute on chronic heart failure, unspecified heart failure type (HCC)    HOME SITUATION  Type of Home: House  Home Layout: One level  Lives With: Alone (daughter lives close by)  Drives: No    SUBJECTIVE  Hello    OCCUPATIONAL THERAPY EXAMINATION      OBJECTIVE  Precautions: Bed/chair alarm  Fall Risk: Standard fall risk      PAIN ASSESSMENT  Ratin      ACTIVITY TOLERANCE  Pulse: 100        BP: (!) 122/98  BP Location: Right arm  BP Method: Automatic  Patient Position: Sitting    O2 SATURATIONS  Oxygen Therapy  SPO2% on Oxygen at Rest: 96  At rest oxygen  flow (liters per minute): 5  SPO2% Ambulation on Oxygen: 94  Ambulation oxygen flow (liters per minute): 5    COGNITION  Overall Cognitive Status:  WNL, lethargic    VISION  WNL    PERCEPTION  wNL    SENSATION  WNL    Communication: WNL    Behavioral/Emotional/Social: Cooperative, pleasnat     RANGE OF MOTION   Upper extremity ROM is within functional limits     STRENGTH ASSESSMENT  Upper extremity strength is within functional limits     COORDINATION  Gross Motor: WFL  Fine Motor: WFL     ACTIVITIES OF DAILY LIVING ASSESSMENT  AM-PAC ‘6-Clicks’ Inpatient Daily Activity Short Form  How much help from another person does the patient currently need…  -   Putting on and taking off regular lower body clothing?: A Little  -   Bathing (including washing, rinsing, drying)?: A Little  -   Toileting, which includes using toilet, bedpan or urinal? : A Little  -   Putting on and taking off regular upper body clothing?: A Little  -   Taking care of personal grooming such as brushing teeth?: A Little  -   Eating meals?: A Little    AM-PAC Score:  Score: 18  Approx Degree of Impairment: 46.65%  Standardized Score (AM-PAC Scale): 38.66  CMS Modifier (G-Code): CK    FUNCTIONAL TRANSFER ASSESSMENT  Sit to Stand: Edge of Bed  Edge of Bed: Contact Guard Assist  Toilet Transfer: Contact Guard Assist    BED MOBILITY  Rolling: Stand-by Assist  Supine to Sit : Contact Guard Assist  Sit to Supine (OT): Contact Guard Assist  Scooting: CGA    BALANCE ASSESSMENT  Static Sitting: Stand-by Assist  Static Standing: Contact Guard Assist    FUNCTIONAL ADL ASSESSMENT  Eating: Supervision  Grooming Seated: Supervision  Bathing Seated: Minimal Assist  UB Dressing Seated: Stand-by Assist  LB Dressing Seated: Minimal Assist  Toileting Seated: Contact Guard Assist    THERAPEUTIC EXERCISE     Skilled Therapy Provided: Pt rcvd in bed and left up in chair at end of session; up and ambulatory bathroom distances with CGA; fxl t/f with CGA; anticiapte up to  Min A for self care 2/2 poor activity tolerance and deconditioning; VSS on 5L o2 saturating >93% at rest and with activities; encouraged pt up in chair and OOB for all meals and continue ambulating with RN staff to bathroom/daily gown changes to prevent further decline/deconditioning; Continue skilled occupational therapy while IP to maximize patient function and increase patient participation, safety, and independence with basic ADL and everyday activities.       EDUCATION PROVIDED  Patient Education : Role of Occupational Therapy; Plan of Care; Discharge Recommendations; Functional Transfer Techniques; Fall Prevention; Posture/Positioning  Patient's Response to Education: Verbalized Understanding    The patient's Approx Degree of Impairment: 46.65% has been calculated based on documentation in the [facility] '6 clicks' Inpatient Daily Activity Short Form.  Research supports that patients with this level of impairment may benefit from HH; increased support.  Final disposition will be made by interdisciplinary medical team.     Patient End of Session: Up in chair;With EH staff;Needs met;Call light within reach    OT Goals  Patients self stated goal is: to return home     Patient will complete functional transfer with Mod I   Comment:     Patient will complete toileting with Mod I   Comment:     Patient will tolerate standing for 3-5 minutes in prep for adls with Mod I    Comment:    Patient will complete item retrieval with Mod I   Comment:          Goals  on: 2/15  Frequency: 3x week    Patient Evaluation Complexity Level:   Occupational Profile/Medical History MODERATE - Expanded review of history including review of medical or therapy record   Specific performance deficits impacting engagement in ADL/IADL MODERATE  3 - 5 performance deficits   Client Assessment/Performance Deficits MODERATE - Comorbidities and min to mod modifications of tasks    Clinical Decision Making MODERATE - Analysis of occupational  profile, detailed assessments, several treatment options    Overall Complexity MODERATE     OT Session Time: 10 minutes  Self-Care Home Management: 0 minutes  Therapeutic Activity: 10 minutes    Nilson Chau, Occupational Therapist, OTR/L ext [phone]

## 2025-01-27 NOTE — CM/SW NOTE
01/27/25 1440   ROMELIA/TITI Referral Data   Referral Source    Reason for Referral Discharge planning   Informant Daughter   Readmission Assessment   Factors that patient feels contributed to this readmission Acute/Chronic Clinical Presentation   Pt's living situation prior to admission? Home alone   Pt's level of independence at discharge? No assist/independent (minimal)   Pt. received education on diagnoses at time of discharge? Yes   Did you know who and how to call someone if you felt worse? Yes   Did any new symptoms or issues develop after you were discharged? No   Did you understand your discharge instructions? Yes   Were medications taken as indicated on discharge instructions? Yes   Did you have a follow-up appointment scheduled at time of discharge? Yes   ----F/U appt: Did you go to that appointment?   (not discharged long enough)   Was full assessment completed by TITI/ROMELIA on prior admission? No (comment)   Medical Hx   Does patient have an established PCP? Yes  (Dr Goldman)   Patient Info   Patient's Current Mental Status at Time of Assessment   (sleeping)   Patient's Home Environment House   Number of Levels in Home 1   Number of Stair in Home 0   Patient lives with Alone   Patient Status Prior to Admission   Independent with ADLs and Mobility Yes   Discharge Needs   Anticipated D/C needs Home health care     CM met with patient and daughter at bedside to discuss discharge planning. Patient sleeping during assessment, dtr provided information.     Dtr confirms home address on face sheet. Pt lives in a single level home, alone. Pt is very independent at active at baseline. Patient cooks/cleans/manages all ADLs on her own. Uses a RW at times.     Dtr lives very close by and provides all transportation and able to help if needed.     Distant history of [facility], no current HHC.     CM discussed PT/OT pending and discussed potential recommendations. Dtr agreeable to HHC if recommended. CM requested [facility]SC to  send tentative referrals. Order/F2F entered.     CM/SW to follow. Patient currently on 5L O2, none at baseline.    Nakia Sevilla, RN, BSN

## 2025-01-27 NOTE — PROGRESS NOTES
[facility]  part of [facility]     Hospitalist Progress Note     Shyla Wheeler Patient Status:  Inpatient    1935 MRN [id number]   Location [facility] 3W/SW Attending Nisreen Aguirre MD   Hosp Day # 1 PCP Ray Goldman MD     Subjective:     Patient reclining in a chair, in good spirits but drowsy.   She denied any active complaints at the time of interview.   Family present at the bedside. All questions and concerns addressed.      Objective:    Review of Systems:   ROS completed; pertinent positive and negatives stated in subjective.      Vital signs:  Temp:  [97.5 °F (36.4 °C)-98 °F (36.7 °C)] 98 °F (36.7 °C)  Pulse:  [] 103  Resp:  [17-32] 20  BP: (108-143)/(78-98) 116/81  SpO2:  [85 %-98 %] 95 %      Physical Exam:    Gen: NAD AO x3  Chest: good air entry CTABL  CVS: normal s1 and s2 RR  Abd: NABS soft NT ND  Neuro: CN 2-12 grossly intact  Ext: no edema in bilateral LE      Diagnostic Data:    Labs:  Recent Labs   Lab 25  0641 25  1808 25  0234   WBC 6.8 8.4 8.4   HGB 12.0 11.6* 11.5*   MCV 94.8 92.8 94.5   .0 304.0 280.0       Recent Labs   Lab 25  0641 25  1807 25  0234   * 188* 127*   BUN 29* 51* 54*   CREATSERUM 0.88 1.49* 1.41*   CA 8.9 9.6 10.2   ALB 4.1 4.2  --     134* 136   K 3.9 5.0 4.6    101 101   CO2 27.0 22.0 26.0   ALKPHO  --  115  --    AST  --  135*  --    ALT  --  144*  --    BILT  --  0.5  --    TP  --  6.9  --        Estimated Creatinine Clearance: 24.8 mL/min (A) (based on SCr of 1.41 mg/dL (H)).    No results for input(s): \"PTP\", \"INR\" in the last 168 hours.           Imaging: Imaging data reviewed in Epic.    Medications:    methylPREDNISolone  40 mg Intravenous Q12H    ipratropium-albuterol  3 mL Nebulization q6h    piperacillin-tazobactam  3.375 g Intravenous Q8H    apixaban  2.5 mg Oral BID    aspirin  81 mg Oral Daily    metoprolol succinate ER  100 mg Oral Daily    atorvastatin  10  mg Oral Nightly    latanoprost  1 drop Both Eyes Nightly    furosemide  40 mg Intravenous BID (Diuretic)    pantoprazole  40 mg Oral QAM AC       Assessment & Plan:     Acute hypoxic respiratory failure 2/2 combined systolic and diastolic HF  ?PNA  Imaging reviewed  Strep pneumo and legionella Ag negative  Resp panel negative  BNP elevated  Trop and EKG reviewed  Started on Lasix 40 mg IV BID  Continue IV zosyn   Duonebs q6hrs  Started on solu-medrol on admission, will discontinue considering CHF exacerbation  Cardiology on consult  Appreciate recs  Strict Is and Os, daily weights  Persistent afib with RVR  Continue home meds  Eliquis on hold as patient wants to discuss with cardiology about possibly another agent  Tele monitoring      Plan of care discussed with patient or family at bedside.      Supplementary Documentation:     Quality:  DVT Prophylaxis: SCDs, patient refusing eliquis  CODE status:       Estimated date of discharge: TBD  Discharge is dependent on: clinical stability  At this point Ms. Wheeler is expected to be discharge to: home             **Certification      PHYSICIAN Certification of Need for Inpatient Hospitalization - Initial Certification    Patient will require inpatient services that will reasonably be expected to span two midnight's based on the clinical documentation in H+P.   Based on patients current state of illness, I anticipate that, after discharge, patient will require TBD.      Nisreen Aguirre MD  Hospitalist    MDM: High, I personally reviewed the available laboratories, imaging including XR. I discussed the case with RN. I ordered laboratories, studies including AM labs.  Medical decision making high, risk is high.       The 21st Century Cures Act makes medical notes like these available to patients in the interest of transparency. Please be advised this is a medical document. Medical documents are intended to carry relevant information, facts as evident, and the clinical opinion  of the practitioner. The medical note is intended as peer to peer communication and may appear blunt or direct. It is written in medical language and may contain abbreviations or verbiage that are unfamiliar.

## 2025-01-27 NOTE — PHYSICAL THERAPY NOTE
PHYSICAL THERAPY EVALUATION - INPATIENT     Room Number: 338/338-A  Evaluation Date: 1/27/2025  Type of Evaluation: Initial   Physician Order: PT Eval and Treat    Presenting Problem: acute on chronic heart failure, acute hypoxemia respiratory failure  Co-Morbidities : HTN, enlarged aorta, R knee surgery, CHF, a-fib, large hiatal hernia, and recent admit for multifocal pneumonia  Reason for Therapy: Mobility Dysfunction and Discharge Planning  Recent hospital admission 1/18 to 1/21/25 with SOB, a-fib and was DC to home.     PHYSICAL THERAPY ASSESSMENT   Patient is a 89 year old female admitted 1/26/2025 for hypoxia and SOB, and worsening fatigue .  Prior to admission, patient's baseline is typically independent.  Patient is currently functioning near baseline with bed mobility, transfers, gait, and stair negotiation.  Patient is requiring supervision as a result of the following impairments: decreased functional strength, decreased endurance/aerobic capacity, and increased O2 needs from baseline.  Physical Therapy will continue to follow for duration of hospitalization.    Patient will benefit from continued skilled PT Services at discharge to promote prior level of function and safety with additional support and return home with home health PT.    PLAN DURING HOSPITALIZATION  Nursing Mobility Recommendation : 1 Assist  PT Device Recommendation: Rolling walker  PT Treatment Plan: Bed mobility;Body mechanics;Endurance;Energy conservation;Patient education;Family education;Gait training;Stair training;Transfer training;Balance training  Rehab Potential : Good  Frequency (Obs): 3-5x/week     PHYSICAL THERAPY MEDICAL/SOCIAL HISTORY   History related to current admission: Recent admit from 1/18/25 to 1/21/25 for SOB and A-fib. Pt now admitted secondary to hypoxia.     Problem List  Principal Problem:    Acute hypoxemic respiratory failure (HCC)  Active Problems:    Acute on chronic heart failure, unspecified heart  failure type (HCC)      HOME SITUATION  Type of Home: House  Home Layout: One level  Stairs to Enter : 1   Railing: Yes              Lives With: Alone (daughter lives close by)    Drives: No         Prior Level of [place]: Per pt report, pt independent with ambulation, ADL, does not drive. Denies falls. Pt's daughter lives close.     SUBJECTIVE  Pt is pleasant and cooperative    PHYSICAL THERAPY EXAMINATION   OBJECTIVE  Precautions: Bed/chair alarm  Fall Risk: Standard fall risk    WEIGHT BEARING RESTRICTION       PAIN ASSESSMENT  Ratin  Location: denies pain       COGNITION  Overall Cognitive Status:  WFL - within functional limits  Arousal/Alertness:  appropriate responses to stimuli  Orientation Level:  oriented x4  Following Commands:  follows all commands and directions without difficulty  Safety Judgement:  good awareness of safety precautions    RANGE OF MOTION AND STRENGTH ASSESSMENT  Upper extremity ROM and strength are within functional limits See OT evaluation  Lower extremity ROM is within functional limits   Lower extremity strength is within functional limits     BALANCE  Static Sitting: Good  Dynamic Sitting: Fair +  Static Standing: Poor +  Dynamic Standing: Poor +    ADDITIONAL TESTS                                    NEUROLOGICAL FINDINGS                      ACTIVITY TOLERANCE  Pulse: 106  Heart Rate Source: Monitor     BP: (!) 122/98  BP Location: Right arm  BP Method: Automatic  Patient Position: Sitting    O2 WALK  Oxygen Therapy  SPO2% on Oxygen at Rest: 95  At rest oxygen flow (liters per minute): 5  SPO2% Ambulation on Oxygen: 95  Ambulation oxygen flow (liters per minute): 5    AM-PAC '6-Clicks' INPATIENT SHORT FORM - BASIC MOBILITY  How much difficulty does the patient currently have...  Patient Difficulty: Turning over in bed (including adjusting bedclothes, sheets and blankets)?: A Little   Patient Difficulty: Sitting down on and standing up from a chair with arms (e.g.,  wheelchair, bedside commode, etc.): A Little   Patient Difficulty: Moving from lying on back to sitting on the side of the bed?: A Little   How much help from another person does the patient currently need...   Help from Another: Moving to and from a bed to a chair (including a wheelchair)?: A Little   Help from Another: Need to walk in hospital room?: A Little   Help from Another: Climbing 3-5 steps with a railing?: A Lot     AM-PAC Score:  Raw Score: 17   Approx Degree of Impairment: 50.57%   Standardized Score (AM-PAC Scale): 42.13   CMS Modifier (G-Code): CK    FUNCTIONAL ABILITY STATUS  Functional Mobility/Gait Assessment  Gait Assistance: Contact guard assist  Distance (ft): 25  Assistive Device: Rolling walker  Pattern: Shuffle  Rolling:  NT  Supine to Sit: contact guard assist  Sit to Supine:  NT  Sit to Stand: contact guard assist    Exercise/Education Provided:  Bed mobility  Body mechanics  Energy conservation  Functional activity tolerated  Gait training  Transfer training    Skilled Therapy Provided: PT orders received, chart reviewed, and RN approved PT session. Pt lying in bed and agreeable to PT. Vitals assessed and WNL. Pt demonstrates good bed mobility, some assistance needed with rails and cueing. Pt instructed in hand placement and integration of RW with sit<>Stand. Pt instructed in proper breathing techniques and activity pacing with activity. Pt in understanding. Pt began ambulation to bedside chair, slow gait, short steps. Pt instructed in upright posture and to bring RW closer to JORGE LUIS. Pt left up in chair, all needs in reach, alarm on. RN notified. Recommend pt up to chair 3 times per day and ambulate with RN staff only.       Patient End of Session: Up in chair;Needs met;Call light within reach;RN aware of session/findings;All patient questions and concerns addressed;Hospital anti-slip socks;Alarm set;Family present    CURRENT GOALS  Goals to be met by: 1/31/25  Patient Goal Patient's  self-stated goal is: to get back home and feel better   Goal #1 Patient is able to demonstrate supine - sit EOB @ level: modified independent     Goal #1   Current Status    Goal #2 Patient is able to demonstrate transfers Sit to/from Stand at assistance level: modified independent with walker - rolling     Goal #2  Current Status    Goal #3 Patient is able to ambulate 150 feet with assist device: walker - rolling at assistance level: modified independent   Goal #3   Current Status    Goal #4 Patient will negotiate 1 stairs/one curb w/ assistive device and supervision   Goal #4   Current Status    Goal #5 Patient to demonstrate independence with home activity/exercise instructions provided to patient in preparation for discharge.   Goal #5   Current Status    Goal #6    Goal #6  Current Status      Patient Evaluation Complexity Level:  History High - 3 or more personal factors and/or co-morbidities   Examination of body systems Moderate - addressing a total of 3 or more elements   Clinical Presentation Low- Stable   Clinical Decision Making  Low Complexity   Total Time: 25 minutes  Gait Training: 10 minutes  Therapeutic Activity:  0 minutes  Neuromuscular Re-education: 0 minutes  Therapeutic Exercise: 0 minutes  Canal[place] Repositionin minutes  Manual Therapy: 0 minutes  Can add/delete any of these

## 2025-01-27 NOTE — ED QUICK NOTES
Orders for admission, patient is aware of plan and ready to go upstairs. Any questions, please call ED RN giuseppe at extension [phone].     Patient Covid vaccination status: Fully vaccinated     COVID Test Ordered in ED: SARS-CoV-2/Flu A and B/RSV by PCR (GeneXpert)    COVID Suspicion at Admission: N/A    Running Infusions:  None    Mental Status/LOC at time of transport: x4    Other pertinent information:   CIWA score: N/A   NIH score:  N/A

## 2025-01-27 NOTE — PLAN OF CARE
Patient from home alone. Increased SOB with O2 sat 80s at home. IV lasix given by ER. Increasing SOB while at rest and exertion. Dr Gamboa notified of dyspnea this morning. IV solumedrol and scheduled&prn nebs added. HR up to 120s non sustained. Daughter and patient refused eliquis says 'it made me feel funny, not right' Stopped taking it 1 day prior to admission. Dr Gamboa notified due to patient being in afib.    Problem: Patient Centered Care  Goal: Patient preferences are identified and integrated in the patient's plan of care  Description: Interventions:  - What would you like us to know as we care for you? From home alone  - Provide timely, complete, and accurate information to patient/family  - Incorporate patient and family knowledge, values, beliefs, and cultural backgrounds into the planning and delivery of care  - Encourage patient/family to participate in care and decision-making at the level they choose  - Honor patient and family perspectives and choices  Outcome: Progressing     Problem: Patient/Family Goals  Goal: Patient/Family Long Term Goal  Description: Patient's Long Term Goal: To go home    Interventions:  - IV lasix, Iv solumedrol, nebs, labs and tests per MDs  - See additional Care Plan goals for specific interventions  Outcome: Progressing  Goal: Patient/Family Short Term Goal  Description: Patient's Short Term Goal: To improve breathing.    Interventions:   - O2 as needed, nebs, IV solumedrol, walking with assistance as tolerated.  - See additional Care Plan goals for specific interventions  Outcome: Progressing     Problem: RESPIRATORY - ADULT  Goal: Achieves optimal ventilation and oxygenation  Description: INTERVENTIONS:  - Assess for changes in respiratory status  - Assess for changes in mentation and behavior  - Position to facilitate oxygenation and minimize respiratory effort  - Oxygen supplementation based on oxygen saturation or ABGs  - Provide Smoking Cessation handout, if  applicable  - Encourage broncho-pulmonary hygiene including cough, deep breathe, Incentive Spirometry  - Assess the need for suctioning and perform as needed  - Assess and instruct to report SOB or any respiratory difficulty  - Respiratory Therapy support as indicated  - Manage/alleviate anxiety  - Monitor for signs/symptoms of CO2 retention  Outcome: Progressing     Problem: CARDIOVASCULAR - ADULT  Goal: Maintains optimal cardiac output and hemodynamic stability  Description: INTERVENTIONS:  - Monitor vital signs, rhythm, and trends  - Monitor for bleeding, hypotension and signs of decreased cardiac output  - Evaluate effectiveness of vasoactive medications to optimize hemodynamic stability  - Monitor arterial and/or venous puncture sites for bleeding and/or hematoma  - Assess quality of pulses, skin color and temperature  - Assess for signs of decreased coronary artery perfusion - ex. Angina  - Evaluate fluid balance, assess for edema, trend weights  Outcome: Progressing  Goal: Absence of cardiac arrhythmias or at baseline  Description: INTERVENTIONS:  - Continuous cardiac monitoring, monitor vital signs, obtain 12 lead EKG if indicated  - Evaluate effectiveness of antiarrhythmic and heart rate control medications as ordered  - Initiate emergency measures for life threatening arrhythmias  - Monitor electrolytes and administer replacement therapy as ordered  Outcome: Progressing     Problem: PAIN - ADULT  Goal: Verbalizes/displays adequate comfort level or patient's stated pain goal  Description: INTERVENTIONS:  - Encourage pt to monitor pain and request assistance  - Assess pain using appropriate pain scale  - Administer analgesics based on type and severity of pain and evaluate response  - Implement non-pharmacological measures as appropriate and evaluate response  - Consider cultural and social influences on pain and pain management  - Manage/alleviate anxiety  - Utilize distraction and/or relaxation  techniques  - Monitor for opioid side effects  - Notify MD/LIP if interventions unsuccessful or patient reports new pain  - Anticipate increased pain with activity and pre-medicate as appropriate  Outcome: Progressing     Problem: SAFETY ADULT - FALL  Goal: Free from fall injury  Description: INTERVENTIONS:  - Assess pt frequently for physical needs  - Identify cognitive and physical deficits and behaviors that affect risk of falls.  - [place] fall precautions as indicated by assessment.  - Educate pt/family on patient safety including physical limitations  - Instruct pt to call for assistance with activity based on assessment  - Modify environment to reduce risk of injury  - Provide assistive devices as appropriate  - Consider OT/PT consult to assist with strengthening/mobility  - Encourage toileting schedule  Outcome: Progressing     Problem: DISCHARGE PLANNING  Goal: Discharge to home or other facility with appropriate resources  Description: INTERVENTIONS:  - Identify barriers to discharge w/pt and caregiver  - Include patient/family/discharge partner in discharge planning  - Arrange for needed discharge resources and transportation as appropriate  - Identify discharge learning needs (meds, wound care, etc)  - Arrange for interpreters to assist at discharge as needed  - Consider post-discharge preferences of patient/family/discharge partner  - Complete POLST form as appropriate  - Assess patient's ability to be responsible for managing their own health  - Refer to Case Management Department for coordinating discharge planning if the patient needs post-hospital services based on physician/LIP order or complex needs related to functional status, cognitive ability or social support system  Outcome: Progressing

## 2025-01-27 NOTE — PLAN OF CARE
Shyla Wheeler Patient Status:  Inpatient    1935 MRN [id number]   Location [facility] 3W/SW Attending Itzel Gamboa MD   Hosp Day # 1 PCP Ray Goldman MD     Cardiology Nocturnal APN Note    Briefly: (Documentation from chart review)     Shyla Wheeler is a 89 f who presented with dypsnea wheezing and low o2 sat.  Patient has afib w/RVR and acute on chronic HF with shortness of breath on exertion fatigue baseline o2 sats 85% improved w/NC o2.  She was recently discharged form hospital on 25 She went out to eat with friends and this has gotten progressively worse     Consult for acute on chronic CHF (EF 30-35%) pBNP 26,463 with elevated LFTs and small pleural effusions trop 26   Above hpi obtained from ed chart review as well as previous admission.     She  has a PMH/PSH of:       Past Medical History:    Enlarged aorta (HCC)    Glaucoma    Other and unspecified hyperlipidemia    Right groin pain    Unspecified essential hypertension       Primary Cardiologist Dr Mcintyre     Vital Signs:       2025     9:45 PM 2025     9:54 PM   Vitals History   Pulse 122    Weight  168 lbs 6 oz   BMI  28.02 kg/m2        Labs:   Lab Results   Component Value Date    WBC 8.4 2025    HGB 11.6 2025    HCT 33.6 2025    .0 2025    CREATSERUM 1.49 2025    BUN 51 2025     2025    K 5.0 2025     2025    CO2 22.0 2025     2025    CA 9.6 2025    ALB 4.2 2025    ALKPHO 115 2025    BILT 0.5 2025    TP 6.9 2025     2025     2025    TROPHS 26 2025       Diagnostics:   XR CHEST AP PORTABLE  (CPT=71045)    Result Date: 2025  PROCEDURE: XR CHEST AP PORTABLE  (CPT=71045) TIME: 1840 hours.   COMPARISON: [facility], XR CHEST AP PORTABLE (CPT=71045), 2025, 9:29 AM.  [facility], XR CHEST AP PORTABLE (CPT=71045), 2025,  10:19 AM.  [facility], XR RIBS WITH CHEST (3 VIEWS), LEFT (CPT=71101),  9/16/2019, 1:51 PM.  INDICATIONS: Shortness of breath x1 day.  TECHNIQUE:   Single view.  Limited by rotation.  FINDINGS:  CARDIAC/MEDIAST: Heart and mediastinum not significantly changed given differences in technique.  Large retrocardiac hiatal hernia.  LUNGS/PLEURA:  Patchy opacity in the left upper lobe may be slightly increased.  Small suspected bilateral pleural effusions.  No pneumothorax. OTHER:  Degenerative change osseous structures.         CONCLUSION:   Patient rotation limits evaluation.  Patchy left upper lobe opacity which may be slightly increased which again may reflect a pneumonia.  Short-term follow-up chest radiographs advised.  Small suspected bilateral pleural effusions.    Dictated by (CST): Venu Tinoco MD on 1/26/2025 at 7:01 PM     Finalized by (CST): Venu Tinoco MD on 1/26/2025 at 7:03 PM          ECHOCARDIOGRAM 1/20/25  Conclusions:     1. Left ventricle: The cavity size was normal. Wall thickness was mildly      increased. Systolic function was moderately to markedly reduced. The      estimated ejection fraction was 30-35%, by biplane method of disks.      Akinesis of the anteroseptal and inferoseptal walls. Severe hypokinesis      of the entireinferior wall. Doppler parameters are consistent with      restrictive physiology, indicative of decreased left ventricular      diastolic compliance and/or increased left atrial pressure.   2. Right ventricle: The cavity size was normal. Systolic function was      normal.   Impressions:  Left ventricle dysfunction is new compared to echocardiogram   from 2016.   *   Allergies:  Allergies[1]    Medications:    apixaban    aspirin    metoprolol succinate ER    atorvastatin    latanoprost    ondansetron    acetaminophen    hydrALAzine    furosemide    zolpidem    alum-mag hydroxide-simethicone    pantoprazole    guaiFENesin-codeine    Assessment:   EKG  shows Afib RVR LBBB rate 104 w/o acute changes  CxR shows patchy left upper lobe opacity which may be slightly increased which again may reflect a pneumonia.  Short-term follow-up chest radiographs advised.    Small suspected bilateral pleural effusions.   Labs  pBNP 26,463 (last admit pBNP -18,000) trop 26   Procalcitionin 0.10  K 5  creat 1.49     WBC 8.4 H&H 11.6/33.6   plts 304  Covid RSV Influenza A&B negative  Missed evening dose of eliqiuis only otherwise compliant  Rec'd Lasix 40 mg IV   VS HR  bp 120-130/80-90 Ow sats 93-97% 3 L nc   Remains in controlled afib < 110 w/mtop on eliquis at home     Plan:  Trend trops and check EKG if indicate  Consider repeat echo last one - 1/20/25 above  Cmp mag in am  Strict intake and output  Daily weights   Chf Education and reinforcement   - Continue to monitor overnight  - Formal Cardiology consult to follow in AM.       Mare Harkins NP  [facility]  1/27/2025  2:28 AM          [1]   Allergies  Allergen Reactions    Benadryl [Diphenhydramine] SWELLING    Ace Inhibitors     Amoxicillin NAUSEA ONLY

## 2025-01-27 NOTE — H&P
[facility]  part of [facility]    History & Physical    Shyla Wheeler Patient Status:  Emergency    1935 MRN [id number]   Location [facility] EMERGENCY DEPARTMENT Attending Sandee Urbina MD   Hosp Day # 0 PCP Ray Goldman MD     Date:  2025  Date of Admission:  2025    Chief Complaint:  Chief Complaint   Patient presents with    Difficulty Breathing       History of Present Illness:  Shyla Wheeler is a(n) 89 year old female, with a past medical history significant for thoracic aortic aneurysm, large hiatal hernia and recently admitted with multifocal pneumonia congestive heart failure new onset NOHEMY-bong was recently discharged from the hospital comes back 2 days later complaining of increasing shortness of breath and wheezing.  She was prescribed diuretics which she claims she has been taking religiously with minimal to no improvement in her breathing status and in fact denies urinating more than usual spite taking her medication.  Denies any recent fever or chills denies any chest pain or palpitations.  Chest x-ray done in ER shows evidence of increased opacity suggestive of worsening pneumonia.    History:  Past Medical History:    Enlarged aorta (HCC)    Glaucoma    Other and unspecified hyperlipidemia    Right groin pain    Unspecified essential hypertension     Past Surgical History:   Procedure Laterality Date    Correct bunion,simple Right     Bunionectomy    Electrocardiogram, complete  2010    Scanned to media tab     Knee surgery      right knee     Family History   Problem Relation Age of Onset    Heart Attack Father     Heart Disease Father         Coronary Artery Disease    Heart Attack Mother     Hypertension Mother     Breast Cancer Sister 78        2 sisters    Arthritis Sister     Heart Attack Brother     Cancer Brother         pancreatic cancer      reports that she has never smoked. She has never used smokeless tobacco. She reports  current alcohol use. She reports that she does not use drugs.    Allergies:  Allergies[1]    Home Medications:  Prior to Admission Medications   Prescriptions Last Dose Informant Patient Reported? Taking?   Calcium Carb-Cholecalciferol 600-200 MG-UNIT Oral Tab   Yes Yes   Sig: Take 1 tablet by mouth daily.   MULTIVITAMIN TAB/CAP   Yes Yes   Sig: Take 1 tablet by mouth daily.   TRAVATAN Z 0.004 % Ophthalmic Solution   Yes No   Sig: Place 1 drop into both eyes at bedtime.   apixaban 2.5 MG Oral Tab   No Yes   Sig: Take 1 tablet (2.5 mg total) by mouth 2 (two) times daily.   aspirin 81 MG Oral Tab EC   Yes No   Sig: Take 1 tablet (81 mg total) by mouth daily.   Patient not taking: Reported on 1/26/2025   furosemide 20 MG Oral Tab   No Yes   Sig: Take 1 tablet (20 mg total) by mouth every other day. OR as directed by cardiology   levoFLOXacin 500 MG Oral Tab   No No   Sig: Take 1 tablet (500 mg total) by mouth daily for 3 days.   metoprolol succinate ER 50 MG Oral Tablet 24 Hr   No No   Sig: Take 2 tablets (100 mg total) by mouth daily.   Patient not taking: Reported on 1/26/2025   pravastatin 40 MG Oral Tab   No Yes   Sig: Take 1 tablet (40 mg total) by mouth nightly.      Facility-Administered Medications: None       Review of Systems:  Constitutional:  Weakness, Fatigue.  Eye:  Negative.  Ear/Nose/Mouth/Throat:  Negative.  Respiratory: Shortness of breath  Cardiovascular: Negative  Gastrointestinal:  Negative.  Genitourinary:  Negative  Endocrine:  Negative.  Immunologic:  Negative.  Musculoskeletal:  Negative.  Integumentary:  Negative.  Neurologic:  Negative.  Psychiatric:  Negative.  ROS reviewed as documented in chart    Physical Exam:  Pulse:  [] 110  Resp:  [17-32] 26  BP: (108-143)/(78-98) 124/98  SpO2:  [85 %-98 %] 93 %    General:  Alert and oriented.  Diffuse skin problem:  None.  Eye:  Pupils are equal, round and reactive to light, extraocular movements are intact, Normal conjunctiva.  HENT:   Normocephalic, oral mucosa is moist.  Head:  Normocephalic, atraumatic.  Neck:  Supple, non-tender, no carotid bruit, no jugular venous distention, no lymphadenopathy, no thyromegaly.  Respiratory: Bilateral wheeze appreciated left greater than right, respirations are mild to moderately-labored, breath sounds are equal, symmetrical chest wall expansion.  Cardiovascular:  Normal rate, regular rhythm, lower diastolic murmur in aortic area, no edema.  Gastrointestinal:  Soft, non-tender, non-distended, normal bowel sounds, no organomegaly.  Lymphatics:  No lymphadenopathy neck, axilla, groin.  Musculoskeletal: Normal range of motion.  normal strength.  Feet:  Normal pulses.  Neurologic:  Alert, oriented, no focal deficits, cranial nerves II-XII are grossly intact.  Cognition and Speech:  Oriented, speech clear and coherent.  Psychiatric:  Cooperative, appropriate mood & affect.      Laboratory Data:   Lab Results   Component Value Date    WBC 8.4 01/26/2025    HGB 11.6 01/26/2025    HCT 33.6 01/26/2025    .0 01/26/2025    CREATSERUM 1.49 01/26/2025    BUN 51 01/26/2025     01/26/2025    K 5.0 01/26/2025     01/26/2025    CO2 22.0 01/26/2025     01/26/2025    CA 9.6 01/26/2025    ALB 4.2 01/26/2025    ALKPHO 115 01/26/2025    BILT 0.5 01/26/2025    TP 6.9 01/26/2025     01/26/2025     01/26/2025       Imaging:  XR CHEST AP PORTABLE  (CPT=71045)    Result Date: 1/26/2025  CONCLUSION:   Patient rotation limits evaluation.  Patchy left upper lobe opacity which may be slightly increased which again may reflect a pneumonia.  Short-term follow-up chest radiographs advised.  Small suspected bilateral pleural effusions.    Dictated by (CST): Venu Tinoco MD on 1/26/2025 at 7:01 PM     Finalized by (CST): Venu Tinoco MD on 1/26/2025 at 7:03 PM            Assessment and Plan:    Acute respiratory failure with hypoxia  Likely triggered by pulmonary edema from heart failure,  increase use of Lasix, continue supplemental O2 wean as tolerated.    Acute combined systolic and diastolic heart failure  Patient started on Lasix 40 mg IV twice daily, cardiology has been consulted.  Monitor I's and O's and daily weights.    Questionable pneumonia  Afebrile, no white count.  Will check procalcitonin along with urine for strep and Legionella will start patient on Zosyn 3.375 g IV piggyback every 8 hours in view of worsening x-ray findings.    Airway reactive disease  Bilateral wheeze appreciated, likely triggered by pneumonia, will start patient on DuoNebs every 6 hours and as needed along with Solu-Medrol 40 mg IV twice daily.    Persistent atrial fibrillation with rapid ventricular response  Resume home meds, will start Cardizem drip if rate worsens.  Continue Eliquis.    Prophylaxis  On Eliquis    CODE STATUS  Full    Primary care physician  Ray Goldman MD    MDM: High, acute illness/severe exacerbation of chronic illness posing threat to life.  IV medications requiring close inpatient monitoring  60 minutes spent on this admission - examining patient, obtaining history, reviewing previous medical records, going over test results/imaging and discussing plan of care. All questions answered.     Disposition  Clinical course will dictate outcome      ROBERT NOBLE MD  1/26/2025  9:22 PM         [1]   Allergies  Allergen Reactions    Benadryl [Diphenhydramine] SWELLING    Ace Inhibitors     Amoxicillin NAUSEA ONLY

## 2025-01-27 NOTE — CM/SW NOTE
Department  notified of request for HHC, aidin referrals started. Assigned CM/SW to follow up with pt/family on further discharge planning.      Abena Lantigua  DSC

## 2025-01-27 NOTE — PLAN OF CARE
Patient alert and oriented x 4 on 5 LNC. Patient denies pain. Continue IV antibiotics, IV lasix. Call light within reach and safety precautions in place.     Problem: Patient Centered Care  Goal: Patient preferences are identified and integrated in the patient's plan of care  Description: Interventions:  - What would you like us to know as we care for you? From home alone  - Provide timely, complete, and accurate information to patient/family  - Incorporate patient and family knowledge, values, beliefs, and cultural backgrounds into the planning and delivery of care  - Encourage patient/family to participate in care and decision-making at the level they choose  - Honor patient and family perspectives and choices  Outcome: Progressing     Problem: Patient/Family Goals  Goal: Patient/Family Long Term Goal  Description: Patient's Long Term Goal: To go home    Interventions:  - IV lasix, Iv solumedrol, nebs, labs and tests per MDs  - See additional Care Plan goals for specific interventions  Outcome: Progressing  Goal: Patient/Family Short Term Goal  Description: Patient's Short Term Goal: To improve breathing.    Interventions:   - O2 as needed, nebs, IV solumedrol, walking with assistance as tolerated.  - See additional Care Plan goals for specific interventions  Outcome: Progressing     Problem: RESPIRATORY - ADULT  Goal: Achieves optimal ventilation and oxygenation  Description: INTERVENTIONS:  - Assess for changes in respiratory status  - Assess for changes in mentation and behavior  - Position to facilitate oxygenation and minimize respiratory effort  - Oxygen supplementation based on oxygen saturation or ABGs  - Provide Smoking Cessation handout, if applicable  - Encourage broncho-pulmonary hygiene including cough, deep breathe, Incentive Spirometry  - Assess the need for suctioning and perform as needed  - Assess and instruct to report SOB or any respiratory difficulty  - Respiratory Therapy support as  indicated  - Manage/alleviate anxiety  - Monitor for signs/symptoms of CO2 retention  Outcome: Progressing     Problem: CARDIOVASCULAR - ADULT  Goal: Maintains optimal cardiac output and hemodynamic stability  Description: INTERVENTIONS:  - Monitor vital signs, rhythm, and trends  - Monitor for bleeding, hypotension and signs of decreased cardiac output  - Evaluate effectiveness of vasoactive medications to optimize hemodynamic stability  - Monitor arterial and/or venous puncture sites for bleeding and/or hematoma  - Assess quality of pulses, skin color and temperature  - Assess for signs of decreased coronary artery perfusion - ex. Angina  - Evaluate fluid balance, assess for edema, trend weights  Outcome: Progressing  Goal: Absence of cardiac arrhythmias or at baseline  Description: INTERVENTIONS:  - Continuous cardiac monitoring, monitor vital signs, obtain 12 lead EKG if indicated  - Evaluate effectiveness of antiarrhythmic and heart rate control medications as ordered  - Initiate emergency measures for life threatening arrhythmias  - Monitor electrolytes and administer replacement therapy as ordered  Outcome: Progressing     Problem: PAIN - ADULT  Goal: Verbalizes/displays adequate comfort level or patient's stated pain goal  Description: INTERVENTIONS:  - Encourage pt to monitor pain and request assistance  - Assess pain using appropriate pain scale  - Administer analgesics based on type and severity of pain and evaluate response  - Implement non-pharmacological measures as appropriate and evaluate response  - Consider cultural and social influences on pain and pain management  - Manage/alleviate anxiety  - Utilize distraction and/or relaxation techniques  - Monitor for opioid side effects  - Notify MD/LIP if interventions unsuccessful or patient reports new pain  - Anticipate increased pain with activity and pre-medicate as appropriate  Outcome: Progressing     Problem: SAFETY ADULT - FALL  Goal: Free from  fall injury  Description: INTERVENTIONS:  - Assess pt frequently for physical needs  - Identify cognitive and physical deficits and behaviors that affect risk of falls.  - [place] fall precautions as indicated by assessment.  - Educate pt/family on patient safety including physical limitations  - Instruct pt to call for assistance with activity based on assessment  - Modify environment to reduce risk of injury  - Provide assistive devices as appropriate  - Consider OT/PT consult to assist with strengthening/mobility  - Encourage toileting schedule  Outcome: Progressing     Problem: DISCHARGE PLANNING  Goal: Discharge to home or other facility with appropriate resources  Description: INTERVENTIONS:  - Identify barriers to discharge w/pt and caregiver  - Include patient/family/discharge partner in discharge planning  - Arrange for needed discharge resources and transportation as appropriate  - Identify discharge learning needs (meds, wound care, etc)  - Arrange for interpreters to assist at discharge as needed  - Consider post-discharge preferences of patient/family/discharge partner  - Complete POLST form as appropriate  - Assess patient's ability to be responsible for managing their own health  - Refer to Case Management Department for coordinating discharge planning if the patient needs post-hospital services based on physician/LIP order or complex needs related to functional status, cognitive ability or social support system  Outcome: Progressing

## 2025-01-27 NOTE — CONSULTS
[facility]  part of [facility]    Cardiology Consultation    Shyla Wheeler Patient Status:  Inpatient    1935 MRN [id number]   Location [facility] 3W/SW Attending Nisreen Aguirre MD   Hosp Day # 1 PCP Ray Goldman MD     Date of Admission:  2025  Date of Consult:  2025  Reason for Consultation: CHF    History of Present Illness:   Patient is a 89 year old female with significant past medical history of persistent atrial fibrillation, HFrEF, hypertension, old LBBB, ascending aortic aneurysm who presents with increased home O2 requirements and dyspnea.  Of note patient had recent hospitalization for pneumonia complicated by new onset A-fib with RVR.  During that hospitalization she was found to have newly depressed LV function, down to 30-35% with regional wall motion normalities.  During that hospitalization it was recommended for her to undergo LHC for ischemic evaluation, however she declined.  Patient was supposed to go home on beta-blockade for blood GDMT and rate control.  However on current encounter, patient and daughter report that she had not been taking the beta-blocker at home.  Also complaining of fatigue and loopiness following apixaban dosing, requesting alternative agent.  In the ED, her heart rates were in the 110 to 120s.  Chest imaging with left upper lobe opacity, possibly pneumonia.  Patient was given IV 40 mg of furosemide along with Zosyn.  Patient does report improved breathing this morning.    Assessment and Plan:   Pneumonia  -Chest imaging with left upper lobe opacity, however this could be persistent opacity from previous/recent admission  -Antibiotics per primary    A-fib with RVR  -Patient presents with persistent A-fib and intermittent RVR  -Likely secondary to beta-blockade interruption, patient had not been taking this upon discharge  -Resume beta-blocker, metoprolol succinate 100 mg daily, continue to optimize rate control  -Elevated  [id number], patient would like an alternative to apixaban due to possible side effects, start rivaroxaban 15 mg once daily    HFrEF  -Recent decrease in LVEF along with regional wall motion normalities, 30-35%  -Patient had deferred ischemic evaluation during her last hospitalization, also possible is tachycardia-mediated cardiomyopathy  -Continue GDMT as tolerated  -Continue rate control for A-fib as above    Past Medical History  Past Medical History:    Enlarged aorta    Glaucoma    Other and unspecified hyperlipidemia    Right groin pain    Unspecified essential hypertension       Past Surgical History  Past Surgical History:   Procedure Laterality Date    Correct bunion,simple Right 1999    Bunionectomy    Electrocardiogram, complete  08/18/2010    Scanned to media tab     Knee surgery      right knee       Family History  Family History   Problem Relation Age of Onset    Heart Attack Father     Heart Disease Father         Coronary Artery Disease    Heart Attack Mother     Hypertension Mother     Breast Cancer Sister 78        2 sisters    Arthritis Sister     Heart Attack Brother     Cancer Brother         pancreatic cancer       Social History  Pediatric History   Patient Parents    Not on file     Other Topics Concern     Service Not Asked    Blood Transfusions Not Asked    Caffeine Concern No     Comment: decaf coffee daily    Occupational Exposure Not Asked    Hobby Hazards Not Asked    Sleep Concern Not Asked    Stress Concern Not Asked    Weight Concern Not Asked    Special Diet Not Asked    Back Care Not Asked    Exercise Yes     Comment: water aerobics 2-3x week    Bike Helmet Not Asked    Seat Belt Not Asked    Self-Exams Not Asked   Social History Narrative    The patient uses the following assistive device(s):  rolling walker.      The patient does live in a home with stairs. 12 stairs           Current Medications:  Current Facility-Administered Medications   Medication Dose Route  Frequency    ipratropium-albuterol (Duoneb) 0.5-2.5 (3) MG/3ML inhalation solution 3 mL  3 mL Nebulization Q6H PRN    ipratropium-albuterol (Duoneb) 0.5-2.5 (3) MG/3ML inhalation solution 3 mL  3 mL Nebulization q6h    piperacillin-tazobactam (Zosyn) 3.375 g in dextrose 5% 100 mL IVPB-ADDV  3.375 g Intravenous Q8H    apixaban (Eliquis) tab 2.5 mg  2.5 mg Oral BID    aspirin DR tab 81 mg  81 mg Oral Daily    metoprolol succinate ER (Toprol XL) 24 hr tab 100 mg  100 mg Oral Daily    atorvastatin (Lipitor) tab 10 mg  10 mg Oral Nightly    latanoprost (Xalatan) 0.005 % ophthalmic solution 1 drop  1 drop Both Eyes Nightly    ondansetron (Zofran) 4 MG/2ML injection 4 mg  4 mg Intravenous Q6H PRN    acetaminophen (Tylenol) tab 650 mg  650 mg Oral Q6H PRN    hydrALAzine (Apresoline) 20 mg/mL injection 10 mg  10 mg Intravenous Q4H PRN    furosemide (Lasix) 10 mg/mL injection 40 mg  40 mg Intravenous BID (Diuretic)    zolpidem (Ambien) tab 5 mg  5 mg Oral Nightly PRN    alum-mag hydroxide-simethicone (Maalox) 200-200-20 MG/5ML oral suspension 30 mL  30 mL Oral QID PRN    pantoprazole (Protonix) DR tab 40 mg  40 mg Oral QAM AC    guaiFENesin-codeine (Robitussin AC) 100-10 MG/5ML oral solution 5 mL  5 mL Oral Q4H PRN     Medications Prior to Admission   Medication Sig    apixaban 2.5 MG Oral Tab Take 1 tablet (2.5 mg total) by mouth 2 (two) times daily.    furosemide 20 MG Oral Tab Take 1 tablet (20 mg total) by mouth every other day. OR as directed by cardiology    pravastatin 40 MG Oral Tab Take 1 tablet (40 mg total) by mouth nightly.    Calcium Carb-Cholecalciferol 600-200 MG-UNIT Oral Tab Take 1 tablet by mouth daily.    TRAVATAN Z 0.004 % Ophthalmic Solution Place 1 drop into both eyes at bedtime.    MULTIVITAMIN TAB/CAP Take 1 tablet by mouth daily.    metoprolol succinate ER 50 MG Oral Tablet 24 Hr Take 2 tablets (100 mg total) by mouth daily. (Patient not taking: Reported on 1/26/2025)    aspirin 81 MG Oral Tab EC Take  1 tablet (81 mg total) by mouth daily. (Patient not taking: Reported on 1/26/2025)       Allergies  Allergies[1]    Review of Systems:   ROS  10 point review of systems completed and negative except as noted.    Physical Exam:   Patient Vitals for the past 24 hrs:   BP Temp Temp src Pulse Resp SpO2 Height Weight   01/27/25 1659 127/87 98 °F (36.7 °C) Oral 106 18 97 % -- --   01/27/25 1340 (!) 132/93 98 °F (36.7 °C) Oral 104 19 93 % -- --   01/27/25 1115 (!) 122/98 -- -- 106 -- -- -- --   01/27/25 1000 (!) 122/98 -- -- 100 -- -- -- --   01/27/25 0828 116/81 98 °F (36.7 °C) Oral 103 20 95 % -- --   01/27/25 0444 122/78 97.8 °F (36.6 °C) Oral 119 22 93 % -- --   01/26/25 2154 -- -- -- -- -- -- -- 76.4 kg   01/26/25 2145 -- -- -- (!) 122 -- -- -- --   01/26/25 2144 (!) 132/91 97.5 °F (36.4 °C) Oral 112 24 95 % -- --   01/26/25 2100 (!) 124/98 -- -- 110 26 93 % -- --   01/26/25 2045 (!) 131/96 -- -- 98 22 97 % -- --   01/26/25 2001 -- -- -- 87 24 91 % -- --   01/26/25 2000 118/84 -- -- (!) 127 24 94 % -- --   01/26/25 1915 143/85 -- -- 99 (!) 32 97 % -- --   01/26/25 1900 (!) 140/94 -- -- 103 (!) 28 97 % -- --   01/26/25 1845 118/78 -- -- 90 (!) 29 98 % -- --   01/26/25 1815 127/89 -- -- 103 17 98 % -- --   01/26/25 1740 108/84 -- -- 114 23 97 % 165.1 cm (5' 5\") 73.5 kg   01/26/25 1734 -- -- -- -- -- (!) 85 % -- --       Intake/Output:   Last 3 shifts:   Intake/Output                   01/25/25 0700 - 01/26/25 0659 (Not Admitted) 01/26/25 0700 - 01/27/25 0659 01/27/25 0700 - 01/28/25 0659       Intake    P.O.  --  480  240    P.O. -- 480 240    I.V.  --  20  10    I.V. -- 20 10    Total Intake -- 500 250       Output    Urine  --  300  200    Urine -- -- 200    Urine Occurrence -- -- 1 x    Output (mL) (External Urinary Catheter) -- 300 --    Stool  --  --  --    Stool Count Calculated for I/O -- 1 x 1 x    Total Output -- 300 200       Net I/O     -- 200 50          Vent Settings:    Hemodynamic parameters (last 24  hours):    Scheduled Meds:    ipratropium-albuterol  3 mL Nebulization q6h    piperacillin-tazobactam  3.375 g Intravenous Q8H    apixaban  2.5 mg Oral BID    aspirin  81 mg Oral Daily    metoprolol succinate ER  100 mg Oral Daily    atorvastatin  10 mg Oral Nightly    latanoprost  1 drop Both Eyes Nightly    furosemide  40 mg Intravenous BID (Diuretic)    pantoprazole  40 mg Oral QAM AC     Continuous Infusions:     Physical Exam:  General: Alert and oriented x 3. No apparent distress.   HEENT: Normocephalic, anicteric sclera, neck supple, no thyromegaly or adenopathy.  Neck: No JVD, carotids 2+, no bruits.  Cardiac: Irregularly irregular  Lungs: Diminished breath sound  Abdomen: Soft, non-tender. No organosplenomegally, mass or rebound, BS-present.  Extremities: Without clubbing or cyanosis. + edema.    Neurologic: Alert and oriented, normal affect. No focal defects  Skin: Warm and dry.     Results:   Laboratory Data:  Lab Results   Component Value Date    WBC 8.4 01/27/2025    HGB 11.5 (L) 01/27/2025    HCT 34.6 (L) 01/27/2025    .0 01/27/2025    CREATSERUM 1.41 (H) 01/27/2025    BUN 54 (H) 01/27/2025     01/27/2025    K 4.6 01/27/2025     01/27/2025    CO2 26.0 01/27/2025     (H) 01/27/2025    CA 10.2 01/27/2025    ALB 4.2 01/26/2025    ALKPHO 115 01/26/2025    TP 6.9 01/26/2025     (H) 01/26/2025     (H) 01/26/2025    PTT 24.4 01/18/2025    T4F 1.1 08/05/2024    TSH 4.417 01/18/2025    MG 2.3 01/27/2025    PHOS 3.2 01/21/2025    B12 562 08/05/2024         Recent Labs   Lab 01/21/25  0641 01/26/25  1807 01/27/25  0234   * 188* 127*   BUN 29* 51* 54*   CREATSERUM 0.88 1.49* 1.41*   CA 8.9 9.6 10.2    134* 136   K 3.9 5.0 4.6    101 101   CO2 27.0 22.0 26.0     Recent Labs   Lab 01/21/25  0641 01/26/25  1808 01/27/25  0234   RBC 3.84 3.62* 3.66*   HGB 12.0 11.6* 11.5*   HCT 36.4 33.6* 34.6*   MCV 94.8 92.8 94.5   MCH 31.3 32.0 31.4   MCHC 33.0 34.5 33.2    RDW 13.7 13.8 13.8   NEPRELIM 5.30 7.44 7.03   WBC 6.8 8.4 8.4   .0 304.0 280.0       No results for input(s): \"BNPML\" in the last 168 hours.    No results for input(s): \"TROP\", \"CK\" in the last 168 hours.    Imaging:  XR CHEST AP PORTABLE  (CPT=71045)    Result Date: 1/26/2025  CONCLUSION:   Patient rotation limits evaluation.  Patchy left upper lobe opacity which may be slightly increased which again may reflect a pneumonia.  Short-term follow-up chest radiographs advised.  Small suspected bilateral pleural effusions.    Dictated by (CST): Venu Tinoco MD on 1/26/2025 at 7:01 PM     Finalized by (CST): Venu Tinoco MD on 1/26/2025 at 7:03 PM           Thank you for allowing me to participate in the care of your patient.    Ray Daniel, DO  [facility]         [1]   Allergies  Allergen Reactions    Benadryl [Diphenhydramine] SWELLING    Ace Inhibitors     Amoxicillin NAUSEA ONLY

## 2025-01-27 NOTE — ED PROVIDER NOTES
Patient Seen in: [facility] Emergency Department      History     Chief Complaint   Patient presents with    Difficulty Breathing     Stated Complaint: wheezing, low spo2    Subjective:   HPI      89-year-old female with multiple medical problems recently discharged from the hospital on 1/21/2025 after admission for new onset A-fib with RVR and acute on chronic heart failure presents to the ER for shortness of breath.  Patient reports since being discharged from the hospital she was feeling better until 2 days ago, since then has had progressively worsening shortness of breath particularly with exertion.  She has been easily fatigued.  Denies fever, cough, chest pain or pressure.  He is taking Lasix every other day as prescribed.  Pulse ox 85% on arrival in triage, this improved with nasal cannula, patient reports improvement in shortness of breath with supplemental nasal cannula oxygen.    Objective:     Past Medical History:    Enlarged aorta (HCC)    Glaucoma    Other and unspecified hyperlipidemia    Right groin pain    Unspecified essential hypertension              Past Surgical History:   Procedure Laterality Date    Correct bunion,simple Right 1999    Bunionectomy    Electrocardiogram, complete  08/18/2010    Scanned to media tab     Knee surgery      right knee                Social History     Socioeconomic History    Marital status:    Tobacco Use    Smoking status: Never    Smokeless tobacco: Never   Substance and Sexual Activity    Alcohol use: Yes     Alcohol/week: 0.0 standard drinks of alcohol     Comment: wine once every 2 mos    Drug use: No   Other Topics Concern    Caffeine Concern No     Comment: decaf coffee daily    Exercise Yes     Comment: water aerobics 2-3x week   Social History Narrative    The patient uses the following assistive device(s):  rolling walker.      The patient does live in a home with stairs. 12 stairs     Social Drivers of Health     Financial Resource  Strain: Low Risk  (1/22/2025)    Financial Resource Strain     Difficulty of Paying Living Expenses: Not hard at all   Food Insecurity: No Food Insecurity (1/26/2025)    Food Insecurity     Food Insecurity: Never true   Transportation Needs: No Transportation Needs (1/26/2025)    Transportation Needs     Lack of Transportation: No   Housing Stability: Low Risk  (1/26/2025)    Housing Stability     Housing Instability: No                  Physical Exam     ED Triage Vitals   BP 01/26/25 1740 108/84   Pulse 01/26/25 1740 114   Resp 01/26/25 1740 23   Temp 01/26/25 2144 97.5 °F (36.4 °C)   Temp src 01/26/25 2144 Oral   SpO2 01/26/25 1734 (!) 85 %   O2 Device 01/26/25 1740 Nasal cannula       Current Vitals:   Vital Signs  BP: (!) 132/91  Pulse: (!) 122  Resp: 24  Temp: 97.5 °F (36.4 °C)  Temp src: Oral  MAP (mmHg): (!) 104    Oxygen Therapy  SpO2: 95 %  O2 Device: Nasal cannula  O2 Flow Rate (L/min): 3 L/min        Physical Exam  Vitals and nursing note reviewed.   Constitutional:       General: She is not in acute distress.     Appearance: She is well-developed.   HENT:      Head: Normocephalic and atraumatic.   Eyes:      Conjunctiva/sclera: Conjunctivae normal.   Cardiovascular:      Rate and Rhythm: Normal rate and regular rhythm.      Heart sounds: Normal heart sounds.   Pulmonary:      Effort: Pulmonary effort is normal. Tachypnea present. No respiratory distress.      Breath sounds: Normal breath sounds.   Abdominal:      General: Bowel sounds are normal. There is no distension.      Palpations: Abdomen is soft.      Tenderness: There is no abdominal tenderness. There is no guarding or rebound.   Musculoskeletal:         General: Normal range of motion.      Cervical back: Normal range of motion and neck supple.   Skin:     General: Skin is warm and dry.      Findings: No rash.   Neurological:      General: No focal deficit present.      Mental Status: She is alert and oriented to person, place, and time.              ED Course     Labs Reviewed   CBC WITH DIFFERENTIAL WITH PLATELET - Abnormal; Notable for the following components:       Result Value    RBC 3.62 (*)     HGB 11.6 (*)     HCT 33.6 (*)     RDW-SD 46.7 (*)     Lymphocyte Absolute 0.68 (*)     All other components within normal limits   COMP METABOLIC PANEL (14) - Abnormal; Notable for the following components:    Glucose 188 (*)     Sodium 134 (*)     BUN 51 (*)     Creatinine 1.49 (*)     BUN/CREA Ratio 34.2 (*)     Calculated Osmolality 297 (*)     eGFR-Cr 34 (*)      (*)      (*)     All other components within normal limits   PRO BETA NATRIURETIC PEPTIDE - Abnormal; Notable for the following components:    Pro-Beta Natriuretic Peptide 26,463 (*)     All other components within normal limits   ARTERIAL BLOOD GAS - Abnormal; Notable for the following components:    ABG PO2 72 (*)     All other components within normal limits   PROCALCITONIN - Abnormal; Notable for the following components:    Procalcitonin 0.10 (*)     All other components within normal limits   TROPONIN I HIGH SENSITIVITY - Normal   SARS-COV-2/FLU A AND B/RSV BY PCR ([facility]ERT) - Normal    Narrative:     This test is intended for the qualitative detection and differentiation of SARS-CoV-2, influenza A, influenza B, and respiratory syncytial virus (RSV) viral RNA in nasopharyngeal or nares swabs from individuals suspected of respiratory viral infection consistent with COVID-19 by their healthcare provider. Signs and symptoms of respiratory viral infection due to SARS-CoV-2, influenza, and RSV can be similar.    Test performed using the Xpert Xpress SARS-CoV-2/FLU/RSV (real time RT-PCR)  assay on the [employer] instrument, [place], [place] [address].   This test is being used under the Food and Drug Administration's Emergency Use Authorization.    The authorized Fact Sheet for Healthcare Providers for this assay is available upon request from the laboratory.   STREPTOCOCCUS  PNEUMONIAE AG, URINE   LEGIONELLA URINE AG SEROGRP 1   BASIC METABOLIC PANEL (8)   CBC WITH DIFFERENTIAL WITH PLATELET   RAINBOW DRAW LAVENDER   RAINBOW DRAW LIGHT GREEN   RAINBOW DRAW BLUE     EKG    Rate, intervals and axes as noted on EKG Report.  Rate: 104  Rhythm: Atrial Fibrillation  Reading: A-fib with left bundle branch block, noted to have left bundle branch block on the EKG from 1/18/2025                Imaging Results Available and Reviewed while in ED:   XR CHEST AP PORTABLE  (CPT=71045)   Final Result   PROCEDURE: XR CHEST AP PORTABLE  (CPT=71045)   TIME: 1840 hours.         COMPARISON: [facility], XR CHEST AP PORTABLE (CPT=71045),    1/21/2025, 9:29 AM.  [facility], XR CHEST AP PORTABLE    (CPT=71045), 1/18/2025, 10:19 AM.  [facility], XR RIBS    WITH CHEST (3 VIEWS), LEFT (CPT=71101),    9/16/2019, 1:51 PM.       INDICATIONS: Shortness of breath x1 day.       TECHNIQUE:   Single view.  Limited by rotation.       FINDINGS:    CARDIAC/MEDIAST: Heart and mediastinum not significantly changed given    differences in technique.  Large retrocardiac hiatal hernia.    LUNGS/PLEURA:  Patchy opacity in the left upper lobe may be slightly    increased.  Small suspected bilateral pleural effusions.  No pneumothorax.   OTHER:  Degenerative change osseous structures.                   =====   CONCLUSION:        Patient rotation limits evaluation.       Patchy left upper lobe opacity which may be slightly increased which again    may reflect a pneumonia.  Short-term follow-up chest radiographs advised.       Small suspected bilateral pleural effusions.               Dictated by (CST): Venu Tinoco MD on 1/26/2025 at 7:01 PM        Finalized by (CST): Venu Tinoco MD on 1/26/2025 at 7:03 PM                   Vitals:    01/26/25 2100 01/26/25 2144 01/26/25 2145 01/26/25 2154   BP: (!) 124/98 (!) 132/91     BP Location:  Right arm     Pulse: 110 112 (!) 122     Resp: 26 24     Temp:  97.5 °F (36.4 °C)     TempSrc:  Oral     SpO2: 93% 95%     Weight:    76.4 kg   Height:         *I personally reviewed and interpreted all ED vitals.         MDM          Admission disposition: 1/26/2025  7:35 PM           Medical Decision Making  Differential diagnosis includes but is not limited to heart failure, PE, viral syndrome, ACS, pleural effusion, pneumonia, anemia    Patient with increase in BNP compared to earlier this month, given a dose of Lasix.  Discussed with and admitted to hospitalist, discussed with YANY HOROWITZ, no further recommendations for the emergency department.    Problems Addressed:  Acute hypoxemic respiratory failure (HCC): acute illness or injury  Acute on chronic heart failure, unspecified heart failure type (HCC): chronic illness or injury with exacerbation, progression, or side effects of treatment    Amount and/or Complexity of Data Reviewed  Independent Historian:      Details: Daughter and granddaughter at bedside providing additional information  External Data Reviewed: labs.     Details: Slight increase in creatinine, mild hyponatremia, increase in AST and ALT, otherwise CBC and CMP stable compared to 1/21/2025  Labs: ordered.  Radiology: ordered and independent interpretation performed.     Details: Chest x-ray image reviewed, no obvious pneumothorax, other incidental findings deferred to radiology  ECG/medicine tests: ordered and independent interpretation performed. Decision-making details documented in ED Course.  Discussion of management or test interpretation with external provider(s): Discussed with cardiology and hospitalist        Disposition and Plan     Clinical Impression:  1. Acute hypoxemic respiratory failure (HCC)    2. Acute on chronic heart failure, unspecified heart failure type (HCC)         Disposition:  Admit  1/26/2025  7:35 pm    Follow-up:  No follow-up provider specified.  We recommend that you schedule follow up care with a  primary care provider within the next three months to obtain basic health screening including reassessment of your blood pressure.      Medications Prescribed:  Current Discharge Medication List              Supplementary Documentation:         Hospital Problems       Present on Admission  Date Reviewed: 8/18/2023            ICD-10-CM Noted POA    * (Principal) Acute hypoxemic respiratory failure (HCC) J96.01 1/18/2025 Unknown    Acute on chronic heart failure, unspecified heart failure type (HCC) I50.9 1/26/2025 Unknown

## 2025-01-28 LAB
ALBUMIN SERPL-MCNC: 4 G/DL (ref 3.2–4.8)
ALBUMIN/GLOB SERPL: 1.4 {RATIO} (ref 1–2)
ALP LIVER SERPL-CCNC: 123 U/L
ALT SERPL-CCNC: 126 U/L
ANION GAP SERPL CALC-SCNC: 14 MMOL/L (ref 0–18)
AST SERPL-CCNC: 80 U/L (ref ?–34)
BASOPHILS # BLD AUTO: 0.01 X10(3) UL (ref 0–0.2)
BASOPHILS NFR BLD AUTO: 0.1 %
BILIRUB SERPL-MCNC: 0.5 MG/DL (ref 0.2–1.1)
BUN BLD-MCNC: 61 MG/DL (ref 9–23)
BUN/CREAT SERPL: 33.7 (ref 10–20)
CALCIUM BLD-MCNC: 9.1 MG/DL (ref 8.7–10.4)
CHLORIDE SERPL-SCNC: 98 MMOL/L (ref 98–112)
CO2 SERPL-SCNC: 26 MMOL/L (ref 21–32)
CREAT BLD-MCNC: 1.81 MG/DL
DEPRECATED RDW RBC AUTO: 46.6 FL (ref 35.1–46.3)
EGFRCR SERPLBLD CKD-EPI 2021: 27 ML/MIN/1.73M2 (ref 60–?)
EOSINOPHIL # BLD AUTO: 0.01 X10(3) UL (ref 0–0.7)
EOSINOPHIL NFR BLD AUTO: 0.1 %
ERYTHROCYTE [DISTWIDTH] IN BLOOD BY AUTOMATED COUNT: 13.9 % (ref 11–15)
GLOBULIN PLAS-MCNC: 2.9 G/DL (ref 2–3.5)
GLUCOSE BLD-MCNC: 164 MG/DL (ref 70–99)
HCT VFR BLD AUTO: 35 %
HGB BLD-MCNC: 11.8 G/DL
IMM GRANULOCYTES # BLD AUTO: 0.05 X10(3) UL (ref 0–1)
IMM GRANULOCYTES NFR BLD: 0.5 %
LYMPHOCYTES # BLD AUTO: 0.77 X10(3) UL (ref 1–4)
LYMPHOCYTES NFR BLD AUTO: 7.3 %
MCH RBC QN AUTO: 31.6 PG (ref 26–34)
MCHC RBC AUTO-ENTMCNC: 33.7 G/DL (ref 31–37)
MCV RBC AUTO: 93.6 FL
MONOCYTES # BLD AUTO: 0.25 X10(3) UL (ref 0.1–1)
MONOCYTES NFR BLD AUTO: 2.4 %
NEUTROPHILS # BLD AUTO: 9.39 X10 (3) UL (ref 1.5–7.7)
NEUTROPHILS # BLD AUTO: 9.39 X10(3) UL (ref 1.5–7.7)
NEUTROPHILS NFR BLD AUTO: 89.6 %
OSMOLALITY SERPL CALC.SUM OF ELEC: 307 MOSM/KG (ref 275–295)
PLATELET # BLD AUTO: 322 10(3)UL (ref 150–450)
POTASSIUM SERPL-SCNC: 4 MMOL/L (ref 3.5–5.1)
PROT SERPL-MCNC: 6.9 G/DL (ref 5.7–8.2)
Q-T INTERVAL: 354 MS
Q-T INTERVAL: 378 MS
QRS DURATION: 158 MS
QRS DURATION: 166 MS
QTC CALCULATION (BEZET): 465 MS
QTC CALCULATION (BEZET): 492 MS
R AXIS: -25 DEGREES
R AXIS: -34 DEGREES
RBC # BLD AUTO: 3.74 X10(6)UL
SODIUM SERPL-SCNC: 138 MMOL/L (ref 136–145)
T AXIS: 132 DEGREES
T AXIS: 138 DEGREES
VENTRICULAR RATE: 102 BPM
VENTRICULAR RATE: 104 BPM
WBC # BLD AUTO: 10.5 X10(3) UL (ref 4–11)

## 2025-01-28 PROCEDURE — 99233 SBSQ HOSP IP/OBS HIGH 50: CPT | Performed by: INTERNAL MEDICINE

## 2025-01-28 NOTE — PROGRESS NOTES
[facility]  part of [facility]     Hospitalist Progress Note     Shyla Wheeler Patient Status:  Inpatient    1935 MRN [id number]   Location [facility] 3W/SW Attending Nisreen Aguirre MD   Hosp Day # 2 PCP Ray Goldman MD     Subjective:     Patient reclining in a chair, in good spirits but drowsy.   She denied any active complaints at the time of interview.   Family present at the bedside. All questions and concerns addressed.      Objective:    Review of Systems:   ROS completed; pertinent positive and negatives stated in subjective.      Vital signs:  Temp:  [97.3 °F (36.3 °C)-98.1 °F (36.7 °C)] 98.1 °F (36.7 °C)  Pulse:  [] 113  Resp:  [18-19] 18  BP: (103-132)/(74-93) 103/74  SpO2:  [93 %-98 %] 93 %      Physical Exam:    Gen: NAD AO x3  Chest: good air entry CTABL  CVS: normal s1 and s2 RR  Abd: NABS soft NT ND  Neuro: CN 2-12 grossly intact  Ext: no edema in bilateral LE      Diagnostic Data:    Labs:  Recent Labs   Lab 25  1808 25  0234 25  0825   WBC 8.4 8.4 10.5   HGB 11.6* 11.5* 11.8*   MCV 92.8 94.5 93.6   .0 280.0 322.0       Recent Labs   Lab 25  1807 25  0234 25  0825   * 127* 164*   BUN 51* 54* 61*   CREATSERUM 1.49* 1.41* 1.81*   CA 9.6 10.2 9.1   ALB 4.2  --  4.0   * 136 138   K 5.0 4.6 4.0    101 98   CO2 22.0 26.0 26.0   ALKPHO 115  --  123   *  --  80*   *  --  126*   BILT 0.5  --  0.5   TP 6.9  --  6.9       Estimated Creatinine Clearance: 19.3 mL/min (A) (based on SCr of 1.81 mg/dL (H)).    No results for input(s): \"PTP\", \"INR\" in the last 168 hours.           Imaging: Imaging data reviewed in Epic.    Medications:    ipratropium-albuterol  3 mL Nebulization q6h    piperacillin-tazobactam  3.375 g Intravenous Q8H    apixaban  2.5 mg Oral BID    aspirin  81 mg Oral Daily    metoprolol succinate ER  100 mg Oral Daily    atorvastatin  10 mg Oral Nightly    latanoprost  1 drop Both  Eyes Nightly    furosemide  40 mg Intravenous BID (Diuretic)    pantoprazole  40 mg Oral QAM AC       Assessment & Plan:     Acute hypoxic respiratory failure 2/2 combined systolic and diastolic HF  ?PNA  Imaging reviewed  Strep pneumo and legionella Ag negative  Resp panel negative  BNP elevated  Trop and EKG reviewed  Started on Lasix 40 mg IV BID  Continue IV zosyn   Duonebs q6hrs  Started on solu-medrol on admission, will discontinue considering CHF exacerbation  Cardiology on consult  EF 30-35%, decreased from prior  Continue GDMT  Strict Is and Os, daily weights  Persistent afib with RVR  Continue home meds  Cardiology on consult  Resume metoprolol 100 mg daily  Start Rivaroxaban 15 mg daily  Tele monitoring      Plan of care discussed with patient or family at bedside.      Supplementary Documentation:     Quality:  DVT Prophylaxis: SCDs, Rivaroxaban  CODE status:       Estimated date of discharge: TBD  Discharge is dependent on: clinical stability  At this point Ms. Wheeler is expected to be discharge to: home             **Certification      PHYSICIAN Certification of Need for Inpatient Hospitalization - Initial Certification    Patient will require inpatient services that will reasonably be expected to span two midnight's based on the clinical documentation in H+P.   Based on patients current state of illness, I anticipate that, after discharge, patient will require TBD.      Nisreen Aguirre MD  Hospitalist    MDM: High, I personally reviewed the available laboratories, imaging including XR. I discussed the case with RN. I ordered laboratories, studies including AM labs.  Medical decision making high, risk is high.       The 21st Century Cures Act makes medical notes like these available to patients in the interest of transparency. Please be advised this is a medical document. Medical documents are intended to carry relevant information, facts as evident, and the clinical opinion of the practitioner. The  medical note is intended as peer to peer communication and may appear blunt or direct. It is written in medical language and may contain abbreviations or verbiage that are unfamiliar.

## 2025-01-28 NOTE — PLAN OF CARE
Patient alert and oriented x 4 on 1.5 LNC, up x1 assist with the walker. Patient denies pain. IV antibiotics continued. Call light within reach and safety precautions in place.     Problem: Patient Centered Care  Goal: Patient preferences are identified and integrated in the patient's plan of care  Description: Interventions:  - What would you like us to know as we care for you? From home alone  - Provide timely, complete, and accurate information to patient/family  - Incorporate patient and family knowledge, values, beliefs, and cultural backgrounds into the planning and delivery of care  - Encourage patient/family to participate in care and decision-making at the level they choose  - Honor patient and family perspectives and choices  Outcome: Progressing     Problem: Patient/Family Goals  Goal: Patient/Family Long Term Goal  Description: Patient's Long Term Goal: To go home    Interventions:  - IV lasix, Iv solumedrol, nebs, labs and tests per MDs  - See additional Care Plan goals for specific interventions  Outcome: Progressing  Goal: Patient/Family Short Term Goal  Description: Patient's Short Term Goal: To improve breathing.    Interventions:   - O2 as needed, nebs, IV solumedrol, walking with assistance as tolerated.  - See additional Care Plan goals for specific interventions  Outcome: Progressing     Problem: RESPIRATORY - ADULT  Goal: Achieves optimal ventilation and oxygenation  Description: INTERVENTIONS:  - Assess for changes in respiratory status  - Assess for changes in mentation and behavior  - Position to facilitate oxygenation and minimize respiratory effort  - Oxygen supplementation based on oxygen saturation or ABGs  - Provide Smoking Cessation handout, if applicable  - Encourage broncho-pulmonary hygiene including cough, deep breathe, Incentive Spirometry  - Assess the need for suctioning and perform as needed  - Assess and instruct to report SOB or any respiratory difficulty  - Respiratory  Therapy support as indicated  - Manage/alleviate anxiety  - Monitor for signs/symptoms of CO2 retention  Outcome: Progressing     Problem: CARDIOVASCULAR - ADULT  Goal: Maintains optimal cardiac output and hemodynamic stability  Description: INTERVENTIONS:  - Monitor vital signs, rhythm, and trends  - Monitor for bleeding, hypotension and signs of decreased cardiac output  - Evaluate effectiveness of vasoactive medications to optimize hemodynamic stability  - Monitor arterial and/or venous puncture sites for bleeding and/or hematoma  - Assess quality of pulses, skin color and temperature  - Assess for signs of decreased coronary artery perfusion - ex. Angina  - Evaluate fluid balance, assess for edema, trend weights  Outcome: Progressing  Goal: Absence of cardiac arrhythmias or at baseline  Description: INTERVENTIONS:  - Continuous cardiac monitoring, monitor vital signs, obtain 12 lead EKG if indicated  - Evaluate effectiveness of antiarrhythmic and heart rate control medications as ordered  - Initiate emergency measures for life threatening arrhythmias  - Monitor electrolytes and administer replacement therapy as ordered  Outcome: Progressing     Problem: PAIN - ADULT  Goal: Verbalizes/displays adequate comfort level or patient's stated pain goal  Description: INTERVENTIONS:  - Encourage pt to monitor pain and request assistance  - Assess pain using appropriate pain scale  - Administer analgesics based on type and severity of pain and evaluate response  - Implement non-pharmacological measures as appropriate and evaluate response  - Consider cultural and social influences on pain and pain management  - Manage/alleviate anxiety  - Utilize distraction and/or relaxation techniques  - Monitor for opioid side effects  - Notify MD/LIP if interventions unsuccessful or patient reports new pain  - Anticipate increased pain with activity and pre-medicate as appropriate  Outcome: Progressing     Problem: SAFETY ADULT -  FALL  Goal: Free from fall injury  Description: INTERVENTIONS:  - Assess pt frequently for physical needs  - Identify cognitive and physical deficits and behaviors that affect risk of falls.  - [place] fall precautions as indicated by assessment.  - Educate pt/family on patient safety including physical limitations  - Instruct pt to call for assistance with activity based on assessment  - Modify environment to reduce risk of injury  - Provide assistive devices as appropriate  - Consider OT/PT consult to assist with strengthening/mobility  - Encourage toileting schedule  Outcome: Progressing     Problem: DISCHARGE PLANNING  Goal: Discharge to home or other facility with appropriate resources  Description: INTERVENTIONS:  - Identify barriers to discharge w/pt and caregiver  - Include patient/family/discharge partner in discharge planning  - Arrange for needed discharge resources and transportation as appropriate  - Identify discharge learning needs (meds, wound care, etc)  - Arrange for interpreters to assist at discharge as needed  - Consider post-discharge preferences of patient/family/discharge partner  - Complete POLST form as appropriate  - Assess patient's ability to be responsible for managing their own health  - Refer to Case Management Department for coordinating discharge planning if the patient needs post-hospital services based on physician/LIP order or complex needs related to functional status, cognitive ability or social support system  Outcome: Progressing

## 2025-01-28 NOTE — PLAN OF CARE
Pt alert and oriented x4 on O2 NC - attempting to wean off O2. No home O2. Voiding via purewick. PRN tylenol given as requested. IV abx given. Call light within reach.    Problem: Patient Centered Care  Goal: Patient preferences are identified and integrated in the patient's plan of care  Description: Interventions:  - What would you like us to know as we care for you? From home alone  - Provide timely, complete, and accurate information to patient/family  - Incorporate patient and family knowledge, values, beliefs, and cultural backgrounds into the planning and delivery of care  - Encourage patient/family to participate in care and decision-making at the level they choose  - Honor patient and family perspectives and choices  Outcome: Progressing     Problem: Patient/Family Goals  Goal: Patient/Family Long Term Goal  Description: Patient's Long Term Goal: To go home    Interventions:  - IV lasix, Iv solumedrol, nebs, labs and tests per MDs  - See additional Care Plan goals for specific interventions  Outcome: Progressing  Goal: Patient/Family Short Term Goal  Description: Patient's Short Term Goal: To improve breathing.    Interventions:   - O2 as needed, nebs, IV solumedrol, walking with assistance as tolerated.  - See additional Care Plan goals for specific interventions  Outcome: Progressing     Problem: RESPIRATORY - ADULT  Goal: Achieves optimal ventilation and oxygenation  Description: INTERVENTIONS:  - Assess for changes in respiratory status  - Assess for changes in mentation and behavior  - Position to facilitate oxygenation and minimize respiratory effort  - Oxygen supplementation based on oxygen saturation or ABGs  - Provide Smoking Cessation handout, if applicable  - Encourage broncho-pulmonary hygiene including cough, deep breathe, Incentive Spirometry  - Assess the need for suctioning and perform as needed  - Assess and instruct to report SOB or any respiratory difficulty  - Respiratory Therapy support  as indicated  - Manage/alleviate anxiety  - Monitor for signs/symptoms of CO2 retention  Outcome: Progressing     Problem: CARDIOVASCULAR - ADULT  Goal: Maintains optimal cardiac output and hemodynamic stability  Description: INTERVENTIONS:  - Monitor vital signs, rhythm, and trends  - Monitor for bleeding, hypotension and signs of decreased cardiac output  - Evaluate effectiveness of vasoactive medications to optimize hemodynamic stability  - Monitor arterial and/or venous puncture sites for bleeding and/or hematoma  - Assess quality of pulses, skin color and temperature  - Assess for signs of decreased coronary artery perfusion - ex. Angina  - Evaluate fluid balance, assess for edema, trend weights  Outcome: Progressing  Goal: Absence of cardiac arrhythmias or at baseline  Description: INTERVENTIONS:  - Continuous cardiac monitoring, monitor vital signs, obtain 12 lead EKG if indicated  - Evaluate effectiveness of antiarrhythmic and heart rate control medications as ordered  - Initiate emergency measures for life threatening arrhythmias  - Monitor electrolytes and administer replacement therapy as ordered  Outcome: Progressing     Problem: PAIN - ADULT  Goal: Verbalizes/displays adequate comfort level or patient's stated pain goal  Description: INTERVENTIONS:  - Encourage pt to monitor pain and request assistance  - Assess pain using appropriate pain scale  - Administer analgesics based on type and severity of pain and evaluate response  - Implement non-pharmacological measures as appropriate and evaluate response  - Consider cultural and social influences on pain and pain management  - Manage/alleviate anxiety  - Utilize distraction and/or relaxation techniques  - Monitor for opioid side effects  - Notify MD/LIP if interventions unsuccessful or patient reports new pain  - Anticipate increased pain with activity and pre-medicate as appropriate  Outcome: Progressing     Problem: SAFETY ADULT - FALL  Goal: Free from  fall injury  Description: INTERVENTIONS:  - Assess pt frequently for physical needs  - Identify cognitive and physical deficits and behaviors that affect risk of falls.  - [place] fall precautions as indicated by assessment.  - Educate pt/family on patient safety including physical limitations  - Instruct pt to call for assistance with activity based on assessment  - Modify environment to reduce risk of injury  - Provide assistive devices as appropriate  - Consider OT/PT consult to assist with strengthening/mobility  - Encourage toileting schedule  Outcome: Progressing     Problem: DISCHARGE PLANNING  Goal: Discharge to home or other facility with appropriate resources  Description: INTERVENTIONS:  - Identify barriers to discharge w/pt and caregiver  - Include patient/family/discharge partner in discharge planning  - Arrange for needed discharge resources and transportation as appropriate  - Identify discharge learning needs (meds, wound care, etc)  - Arrange for interpreters to assist at discharge as needed  - Consider post-discharge preferences of patient/family/discharge partner  - Complete POLST form as appropriate  - Assess patient's ability to be responsible for managing their own health  - Refer to Case Management Department for coordinating discharge planning if the patient needs post-hospital services based on physician/LIP order or complex needs related to functional status, cognitive ability or social support system  Outcome: Progressing

## 2025-01-28 NOTE — PROGRESS NOTES
Progress Note  Shyla Wheeler Patient Status:  Inpatient    1935 MRN [id number]   Location [facility] 3W/SW Attending Nisreen Aguirre MD   Hosp Day # 2 PCP Ray Goldman MD     SUBJECTIVE:    Denies chest pain. No dyspnea. No palpitations.     VITALS:  /74 (BP Location: Right arm)   Pulse 113   Temp 98.1 °F (36.7 °C) (Oral)   Resp 18   Ht 5' 5\" (1.651 m)   Wt 171 lb 9.6 oz (77.8 kg)   SpO2 93%   BMI 28.56 kg/m²   INTAKE/OUTPUT:    Intake/Output Summary (Last 24 hours) at 2025 1328  Last data filed at 2025 1050  Gross per 24 hour   Intake 590 ml   Output 1250 ml   Net -660 ml     Last 3 Weights   25 0400 171 lb 9.6 oz (77.8 kg)   25 2154 168 lb 6.4 oz (76.4 kg)   25 1740 162 lb (73.5 kg)   25 0313 167 lb 8 oz (76 kg)   25 0501 163 lb 6.4 oz (74.1 kg)   25 1457 156 lb 6.4 oz (70.9 kg)   25 1451 156 lb 6.4 oz (70.9 kg)   25 0956 161 lb 9.6 oz (73.3 kg)   25 0929 162 lb (73.5 kg)   23 0936 171 lb (77.6 kg)     LABS:  Recent Labs   Lab 25  18025  0234 25  0825   * 127* 164*   BUN 51* 54* 61*   CREATSERUM 1.49* 1.41* 1.81*   EGFRCR 34* 36* 27*   CA 9.6 10.2 9.1   * 136 138   K 5.0 4.6 4.0    101 98   CO2 22.0 26.0 26.0     Recent Labs   Lab 25  1808 25  0234 25  0825   RBC 3.62* 3.66* 3.74*   HGB 11.6* 11.5* 11.8*   HCT 33.6* 34.6* 35.0   MCV 92.8 94.5 93.6   MCH 32.0 31.4 31.6   MCHC 34.5 33.2 33.7   RDW 13.8 13.8 13.9   NEPRELIM 7.44 7.03 9.39*   WBC 8.4 8.4 10.5   .0 280.0 322.0     No results for input(s): \"TROP\", \"CK\" in the last 168 hours.  DIAGNOSTICS:  TELEMETRY: Afib CVR    ECHO 2025:  Conclusions:   1. Left ventricle: The cavity size was normal. Wall thickness was mildly      increased. Systolic function was moderately to markedly reduced. The      estimated ejection fraction was 30-35%, by biplane method of disks.      Akinesis of the anteroseptal  and inferoseptal walls. Severe hypokinesis      of the entireinferior wall. Doppler parameters are consistent with      restrictive physiology, indicative of decreased left ventricular      diastolic compliance and/or increased left atrial pressure.   2. Right ventricle: The cavity size was normal. Systolic function was      normal.   Impressions:  Left ventricle dysfunction is new compared to echocardiogram   from 2016.     ROS: Negative unless noted above   PHYSICAL EXAM:  General: Alert and oriented x 3. No apparent distress.  HEENT: Normocephalic, sclera are nonicteric. Hearing appropriate bilaterally.  Neck: No JVD or Carotid bruits. Trachea midline.   Cardiac: IRRegular rate and rhythm. S1, S2 auscultated. No murmurs, rubs, or gallops appreciated.   Lungs: Clear anteriorly. Faint posterior inspiratory wheezes. Chest expansion symmetrical. Regular effort. 1.5L NC  Abdomen: Soft, non-tender, +BS. No hepatosplenomegaly or appreciable masses.   Extremities: Without clubbing, cyanosis. Peripheral pulses are 2+. Edema   Neurologic: Motor and sensory nerves grossly intact.   Psych: Appropriate affect   Skin: Warm and dry. No obvious lesions, wounds, or ulcerations.     MEDICATIONS:   ipratropium-albuterol  3 mL Nebulization q6h    piperacillin-tazobactam  3.375 g Intravenous Q8H    apixaban  2.5 mg Oral BID    aspirin  81 mg Oral Daily    metoprolol succinate ER  100 mg Oral Daily    atorvastatin  10 mg Oral Nightly    latanoprost  1 drop Both Eyes Nightly    furosemide  40 mg Intravenous BID (Diuretic)    pantoprazole  40 mg Oral QAM AC     ASSESSMENT:    Presented with Afib RVR, was recently discharged on beta-blocker but patient did not start it on discharge.     PNA  - Per primary     PAF/ Chronic LBBB  - Presented with RVR, BB resumed, rates improved   - TSH unremarkable   - Reports fatigue from Eliquis therefore it was switched to renal dosed Xarelto this admission     Acute on Chronic HFrEF, LVEF 30-35%  -  Offered [facility] but patient declined. Chest CT 1/18 with coronary calcifications   - proBNP 26,463  - CXR without edema, Dry wt 167 lb, unclear today's wt is accurate as it is bed wt   - Appears dry on exam   - On BB. Not started on ARNI/ACE/ARB/MRNA/ SGLT2 with DEJAH     DEJAH  - Worsening since admission, historically normal renal function     HTN- Controlled   Elevated Liver Enzymes- Improving, US Liver last admission stable   Ascending Aortic Aneurysm- 5.7 cm     PLAN:  - Appears dry on exam, renal function worsening, check bladder scan. Give diuretic holding  - Hold Statin until LFTs normalize   - Discussed with patient, her largest meal of day is lunch, ok to take Xarelto at that time instead of dinner     Plan of care discussed with patient and RN.     Do Guevara, MSN, FNP-BC, CCK  01/28/25   1:28 PM  [phone] [place]  [phone] Kris

## 2025-01-28 NOTE — CM/SW NOTE
01/28/25 1100   Choice of Post-Acute Provider   Informed patient of right to choose their preferred provider Yes   List of appropriate post-acute services provided to patient/family with quality data Yes   Information given to Patient;Daughter     Social work was able to meet with the patient and her daughter at bedside to provide the HH list.    The HH list was provided at bedside and social work will follow up for choice.    SW/CM to remain available for support and/or discharge planning.     Azeb Galaviz MSW, LSW  Discharge Planner [id number]

## 2025-01-29 ENCOUNTER — APPOINTMENT (OUTPATIENT)
Dept: ULTRASOUND IMAGING | Facility: HOSPITAL | Age: 89
End: 2025-01-29
Attending: INTERNAL MEDICINE
Payer: MEDICARE

## 2025-01-29 LAB
ALBUMIN SERPL-MCNC: 3.9 G/DL (ref 3.2–4.8)
ALBUMIN/GLOB SERPL: 1.4 {RATIO} (ref 1–2)
ALP LIVER SERPL-CCNC: 113 U/L
ALT SERPL-CCNC: 106 U/L
ANION GAP SERPL CALC-SCNC: 10 MMOL/L (ref 0–18)
AST SERPL-CCNC: 59 U/L (ref ?–34)
BASOPHILS # BLD AUTO: 0.02 X10(3) UL (ref 0–0.2)
BASOPHILS NFR BLD AUTO: 0.2 %
BILIRUB SERPL-MCNC: 0.4 MG/DL (ref 0.2–1.1)
BUN BLD-MCNC: 60 MG/DL (ref 9–23)
BUN/CREAT SERPL: 34.7 (ref 10–20)
CALCIUM BLD-MCNC: 8.8 MG/DL (ref 8.7–10.4)
CHLORIDE SERPL-SCNC: 98 MMOL/L (ref 98–112)
CO2 SERPL-SCNC: 28 MMOL/L (ref 21–32)
CREAT BLD-MCNC: 1.73 MG/DL
DEPRECATED RDW RBC AUTO: 47.8 FL (ref 35.1–46.3)
EGFRCR SERPLBLD CKD-EPI 2021: 28 ML/MIN/1.73M2 (ref 60–?)
EOSINOPHIL # BLD AUTO: 0.29 X10(3) UL (ref 0–0.7)
EOSINOPHIL NFR BLD AUTO: 3.3 %
ERYTHROCYTE [DISTWIDTH] IN BLOOD BY AUTOMATED COUNT: 14 % (ref 11–15)
GLOBULIN PLAS-MCNC: 2.7 G/DL (ref 2–3.5)
GLUCOSE BLD-MCNC: 114 MG/DL (ref 70–99)
HCT VFR BLD AUTO: 33.6 %
HGB BLD-MCNC: 11.5 G/DL
IMM GRANULOCYTES # BLD AUTO: 0.04 X10(3) UL (ref 0–1)
IMM GRANULOCYTES NFR BLD: 0.5 %
LYMPHOCYTES # BLD AUTO: 1.02 X10(3) UL (ref 1–4)
LYMPHOCYTES NFR BLD AUTO: 11.7 %
MCH RBC QN AUTO: 32.1 PG (ref 26–34)
MCHC RBC AUTO-ENTMCNC: 34.2 G/DL (ref 31–37)
MCV RBC AUTO: 93.9 FL
MONOCYTES # BLD AUTO: 0.48 X10(3) UL (ref 0.1–1)
MONOCYTES NFR BLD AUTO: 5.5 %
NEUTROPHILS # BLD AUTO: 6.86 X10 (3) UL (ref 1.5–7.7)
NEUTROPHILS # BLD AUTO: 6.86 X10(3) UL (ref 1.5–7.7)
NEUTROPHILS NFR BLD AUTO: 78.8 %
OSMOLALITY SERPL CALC.SUM OF ELEC: 300 MOSM/KG (ref 275–295)
PLATELET # BLD AUTO: 331 10(3)UL (ref 150–450)
POTASSIUM SERPL-SCNC: 3.7 MMOL/L (ref 3.5–5.1)
PROT SERPL-MCNC: 6.6 G/DL (ref 5.7–8.2)
RBC # BLD AUTO: 3.58 X10(6)UL
SODIUM SERPL-SCNC: 136 MMOL/L (ref 136–145)
WBC # BLD AUTO: 8.7 X10(3) UL (ref 4–11)

## 2025-01-29 PROCEDURE — 76770 US EXAM ABDO BACK WALL COMP: CPT | Performed by: INTERNAL MEDICINE

## 2025-01-29 PROCEDURE — 99233 SBSQ HOSP IP/OBS HIGH 50: CPT | Performed by: INTERNAL MEDICINE

## 2025-01-29 RX ORDER — POTASSIUM CHLORIDE 1500 MG/1
40 TABLET, EXTENDED RELEASE ORAL ONCE
Status: COMPLETED | OUTPATIENT
Start: 2025-01-29 | End: 2025-01-29

## 2025-01-29 RX ORDER — METOPROLOL SUCCINATE 50 MG/1
100 TABLET, EXTENDED RELEASE ORAL DAILY
Qty: 60 TABLET | Refills: 1 | Status: SHIPPED | OUTPATIENT
Start: 2025-01-29 | End: 2025-02-06

## 2025-01-29 NOTE — PLAN OF CARE
Problem: Patient Centered Care  Goal: Patient preferences are identified and integrated in the patient's plan of care  Description: Interventions:  - What would you like us to know as we care for you? From home alone  - Provide timely, complete, and accurate information to patient/family  - Incorporate patient and family knowledge, values, beliefs, and cultural backgrounds into the planning and delivery of care  - Encourage patient/family to participate in care and decision-making at the level they choose  - Honor patient and family perspectives and choices  Outcome: Progressing     Problem: Patient/Family Goals  Goal: Patient/Family Long Term Goal  Description: Patient's Long Term Goal: To go home    Interventions:  - IV lasix, Iv solumedrol, nebs, labs and tests per MDs  - See additional Care Plan goals for specific interventions  Outcome: Progressing  Goal: Patient/Family Short Term Goal  Description: Patient's Short Term Goal: To improve breathing.    Interventions:   - O2 as needed, nebs, IV solumedrol, walking with assistance as tolerated.  - See additional Care Plan goals for specific interventions  Outcome: Progressing     Problem: RESPIRATORY - ADULT  Goal: Achieves optimal ventilation and oxygenation  Description: INTERVENTIONS:  - Assess for changes in respiratory status  - Assess for changes in mentation and behavior  - Position to facilitate oxygenation and minimize respiratory effort  - Oxygen supplementation based on oxygen saturation or ABGs  - Provide Smoking Cessation handout, if applicable  - Encourage broncho-pulmonary hygiene including cough, deep breathe, Incentive Spirometry  - Assess the need for suctioning and perform as needed  - Assess and instruct to report SOB or any respiratory difficulty  - Respiratory Therapy support as indicated  - Manage/alleviate anxiety  - Monitor for signs/symptoms of CO2 retention  Outcome: Progressing     Problem: CARDIOVASCULAR - ADULT  Goal: Maintains  optimal cardiac output and hemodynamic stability  Description: INTERVENTIONS:  - Monitor vital signs, rhythm, and trends  - Monitor for bleeding, hypotension and signs of decreased cardiac output  - Evaluate effectiveness of vasoactive medications to optimize hemodynamic stability  - Monitor arterial and/or venous puncture sites for bleeding and/or hematoma  - Assess quality of pulses, skin color and temperature  - Assess for signs of decreased coronary artery perfusion - ex. Angina  - Evaluate fluid balance, assess for edema, trend weights  Outcome: Progressing  Goal: Absence of cardiac arrhythmias or at baseline  Description: INTERVENTIONS:  - Continuous cardiac monitoring, monitor vital signs, obtain 12 lead EKG if indicated  - Evaluate effectiveness of antiarrhythmic and heart rate control medications as ordered  - Initiate emergency measures for life threatening arrhythmias  - Monitor electrolytes and administer replacement therapy as ordered  Outcome: Progressing     Problem: PAIN - ADULT  Goal: Verbalizes/displays adequate comfort level or patient's stated pain goal  Description: INTERVENTIONS:  - Encourage pt to monitor pain and request assistance  - Assess pain using appropriate pain scale  - Administer analgesics based on type and severity of pain and evaluate response  - Implement non-pharmacological measures as appropriate and evaluate response  - Consider cultural and social influences on pain and pain management  - Manage/alleviate anxiety  - Utilize distraction and/or relaxation techniques  - Monitor for opioid side effects  - Notify MD/LIP if interventions unsuccessful or patient reports new pain  - Anticipate increased pain with activity and pre-medicate as appropriate  Outcome: Progressing     Problem: SAFETY ADULT - FALL  Goal: Free from fall injury  Description: INTERVENTIONS:  - Assess pt frequently for physical needs  - Identify cognitive and physical deficits and behaviors that affect risk of  falls.  - [place] fall precautions as indicated by assessment.  - Educate pt/family on patient safety including physical limitations  - Instruct pt to call for assistance with activity based on assessment  - Modify environment to reduce risk of injury  - Provide assistive devices as appropriate  - Consider OT/PT consult to assist with strengthening/mobility  - Encourage toileting schedule  Outcome: Progressing     Problem: DISCHARGE PLANNING  Goal: Discharge to home or other facility with appropriate resources  Description: INTERVENTIONS:  - Identify barriers to discharge w/pt and caregiver  - Include patient/family/discharge partner in discharge planning  - Arrange for needed discharge resources and transportation as appropriate  - Identify discharge learning needs (meds, wound care, etc)  - Arrange for interpreters to assist at discharge as needed  - Consider post-discharge preferences of patient/family/discharge partner  - Complete POLST form as appropriate  - Assess patient's ability to be responsible for managing their own health  - Refer to Case Management Department for coordinating discharge planning if the patient needs post-hospital services based on physician/LIP order or complex needs related to functional status, cognitive ability or social support system  Outcome: Progressing   Patient a&ox4, wean O2 as tolerated. IV abx, IV changed overnight. Up to chair this am, all needs met at this time.

## 2025-01-29 NOTE — PROGRESS NOTES
Heart Failure Nurse  Progress Note    Patient was evaluated by the Heart Failure Nurse  for understanding, verbalization, demonstration, and recall of education related to heart failure, overall adherence to the behaviors necessary to maintain a compensated status, and risk for readmission.     Patient assessment:  Patient is aware of her diagnosis and reports she weighs herself daily and follows a low sodium diet. Discussed the salty 6 with her and found she like American cheese. Suggested the switch to Swiss and to look at other meals and just make a small change to choices in relation to sodium. Handout given to patient and appointment discussed with her. Her daughter will take her to her follow up appt and will be out of town next week so appt made for early the following week.    Patient is able to verbalize signs/symptoms fluid overload/impending HF exacerbation and who to contact with problems                                          _x__ yes  ___ no      Patient is following a 2000 mg sodium diet                                             _x__ yes  ___ no    If no, barriers to 2000 mg sodium diet:_______________________________________________    Patient informed of 2-Part dietician classes that is free if sign up within 30 days of discharge or $40  _x__ yes  ___ no      Patient is adherent to medication regimen                                              _x__ yes  ___ no    If no, barriers to medication regimen:    Patient has sufficient funds to purchase medication                      __x_ yes  ___ no      Patient has a scale in the home              _x__ yes  ___ no      Patient is adherent to daily weight monitoring                                        _x__ yes   ___ no    If no, barriers to daily weight monitoring:    Symptom Tracker Worksheet reviewed with patient  __x_ yes   ___ no      Patient verbalizes understanding of stoplight/heart failure zones          _x__ yes   ___  no      Patient understands the importance of 7-day follow-up appointment      __x_ yes  ___ no    Appointment Date: Trina HOROWITZ 2/11@ 1130          Patient has adequate transportation to attend follow-up appointments    _x__ yes  ___ no    If no, was referral to Social Work made  ___yes  ___ no      Family/Friend present during education: none    Additional consultations required: none    Taina Esquivel RN HF  [phone]

## 2025-01-29 NOTE — PROGRESS NOTES
[facility]     Shyla Wheeler Patient Status:  Inpatient    1935 MRN [id number]   Location [facility] 3W/SW Attending Nisreen Aguirre MD   Hosp Day # 3 PCP Ray Goldman MD     Subjective:     Sitting up in bed and in NAD.  In good spirits today. All questions and concerns addressed.    Objective:    Review of Systems:   ROS completed; pertinent positive and negatives stated in subjective.      Vital signs:  Temp:  [97.2 °F (36.2 °C)-98.1 °F (36.7 °C)] 98 °F (36.7 °C)  Pulse:  [] 101  Resp:  [18-20] 20  BP: ()/(66-81) 102/72  SpO2:  [93 %-99 %] 99 %      Physical Exam:    Gen: NAD AO x3  Chest: good air entry CTABL  CVS: normal s1 and s2 RR  Abd: NABS soft NT ND  Neuro: CN 2-12 grossly intact  Ext: no edema in bilateral LE      Diagnostic Data:    Labs:  Recent Labs   Lab 25  1808 25  0234 25  0825 25  0706   WBC 8.4 8.4 10.5 8.7   HGB 11.6* 11.5* 11.8* 11.5*   MCV 92.8 94.5 93.6 93.9   .0 280.0 322.0 331.0       Recent Labs   Lab 25  1807 25  0234 25  0825 25  0706   * 127* 164* 114*   BUN 51* 54* 61* 60*   CREATSERUM 1.49* 1.41* 1.81* 1.73*   CA 9.6 10.2 9.1 8.8   ALB 4.2  --  4.0 3.9   * 136 138 136   K 5.0 4.6 4.0 3.7    101 98 98   CO2 22.0 26.0 26.0 28.0   ALKPHO 115  --  123 113   *  --  80* 59*   *  --  126* 106*   BILT 0.5  --  0.5 0.4   TP 6.9  --  6.9 6.6       Estimated Creatinine Clearance: 20.2 mL/min (A) (based on SCr of 1.73 mg/dL (H)).    No results for input(s): \"PTP\", \"INR\" in the last 168 hours.           Imaging: Imaging data reviewed in Epic.    Medications:    rivaroxaban  15 mg Oral Daily with lunch    ipratropium-albuterol  3 mL Nebulization q6h    piperacillin-tazobactam  3.375 g Intravenous Q8H    aspirin  81 mg Oral Daily    metoprolol succinate ER  100 mg Oral Daily    [Held by provider] atorvastatin  10 mg  Oral Nightly    latanoprost  1 drop Both Eyes Nightly    pantoprazole  40 mg Oral QAM AC       Assessment & Plan:     Acute hypoxic respiratory failure 2/2 combined systolic and diastolic HF  ?PNA  Imaging reviewed  Strep pneumo and legionella Ag negative  Resp panel negative  BNP elevated  Trop and EKG reviewed  Started on solu-medrol on admission, will discontinue considering CHF exacerbation  Started on Lasix 40 mg IV BID on admission  Continue IV zosyn (day 3)  Duonebs q6hrs  Cardiology on consult  EF 30-35%, decreased from prior  Continue BB  Unable to start ACE/ARB/MRNA/ARNI/SGLT2 due to DEJAH  Strict Is and Os, daily weights  DEJAH  Likely 2/2 overdiuresis?  Cardiology on consult  Hold diuresis  Renal US  Making uine  Monitor labs  Persistent afib with RVR  Continue home meds  Cardiology on consult  Resume metoprolol 100 mg daily  Start Rivaroxaban 15 mg daily  Tele monitoring  HTN  BP well controlled  Monitor vitals  Elevated LFTs  Improving  Monitor labs      Plan of care discussed with patient or family at bedside.      Supplementary Documentation:     Quality:  DVT Prophylaxis: SCDs, Rivaroxaban  CODE status:       Estimated date of discharge: TBD  Discharge is dependent on: clinical stability  At this point Ms. Wheeler is expected to be discharge to: home             **Certification      PHYSICIAN Certification of Need for Inpatient Hospitalization - Initial Certification    Patient will require inpatient services that will reasonably be expected to span two midnight's based on the clinical documentation in H+P.   Based on patients current state of illness, I anticipate that, after discharge, patient will require TBD.      Nisreen Aguirre MD  Hospitalist    MDM: High, I personally reviewed the available laboratories, imaging including XR. I discussed the case with RN. I ordered laboratories, studies including AM labs.  Medical decision making high, risk is high.       The 21st Century Cures Act makes medical  notes like these available to patients in the interest of transparency. Please be advised this is a medical document. Medical documents are intended to carry relevant information, facts as evident, and the clinical opinion of the practitioner. The medical note is intended as peer to peer communication and may appear blunt or direct. It is written in medical language and may contain abbreviations or verbiage that are unfamiliar.

## 2025-01-29 NOTE — PROGRESS NOTES
[facility] Cardiology Progress Note    Shyla Wheeler Patient Status:  Inpatient    1935 MRN [id number]   Location [facility] 3W/SW Attending Nisreen Aguirre MD   Hosp Day # 3 PCP Ray Goldman MD     Subjective:  Sitting up in her chair   No CV concerns     Objective:  BP 99/75 (BP Location: Left arm)   Pulse 96   Temp 97.6 °F (36.4 °C) (Oral)   Resp 20   Ht 165.1 cm (5' 5\")   Wt 170 lb 8 oz (77.3 kg)   SpO2 98%   BMI 28.37 kg/m²     Telemetry: Afib w/ occ PVCs , 96 bpm       Intake/Output:    Intake/Output Summary (Last 24 hours) at 2025 1133  Last data filed at 2025 0956  Gross per 24 hour   Intake 1300 ml   Output 1000 ml   Net 300 ml       Last 3 Weights   25 0500 170 lb 8 oz (77.3 kg)   25 0400 171 lb 9.6 oz (77.8 kg)   25 2154 168 lb 6.4 oz (76.4 kg)   25 1740 162 lb (73.5 kg)   25 0313 167 lb 8 oz (76 kg)   25 0501 163 lb 6.4 oz (74.1 kg)   25 1457 156 lb 6.4 oz (70.9 kg)   25 1451 156 lb 6.4 oz (70.9 kg)   25 0956 161 lb 9.6 oz (73.3 kg)   25 0929 162 lb (73.5 kg)   23 0936 171 lb (77.6 kg)       Labs:  Recent Labs   Lab 25  0234 25  0825 25  0706   * 164* 114*   BUN 54* 61* 60*   CREATSERUM 1.41* 1.81* 1.73*   EGFRCR 36* 27* 28*   CA 10.2 9.1 8.8    138 136   K 4.6 4.0 3.7    98 98   CO2 26.0 26.0 28.0     Recent Labs   Lab 25  0234 25  0825 25  0706   RBC 3.66* 3.74* 3.58*   HGB 11.5* 11.8* 11.5*   HCT 34.6* 35.0 33.6*   MCV 94.5 93.6 93.9   MCH 31.4 31.6 32.1   MCHC 33.2 33.7 34.2   RDW 13.8 13.9 14.0   NEPRELIM 7.03 9.39* 6.86   WBC 8.4 10.5 8.7   .0 322.0 331.0         Recent Labs   Lab 25  1807 25  0234 25  0551   TROPHS 26 28 31       Diagnostics:     25 Echo  1. Left ventricle: The cavity size was normal. Wall thickness was mildly      increased. Systolic function was moderately to markedly reduced. The       estimated ejection fraction was 30-35%, by biplane method of disks.      Akinesis of the anteroseptal and inferoseptal walls. Severe hypokinesis      of the entireinferior wall. Doppler parameters are consistent with      restrictive physiology, indicative of decreased left ventricular      diastolic compliance and/or increased left atrial pressure.   2. Right ventricle: The cavity size was normal. Systolic function was      normal.   Impressions:  Left ventricle dysfunction is new compared to echocardiogram   from 2016.       Review of Systems   Respiratory: Negative.     Cardiovascular: Negative.      Physical Exam:    General: Alert and oriented x 3. No apparent distress.   HEENT: Normocephalic, anicteric sclera, neck supple, no thyromegaly or adenopathy.  Neck: No JVD, carotids 2+, no bruits.  Cardiac: Irregularly irregular rate & rhythm. S1, S2 normal. No murmur, pericardial rub, S3, or extra cardiac sounds.  Lungs: Clear without wheezes, rales, rhonchi or dullness.  Normal excursions and effort.  Abdomen: Soft, non-tender. No organosplenomegally, mass or rebound, BS-present.  Extremities: Without clubbing or cyanosis.  No left lower extremity edema, no right lower extremity edema.  Neurologic: Alert and oriented, normal affect. No focal defects  Skin: Warm and dry.       Medications:   rivaroxaban  15 mg Oral Daily with lunch    ipratropium-albuterol  3 mL Nebulization q6h    piperacillin-tazobactam  3.375 g Intravenous Q8H    aspirin  81 mg Oral Daily    metoprolol succinate ER  100 mg Oral Daily    [Held by provider] atorvastatin  10 mg Oral Nightly    latanoprost  1 drop Both Eyes Nightly    pantoprazole  40 mg Oral QAM AC         Assessment:    Pneumonia  -On IV antibiotics per PMD     A-fib with RVR ; old LBBB  -Patient presented with persistent A-fib and intermittent RVR  -Likely secondary to beta-blockade interruption, patient had not been taking this upon discharge from last hospitalization    -Rate  controlled on metoprolol succinate 100 mg daily   -Elevated [id number], switched to rivaroxaban 15 mg once daily from eliquis due to pt requesting an alternative      Acute on chronic HFrEF, EF 30-35%  -Recent decrease in LVEF along with regional wall motion normalities, 30-35%  -Patient had deferred ischemic evaluation during her last hospitalization, also possible is tachycardia-mediated cardiomyopathy  -pBNP 26,463 & CXR w/ small bilateral pleural effusions. S/p IV lasix in ED   -Continue GDMT as tolerated w/ toprol xl . Not on diuretics/aci/arb/arni/mra/sglt2 due to DEJAH   -Continue rate control for A-fib as above  -Compensated on exam     DEJAH   -Scr improving   -Diuretic therapy on hold   -Pending renal ultrasound     HTN   - low normal, stable     Elevated LFTs  -Atorvastatin on hold   -Per PMD     Plan:    Repeat CXR & kidney ultrasound pending today per PMD  Compensated on exam. Denies dyspnea. Continue to hold diuretic therapy . Resume when ok w/ Nephrology   Replenish electrolytes per cardia protocol . Keep K > 4, Mg > 2   In rate controlled afib . Continue lopressor & xarelto   Will follow peripherally at this time. Please call with any questions/concerns.     LOR Coello  1/29/2025  11:33 AM  Ph [phone] ([place])  Ph [phone] ([place])

## 2025-01-30 ENCOUNTER — APPOINTMENT (OUTPATIENT)
Dept: GENERAL RADIOLOGY | Facility: HOSPITAL | Age: 89
End: 2025-01-30
Attending: INTERNAL MEDICINE
Payer: MEDICARE

## 2025-01-30 ENCOUNTER — APPOINTMENT (OUTPATIENT)
Dept: ULTRASOUND IMAGING | Facility: HOSPITAL | Age: 89
End: 2025-01-30
Attending: INTERNAL MEDICINE
Payer: MEDICARE

## 2025-01-30 LAB
ALBUMIN SERPL-MCNC: 3.8 G/DL (ref 3.2–4.8)
ALBUMIN/GLOB SERPL: 1.6 {RATIO} (ref 1–2)
ALP LIVER SERPL-CCNC: 97 U/L
ALT SERPL-CCNC: 81 U/L
ANION GAP SERPL CALC-SCNC: 8 MMOL/L (ref 0–18)
AST SERPL-CCNC: 40 U/L (ref ?–34)
BASOPHILS # BLD AUTO: 0.03 X10(3) UL (ref 0–0.2)
BASOPHILS NFR BLD AUTO: 0.3 %
BILIRUB SERPL-MCNC: 0.5 MG/DL (ref 0.2–1.1)
BUN BLD-MCNC: 41 MG/DL (ref 9–23)
BUN/CREAT SERPL: 30.8 (ref 10–20)
CALCIUM BLD-MCNC: 8.9 MG/DL (ref 8.7–10.4)
CHLORIDE SERPL-SCNC: 101 MMOL/L (ref 98–112)
CO2 SERPL-SCNC: 28 MMOL/L (ref 21–32)
CREAT BLD-MCNC: 1.33 MG/DL
DEPRECATED RDW RBC AUTO: 48.2 FL (ref 35.1–46.3)
EGFRCR SERPLBLD CKD-EPI 2021: 38 ML/MIN/1.73M2 (ref 60–?)
EOSINOPHIL # BLD AUTO: 0.46 X10(3) UL (ref 0–0.7)
EOSINOPHIL NFR BLD AUTO: 5.3 %
ERYTHROCYTE [DISTWIDTH] IN BLOOD BY AUTOMATED COUNT: 14.5 % (ref 11–15)
GLOBULIN PLAS-MCNC: 2.4 G/DL (ref 2–3.5)
GLUCOSE BLD-MCNC: 103 MG/DL (ref 70–99)
HCT VFR BLD AUTO: 34.9 %
HGB BLD-MCNC: 11.3 G/DL
IMM GRANULOCYTES # BLD AUTO: 0.03 X10(3) UL (ref 0–1)
IMM GRANULOCYTES NFR BLD: 0.3 %
LYMPHOCYTES # BLD AUTO: 1.2 X10(3) UL (ref 1–4)
LYMPHOCYTES NFR BLD AUTO: 13.8 %
MCH RBC QN AUTO: 30.2 PG (ref 26–34)
MCHC RBC AUTO-ENTMCNC: 32.4 G/DL (ref 31–37)
MCV RBC AUTO: 93.3 FL
MONOCYTES # BLD AUTO: 0.51 X10(3) UL (ref 0.1–1)
MONOCYTES NFR BLD AUTO: 5.9 %
NEUTROPHILS # BLD AUTO: 6.46 X10 (3) UL (ref 1.5–7.7)
NEUTROPHILS # BLD AUTO: 6.46 X10(3) UL (ref 1.5–7.7)
NEUTROPHILS NFR BLD AUTO: 74.4 %
OSMOLALITY SERPL CALC.SUM OF ELEC: 294 MOSM/KG (ref 275–295)
PLATELET # BLD AUTO: 343 10(3)UL (ref 150–450)
POTASSIUM SERPL-SCNC: 4.3 MMOL/L (ref 3.5–5.1)
POTASSIUM SERPL-SCNC: 4.3 MMOL/L (ref 3.5–5.1)
PROT SERPL-MCNC: 6.2 G/DL (ref 5.7–8.2)
RBC # BLD AUTO: 3.74 X10(6)UL
SODIUM SERPL-SCNC: 137 MMOL/L (ref 136–145)
WBC # BLD AUTO: 8.7 X10(3) UL (ref 4–11)

## 2025-01-30 PROCEDURE — 99233 SBSQ HOSP IP/OBS HIGH 50: CPT | Performed by: INTERNAL MEDICINE

## 2025-01-30 PROCEDURE — 99223 1ST HOSP IP/OBS HIGH 75: CPT | Performed by: INTERNAL MEDICINE

## 2025-01-30 PROCEDURE — 71045 X-RAY EXAM CHEST 1 VIEW: CPT | Performed by: INTERNAL MEDICINE

## 2025-01-30 RX ORDER — FUROSEMIDE 20 MG/1
20 TABLET ORAL DAILY
Status: DISCONTINUED | OUTPATIENT
Start: 2025-01-30 | End: 2025-02-02

## 2025-01-30 NOTE — SPIRITUAL CARE NOTE
Spiritual Care Visit Note    Patient Name: Shyla Wheeler Date of Spiritual Care Visit: 25   : 1935 Primary Dx: Acute hypoxemic respiratory failure (HCC)       Referred By: Referral From: Patient    Spiritual Care Taxonomy:    Intended Effects: Helping someone feel comforted    Methods: Collaborate with care team member;Demonstrate acceptance;Encourage story-telling;Encourage someone to recognize their strengths;Encourage sharing of feelings;Encourage self reflection;Explore presence of God;Offer spiritual/Yarsanism support;Offer emotional support    Interventions: Acknowledge current situation;Ask guided questions about cultural and Yarsanism values;Ask questions to bring forth feelings;Active listening;Ask guided questions;Provide Yarsanism music;Share words of hope and inspiration    Visit Type/Summary:     - Spiritual Care: Responded to a request via the on call phone Consulted with RN prior to visit. Offered empathic listening and emotional support. Provided information regarding how to contact Spiritual Care and left a Spiritual Care information card.    Spiritual Care support can be requested via an Epic consult. For urgent/immediate needs, please contact the On Call  at: [place]: ext [phone]       Chaplain Ferrara

## 2025-01-30 NOTE — PLAN OF CARE
Bed locked in low position, belongings in reach, bed alarm on, call light in reach. Frequent rounding done, all patient needs met. Non-slip socks on/at bedside. IV ABX continue. Tylenol given last night, see MAR. Called  tonight to speak with patient, she appreciated the 's visit.     Problem: Patient Centered Care  Goal: Patient preferences are identified and integrated in the patient's plan of care  Description: Interventions:  - What would you like us to know as we care for you? From home alone  - Provide timely, complete, and accurate information to patient/family  - Incorporate patient and family knowledge, values, beliefs, and cultural backgrounds into the planning and delivery of care  - Encourage patient/family to participate in care and decision-making at the level they choose  - Honor patient and family perspectives and choices  Outcome: Progressing     Problem: RESPIRATORY - ADULT  Goal: Achieves optimal ventilation and oxygenation  Description: INTERVENTIONS:  - Assess for changes in respiratory status  - Assess for changes in mentation and behavior  - Position to facilitate oxygenation and minimize respiratory effort  - Oxygen supplementation based on oxygen saturation or ABGs  - Provide Smoking Cessation handout, if applicable  - Encourage broncho-pulmonary hygiene including cough, deep breathe, Incentive Spirometry  - Assess the need for suctioning and perform as needed  - Assess and instruct to report SOB or any respiratory difficulty  - Respiratory Therapy support as indicated  - Manage/alleviate anxiety  - Monitor for signs/symptoms of CO2 retention  Outcome: Progressing     Problem: CARDIOVASCULAR - ADULT  Goal: Maintains optimal cardiac output and hemodynamic stability  Description: INTERVENTIONS:  - Monitor vital signs, rhythm, and trends  - Monitor for bleeding, hypotension and signs of decreased cardiac output  - Evaluate effectiveness of vasoactive medications to optimize  hemodynamic stability  - Monitor arterial and/or venous puncture sites for bleeding and/or hematoma  - Assess quality of pulses, skin color and temperature  - Assess for signs of decreased coronary artery perfusion - ex. Angina  - Evaluate fluid balance, assess for edema, trend weights  Outcome: Progressing  Goal: Absence of cardiac arrhythmias or at baseline  Description: INTERVENTIONS:  - Continuous cardiac monitoring, monitor vital signs, obtain 12 lead EKG if indicated  - Evaluate effectiveness of antiarrhythmic and heart rate control medications as ordered  - Initiate emergency measures for life threatening arrhythmias  - Monitor electrolytes and administer replacement therapy as ordered  Outcome: Progressing     Problem: PAIN - ADULT  Goal: Verbalizes/displays adequate comfort level or patient's stated pain goal  Description: INTERVENTIONS:  - Encourage pt to monitor pain and request assistance  - Assess pain using appropriate pain scale  - Administer analgesics based on type and severity of pain and evaluate response  - Implement non-pharmacological measures as appropriate and evaluate response  - Consider cultural and social influences on pain and pain management  - Manage/alleviate anxiety  - Utilize distraction and/or relaxation techniques  - Monitor for opioid side effects  - Notify MD/LIP if interventions unsuccessful or patient reports new pain  - Anticipate increased pain with activity and pre-medicate as appropriate  Outcome: Progressing     Problem: SAFETY ADULT - FALL  Goal: Free from fall injury  Description: INTERVENTIONS:  - Assess pt frequently for physical needs  - Identify cognitive and physical deficits and behaviors that affect risk of falls.  - [place] fall precautions as indicated by assessment.  - Educate pt/family on patient safety including physical limitations  - Instruct pt to call for assistance with activity based on assessment  - Modify environment to reduce risk of injury  -  Provide assistive devices as appropriate  - Consider OT/PT consult to assist with strengthening/mobility  - Encourage toileting schedule  Outcome: Progressing     Problem: DISCHARGE PLANNING  Goal: Discharge to home or other facility with appropriate resources  Description: INTERVENTIONS:  - Identify barriers to discharge w/pt and caregiver  - Include patient/family/discharge partner in discharge planning  - Arrange for needed discharge resources and transportation as appropriate  - Identify discharge learning needs (meds, wound care, etc)  - Arrange for interpreters to assist at discharge as needed  - Consider post-discharge preferences of patient/family/discharge partner  - Complete POLST form as appropriate  - Assess patient's ability to be responsible for managing their own health  - Refer to Case Management Department for coordinating discharge planning if the patient needs post-hospital services based on physician/LIP order or complex needs related to functional status, cognitive ability or social support system  Outcome: Progressing

## 2025-01-30 NOTE — PLAN OF CARE
Pt alert and oriented x4. Standby assist with walker. Pt weaned to room air. IV Zosyn. PO Lasix. Pt and family updated on plan of care. Safety precautions in place. Call light within reach.    Problem: Patient Centered Care  Goal: Patient preferences are identified and integrated in the patient's plan of care  Description: Interventions:  - What would you like us to know as we care for you? From home alone  - Provide timely, complete, and accurate information to patient/family  - Incorporate patient and family knowledge, values, beliefs, and cultural backgrounds into the planning and delivery of care  - Encourage patient/family to participate in care and decision-making at the level they choose  - Honor patient and family perspectives and choices  Outcome: Progressing     Problem: Patient/Family Goals  Goal: Patient/Family Long Term Goal  Description: Patient's Long Term Goal: To go home    Interventions:  - IV lasix, Iv solumedrol, nebs, labs and tests per MDs  - See additional Care Plan goals for specific interventions  Outcome: Progressing  Goal: Patient/Family Short Term Goal  Description: Patient's Short Term Goal: To improve breathing.    Interventions:   - O2 as needed, nebs, IV solumedrol, walking with assistance as tolerated.  - See additional Care Plan goals for specific interventions  Outcome: Progressing     Problem: RESPIRATORY - ADULT  Goal: Achieves optimal ventilation and oxygenation  Description: INTERVENTIONS:  - Assess for changes in respiratory status  - Assess for changes in mentation and behavior  - Position to facilitate oxygenation and minimize respiratory effort  - Oxygen supplementation based on oxygen saturation or ABGs  - Provide Smoking Cessation handout, if applicable  - Encourage broncho-pulmonary hygiene including cough, deep breathe, Incentive Spirometry  - Assess the need for suctioning and perform as needed  - Assess and instruct to report SOB or any respiratory difficulty  -  Respiratory Therapy support as indicated  - Manage/alleviate anxiety  - Monitor for signs/symptoms of CO2 retention  Outcome: Progressing     Problem: CARDIOVASCULAR - ADULT  Goal: Maintains optimal cardiac output and hemodynamic stability  Description: INTERVENTIONS:  - Monitor vital signs, rhythm, and trends  - Monitor for bleeding, hypotension and signs of decreased cardiac output  - Evaluate effectiveness of vasoactive medications to optimize hemodynamic stability  - Monitor arterial and/or venous puncture sites for bleeding and/or hematoma  - Assess quality of pulses, skin color and temperature  - Assess for signs of decreased coronary artery perfusion - ex. Angina  - Evaluate fluid balance, assess for edema, trend weights  Outcome: Progressing  Goal: Absence of cardiac arrhythmias or at baseline  Description: INTERVENTIONS:  - Continuous cardiac monitoring, monitor vital signs, obtain 12 lead EKG if indicated  - Evaluate effectiveness of antiarrhythmic and heart rate control medications as ordered  - Initiate emergency measures for life threatening arrhythmias  - Monitor electrolytes and administer replacement therapy as ordered  Outcome: Progressing     Problem: PAIN - ADULT  Goal: Verbalizes/displays adequate comfort level or patient's stated pain goal  Description: INTERVENTIONS:  - Encourage pt to monitor pain and request assistance  - Assess pain using appropriate pain scale  - Administer analgesics based on type and severity of pain and evaluate response  - Implement non-pharmacological measures as appropriate and evaluate response  - Consider cultural and social influences on pain and pain management  - Manage/alleviate anxiety  - Utilize distraction and/or relaxation techniques  - Monitor for opioid side effects  - Notify MD/LIP if interventions unsuccessful or patient reports new pain  - Anticipate increased pain with activity and pre-medicate as appropriate  Outcome: Progressing     Problem: SAFETY  ADULT - FALL  Goal: Free from fall injury  Description: INTERVENTIONS:  - Assess pt frequently for physical needs  - Identify cognitive and physical deficits and behaviors that affect risk of falls.  - [place] fall precautions as indicated by assessment.  - Educate pt/family on patient safety including physical limitations  - Instruct pt to call for assistance with activity based on assessment  - Modify environment to reduce risk of injury  - Provide assistive devices as appropriate  - Consider OT/PT consult to assist with strengthening/mobility  - Encourage toileting schedule  Outcome: Progressing     Problem: DISCHARGE PLANNING  Goal: Discharge to home or other facility with appropriate resources  Description: INTERVENTIONS:  - Identify barriers to discharge w/pt and caregiver  - Include patient/family/discharge partner in discharge planning  - Arrange for needed discharge resources and transportation as appropriate  - Identify discharge learning needs (meds, wound care, etc)  - Arrange for interpreters to assist at discharge as needed  - Consider post-discharge preferences of patient/family/discharge partner  - Complete POLST form as appropriate  - Assess patient's ability to be responsible for managing their own health  - Refer to Case Management Department for coordinating discharge planning if the patient needs post-hospital services based on physician/LIP order or complex needs related to functional status, cognitive ability or social support system  Outcome: Progressing

## 2025-01-30 NOTE — PHYSICAL THERAPY NOTE
PHYSICAL THERAPY TREATMENT NOTE - INPATIENT     Room Number: 338/338-A       Presenting Problem: acute on chronic heart failure, acute hypoxemia respiratory failure  Co-Morbidities : HTN, enlarged aorta, R knee surgery, CHF, a-fib, large hiatal hernia, and recent admit for multifocal pneumonia    Problem List  Principal Problem:    Acute hypoxemic respiratory failure (HCC)  Active Problems:    Acute on chronic heart failure, unspecified heart failure type (HCC)    PHYSICAL THERAPY ASSESSMENT   Patient demonstrates fair progress this session, goals  remain in progress.      Patient is requiring contact guard assist as a result of the following impairments: decreased functional strength, decreased endurance/aerobic capacity, impaired standing balance, medical status, and increased O2 needs from baseline.     Patient continues to function near baseline with bed mobility, transfers, gait, stair negotiation, maintaining seated position, standing prolonged periods, and performing household tasks.  Next session anticipate patient to progress bed mobility, transfers, gait, stair negotiation, maintaining seated position, standing prolonged periods, and performing household tasks.  Physical Therapy will continue to follow patient for duration of hospitalization.    Patient continues to benefit from continued skilled PT services: at discharge to promote prior level of function and safety with additional support and return home with home health PT.    PLAN DURING HOSPITALIZATION  Nursing Mobility Recommendation : 1 Assist  PT Device Recommendation: Rolling walker  PT Treatment Plan: Bed mobility;Body mechanics;Coordination;Endurance;Energy conservation;Gait training;Strengthening;Transfer training;Balance training  Frequency (Obs): 5x/week     SUBJECTIVE  \"It's from a lack of doing\"    OBJECTIVE  Precautions: Bed/chair alarm    PAIN ASSESSMENT   Ratin  Location: denies pain    BALANCE  Static Sitting: Good  Dynamic Sitting:  Fair  Static Standing: Fair  Dynamic Standing: Fair -    ACTIVITY TOLERANCE  Pulse: 108 (up to 140 with ambulation)    O2 WALK  Oxygen Therapy  SPO2% on Room Air at Rest: 96  SPO2% on Oxygen at Rest: 99  At rest oxygen flow (liters per minute): 2  SPO2% Ambulation on Room Air: 94 (briefly dropped to 88-89% once seated post ambulation, recovered quickly)    AM-PAC '6-Clicks' INPATIENT SHORT FORM - BASIC MOBILITY  How much difficulty does the patient currently have...  Patient Difficulty: Turning over in bed (including adjusting bedclothes, sheets and blankets)?: A Little   Patient Difficulty: Sitting down on and standing up from a chair with arms (e.g., wheelchair, bedside commode, etc.): A Little   Patient Difficulty: Moving from lying on back to sitting on the side of the bed?: A Little   How much help from another person does the patient currently need...   Help from Another: Moving to and from a bed to a chair (including a wheelchair)?: A Little   Help from Another: Need to walk in hospital room?: A Little   Help from Another: Climbing 3-5 steps with a railing?: A Little     AM-PAC Score:  Raw Score: 18   Approx Degree of Impairment: 46.58%   Standardized Score (AM-PAC Scale): 43.63   CMS Modifier (G-Code): CK    FUNCTIONAL ABILITY STATUS  Functional Mobility/Gait Assessment  Gait Assistance: Contact guard assist (progressing to SBA)  Distance (ft): ~110 feet  Assistive Device: Rolling walker  Pattern:  (decreased rylee, decreased step length, forward flexed posture, narrow base of support, verbal cues for sequencing and rolling walker management.)  Rolling:  not tested  Supine to Sit:  not tested (seated in chair at start/end of session)  Sit to Supine:  not tested  Sit to Stand: stand-by assist    Skilled Therapy Provided: Patient received seated in recliner chair at initiation of session agreeable to participation in PT. Patient tolerates treatment well, demonstrates improvement in ambulation tolerance and  decreased level of physical assistance for functional mobility in comparison to previous session. Patient able to ambulate ~110 feet with BUE support on RW. Patient vitals closely monitored during session, RN informed. Patient left in bedside chair, lines intact, needs in reach and handoff to RN.    The patient's Approx Degree of Impairment: 46.58% has been calculated based on documentation in the [facility] '6 clicks' Inpatient Daily Activity Short Form.  Research supports that patients with this level of impairment may benefit from home.    Final disposition will be made by interdisciplinary medical team.    Patient End of Session: Up in chair;Needs met;Call light within reach;RN aware of session/findings;All patient questions and concerns addressed;Hospital anti-slip socks;Alarm set    CURRENT GOALS   Goals to be met by: 1/31/25  Patient Goal Patient's self-stated goal is: to get back home and feel better   Goal #1 Patient is able to demonstrate supine - sit EOB @ level: modified independent      Goal #1   Current Status  NT   Goal #2 Patient is able to demonstrate transfers Sit to/from Stand at assistance level: modified independent with walker - rolling      Goal #2  Current Status  Progressing   Goal #3 Patient is able to ambulate 150 feet with assist device: walker - rolling at assistance level: modified independent   Goal #3   Current Status  Progressing   Goal #4 Patient will negotiate 1 stairs/one curb w/ assistive device and supervision   Goal #4   Current Status  NT   Goal #5 Patient to demonstrate independence with home activity/exercise instructions provided to patient in preparation for discharge.   Goal #5   Current Status  Progressing   Goal #6     Goal #6  Current Status       Gait Training: 15 minutes    Shayna Key PT, DPT

## 2025-01-30 NOTE — CONSULTS
[facility]  part of [facility]    Report of Consultation    Shyla Wheeler Patient Status:  Inpatient    1935 MRN [id number]   Location [facility] 3W/SW Attending Nisreen Aguirre MD   Hosp Day # 4 PCP Ray Goldman MD     Date of Admission:  2025  Date of Consult:  2025   Reason for Consultation:   DEJAH    History of Present Illness:   Patient is a 89 year old female who was admitted to the hospital for Acute hypoxemic respiratory failure (HCC):  The patient was admitted to the hospital 3 days ago due to increasing shortness of breath.  This is a readmission and she was recently admitted due to congestive heart failure.  The patient declined an angiogram.  She was given IV Lasix on admission with improvement of her symptoms, but it was discontinued as her creatinine up trended    Her baseline creatinine from last admission was 1.  During this admission her creatinine has been 1.5 and it peaked at 1.8 after a dose of Lasix.  It is down to 1.3 today.    I am being asked to comment on ability to diuresis    Past Medical History  Past Medical History:    Enlarged aorta    Glaucoma    Other and unspecified hyperlipidemia    Right groin pain    Unspecified essential hypertension       Past Surgical History  Past Surgical History:   Procedure Laterality Date    Correct bunion,simple Right 1999    Bunionectomy    Electrocardiogram, complete  2010    Scanned to media tab     Knee surgery      right knee       Family History  Family History   Problem Relation Age of Onset    Heart Attack Father     Heart Disease Father         Coronary Artery Disease    Heart Attack Mother     Hypertension Mother     Breast Cancer Sister 78        2 sisters    Arthritis Sister     Heart Attack Brother     Cancer Brother         pancreatic cancer       Social History  Social History     Socioeconomic History    Marital status:    Tobacco Use    Smoking status: Never    Smokeless tobacco:  Never   Substance and Sexual Activity    Alcohol use: Yes     Alcohol/week: 0.0 standard drinks of alcohol     Comment: wine once every 2 mos    Drug use: No   Other Topics Concern    Caffeine Concern No     Comment: decaf coffee daily    Exercise Yes     Comment: water aerobics 2-3x week   Social History Narrative    The patient uses the following assistive device(s):  rolling walker.      The patient does live in a home with stairs. 12 stairs     Social Drivers of Health     Financial Resource Strain: Low Risk  (1/22/2025)    Financial Resource Strain     Difficulty of Paying Living Expenses: Not hard at all   Food Insecurity: No Food Insecurity (1/26/2025)    Food Insecurity     Food Insecurity: Never true   Transportation Needs: No Transportation Needs (1/26/2025)    Transportation Needs     Lack of Transportation: No   Housing Stability: Low Risk  (1/26/2025)    Housing Stability     Housing Instability: No       Current Medications:  Current Facility-Administered Medications   Medication Dose Route Frequency    rivaroxaban (Xarelto) tab 15 mg  15 mg Oral Daily with lunch    ipratropium-albuterol (Duoneb) 0.5-2.5 (3) MG/3ML inhalation solution 3 mL  3 mL Nebulization Q6H PRN    ipratropium-albuterol (Duoneb) 0.5-2.5 (3) MG/3ML inhalation solution 3 mL  3 mL Nebulization q6h    piperacillin-tazobactam (Zosyn) 3.375 g in dextrose 5% 100 mL IVPB-ADDV  3.375 g Intravenous Q8H    aspirin DR tab 81 mg  81 mg Oral Daily    metoprolol succinate ER (Toprol XL) 24 hr tab 100 mg  100 mg Oral Daily    [Held by provider] atorvastatin (Lipitor) tab 10 mg  10 mg Oral Nightly    latanoprost (Xalatan) 0.005 % ophthalmic solution 1 drop  1 drop Both Eyes Nightly    ondansetron (Zofran) 4 MG/2ML injection 4 mg  4 mg Intravenous Q6H PRN    acetaminophen (Tylenol) tab 650 mg  650 mg Oral Q6H PRN    hydrALAzine (Apresoline) 20 mg/mL injection 10 mg  10 mg Intravenous Q4H PRN    zolpidem (Ambien) tab 5 mg  5 mg Oral Nightly PRN     alum-mag hydroxide-simethicone (Maalox) 200-200-20 MG/5ML oral suspension 30 mL  30 mL Oral QID PRN    pantoprazole (Protonix) DR tab 40 mg  40 mg Oral QAM AC    guaiFENesin-codeine (Robitussin AC) 100-10 MG/5ML oral solution 5 mL  5 mL Oral Q4H PRN     Medications Prior to Admission   Medication Sig    apixaban 2.5 MG Oral Tab Take 1 tablet (2.5 mg total) by mouth 2 (two) times daily.    furosemide 20 MG Oral Tab Take 1 tablet (20 mg total) by mouth every other day. OR as directed by cardiology    pravastatin 40 MG Oral Tab Take 1 tablet (40 mg total) by mouth nightly.    Calcium Carb-Cholecalciferol 600-200 MG-UNIT Oral Tab Take 1 tablet by mouth daily.    TRAVATAN Z 0.004 % Ophthalmic Solution Place 1 drop into both eyes at bedtime.    MULTIVITAMIN TAB/CAP Take 1 tablet by mouth daily.    [DISCONTINUED] metoprolol succinate ER 50 MG Oral Tablet 24 Hr Take 2 tablets (100 mg total) by mouth daily. (Patient not taking: Reported on 1/26/2025)    aspirin 81 MG Oral Tab EC Take 1 tablet (81 mg total) by mouth daily. (Patient not taking: Reported on 1/26/2025)       Allergies  Allergies[1]    Review of Systems:     General: weak      A comprehensive 12 point review of systems was completed.  Pertinent positives as above and all the rest were negative.     Physical Exam:   /80 (BP Location: Right arm)   Pulse 108   Temp 97.6 °F (36.4 °C) (Oral)   Resp 20   Ht 5' 5\" (1.651 m)   Wt 170 lb 3.2 oz (77.2 kg)   SpO2 95%   BMI 28.32 kg/m²      Intake/Output Summary (Last 24 hours) at 1/30/2025 1315  Last data filed at 1/30/2025 1100  Gross per 24 hour   Intake 705 ml   Output 1880 ml   Net -1175 ml     Wt Readings from Last 1 Encounters:   01/30/25 170 lb 3.2 oz (77.2 kg)       Exam  Gen: No acute distress  Heent: NC AT, mucous memb clear, neck supple  Pulm: Lungs clear, normal respiratory effort  CV: Heart with regular rate and rhythm, no edema  Abd: Abdomen soft, nontender, nondistended, no organomegaly, bowel  sounds present  Skin: no symptoms reported  Psych: alert and oriented        Results:     Laboratory Data:  Recent Labs   Lab 01/28/25  0825 01/29/25  0706 01/30/25  0713   RBC 3.74* 3.58* 3.74*   HGB 11.8* 11.5* 11.3*   HCT 35.0 33.6* 34.9*   MCV 93.6 93.9 93.3   MCH 31.6 32.1 30.2   MCHC 33.7 34.2 32.4   RDW 13.9 14.0 14.5   NEPRELIM 9.39* 6.86 6.46   WBC 10.5 8.7 8.7   .0 331.0 343.0         Recent Labs   Lab 01/28/25  0825 01/29/25  0706 01/30/25  0713   * 114* 103*   BUN 61* 60* 41*   CREATSERUM 1.81* 1.73* 1.33*   CA 9.1 8.8 8.9    136 137   K 4.0 3.7 4.3  4.3   CL 98 98 101   CO2 26.0 28.0 28.0        Imaging:  XR CHEST AP PORTABLE  (CPT=71045)    Result Date: 1/30/2025  CONCLUSION:   No significant change in the left upper lobe opacity.  No significant change in the trace bilateral pleural effusions, bibasilar opacities, and pulmonary edema  Lesser incidental findings described above.    Dictated by (CST): Agustin Rosas MD on 1/30/2025 at 11:19 AM     Finalized by (CST): Agustin Rosas MD on 1/30/2025 at 11:23 AM                   Impression/Receommendations:     1 - DEJAH  Her creatinine has down trended today, I suspect she has cardiorenal syndrome which would mean that her creatinine would go up with diuresis.  This would be expected.  With an ejection fraction of 30 to 35% however she needs to be decongested to be in better shape.    I recommend resuming oral Lasix.    I will check a renal ultrasound.  Given that the patient would like to minimize medications I would avoid SGLT 2 inhibitors and ACEs/ARB's at this time    2 - CHFrEF  As above    3 -A-fib with RVR  On metoprolol and rivaroxaban    4 -pneumonia   on antibiotics    The patient should plan to follow-up with the heart failure clinic on discharge    Discussed with the patient and her daughter   Thank you for allowing me to participate in the care of your patient.    COLIN LOVE MD  1/30/2025          [1]    Allergies  Allergen Reactions    Benadryl [Diphenhydramine] SWELLING    Ace Inhibitors     Amoxicillin NAUSEA ONLY

## 2025-01-30 NOTE — PROGRESS NOTES
[facility]     Shyla Wheeler Patient Status:  Inpatient    1935 MRN [id number]   Location [facility] 3W/SW Attending Nisreen Aguirre MD   Hosp Day # 4 PCP Ray Goldman MD     Subjective:     Sitting up in bed and in NAD.  In good spirits today. All questions and concerns addressed.  No overnight events reported by the nursing staff.    Objective:    Review of Systems:   ROS completed; pertinent positive and negatives stated in subjective.      Vital signs:  Temp:  [97.5 °F (36.4 °C)-98 °F (36.7 °C)] 97.6 °F (36.4 °C)  Pulse:  [] 119  Resp:  [18-21] 20  BP: (105-125)/(65-88) 125/80  SpO2:  [94 %-96 %] 95 %      Physical Exam:    Gen: NAD AO x3  Chest: good air entry CTABL  CVS: normal s1 and s2 RR  Abd: NABS soft NT ND  Neuro: CN 2-12 grossly intact  Ext: no edema in bilateral LE      Diagnostic Data:    Labs:  Recent Labs   Lab 25  1808 25  0234 25  0825 25  0706 25  0713   WBC 8.4 8.4 10.5 8.7 8.7   HGB 11.6* 11.5* 11.8* 11.5* 11.3*   MCV 92.8 94.5 93.6 93.9 93.3   .0 280.0 322.0 331.0 343.0       Recent Labs   Lab 25  0825 25  0706 25  0713   * 114* 103*   BUN 61* 60* 41*   CREATSERUM 1.81* 1.73* 1.33*   CA 9.1 8.8 8.9   ALB 4.0 3.9 3.8    136 137   K 4.0 3.7 4.3  4.3   CL 98 98 101   CO2 26.0 28.0 28.0   ALKPHO 123 113 97   AST 80* 59* 40*   * 106* 81*   BILT 0.5 0.4 0.5   TP 6.9 6.6 6.2       Estimated Creatinine Clearance: 26.3 mL/min (A) (based on SCr of 1.33 mg/dL (H)).    No results for input(s): \"PTP\", \"INR\" in the last 168 hours.           Imaging: Imaging data reviewed in Epic.    Medications:    rivaroxaban  15 mg Oral Daily with lunch    ipratropium-albuterol  3 mL Nebulization q6h    piperacillin-tazobactam  3.375 g Intravenous Q8H    aspirin  81 mg Oral Daily    metoprolol succinate ER  100 mg Oral Daily    [Held by provider]  atorvastatin  10 mg Oral Nightly    latanoprost  1 drop Both Eyes Nightly    pantoprazole  40 mg Oral QAM AC       Assessment & Plan:     Acute hypoxic respiratory failure 2/2 combined systolic and diastolic HF  ?PNA  Imaging reviewed  Strep pneumo and legionella Ag negative  Resp panel negative  BNP elevated  Trop and EKG reviewed  Started on solu-medrol on admission, will discontinue considering CHF exacerbation  Started on Lasix 40 mg IV BID on admission  Continue IV zosyn (day 3)  Duonebs q6hrs  Cardiology on consult  EF 30-35%, decreased from prior  Continue BB  Unable to start ACE/ARB/MRNA/ARNI/SGLT2 due to DEJAH  Resume diuresis when okay with nephrology  Nephrology on consult  Appreciate recs  Strict Is and Os, daily weights  DEJAH - improving  Likely 2/2 overdiuresis?  Cardiology on consult  Hold diuresis, resume when okay with nephrology  Renal US - reviewed  Making uine  Monitor labs  Persistent afib with RVR  Continue home meds  Cardiology on consult  Resume metoprolol 100 mg daily  Start Rivaroxaban 15 mg daily  Tele monitoring  HTN  BP well controlled  Monitor vitals  Elevated LFTs  Improving  Monitor labs      Plan of care discussed with patient or family at bedside.      Supplementary Documentation:     Quality:  DVT Prophylaxis: SCDs, Rivaroxaban  CODE status:       Estimated date of discharge: TBD  Discharge is dependent on: clinical stability  At this point Ms. Wheeler is expected to be discharge to: home             **Certification      PHYSICIAN Certification of Need for Inpatient Hospitalization - Initial Certification    Patient will require inpatient services that will reasonably be expected to span two midnight's based on the clinical documentation in H+P.   Based on patients current state of illness, I anticipate that, after discharge, patient will require TBD.      Nisreen Aguirre MD  Hospitalist    MDM: High, I personally reviewed the available laboratories, imaging including XR. I discussed the  case with RN. I ordered laboratories, studies including AM labs.  Medical decision making high, risk is high.       The 21st Century Cures Act makes medical notes like these available to patients in the interest of transparency. Please be advised this is a medical document. Medical documents are intended to carry relevant information, facts as evident, and the clinical opinion of the practitioner. The medical note is intended as peer to peer communication and may appear blunt or direct. It is written in medical language and may contain abbreviations or verbiage that are unfamiliar.

## 2025-01-30 NOTE — CM/SW NOTE
Social work received a consult to price check the patient's Xarelto.     Per [facility] the Xarelto is $557.37 due to the patient not meeting her Medicare Part D Deductible.    Social work will discuss this with the patient and her daughter.    TITI/ROMELIA to remain available for support and/or discharge planning.     Azeb Galaviz MSW, LSW  Discharge Planner [id number]

## 2025-01-30 NOTE — OCCUPATIONAL THERAPY NOTE
OCCUPATIONAL THERAPY TREATMENT NOTE - INPATIENT        Room Number: 338/338-A     Presenting Problem: Respiratory failure    Problem List  Principal Problem:    Acute hypoxemic respiratory failure (HCC)  Active Problems:    Acute on chronic heart failure, unspecified heart failure type (HCC)      OCCUPATIONAL THERAPY ASSESSMENT   Patient demonstrates good  progress this session, goals remain in progress.    Patient is requiring up to SBA as a result of the following impairments: decreased endurance and increased O2 needs from baseline.    Patient continues to function near baseline with self care and basic mobility.  Next session anticipate patient to progress with OT POC.  Occupational Therapy will continue to follow patient for duration of hospitalization.    Patient continues to benefit from continued skilled OT services: at discharge to promote prior level of function and safety with additional support and return home with home health OT.     PLAN DURING HOSPITALIZATION  OT Device Recommendations: None  OT Treatment Plan: Balance activities;Energy conservation/work simplification techniques;ADL training;Functional transfer training;Endurance training;Patient/Family education;Patient/Family training;Compensatory technique education     SUBJECTIVE  Do you think I will go home today?      OBJECTIVE  Precautions: Bed/chair alarm    WEIGHT BEARING RESTRICTION     PAIN ASSESSMENT  Ratin    ACTIVITY TOLERANCE  Pulse: 108 (observed up to 140 with activity)         [id number]             O2 SATURATIONS  Oxygen Therapy  SPO2% on Room Air at Rest: 96  SPO2% on Oxygen at Rest: 99  At rest oxygen flow (liters per minute): 2  SPO2% Ambulation on Room Air: 94    ACTIVITIES OF DAILY LIVING ASSESSMENT  AM-PAC ‘6-Clicks’ Inpatient Daily Activity Short Form  How much help from another person does the patient currently need…  -   Putting on and taking off regular lower body clothing?: A Little  -   Bathing (including washing,  rinsing, drying)?: A Little  -   Toileting, which includes using toilet, bedpan or urinal? : A Little  -   Putting on and taking off regular upper body clothing?: A Little  -   Taking care of personal grooming such as brushing teeth?: A Little  -   Eating meals?: A Little    AM-PAC Score:  Score: 18  Approx Degree of Impairment: 46.65%  Standardized Score (AM-PAC Scale): 38.66  CMS Modifier (G-Code): CK    FUNCTIONAL TRANSFER ASSESSMENT  Sit to Stand: Edge of Bed  Edge of Bed: Contact Guard Assist  Toilet Transfer: Contact Guard Assist    BED MOBILITY  Rolling: Stand-by Assist  Supine to Sit : Contact Guard Assist  Sit to Supine (OT): Contact Guard Assist  Scooting: CGA    BALANCE ASSESSMENT  Static Sitting: Stand-by Assist  Static Standing: Contact Guard Assist    FUNCTIONAL ADL ASSESSMENT  Eating: Supervision  Grooming Seated: Supervision  Bathing Seated: Minimal Assist  UB Dressing Seated: Stand-by Assist  LB Dressing Seated: Minimal Assist  Toileting Seated: Contact Guard Assist         Skilled Therapy Provided: Energy conservation, activity pacing, activity promotion; pt up and ambulatory in room, managing self care at SBA-CGA level; cues for PLB and pacing tasks; progressing to hallway mobility with SBA with RW; pt's HR up to 140 with activity and O2 on RA >95%; educated on PLB, pacing and rest breaks; left up in chair with alarm set. Stable at exit, HR returned to 90s at end of session.     EDUCATION PROVIDED  Patient Education : Role of Occupational Therapy; Plan of Care; Discharge Recommendations; Energy Conservation  Patient's Response to Education: Verbalized Understanding    The patient's Approx Degree of Impairment: 46.65% has been calculated based on documentation in the [facility] '6 clicks' Inpatient Daily Activity Short Form.  Research supports that patients with this level of impairment may benefit from HH.  Final disposition will be made by interdisciplinary medical team.    Patient End of Session: Up  in chair;Needs met;Call light within reach;RN aware of session/findings;All patient questions and concerns addressed    OT Goals:      OT Goals  Patients self stated goal is: to return home     Patient will complete functional transfer with Mod I   Comment: ongoing    Patient will complete toileting with Mod I   Comment: ongoing    Patient will tolerate standing for 3-5 minutes in prep for adls with Mod I    Comment:ongoing    Patient will complete item retrieval with Mod I   Comment:ongoing          Goals  on: 2/15  Frequency: 3x week    OT Session Time: 23 minutes  Self-Care Home Management: 0 minutes  Therapeutic Activity: 23 minutes

## 2025-01-30 NOTE — PLAN OF CARE
Patient is alert and oriented, resting in the chair. Patient is on 2L O2 via NC. Son in law was at the bedside in the afternoon and updated on plan of care. IV antibiotics were given as ordered. US of kidneys was done. Chair alarm is on and call light is in reach.     Problem: Patient Centered Care  Goal: Patient preferences are identified and integrated in the patient's plan of care  Description: Interventions:  - What would you like us to know as we care for you? From home alone  - Provide timely, complete, and accurate information to patient/family  - Incorporate patient and family knowledge, values, beliefs, and cultural backgrounds into the planning and delivery of care  - Encourage patient/family to participate in care and decision-making at the level they choose  - Honor patient and family perspectives and choices  Outcome: Progressing     Problem: Patient/Family Goals  Goal: Patient/Family Long Term Goal  Description: Patient's Long Term Goal: To go home    Interventions:  - IV lasix, Iv solumedrol, nebs, labs and tests per MDs  - See additional Care Plan goals for specific interventions  Outcome: Progressing  Goal: Patient/Family Short Term Goal  Description: Patient's Short Term Goal: To improve breathing.    Interventions:   - O2 as needed, nebs, IV solumedrol, walking with assistance as tolerated.  - See additional Care Plan goals for specific interventions  Outcome: Progressing     Problem: RESPIRATORY - ADULT  Goal: Achieves optimal ventilation and oxygenation  Description: INTERVENTIONS:  - Assess for changes in respiratory status  - Assess for changes in mentation and behavior  - Position to facilitate oxygenation and minimize respiratory effort  - Oxygen supplementation based on oxygen saturation or ABGs  - Provide Smoking Cessation handout, if applicable  - Encourage broncho-pulmonary hygiene including cough, deep breathe, Incentive Spirometry  - Assess the need for suctioning and perform as  needed  - Assess and instruct to report SOB or any respiratory difficulty  - Respiratory Therapy support as indicated  - Manage/alleviate anxiety  - Monitor for signs/symptoms of CO2 retention  Outcome: Progressing     Problem: CARDIOVASCULAR - ADULT  Goal: Maintains optimal cardiac output and hemodynamic stability  Description: INTERVENTIONS:  - Monitor vital signs, rhythm, and trends  - Monitor for bleeding, hypotension and signs of decreased cardiac output  - Evaluate effectiveness of vasoactive medications to optimize hemodynamic stability  - Monitor arterial and/or venous puncture sites for bleeding and/or hematoma  - Assess quality of pulses, skin color and temperature  - Assess for signs of decreased coronary artery perfusion - ex. Angina  - Evaluate fluid balance, assess for edema, trend weights  Outcome: Progressing  Goal: Absence of cardiac arrhythmias or at baseline  Description: INTERVENTIONS:  - Continuous cardiac monitoring, monitor vital signs, obtain 12 lead EKG if indicated  - Evaluate effectiveness of antiarrhythmic and heart rate control medications as ordered  - Initiate emergency measures for life threatening arrhythmias  - Monitor electrolytes and administer replacement therapy as ordered  Outcome: Progressing     Problem: PAIN - ADULT  Goal: Verbalizes/displays adequate comfort level or patient's stated pain goal  Description: INTERVENTIONS:  - Encourage pt to monitor pain and request assistance  - Assess pain using appropriate pain scale  - Administer analgesics based on type and severity of pain and evaluate response  - Implement non-pharmacological measures as appropriate and evaluate response  - Consider cultural and social influences on pain and pain management  - Manage/alleviate anxiety  - Utilize distraction and/or relaxation techniques  - Monitor for opioid side effects  - Notify MD/LIP if interventions unsuccessful or patient reports new pain  - Anticipate increased pain with  activity and pre-medicate as appropriate  Outcome: Progressing     Problem: SAFETY ADULT - FALL  Goal: Free from fall injury  Description: INTERVENTIONS:  - Assess pt frequently for physical needs  - Identify cognitive and physical deficits and behaviors that affect risk of falls.  - [place] fall precautions as indicated by assessment.  - Educate pt/family on patient safety including physical limitations  - Instruct pt to call for assistance with activity based on assessment  - Modify environment to reduce risk of injury  - Provide assistive devices as appropriate  - Consider OT/PT consult to assist with strengthening/mobility  - Encourage toileting schedule  Outcome: Progressing     Problem: DISCHARGE PLANNING  Goal: Discharge to home or other facility with appropriate resources  Description: INTERVENTIONS:  - Identify barriers to discharge w/pt and caregiver  - Include patient/family/discharge partner in discharge planning  - Arrange for needed discharge resources and transportation as appropriate  - Identify discharge learning needs (meds, wound care, etc)  - Arrange for interpreters to assist at discharge as needed  - Consider post-discharge preferences of patient/family/discharge partner  - Complete POLST form as appropriate  - Assess patient's ability to be responsible for managing their own health  - Refer to Case Management Department for coordinating discharge planning if the patient needs post-hospital services based on physician/LIP order or complex needs related to functional status, cognitive ability or social support system  Outcome: Progressing

## 2025-01-31 LAB
ALBUMIN SERPL-MCNC: 3.9 G/DL (ref 3.2–4.8)
ALBUMIN/GLOB SERPL: 1.4 {RATIO} (ref 1–2)
ALP LIVER SERPL-CCNC: 100 U/L
ALT SERPL-CCNC: 67 U/L
ANION GAP SERPL CALC-SCNC: 8 MMOL/L (ref 0–18)
AST SERPL-CCNC: 32 U/L (ref ?–34)
BASOPHILS # BLD AUTO: 0.03 X10(3) UL (ref 0–0.2)
BASOPHILS NFR BLD AUTO: 0.4 %
BILIRUB SERPL-MCNC: 0.6 MG/DL (ref 0.2–1.1)
BUN BLD-MCNC: 34 MG/DL (ref 9–23)
BUN/CREAT SERPL: 23.9 (ref 10–20)
CALCIUM BLD-MCNC: 9.3 MG/DL (ref 8.7–10.4)
CHLORIDE SERPL-SCNC: 101 MMOL/L (ref 98–112)
CO2 SERPL-SCNC: 27 MMOL/L (ref 21–32)
CREAT BLD-MCNC: 1.42 MG/DL
DEPRECATED RDW RBC AUTO: 49.2 FL (ref 35.1–46.3)
EGFRCR SERPLBLD CKD-EPI 2021: 36 ML/MIN/1.73M2 (ref 60–?)
EOSINOPHIL # BLD AUTO: 0.29 X10(3) UL (ref 0–0.7)
EOSINOPHIL NFR BLD AUTO: 3.5 %
ERYTHROCYTE [DISTWIDTH] IN BLOOD BY AUTOMATED COUNT: 14.5 % (ref 11–15)
GLOBULIN PLAS-MCNC: 2.7 G/DL (ref 2–3.5)
GLUCOSE BLD-MCNC: 146 MG/DL (ref 70–99)
HCT VFR BLD AUTO: 35 %
HGB BLD-MCNC: 11.4 G/DL
IMM GRANULOCYTES # BLD AUTO: 0.04 X10(3) UL (ref 0–1)
IMM GRANULOCYTES NFR BLD: 0.5 %
LYMPHOCYTES # BLD AUTO: 0.85 X10(3) UL (ref 1–4)
LYMPHOCYTES NFR BLD AUTO: 10.3 %
MCH RBC QN AUTO: 30.6 PG (ref 26–34)
MCHC RBC AUTO-ENTMCNC: 32.6 G/DL (ref 31–37)
MCV RBC AUTO: 94.1 FL
MONOCYTES # BLD AUTO: 0.48 X10(3) UL (ref 0.1–1)
MONOCYTES NFR BLD AUTO: 5.8 %
NEUTROPHILS # BLD AUTO: 6.55 X10 (3) UL (ref 1.5–7.7)
NEUTROPHILS # BLD AUTO: 6.55 X10(3) UL (ref 1.5–7.7)
NEUTROPHILS NFR BLD AUTO: 79.5 %
OSMOLALITY SERPL CALC.SUM OF ELEC: 292 MOSM/KG (ref 275–295)
PHOSPHATE SERPL-MCNC: 3.2 MG/DL (ref 2.4–5.1)
PLATELET # BLD AUTO: 356 10(3)UL (ref 150–450)
POTASSIUM SERPL-SCNC: 4.2 MMOL/L (ref 3.5–5.1)
PROT SERPL-MCNC: 6.6 G/DL (ref 5.7–8.2)
RBC # BLD AUTO: 3.72 X10(6)UL
SODIUM SERPL-SCNC: 136 MMOL/L (ref 136–145)
WBC # BLD AUTO: 8.2 X10(3) UL (ref 4–11)

## 2025-01-31 PROCEDURE — 99233 SBSQ HOSP IP/OBS HIGH 50: CPT | Performed by: FAMILY MEDICINE

## 2025-01-31 PROCEDURE — 99233 SBSQ HOSP IP/OBS HIGH 50: CPT | Performed by: INTERNAL MEDICINE

## 2025-01-31 RX ORDER — METOPROLOL SUCCINATE 50 MG/1
100 TABLET, EXTENDED RELEASE ORAL
Status: DISCONTINUED | OUTPATIENT
Start: 2025-01-31 | End: 2025-02-01

## 2025-01-31 RX ORDER — METOPROLOL SUCCINATE 50 MG/1
150 TABLET, EXTENDED RELEASE ORAL DAILY
Status: DISCONTINUED | OUTPATIENT
Start: 2025-02-01 | End: 2025-01-31

## 2025-01-31 RX ORDER — ALPRAZOLAM 0.25 MG
0.25 TABLET ORAL NIGHTLY PRN
Status: DISCONTINUED | OUTPATIENT
Start: 2025-01-31 | End: 2025-02-06

## 2025-01-31 RX ORDER — METOPROLOL SUCCINATE 50 MG/1
50 TABLET, EXTENDED RELEASE ORAL ONCE
Status: COMPLETED | OUTPATIENT
Start: 2025-01-31 | End: 2025-01-31

## 2025-01-31 NOTE — PROGRESS NOTES
[facility]  part of [facility]    Progress Note    Shyla Wheeler Patient Status:  Inpatient    1935 MRN [id number]   Location [facility] 3W/SW Attending Odalys Castro MD   Hosp Day # 5 PCP Ray Goldman MD       Subjective:   Shyla Wheeler is a(n) 89 year old female who I am seeing for DEJAH      Resting    Objective:   /74 (BP Location: Right arm)   Pulse 107   Temp 97.3 °F (36.3 °C) (Oral)   Resp 18   Ht 5' 5\" (1.651 m)   Wt 171 lb 9.6 oz (77.8 kg)   SpO2 96%   BMI 28.56 kg/m²      Intake/Output Summary (Last 24 hours) at 2025 1425  Last data filed at 2025 1335  Gross per 24 hour   Intake 1435 ml   Output 200 ml   Net 1235 ml     Wt Readings from Last 1 Encounters:   25 171 lb 9.6 oz (77.8 kg)       Exam  Gen: No acute distress, Heent: NC AT, mucous memb clear, neck supple  Pulm: Lungs clear, normal respiratory effort  CV: Heart with regular rate and rhythm, no edema  Abd: Abdomen soft, nontender, nondistended, no organomegaly, bowel sounds quiet  Skin: no symptoms reported  Psych: alert and oriented    Assessment and Plan:     1 - DEJAH  Creatinine is stable.  Diuretics have been resumed orally.  Renal ultrasound is unremarkable.    Given that the patient would like to minimize medications I would avoid SGLT 2 inhibitors and ACEs/ARB's at this time     2 - CHFrEF  As above     3 -A-fib with RVR  On metoprolol and rivaroxaban     4 -pneumonia   on antibiotics     The patient should plan to follow-up with the heart failure clinic on discharge    Nephrology will sign off active follow-up.  Please call on-call service if there are any issues          Results:     Recent Labs   Lab 25  0706 25  0713 25  0904   RBC 3.58* 3.74* 3.72*   HGB 11.5* 11.3* 11.4*   HCT 33.6* 34.9* 35.0   MCV 93.9 93.3 94.1   MCH 32.1 30.2 30.6   MCHC 34.2 32.4 32.6   RDW 14.0 14.5 14.5   NEPRELIM 6.86 6.46 6.55   WBC 8.7 8.7 8.2   .0 343.0 356.0          Recent Labs   Lab 01/29/25  0706 01/30/25  0713 01/31/25  0904   * 103* 146*   BUN 60* 41* 34*   CREATSERUM 1.73* 1.33* 1.42*   CA 8.8 8.9 9.3    137 136   K 3.7 4.3  4.3 4.2   CL 98 101 101   CO2 28.0 28.0 27.0          XR CHEST AP PORTABLE  (CPT=71045)    Result Date: 1/30/2025  CONCLUSION:   No significant change in the left upper lobe opacity.  No significant change in the trace bilateral pleural effusions, bibasilar opacities, and pulmonary edema  Lesser incidental findings described above.    Dictated by (CST): Agustin Rosas MD on 1/30/2025 at 11:19 AM     Finalized by (CST): Agustin Rosas MD on 1/30/2025 at 11:23 AM               COLIN LOVE MD  1/31/2025

## 2025-01-31 NOTE — CM/SW NOTE
01/31/25 0800   Choice of Post-Acute Provider   Informed patient of right to choose their preferred provider Yes   List of appropriate post-acute services provided to patient/family with quality data Yes   Information given to Patient;Daughter   Patient declines recommended services Yes     Social work received an update from the patient's daughter on 1/30 that the patient is declining home health.    The patient does not want any HH services.    Social work also provided a free 30 day for Xarelto at bedside. The patient is agreeable to the cost since she has not met her Medicare D deductible.    SW/CM to remain available for support and/or discharge planning.     Azeb Galaviz MSW, LSW  Discharge Planner [id number]

## 2025-01-31 NOTE — PLAN OF CARE
Bed locked in low position, belongings in reach, bed alarm on, call light in reach. Frequent rounding done, all patient needs met. Non-slip socks on/at bedside. Patient was very anxious last night about possibly discharging and what would happen with her ABX, and with her feeling more weak. She declined the xanax and the ambien to help her relax.  was requested, but was unable to come to bedside.    Problem: Patient Centered Care  Goal: Patient preferences are identified and integrated in the patient's plan of care  Description: Interventions:  - What would you like us to know as we care for you? From home alone  - Provide timely, complete, and accurate information to patient/family  - Incorporate patient and family knowledge, values, beliefs, and cultural backgrounds into the planning and delivery of care  - Encourage patient/family to participate in care and decision-making at the level they choose  - Honor patient and family perspectives and choices  Outcome: Progressing     Problem: RESPIRATORY - ADULT  Goal: Achieves optimal ventilation and oxygenation  Description: INTERVENTIONS:  - Assess for changes in respiratory status  - Assess for changes in mentation and behavior  - Position to facilitate oxygenation and minimize respiratory effort  - Oxygen supplementation based on oxygen saturation or ABGs  - Provide Smoking Cessation handout, if applicable  - Encourage broncho-pulmonary hygiene including cough, deep breathe, Incentive Spirometry  - Assess the need for suctioning and perform as needed  - Assess and instruct to report SOB or any respiratory difficulty  - Respiratory Therapy support as indicated  - Manage/alleviate anxiety  - Monitor for signs/symptoms of CO2 retention  Outcome: Progressing     Problem: CARDIOVASCULAR - ADULT  Goal: Maintains optimal cardiac output and hemodynamic stability  Description: INTERVENTIONS:  - Monitor vital signs, rhythm, and trends  - Monitor for bleeding,  hypotension and signs of decreased cardiac output  - Evaluate effectiveness of vasoactive medications to optimize hemodynamic stability  - Monitor arterial and/or venous puncture sites for bleeding and/or hematoma  - Assess quality of pulses, skin color and temperature  - Assess for signs of decreased coronary artery perfusion - ex. Angina  - Evaluate fluid balance, assess for edema, trend weights  Outcome: Progressing  Goal: Absence of cardiac arrhythmias or at baseline  Description: INTERVENTIONS:  - Continuous cardiac monitoring, monitor vital signs, obtain 12 lead EKG if indicated  - Evaluate effectiveness of antiarrhythmic and heart rate control medications as ordered  - Initiate emergency measures for life threatening arrhythmias  - Monitor electrolytes and administer replacement therapy as ordered  Outcome: Progressing     Problem: PAIN - ADULT  Goal: Verbalizes/displays adequate comfort level or patient's stated pain goal  Description: INTERVENTIONS:  - Encourage pt to monitor pain and request assistance  - Assess pain using appropriate pain scale  - Administer analgesics based on type and severity of pain and evaluate response  - Implement non-pharmacological measures as appropriate and evaluate response  - Consider cultural and social influences on pain and pain management  - Manage/alleviate anxiety  - Utilize distraction and/or relaxation techniques  - Monitor for opioid side effects  - Notify MD/LIP if interventions unsuccessful or patient reports new pain  - Anticipate increased pain with activity and pre-medicate as appropriate  Outcome: Progressing     Problem: SAFETY ADULT - FALL  Goal: Free from fall injury  Description: INTERVENTIONS:  - Assess pt frequently for physical needs  - Identify cognitive and physical deficits and behaviors that affect risk of falls.  - [place] fall precautions as indicated by assessment.  - Educate pt/family on patient safety including physical limitations  - Instruct  pt to call for assistance with activity based on assessment  - Modify environment to reduce risk of injury  - Provide assistive devices as appropriate  - Consider OT/PT consult to assist with strengthening/mobility  - Encourage toileting schedule  Outcome: Progressing     Problem: DISCHARGE PLANNING  Goal: Discharge to home or other facility with appropriate resources  Description: INTERVENTIONS:  - Identify barriers to discharge w/pt and caregiver  - Include patient/family/discharge partner in discharge planning  - Arrange for needed discharge resources and transportation as appropriate  - Identify discharge learning needs (meds, wound care, etc)  - Arrange for interpreters to assist at discharge as needed  - Consider post-discharge preferences of patient/family/discharge partner  - Complete POLST form as appropriate  - Assess patient's ability to be responsible for managing their own health  - Refer to Case Management Department for coordinating discharge planning if the patient needs post-hospital services based on physician/LIP order or complex needs related to functional status, cognitive ability or social support system  Outcome: Progressing

## 2025-01-31 NOTE — PROGRESS NOTES
[facility] Cardiology Progress Note    Shyla Wheeler Patient Status:  Inpatient    1935 MRN [id number]   Location [facility] 3W/SW Attending Nisreen Aguirre MD   Hosp Day # 5 PCP Ray Goldman MD     Subjective:  Notified by APN of AF RVR , HR 120s. BP stable.    Sitting up in her chair . Denies cp, sob, palpitations, lightheadedness, dizziness   Son at bedside     Objective:  /74 (BP Location: Right arm)   Pulse 107   Temp 97.3 °F (36.3 °C) (Oral)   Resp 18   Ht 165.1 cm (5' 5\")   Wt 171 lb 9.6 oz (77.8 kg)   SpO2 96%   BMI 28.56 kg/m²     Telemetry: Afib , 127 bpm       Intake/Output:    Intake/Output Summary (Last 24 hours) at 2025 1428  Last data filed at 2025 1335  Gross per 24 hour   Intake 1435 ml   Output 200 ml   Net 1235 ml       Last 3 Weights   25 0530 171 lb 9.6 oz (77.8 kg)   25 0514 170 lb 3.2 oz (77.2 kg)   25 0500 170 lb 8 oz (77.3 kg)   25 0400 171 lb 9.6 oz (77.8 kg)   25 2154 168 lb 6.4 oz (76.4 kg)   25 1740 162 lb (73.5 kg)   25 0313 167 lb 8 oz (76 kg)   25 0501 163 lb 6.4 oz (74.1 kg)   25 1457 156 lb 6.4 oz (70.9 kg)   25 1451 156 lb 6.4 oz (70.9 kg)   25 0956 161 lb 9.6 oz (73.3 kg)   25 0929 162 lb (73.5 kg)   23 0936 171 lb (77.6 kg)       Labs:  Recent Labs   Lab 25  0706 25  0713 25  0904   * 103* 146*   BUN 60* 41* 34*   CREATSERUM 1.73* 1.33* 1.42*   EGFRCR 28* 38* 36*   CA 8.8 8.9 9.3    137 136   K 3.7 4.3  4.3 4.2   CL 98 101 101   CO2 28.0 28.0 27.0     Recent Labs   Lab 25  0706 25  0713 25  0904   RBC 3.58* 3.74* 3.72*   HGB 11.5* 11.3* 11.4*   HCT 33.6* 34.9* 35.0   MCV 93.9 93.3 94.1   MCH 32.1 30.2 30.6   MCHC 34.2 32.4 32.6   RDW 14.0 14.5 14.5   NEPRELIM 6.86 6.46 6.55   WBC 8.7 8.7 8.2   .0 343.0 356.0         Recent Labs   Lab 25  1807 25  0234 25  0551   TROPHS  31        Diagnostics:     1/20/25 Echo  1. Left ventricle: The cavity size was normal. Wall thickness was mildly      increased. Systolic function was moderately to markedly reduced. The      estimated ejection fraction was 30-35%, by biplane method of disks.      Akinesis of the anteroseptal and inferoseptal walls. Severe hypokinesis      of the entireinferior wall. Doppler parameters are consistent with      restrictive physiology, indicative of decreased left ventricular      diastolic compliance and/or increased left atrial pressure.   2. Right ventricle: The cavity size was normal. Systolic function was      normal.   Impressions:  Left ventricle dysfunction is new compared to echocardiogram   from 2016.       Review of Systems   Respiratory: Negative.     Cardiovascular: Negative.      Physical Exam:    General: Alert and oriented x 3. No apparent distress.   HEENT: Normocephalic, anicteric sclera, neck supple, no thyromegaly or adenopathy.  Neck: No JVD, carotids 2+, no bruits.  Cardiac: Irregularly irregular rate & rhythm. S1, S2 normal. No murmur, pericardial rub, S3, or extra cardiac sounds.  Lungs: Clear without wheezes, rales, rhonchi or dullness.  Normal excursions and effort.  Abdomen: Soft, non-tender. No organosplenomegally, mass or rebound, BS-present.  Extremities: Without clubbing or cyanosis.  No left lower extremity edema, no right lower extremity edema.  Neurologic: Alert and oriented, normal affect. No focal defects  Skin: Warm and dry.       Medications:   [START ON 2/1/2025] metoprolol succinate ER  150 mg Oral Daily    furosemide  20 mg Oral Daily    rivaroxaban  15 mg Oral Daily with lunch    ipratropium-albuterol  3 mL Nebulization q6h    piperacillin-tazobactam  3.375 g Intravenous Q8H    aspirin  81 mg Oral Daily    [Held by provider] atorvastatin  10 mg Oral Nightly    latanoprost  1 drop Both Eyes Nightly    pantoprazole  40 mg Oral QAM AC         Assessment:    Pneumonia  -On IV  antibiotics per PMD     A-fib with RVR ; old LBBB  -Patient presented with persistent A-fib and intermittent RVR  -Initially likely secondary to beta-blockade interruption, patient had not been taking this upon discharge from last hospitalization    -On metoprolol succinate 100 mg daily . Rapid rates today   -Elevated [id number], [facility] to pt requesting an alternative      Acute on chronic HFrEF, EF 30-35%  -Recent decrease in LVEF along with regional wall motion normalities, 30-35%  -Patient had deferred ischemic evaluation during her last hospitalization, also possible is tachycardia-mediated cardiomyopathy  -pBNP 26,463 & CXR w/ small bilateral pleural effusions. S/p IV lasix in ED . Subsequent diuretics held due to dejah. Restarted on lasix 20mg daily per Neph on 1/30  -Continue GDMT as tolerated w/ toprol xl . acei/arb/arni/mra/sglt2 due to DEJAH   -Compensated on exam     DEJAH   -Scr overall improved   -Diuretic resumed per Neph as above    -Renal ultrasound unremarkable    HTN   - low normal, stable     Elevated LFTs  -Improving   -Atorvastatin on hold, can likely resume on dc    -Per PMD     Plan:    In rapid Afib. HR 120s. Asymptomatic. Will give additional toprol xl 50mg x 1 now & increase daily dose to 150mg po daily . Continue xarelto   Compensated on exam. Denies dyspnea. Diuretics resumed per Neph   Monitor renal fx & volume status closely   Replenish electrolytes per cardiac protocol as needed. Keep K > 4, Mg > 2     Delicia Paul, SARA, FPA-APRN, FNP-BC, CCK   1/31/2025  2:36 PM  Ph [phone] ([place])  Ph [phone] ([place])      Seen/examined patient independently.  Agree with findings, assessment and plan as outlined above.     Patient reports feeling more fatigued recently.  Initially had attributed to anticoagulation, she was initially started on apixaban and due to the symptoms patient asked to be switched to an alternative  agent, rivaroxaban.  However symptoms have persisted.  On review of telemetry, patient has been in uncontrolled A-fib for the past 40 hours, rates in the 100-130s.    In regards to A-fib with RVR, will increase rate control from metoprolol succinate 100 mg daily to 100 mg twice daily.  In regards to elevated [id number], extensively explained her symptoms were most likely due to uncontrolled atrial fibrillation and not due to the anticoagulant.  Patient would like to discuss this with her daughter.  Attempted to call daughter, however went to voicemail.    Rest of plan as above.    Ray Daniel, DO

## 2025-01-31 NOTE — PLAN OF CARE
Pt alert and oriented x4. X1 assist with walker. 2L NC. IV Zosyn. PO Lasix. HR 110s-120s, cards aware, see orders. Pt and family updated on plan of care. Plan to monitor labs and wean O2. Safety precautions in place. Call light within reach.    Problem: Patient Centered Care  Goal: Patient preferences are identified and integrated in the patient's plan of care  Description: Interventions:  - What would you like us to know as we care for you? From home alone  - Provide timely, complete, and accurate information to patient/family  - Incorporate patient and family knowledge, values, beliefs, and cultural backgrounds into the planning and delivery of care  - Encourage patient/family to participate in care and decision-making at the level they choose  - Honor patient and family perspectives and choices  Outcome: Progressing     Problem: Patient/Family Goals  Goal: Patient/Family Long Term Goal  Description: Patient's Long Term Goal: To go home    Interventions:  - IV lasix, Iv solumedrol, nebs, labs and tests per MDs  - See additional Care Plan goals for specific interventions  Outcome: Progressing  Goal: Patient/Family Short Term Goal  Description: Patient's Short Term Goal: To improve breathing.    Interventions:   - O2 as needed, nebs, IV solumedrol, walking with assistance as tolerated.  - See additional Care Plan goals for specific interventions  Outcome: Progressing     Problem: RESPIRATORY - ADULT  Goal: Achieves optimal ventilation and oxygenation  Description: INTERVENTIONS:  - Assess for changes in respiratory status  - Assess for changes in mentation and behavior  - Position to facilitate oxygenation and minimize respiratory effort  - Oxygen supplementation based on oxygen saturation or ABGs  - Provide Smoking Cessation handout, if applicable  - Encourage broncho-pulmonary hygiene including cough, deep breathe, Incentive Spirometry  - Assess the need for suctioning and perform as needed  - Assess and instruct  to report SOB or any respiratory difficulty  - Respiratory Therapy support as indicated  - Manage/alleviate anxiety  - Monitor for signs/symptoms of CO2 retention  Outcome: Progressing     Problem: CARDIOVASCULAR - ADULT  Goal: Maintains optimal cardiac output and hemodynamic stability  Description: INTERVENTIONS:  - Monitor vital signs, rhythm, and trends  - Monitor for bleeding, hypotension and signs of decreased cardiac output  - Evaluate effectiveness of vasoactive medications to optimize hemodynamic stability  - Monitor arterial and/or venous puncture sites for bleeding and/or hematoma  - Assess quality of pulses, skin color and temperature  - Assess for signs of decreased coronary artery perfusion - ex. Angina  - Evaluate fluid balance, assess for edema, trend weights  Outcome: Progressing  Goal: Absence of cardiac arrhythmias or at baseline  Description: INTERVENTIONS:  - Continuous cardiac monitoring, monitor vital signs, obtain 12 lead EKG if indicated  - Evaluate effectiveness of antiarrhythmic and heart rate control medications as ordered  - Initiate emergency measures for life threatening arrhythmias  - Monitor electrolytes and administer replacement therapy as ordered  Outcome: Progressing     Problem: PAIN - ADULT  Goal: Verbalizes/displays adequate comfort level or patient's stated pain goal  Description: INTERVENTIONS:  - Encourage pt to monitor pain and request assistance  - Assess pain using appropriate pain scale  - Administer analgesics based on type and severity of pain and evaluate response  - Implement non-pharmacological measures as appropriate and evaluate response  - Consider cultural and social influences on pain and pain management  - Manage/alleviate anxiety  - Utilize distraction and/or relaxation techniques  - Monitor for opioid side effects  - Notify MD/LIP if interventions unsuccessful or patient reports new pain  - Anticipate increased pain with activity and pre-medicate as  appropriate  Outcome: Progressing     Problem: SAFETY ADULT - FALL  Goal: Free from fall injury  Description: INTERVENTIONS:  - Assess pt frequently for physical needs  - Identify cognitive and physical deficits and behaviors that affect risk of falls.  - Lee fall precautions as indicated by assessment.  - Educate pt/family on patient safety including physical limitations  - Instruct pt to call for assistance with activity based on assessment  - Modify environment to reduce risk of injury  - Provide assistive devices as appropriate  - Consider OT/PT consult to assist with strengthening/mobility  - Encourage toileting schedule  Outcome: Progressing     Problem: DISCHARGE PLANNING  Goal: Discharge to home or other facility with appropriate resources  Description: INTERVENTIONS:  - Identify barriers to discharge w/pt and caregiver  - Include patient/family/discharge partner in discharge planning  - Arrange for needed discharge resources and transportation as appropriate  - Identify discharge learning needs (meds, wound care, etc)  - Arrange for interpreters to assist at discharge as needed  - Consider post-discharge preferences of patient/family/discharge partner  - Complete POLST form as appropriate  - Assess patient's ability to be responsible for managing their own health  - Refer to Case Management Department for coordinating discharge planning if the patient needs post-hospital services based on physician/LIP order or complex needs related to functional status, cognitive ability or social support system  Outcome: Progressing

## 2025-01-31 NOTE — SPIRITUAL CARE NOTE
Spiritual Care Visit Note    Patient Name: Shyla Wheeler Date of Spiritual Care Visit: 25   : 1935 Primary Dx: Acute hypoxemic respiratory failure (HCC)       Referred By: Referral From: Nurse    Spiritual Care Taxonomy:    Intended Effects: Build relationship of care and support    Methods: Offer emotional support;Offer spiritual/Advent support    Interventions: Acknowledge current situation;Acknowledge response to difficult experience;Active listening;Ask guided questions;Ask guided questions about steven;Explain  role;Provide hospitality;Prayer for healing    Visit Type/Summary:     - Spiritual Care: Responded to a request for spiritual care and assessed the patient for spiritual care needs. Consulted with RN prior to visit. Offered empathic listening and emotional support. Patient and family expressed appreciation for  visit. Provided information regarding how to contact Spiritual Care and left a Spiritual Care information card.  remains available as needed for follow up.    Spiritual Care support can be requested via an Epic consult. For urgent/immediate needs, please contact the On Call  at: [place]: ext [phone]    Chaplain Resident, Shea Lacey PhD

## 2025-01-31 NOTE — PROGRESS NOTES
Progress Note     Shyla Wheeler Patient Status:  Inpatient    1935 MRN [id number]   Location [facility] 3W/SW Attending Odalys Castro MD   Hosp Day # 5 PCP Ray Goldman MD     Chief Complaint: patient presented with   Chief Complaint   Patient presents with    Difficulty Breathing       Subjective:   S: Patient had increased HR and now up to 120s, on 2 Litres of oxygen.     Review of Systems:   10 point ROS completed and was negative, except for pertinent positive and negatives stated in subjective.    Objective:   Vital signs:  Temp:  [97.3 °F (36.3 °C)-98.7 °F (37.1 °C)] 97.3 °F (36.3 °C)  Pulse:  [105-139] 127  Resp:  [18-20] 18  BP: ()/(69-92) 126/84  SpO2:  [92 %-95 %] 94 %    Wt Readings from Last 6 Encounters:   25 171 lb 9.6 oz (77.8 kg)   25 167 lb 8 oz (76 kg)   23 171 lb (77.6 kg)   21 173 lb (78.5 kg)   21 170 lb (77.1 kg)   19 180 lb (81.6 kg)         Physical Exam:    General: No acute distress. Alert ,         Respiratory: Clear to auscultation bilaterally. No wheezes. No rhonchi.  Cardiovascular: S1, S2. Regular rate and rhythm. No murmurs, rubs or gallops.   Abdomen: Soft, nontender, nondistended.  Positive bowel sounds. No rebound or guarding.  Neurologic: No focal neurological deficits.   Musculoskeletal: Moves all extremities.  Extremities: No edema.    Results:   Diagnostic Data:      Labs:    Labs Last 24 Hours:   BMP     CBC    Other     Na 136 Cl 101 BUN 34 Glu 146   Hb 11.4   PTT - Procal -   K 4.2 CO2 27.0 Cr 1.42   WBC 8.2 >< .0  INR - CRP -   Renal Lytes Endo    Hct 35.0   Trop - D dim -   eGFR - Ca 9.3 POC Gluc  -    LFT   pBNP - Lactic -   eGFR AA - PO4 3.2 A1c -   AST 32 APk 100 Prot 6.6  LDL -     Mg - TSH -   ALT 67 T kal 0.6 Alb 3.9        COVID-19 Lab Results    COVID-19  Lab Results   Component Value Date    COVID19 Not Detected 2025    COVID19 Not Detected 2025       Pro-Calcitonin  Recent Labs    Lab 01/26/25  1807   PCT 0.10*       Cardiac  Recent Labs   Lab 01/26/25  1807   PBNP 26,463*       Creatinine Kinase  No results for input(s): \"CK\" in the last 168 hours.    Inflammatory Markers  No results for input(s): \"CRP\", \"PATRIZIA\", \"LDH\", \"DDIMER\" in the last 168 hours.    Imaging: Imaging data reviewed in Epic.    Medications:    furosemide  20 mg Oral Daily    rivaroxaban  15 mg Oral Daily with lunch    ipratropium-albuterol  3 mL Nebulization q6h    piperacillin-tazobactam  3.375 g Intravenous Q8H    aspirin  81 mg Oral Daily    metoprolol succinate ER  100 mg Oral Daily    [Held by provider] atorvastatin  10 mg Oral Nightly    latanoprost  1 drop Both Eyes Nightly    pantoprazole  40 mg Oral QAM AC       Assessment & Plan:   ASSESSMENT / PLAN:     Problem List Items Addressed This Visit          HCC Problems    * (Principal) Acute hypoxemic respiratory failure (HCC) - Primary    Relevant Medications    guaiFENesin-codeine (Robitussin AC) 100-10 MG/5ML oral solution 5 mL    ipratropium-albuterol (Duoneb) 0.5-2.5 (3) MG/3ML inhalation solution 3 mL    ipratropium-albuterol (Duoneb) 0.5-2.5 (3) MG/3ML inhalation solution 3 mL    Acute on chronic heart failure, unspecified heart failure type (HCC)    Relevant Medications    furosemide (Lasix) 10 mg/mL injection 40 mg (Completed)    aspirin DR tab 81 mg    metoprolol succinate ER (Toprol XL) 24 hr tab 100 mg    atorvastatin (Lipitor) tab 10 mg    hydrALAzine (Apresoline) 20 mg/mL injection 10 mg    rivaroxaban (Xarelto) tab 15 mg    METOPROLOL SUCCINATE ER 50 MG Oral Tablet 24 Hr    RIVAROXABAN 15 MG Oral Tab    potassium chloride (Klor-Con M20) tab 40 mEq (Completed)    furosemide (Lasix) tab 20 mg     Acute hypoxic respiratory failure 2/2 combined systolic and diastolic HF     1/31-> pt is now back on 2 Litres of Oxygen.   ?PNA  Imaging reviewed  Strep pneumo and legionella Ag negative  Resp panel negative  BNP elevated  Trop and EKG reviewed  Started on  solu-medrol on admission, will discontinue considering CHF exacerbation  Started on Lasix 40 mg IV BID on admission  Continue IV zosyn (day 3)  Duonebs q6hrs  Cardiology on consult  EF 30-35%, decreased from prior  Continue BB  Unable to start ACE/ARB/MRNA/ARNI/SGLT2 due to DEJAH  Resume diuresis when okay with nephrology  Nephrology on consult  Appreciate recs  Strict Is and Os, daily weights  DEJAH - improving  Likely 2/2 overdiuresis?  Cardiology on consult  Hold diuresis, resume when okay with nephrology  Renal US - reviewed  Making uine  Monitor labs  1/31: lasix resumed, Renal following.   Persistent afib with RVR  Continue home meds  Cardiology on consult  Resume metoprolol 100 mg daily  Start Rivaroxaban 15 mg daily  Tele monitoring  1/31: Increased HR, Await Cards recs   HTN  BP well controlled  Monitor vitals  Elevated LFTs  Improving  Monitor labs       Quality:  DVT Prophylaxis: Xarelto   CODE status: FULL   DISPO: pending clinical improvement.   Estimated date of discharge: To be determined  Discharge is dependent on: Improved clinical status  At this point Patient is expected to be discharge to: Home versus rehab      Plan of care discussed with Patient and RN.     Coordinated care with providers and counseling re: treatment plan and workup     Odalys Castro MD    Supplementary Documentation:           I personally reviewed the available laboratories, imaging including operative report. I discussed/will discuss the case with patient and her nurse. I ordered laboratories studies. I adjusted medications including not applicable today. Medical decision making high, risk is high.     >55min spent, >50% spent counseling and coordinating care in the form of educating pt/family and d/w consultants and staff. Most of the time spent discussing the above plan.

## 2025-02-01 LAB — NT-PROBNP SERPL-MCNC: ABNORMAL PG/ML (ref ?–450)

## 2025-02-01 PROCEDURE — 99233 SBSQ HOSP IP/OBS HIGH 50: CPT | Performed by: FAMILY MEDICINE

## 2025-02-01 RX ORDER — ENOXAPARIN SODIUM 100 MG/ML
1 INJECTION SUBCUTANEOUS EVERY 24 HOURS
Status: DISCONTINUED | OUTPATIENT
Start: 2025-02-01 | End: 2025-02-04

## 2025-02-01 RX ORDER — METOPROLOL SUCCINATE 50 MG/1
150 TABLET, EXTENDED RELEASE ORAL
Status: DISCONTINUED | OUTPATIENT
Start: 2025-02-02 | End: 2025-02-02

## 2025-02-01 NOTE — PLAN OF CARE
Patient sitting up in room chair. Prn tylenol given for generalized pain. Son-in-law, daughter, or granddaughter at bedside throughout the day. Patient and family updated on POC and verbalized understanding.     Problem: Patient Centered Care  Goal: Patient preferences are identified and integrated in the patient's plan of care  Description: Interventions:  - What would you like us to know as we care for you? From home alone  - Provide timely, complete, and accurate information to patient/family  - Incorporate patient and family knowledge, values, beliefs, and cultural backgrounds into the planning and delivery of care  - Encourage patient/family to participate in care and decision-making at the level they choose  - Honor patient and family perspectives and choices  Outcome: Progressing     Problem: Patient/Family Goals  Goal: Patient/Family Long Term Goal  Description: Patient's Long Term Goal: To go home    Interventions:  - Monitor labs and vital signs  - Titrate oxygen  - Medication compliance  - Follow provider recommendations  - See additional Care Plan goals for specific interventions  Outcome: Progressing  Goal: Patient/Family Short Term Goal  Description: Patient's Short Term Goal: To improve breathing    Interventions:   - Oxygen  - IV antibiotics  - Medication compliance  - Follow provider recommendations  - See additional Care Plan goals for specific interventions  Outcome: Progressing     Problem: RESPIRATORY - ADULT  Goal: Achieves optimal ventilation and oxygenation  Description: INTERVENTIONS:  - Assess for changes in respiratory status  - Assess for changes in mentation and behavior  - Position to facilitate oxygenation and minimize respiratory effort  - Oxygen supplementation based on oxygen saturation or ABGs  - Provide Smoking Cessation handout, if applicable  - Encourage broncho-pulmonary hygiene including cough, deep breathe, Incentive Spirometry  - Assess the need for suctioning and perform as  needed  - Assess and instruct to report SOB or any respiratory difficulty  - Respiratory Therapy support as indicated  - Manage/alleviate anxiety  - Monitor for signs/symptoms of CO2 retention  Outcome: Progressing     Problem: CARDIOVASCULAR - ADULT  Goal: Maintains optimal cardiac output and hemodynamic stability  Description: INTERVENTIONS:  - Monitor vital signs, rhythm, and trends  - Monitor for bleeding, hypotension and signs of decreased cardiac output  - Evaluate effectiveness of vasoactive medications to optimize hemodynamic stability  - Monitor arterial and/or venous puncture sites for bleeding and/or hematoma  - Assess quality of pulses, skin color and temperature  - Assess for signs of decreased coronary artery perfusion - ex. Angina  - Evaluate fluid balance, assess for edema, trend weights  Outcome: Progressing  Goal: Absence of cardiac arrhythmias or at baseline  Description: INTERVENTIONS:  - Continuous cardiac monitoring, monitor vital signs, obtain 12 lead EKG if indicated  - Evaluate effectiveness of antiarrhythmic and heart rate control medications as ordered  - Initiate emergency measures for life threatening arrhythmias  - Monitor electrolytes and administer replacement therapy as ordered  Outcome: Progressing     Problem: PAIN - ADULT  Goal: Verbalizes/displays adequate comfort level or patient's stated pain goal  Description: INTERVENTIONS:  - Encourage pt to monitor pain and request assistance  - Assess pain using appropriate pain scale  - Administer analgesics based on type and severity of pain and evaluate response  - Implement non-pharmacological measures as appropriate and evaluate response  - Consider cultural and social influences on pain and pain management  - Manage/alleviate anxiety  - Utilize distraction and/or relaxation techniques  - Monitor for opioid side effects  - Notify MD/LIP if interventions unsuccessful or patient reports new pain  - Anticipate increased pain with  activity and pre-medicate as appropriate  Outcome: Progressing     Problem: SAFETY ADULT - FALL  Goal: Free from fall injury  Description: INTERVENTIONS:  - Assess pt frequently for physical needs  - Identify cognitive and physical deficits and behaviors that affect risk of falls.  - [place] fall precautions as indicated by assessment.  - Educate pt/family on patient safety including physical limitations  - Instruct pt to call for assistance with activity based on assessment  - Modify environment to reduce risk of injury  - Provide assistive devices as appropriate  - Consider OT/PT consult to assist with strengthening/mobility  - Encourage toileting schedule  Outcome: Progressing     Problem: DISCHARGE PLANNING  Goal: Discharge to home or other facility with appropriate resources  Description: INTERVENTIONS:  - Identify barriers to discharge w/pt and caregiver  - Include patient/family/discharge partner in discharge planning  - Arrange for needed discharge resources and transportation as appropriate  - Identify discharge learning needs (meds, wound care, etc)  - Arrange for interpreters to assist at discharge as needed  - Consider post-discharge preferences of patient/family/discharge partner  - Complete POLST form as appropriate  - Assess patient's ability to be responsible for managing their own health  - Refer to Case Management Department for coordinating discharge planning if the patient needs post-hospital services based on physician/LIP order or complex needs related to functional status, cognitive ability or social support system  Outcome: Progressing

## 2025-02-01 NOTE — PROGRESS NOTES
Progress Note     Shyla Wheeler Patient Status:  Inpatient    1935 MRN [id number]   Location [facility] 3W/SW Attending Odalys Castro MD   Hosp Day # 6 PCP Ray Goldman MD     Chief Complaint: patient presented with   Chief Complaint   Patient presents with    Difficulty Breathing       Subjective:   S: Patient had increased HR and now up to 120s, on 2 Litres of oxygen.   : Pt continues to have increase HR around 110 , curretly on 3Litres of Oxygen , at home she is not on any oxygen     Review of Systems:   10 point ROS completed and was negative, except for pertinent positive and negatives stated in subjective.    Objective:   Vital signs:  Temp:  [97.3 °F (36.3 °C)-98.1 °F (36.7 °C)] 97.3 °F (36.3 °C)  Pulse:  [] 105  Resp:  [18-23] 20  BP: ()/(74-95) 105/85  SpO2:  [93 %-97 %] 96 %    Wt Readings from Last 6 Encounters:   25 169 lb 6.4 oz (76.8 kg)   25 167 lb 8 oz (76 kg)   23 171 lb (77.6 kg)   21 173 lb (78.5 kg)   21 170 lb (77.1 kg)   19 180 lb (81.6 kg)         Physical Exam:    General: No acute distress. Alert ,         Respiratory: Clear to auscultation bilaterally. No wheezes. No rhonchi.  Cardiovascular: S1, S2. Regular rate and rhythm. No murmurs, rubs or gallops.   Abdomen: Soft, nontender, nondistended.  Positive bowel sounds. No rebound or guarding.  Neurologic: No focal neurological deficits.   Musculoskeletal: Moves all extremities.  Extremities: No edema.    Results:   Diagnostic Data:      Labs:    Labs Last 24 Hours:   BMP     CBC    Other     Na - Cl - BUN - Glu -   Hb -   PTT - Procal -   K - CO2 - Cr -   WBC - >< PLT -  INR - CRP -   Renal Lytes Endo    Hct -   Trop - D dim -   eGFR - Ca - POC Gluc  -    LFT   pBNP 34,364 Lactic -   eGFR AA - PO4 - A1c -   AST - APk - Prot -  LDL -     Mg - TSH -   ALT - T kal - Alb -        COVID-19 Lab Results    COVID-19  Lab Results   Component Value Date    COVID19 Not Detected  01/26/2025    COVID19 Not Detected 01/18/2025       Pro-Calcitonin  Recent Labs   Lab 01/26/25  1807   PCT 0.10*       Cardiac  Recent Labs   Lab 02/01/25  0553   PBNP 34,364*       Creatinine Kinase  No results for input(s): \"CK\" in the last 168 hours.    Inflammatory Markers  No results for input(s): \"CRP\", \"PATRIZIA\", \"LDH\", \"DDIMER\" in the last 168 hours.    Imaging: Imaging data reviewed in Epic.    Medications:    metoprolol succinate  100 mg Oral 2x Daily(Beta Blocker)    furosemide  20 mg Oral Daily    rivaroxaban  15 mg Oral Daily with lunch    ipratropium-albuterol  3 mL Nebulization q6h    piperacillin-tazobactam  3.375 g Intravenous Q8H    aspirin  81 mg Oral Daily    [Held by provider] atorvastatin  10 mg Oral Nightly    latanoprost  1 drop Both Eyes Nightly    pantoprazole  40 mg Oral QAM AC       Assessment & Plan:   ASSESSMENT / PLAN:     Problem List Items Addressed This Visit          HCC Problems    * (Principal) Acute hypoxemic respiratory failure (HCC) - Primary    Relevant Medications    guaiFENesin-codeine (Robitussin AC) 100-10 MG/5ML oral solution 5 mL    ipratropium-albuterol (Duoneb) 0.5-2.5 (3) MG/3ML inhalation solution 3 mL    ipratropium-albuterol (Duoneb) 0.5-2.5 (3) MG/3ML inhalation solution 3 mL    Acute on chronic heart failure, unspecified heart failure type (HCC)    Relevant Medications    furosemide (Lasix) 10 mg/mL injection 40 mg (Completed)    aspirin DR tab 81 mg    atorvastatin (Lipitor) tab 10 mg    hydrALAzine (Apresoline) 20 mg/mL injection 10 mg    rivaroxaban (Xarelto) tab 15 mg    METOPROLOL SUCCINATE ER 50 MG Oral Tablet 24 Hr    RIVAROXABAN 15 MG Oral Tab    potassium chloride (Klor-Con M20) tab 40 mEq (Completed)    furosemide (Lasix) tab 20 mg    metoprolol succinate ER (Toprol XL) 24 hr tab 50 mg (Completed)    metoprolol succinate ER (Toprol XL) 24 hr tab 100 mg     Acute hypoxic respiratory failure 2/2 combined systolic and diastolic HF     1/31-> pt is now back on  2 Litres of Oxygen.   2/1: continues to be on 3 Litres of oxygen, continue to wean.     ?PNA  Imaging reviewed  Strep pneumo and legionella Ag negative  Resp panel negative  BNP elevated  Trop and EKG reviewed  Started on solu-medrol on admission, will discontinue considering CHF exacerbation  Started on Lasix 40 mg IV BID on admission  Continue IV zosyn (day 3)  Duonebs q6hrs  Cardiology on consult  EF 30-35%, decreased from prior  Continue BB  Unable to start ACE/ARB/MRNA/ARNI/SGLT2 due to DEJAH  Resume diuresis when okay with nephrology  Nephrology on consult  Appreciate recs  Strict Is and Os, daily weights  DEJAH - improving  Likely 2/2 overdiuresis?  Cardiology on consult  Hold diuresis, resume when okay with nephrology  Renal US - reviewed  Making uine  Monitor labs  1/31: lasix resumed, Renal following.   Persistent afib with RVR  Continue home meds  Cardiology on consult  Resume metoprolol 100 mg daily  Start Rivaroxaban 15 mg daily  Tele monitoring  1/31: Increased HR, Await Cards recs   2/1: pt continue to have increased HR, metoprolol dose increased   HTN  BP well controlled  Monitor vitals  Elevated LFTs  Improving  Monitor labs       Quality:  DVT Prophylaxis: Xarelto   CODE status: FULL   DISPO: pending clinical improvement.   Estimated date of discharge: To be determined  Discharge is dependent on: Improved clinical status  At this point Patient is expected to be discharge to: Home versus rehab      Plan of care discussed with Patient and RN.     Coordinated care with providers and counseling re: treatment plan and workup     Odalys Castro MD    Supplementary Documentation:           I personally reviewed the available laboratories, imaging including operative report. I discussed/will discuss the case with patient and her nurse. I ordered laboratories studies. I adjusted medications including not applicable today. Medical decision making high, risk is high.     >55min spent, >50% spent counseling and  coordinating care in the form of educating pt/family and d/w consultants and staff. Most of the time spent discussing the above plan.

## 2025-02-01 NOTE — PROGRESS NOTES
Discussed in detail the need for anticoagulation with patient and her son-in-law about afib heart rates varying as much as they do, from 80's to the 130's. Explained the mechanisms behind afib and how the blood clots start to form. It would be beneficial for further discussion about options for anticoagulation to prevent stroke risk.

## 2025-02-02 LAB
ANION GAP SERPL CALC-SCNC: 8 MMOL/L (ref 0–18)
BASOPHILS # BLD AUTO: 0.03 X10(3) UL (ref 0–0.2)
BASOPHILS NFR BLD AUTO: 0.3 %
BUN BLD-MCNC: 43 MG/DL (ref 9–23)
BUN/CREAT SERPL: 28.5 (ref 10–20)
CALCIUM BLD-MCNC: 8.4 MG/DL (ref 8.7–10.4)
CHLORIDE SERPL-SCNC: 101 MMOL/L (ref 98–112)
CO2 SERPL-SCNC: 24 MMOL/L (ref 21–32)
CREAT BLD-MCNC: 1.51 MG/DL
DEPRECATED RDW RBC AUTO: 47.8 FL (ref 35.1–46.3)
EGFRCR SERPLBLD CKD-EPI 2021: 33 ML/MIN/1.73M2 (ref 60–?)
EOSINOPHIL # BLD AUTO: 0.29 X10(3) UL (ref 0–0.7)
EOSINOPHIL NFR BLD AUTO: 3.3 %
ERYTHROCYTE [DISTWIDTH] IN BLOOD BY AUTOMATED COUNT: 14.4 % (ref 11–15)
GLUCOSE BLD-MCNC: 126 MG/DL (ref 70–99)
GLUCOSE BLDC GLUCOMTR-MCNC: 159 MG/DL (ref 70–99)
HCT VFR BLD AUTO: 34.6 %
HGB BLD-MCNC: 11.8 G/DL
IMM GRANULOCYTES # BLD AUTO: 0.04 X10(3) UL (ref 0–1)
IMM GRANULOCYTES NFR BLD: 0.5 %
LYMPHOCYTES # BLD AUTO: 0.72 X10(3) UL (ref 1–4)
LYMPHOCYTES NFR BLD AUTO: 8.3 %
MCH RBC QN AUTO: 31.7 PG (ref 26–34)
MCHC RBC AUTO-ENTMCNC: 34.1 G/DL (ref 31–37)
MCV RBC AUTO: 93 FL
MONOCYTES # BLD AUTO: 0.42 X10(3) UL (ref 0.1–1)
MONOCYTES NFR BLD AUTO: 4.8 %
NEUTROPHILS # BLD AUTO: 7.17 X10 (3) UL (ref 1.5–7.7)
NEUTROPHILS # BLD AUTO: 7.17 X10(3) UL (ref 1.5–7.7)
NEUTROPHILS NFR BLD AUTO: 82.8 %
OSMOLALITY SERPL CALC.SUM OF ELEC: 288 MOSM/KG (ref 275–295)
PLATELET # BLD AUTO: 335 10(3)UL (ref 150–450)
POTASSIUM SERPL-SCNC: 4.7 MMOL/L (ref 3.5–5.1)
RBC # BLD AUTO: 3.72 X10(6)UL
SODIUM SERPL-SCNC: 133 MMOL/L (ref 136–145)
WBC # BLD AUTO: 8.7 X10(3) UL (ref 4–11)

## 2025-02-02 PROCEDURE — 99233 SBSQ HOSP IP/OBS HIGH 50: CPT | Performed by: FAMILY MEDICINE

## 2025-02-02 RX ORDER — FUROSEMIDE 20 MG/1
20 TABLET ORAL EVERY OTHER DAY
Status: DISCONTINUED | OUTPATIENT
Start: 2025-02-03 | End: 2025-02-04

## 2025-02-02 RX ORDER — DILTIAZEM HYDROCHLORIDE 30 MG/1
30 TABLET, FILM COATED ORAL EVERY 6 HOURS SCHEDULED
Status: DISCONTINUED | OUTPATIENT
Start: 2025-02-02 | End: 2025-02-04

## 2025-02-02 RX ORDER — METOPROLOL SUCCINATE 50 MG/1
100 TABLET, EXTENDED RELEASE ORAL
Status: DISCONTINUED | OUTPATIENT
Start: 2025-02-02 | End: 2025-02-04

## 2025-02-02 NOTE — PLAN OF CARE
Problem: Patient Centered Care  Goal: Patient preferences are identified and integrated in the patient's plan of care  Description: Interventions:  - What would you like us to know as we care for you? From home alone  - Provide timely, complete, and accurate information to patient/family  - Incorporate patient and family knowledge, values, beliefs, and cultural backgrounds into the planning and delivery of care  - Encourage patient/family to participate in care and decision-making at the level they choose  - Honor patient and family perspectives and choices  Outcome: Progressing     Problem: Patient/Family Goals  Goal: Patient/Family Long Term Goal  Description: Patient's Long Term Goal: To go home    Interventions:  - Monitor labs and vital signs  - Titrate oxygen  - Medication compliance  - Follow provider recommendations  - See additional Care Plan goals for specific interventions  Outcome: Progressing  Goal: Patient/Family Short Term Goal  Description: Patient's Short Term Goal: To improve breathing    Interventions:   - Oxygen  - IV antibiotics  - Medication compliance  - Follow provider recommendations  - See additional Care Plan goals for specific interventions  Outcome: Progressing     Problem: RESPIRATORY - ADULT  Goal: Achieves optimal ventilation and oxygenation  Description: INTERVENTIONS:  - Assess for changes in respiratory status  - Assess for changes in mentation and behavior  - Position to facilitate oxygenation and minimize respiratory effort  - Oxygen supplementation based on oxygen saturation or ABGs  - Provide Smoking Cessation handout, if applicable  - Encourage broncho-pulmonary hygiene including cough, deep breathe, Incentive Spirometry  - Assess the need for suctioning and perform as needed  - Assess and instruct to report SOB or any respiratory difficulty  - Respiratory Therapy support as indicated  - Manage/alleviate anxiety  - Monitor for signs/symptoms of CO2 retention  Outcome:  Progressing     Problem: CARDIOVASCULAR - ADULT  Goal: Maintains optimal cardiac output and hemodynamic stability  Description: INTERVENTIONS:  - Monitor vital signs, rhythm, and trends  - Monitor for bleeding, hypotension and signs of decreased cardiac output  - Evaluate effectiveness of vasoactive medications to optimize hemodynamic stability  - Monitor arterial and/or venous puncture sites for bleeding and/or hematoma  - Assess quality of pulses, skin color and temperature  - Assess for signs of decreased coronary artery perfusion - ex. Angina  - Evaluate fluid balance, assess for edema, trend weights  Outcome: Progressing  Goal: Absence of cardiac arrhythmias or at baseline  Description: INTERVENTIONS:  - Continuous cardiac monitoring, monitor vital signs, obtain 12 lead EKG if indicated  - Evaluate effectiveness of antiarrhythmic and heart rate control medications as ordered  - Initiate emergency measures for life threatening arrhythmias  - Monitor electrolytes and administer replacement therapy as ordered  Outcome: Progressing     Problem: PAIN - ADULT  Goal: Verbalizes/displays adequate comfort level or patient's stated pain goal  Description: INTERVENTIONS:  - Encourage pt to monitor pain and request assistance  - Assess pain using appropriate pain scale  - Administer analgesics based on type and severity of pain and evaluate response  - Implement non-pharmacological measures as appropriate and evaluate response  - Consider cultural and social influences on pain and pain management  - Manage/alleviate anxiety  - Utilize distraction and/or relaxation techniques  - Monitor for opioid side effects  - Notify MD/LIP if interventions unsuccessful or patient reports new pain  - Anticipate increased pain with activity and pre-medicate as appropriate  Outcome: Progressing     Problem: SAFETY ADULT - FALL  Goal: Free from fall injury  Description: INTERVENTIONS:  - Assess pt frequently for physical needs  - Identify  cognitive and physical deficits and behaviors that affect risk of falls.  - [place] fall precautions as indicated by assessment.  - Educate pt/family on patient safety including physical limitations  - Instruct pt to call for assistance with activity based on assessment  - Modify environment to reduce risk of injury  - Provide assistive devices as appropriate  - Consider OT/PT consult to assist with strengthening/mobility  - Encourage toileting schedule  Outcome: Progressing     Problem: DISCHARGE PLANNING  Goal: Discharge to home or other facility with appropriate resources  Description: INTERVENTIONS:  - Identify barriers to discharge w/pt and caregiver  - Include patient/family/discharge partner in discharge planning  - Arrange for needed discharge resources and transportation as appropriate  - Identify discharge learning needs (meds, wound care, etc)  - Arrange for interpreters to assist at discharge as needed  - Consider post-discharge preferences of patient/family/discharge partner  - Complete POLST form as appropriate  - Assess patient's ability to be responsible for managing their own health  - Refer to Case Management Department for coordinating discharge planning if the patient needs post-hospital services based on physician/LIP order or complex needs related to functional status, cognitive ability or social support system  Outcome: Progressing

## 2025-02-02 NOTE — PLAN OF CARE
Patient has been sleepy but easily arousable all day. HR 80-90s when sleeping and 100-120s while awake. PO cardizem started today per cardiology. BP 90/62 this evening. Metoprolol and cardizem dose held per Nikia CARTER. Daughter and son-in-law visited today. Patient and family updated on POC. Patient requires reinforcement of all teaching due to sleepiness. Family verbalized understanding of POC.    Problem: Patient Centered Care  Goal: Patient preferences are identified and integrated in the patient's plan of care  Description: Interventions:  - What would you like us to know as we care for you? From home alone  - Provide timely, complete, and accurate information to patient/family  - Incorporate patient and family knowledge, values, beliefs, and cultural backgrounds into the planning and delivery of care  - Encourage patient/family to participate in care and decision-making at the level they choose  - Honor patient and family perspectives and choices  Outcome: Progressing     Problem: Patient/Family Goals  Goal: Patient/Family Long Term Goal  Description: Patient's Long Term Goal: To go home    Interventions:  - Monitor labs and vital signs  - Titrate oxygen  - Medication compliance  - Follow provider recommendations  - See additional Care Plan goals for specific interventions  Outcome: Progressing  Goal: Patient/Family Short Term Goal  Description: Patient's Short Term Goal: To improve breathing    Interventions:   - Oxygen  - IV antibiotics  - Medication compliance  - Follow provider recommendations  - See additional Care Plan goals for specific interventions  Outcome: Progressing     Problem: RESPIRATORY - ADULT  Goal: Achieves optimal ventilation and oxygenation  Description: INTERVENTIONS:  - Assess for changes in respiratory status  - Assess for changes in mentation and behavior  - Position to facilitate oxygenation and minimize respiratory effort  - Oxygen supplementation based on oxygen  saturation or ABGs  - Provide Smoking Cessation handout, if applicable  - Encourage broncho-pulmonary hygiene including cough, deep breathe, Incentive Spirometry  - Assess the need for suctioning and perform as needed  - Assess and instruct to report SOB or any respiratory difficulty  - Respiratory Therapy support as indicated  - Manage/alleviate anxiety  - Monitor for signs/symptoms of CO2 retention  Outcome: Progressing     Problem: CARDIOVASCULAR - ADULT  Goal: Maintains optimal cardiac output and hemodynamic stability  Description: INTERVENTIONS:  - Monitor vital signs, rhythm, and trends  - Monitor for bleeding, hypotension and signs of decreased cardiac output  - Evaluate effectiveness of vasoactive medications to optimize hemodynamic stability  - Monitor arterial and/or venous puncture sites for bleeding and/or hematoma  - Assess quality of pulses, skin color and temperature  - Assess for signs of decreased coronary artery perfusion - ex. Angina  - Evaluate fluid balance, assess for edema, trend weights  Outcome: Progressing  Goal: Absence of cardiac arrhythmias or at baseline  Description: INTERVENTIONS:  - Continuous cardiac monitoring, monitor vital signs, obtain 12 lead EKG if indicated  - Evaluate effectiveness of antiarrhythmic and heart rate control medications as ordered  - Initiate emergency measures for life threatening arrhythmias  - Monitor electrolytes and administer replacement therapy as ordered  Outcome: Progressing     Problem: PAIN - ADULT  Goal: Verbalizes/displays adequate comfort level or patient's stated pain goal  Description: INTERVENTIONS:  - Encourage pt to monitor pain and request assistance  - Assess pain using appropriate pain scale  - Administer analgesics based on type and severity of pain and evaluate response  - Implement non-pharmacological measures as appropriate and evaluate response  - Consider cultural and social influences on pain and pain management  - Manage/alleviate  anxiety  - Utilize distraction and/or relaxation techniques  - Monitor for opioid side effects  - Notify MD/LIP if interventions unsuccessful or patient reports new pain  - Anticipate increased pain with activity and pre-medicate as appropriate  Outcome: Progressing     Problem: SAFETY ADULT - FALL  Goal: Free from fall injury  Description: INTERVENTIONS:  - Assess pt frequently for physical needs  - Identify cognitive and physical deficits and behaviors that affect risk of falls.  - [place] fall precautions as indicated by assessment.  - Educate pt/family on patient safety including physical limitations  - Instruct pt to call for assistance with activity based on assessment  - Modify environment to reduce risk of injury  - Provide assistive devices as appropriate  - Consider OT/PT consult to assist with strengthening/mobility  - Encourage toileting schedule  Outcome: Progressing     Problem: DISCHARGE PLANNING  Goal: Discharge to home or other facility with appropriate resources  Description: INTERVENTIONS:  - Identify barriers to discharge w/pt and caregiver  - Include patient/family/discharge partner in discharge planning  - Arrange for needed discharge resources and transportation as appropriate  - Identify discharge learning needs (meds, wound care, etc)  - Arrange for interpreters to assist at discharge as needed  - Consider post-discharge preferences of patient/family/discharge partner  - Complete POLST form as appropriate  - Assess patient's ability to be responsible for managing their own health  - Refer to Case Management Department for coordinating discharge planning if the patient needs post-hospital services based on physician/LIP order or complex needs related to functional status, cognitive ability or social support system  Outcome: Progressing

## 2025-02-02 NOTE — PROGRESS NOTES
[facility] Cardiology Progress Note    Shyla Wheeler Patient Status:  Inpatient    1935 MRN [id number]   Location [facility] 3W/SW Attending Nisreen gAuirre MD   Hosp Day # 6 PCP Ray Goldman MD     Subjective:  Rates are better controlled, 90-100s.  Patient reports ongoing fatigue, states that it is associated after her rivaroxaban dosing.    Objective:  BP (!) 117/93 (BP Location: Right arm)   Pulse 98   Temp 98.2 °F (36.8 °C) (Oral)   Resp 20   Ht 5' 5\" (1.651 m)   Wt 169 lb 6.4 oz (76.8 kg)   SpO2 98%   BMI 28.19 kg/m²     Telemetry: Afib , 127 bpm       Intake/Output:    Intake/Output Summary (Last 24 hours) at 2025 2341  Last data filed at 2025 1827  Gross per 24 hour   Intake 1210 ml   Output 450 ml   Net 760 ml       Last 3 Weights   25 0416 169 lb 6.4 oz (76.8 kg)   25 0530 171 lb 9.6 oz (77.8 kg)   25 0514 170 lb 3.2 oz (77.2 kg)   25 0500 170 lb 8 oz (77.3 kg)   25 0400 171 lb 9.6 oz (77.8 kg)   25 2154 168 lb 6.4 oz (76.4 kg)   25 1740 162 lb (73.5 kg)   25 0313 167 lb 8 oz (76 kg)   25 0501 163 lb 6.4 oz (74.1 kg)   25 1457 156 lb 6.4 oz (70.9 kg)   25 1451 156 lb 6.4 oz (70.9 kg)   25 0956 161 lb 9.6 oz (73.3 kg)   25 0929 162 lb (73.5 kg)   23 0936 171 lb (77.6 kg)       Labs:  Recent Labs   Lab 25  0706 25  0713 25  0904   * 103* 146*   BUN 60* 41* 34*   CREATSERUM 1.73* 1.33* 1.42*   EGFRCR 28* 38* 36*   CA 8.8 8.9 9.3    137 136   K 3.7 4.3  4.3 4.2   CL 98 101 101   CO2 28.0 28.0 27.0     Recent Labs   Lab 25  0706 25  0713 25  0904   RBC 3.58* 3.74* 3.72*   HGB 11.5* 11.3* 11.4*   HCT 33.6* 34.9* 35.0   MCV 93.9 93.3 94.1   MCH 32.1 30.2 30.6   MCHC 34.2 32.4 32.6   RDW 14.0 14.5 14.5   NEPRELIM 6.86 6.46 6.55   WBC 8.7 8.7 8.2   .0 343.0 356.0         Recent Labs   Lab 25  1807 25  0234 25  0551    TROPHS 26 28 31       Diagnostics:     1/20/25 Echo  1. Left ventricle: The cavity size was normal. Wall thickness was mildly      increased. Systolic function was moderately to markedly reduced. The      estimated ejection fraction was 30-35%, by biplane method of disks.      Akinesis of the anteroseptal and inferoseptal walls. Severe hypokinesis      of the entireinferior wall. Doppler parameters are consistent with      restrictive physiology, indicative of decreased left ventricular      diastolic compliance and/or increased left atrial pressure.   2. Right ventricle: The cavity size was normal. Systolic function was      normal.   Impressions:  Left ventricle dysfunction is new compared to echocardiogram   from 2016.       Review of Systems   Respiratory: Negative.     Cardiovascular: Negative.      Physical Exam:    General: Alert and oriented x 3. No apparent distress.   HEENT: Normocephalic, anicteric sclera, neck supple, no thyromegaly or adenopathy.  Neck: No JVD, carotids 2+, no bruits.  Cardiac: Irregularly irregular rate & rhythm. S1, S2 normal. No murmur, pericardial rub, S3, or extra cardiac sounds.  Lungs: Clear without wheezes, rales, rhonchi or dullness.  Normal excursions and effort.  Abdomen: Soft, non-tender. No organosplenomegally, mass or rebound, BS-present.  Extremities: Without clubbing or cyanosis.  No left lower extremity edema, no right lower extremity edema.  Neurologic: Alert and oriented, normal affect. No focal defects  Skin: Warm and dry.       Medications:   enoxaparin  1 mg/kg Subcutaneous Q24H    metoprolol succinate  100 mg Oral 2x Daily(Beta Blocker)    furosemide  20 mg Oral Daily    ipratropium-albuterol  3 mL Nebulization q6h    piperacillin-tazobactam  3.375 g Intravenous Q8H    aspirin  81 mg Oral Daily    [Held by provider] atorvastatin  10 mg Oral Nightly    latanoprost  1 drop Both Eyes Nightly    pantoprazole  40 mg Oral QAM AC         Assessment:    Pneumonia  -On  IV antibiotics per PMD     A-fib with RVR ; old LBBB  -Patient presented with persistent A-fib and intermittent RVR  -Initially likely secondary to beta-blockade interruption, patient had not been taking this upon discharge from last hospitalization    -Continue rate control with metoprolol succinate   -increase to 150mg BID  -Elevated HHT7VD3-KOTs, pt attributes her fatigue symptoms to OAC - first with apixaban and now after switching to rivaroxaban - had extensive discussion that symptoms likely related to suboptimally controlled AF and underlying rEF, will switch to enoxaparin during hospitalization per patient preference     Acute on chronic HFrEF, EF 30-35%  -Recent decrease in LVEF along with regional wall motion normalities, 30-35%  -Patient adamant in deferring ischemic evaluation, also possible is tachycardia-mediated cardiomyopathy  -pBNP 26,463 & CXR w/ small bilateral pleural effusions  -Continue GDMT as tolerated w/ toprol xl . acei/arb/arni/mra/sglt2 due to DEJAH   -resumed diuresis to maintain euvolemia    DEJAH   -Scr overall improved   -Diuretic resumed per Neph as above    -Renal ultrasound unremarkable    Ray Daniel, DO

## 2025-02-02 NOTE — PROGRESS NOTES
Progress Note     Shyla Wheeler Patient Status:  Inpatient    1935 MRN [id number]   Location [facility] 3W/SW Attending Odalys Castro MD   Hosp Day # 7 PCP Ray Goldman MD     Chief Complaint: patient presented with   Chief Complaint   Patient presents with    Difficulty Breathing       Subjective:   S: Patient had increased HR and now up to 120s, on 2 Litres of oxygen.   2: Pt continues to have increase HR around 110 , curretly on 3Litres of Oxygen , at home she is not on any oxygen   2/2 pt continues to be on 3L of oxygen, rate not well controlled, denies any other complaints, no cp, no sob.     Review of Systems:   10 point ROS completed and was negative, except for pertinent positive and negatives stated in subjective.    Objective:   Vital signs:  Temp:  [97.2 °F (36.2 °C)-98.2 °F (36.8 °C)] 97.7 °F (36.5 °C)  Pulse:  [] 91  Resp:  [18-26] 20  BP: (108-166)/(83-93) 120/89  SpO2:  [94 %-98 %] 97 %    Wt Readings from Last 6 Encounters:   25 169 lb (76.7 kg)   25 167 lb 8 oz (76 kg)   23 171 lb (77.6 kg)   21 173 lb (78.5 kg)   21 170 lb (77.1 kg)   19 180 lb (81.6 kg)         Physical Exam:    General: No acute distress. Alert ,         Respiratory: Clear to auscultation bilaterally. No wheezes. No rhonchi.  Cardiovascular: S1, S2. Regular rate and rhythm. No murmurs, rubs or gallops.   Abdomen: Soft, nontender, nondistended.  Positive bowel sounds. No rebound or guarding.  Neurologic: No focal neurological deficits.   Musculoskeletal: Moves all extremities.  Extremities: No edema.    Results:   Diagnostic Data:      Labs:    Labs Last 24 Hours:   BMP     CBC    Other     Na 133 Cl 101 BUN 43 Glu 126   Hb 11.8   PTT - Procal -   K 4.7 CO2 24.0 Cr 1.51   WBC 8.7 >< .0  INR - CRP -   Renal Lytes Endo    Hct 34.6   Trop - D dim -   eGFR - Ca 8.4 POC Gluc  159    LFT   pBNP - Lactic -   eGFR AA - PO4 - A1c -   AST - APk - Prot -  LDL -     Mg -  TSH -   ALT - T kal - Alb -        COVID-19 Lab Results    COVID-19  Lab Results   Component Value Date    COVID19 Not Detected 01/26/2025    COVID19 Not Detected 01/18/2025       Pro-Calcitonin  Recent Labs   Lab 01/26/25  1807   PCT 0.10*       Cardiac  Recent Labs   Lab 02/01/25  0553   PBNP 34,364*       Creatinine Kinase  No results for input(s): \"CK\" in the last 168 hours.    Inflammatory Markers  No results for input(s): \"CRP\", \"PATRIZIA\", \"LDH\", \"DDIMER\" in the last 168 hours.    Imaging: Imaging data reviewed in Epic.    Medications:    dilTIAZem  30 mg Oral 4 times per day    metoprolol succinate  100 mg Oral 2x Daily(Beta Blocker)    enoxaparin  1 mg/kg Subcutaneous Q24H    furosemide  20 mg Oral Daily    ipratropium-albuterol  3 mL Nebulization q6h    piperacillin-tazobactam  3.375 g Intravenous Q8H    aspirin  81 mg Oral Daily    [Held by provider] atorvastatin  10 mg Oral Nightly    latanoprost  1 drop Both Eyes Nightly    pantoprazole  40 mg Oral QAM AC       Assessment & Plan:   ASSESSMENT / PLAN:     Problem List Items Addressed This Visit          HCC Problems    * (Principal) Acute hypoxemic respiratory failure (HCC) - Primary    Relevant Medications    guaiFENesin-codeine (Robitussin AC) 100-10 MG/5ML oral solution 5 mL    ipratropium-albuterol (Duoneb) 0.5-2.5 (3) MG/3ML inhalation solution 3 mL    ipratropium-albuterol (Duoneb) 0.5-2.5 (3) MG/3ML inhalation solution 3 mL    Acute on chronic heart failure, unspecified heart failure type (HCC)    Relevant Medications    furosemide (Lasix) 10 mg/mL injection 40 mg (Completed)    aspirin DR tab 81 mg    atorvastatin (Lipitor) tab 10 mg    hydrALAzine (Apresoline) 20 mg/mL injection 10 mg    METOPROLOL SUCCINATE ER 50 MG Oral Tablet 24 Hr    RIVAROXABAN 15 MG Oral Tab    potassium chloride (Klor-Con M20) tab 40 mEq (Completed)    furosemide (Lasix) tab 20 mg    metoprolol succinate ER (Toprol XL) 24 hr tab 50 mg (Completed)    enoxaparin (Lovenox) 80  MG/0.8ML SUBQ injection 80 mg    dilTIAZem (cardIZEM) tab 30 mg    metoprolol succinate ER (Toprol XL) 24 hr tab 100 mg (Start on 2/2/2025  6:00 PM)     Acute hypoxic respiratory failure 2/2 combined systolic and diastolic HF     1/31-> pt is now back on 2 Litres of Oxygen.   2/1: continues to be on 3 Litres of oxygen, continue to wean.     ?PNA  Imaging reviewed  Strep pneumo and legionella Ag negative  Resp panel negative  BNP elevated  Trop and EKG reviewed  Started on solu-medrol on admission, will discontinue considering CHF exacerbation  Started on Lasix 40 mg IV BID on admission  Continue IV zosyn (day 3)  Duonebs q6hrs  Cardiology on consult  EF 30-35%, decreased from prior  Continue BB  Unable to start ACE/ARB/MRNA/ARNI/SGLT2 due to DEJAH  Resume diuresis when okay with nephrology  Nephrology on consult  Appreciate recs  Strict Is and Os, daily weights  2/2: day 6 of zosyn, may dc after 7 days , no white count, hypoxia could be related to afib uncontrolled.   DEJAH - improving  Likely 2/2 overdiuresis?  Cardiology on consult  Hold diuresis, resume when okay with nephrology  Renal US - reviewed  Making uine  Monitor labs  1/31: lasix resumed, Renal following.   Persistent afib with RVR  Continue home meds  Cardiology on consult  Resume metoprolol 100 mg daily  Start Rivaroxaban 15 mg daily  Tele monitoring  1/31: Increased HR, Await Cards recs   2/1: pt continue to have increased HR, metoprolol dose increased   2/2 cards following , adjusting meds, Lovenox therapeutic while in house per Dr Jasso , since family is concerned about DOAC causing anxiety, family explained and reassured about DOAC and importance of taking it.   HTN  BP well controlled  Monitor vitals  Elevated LFTs  Improving  Monitor labs       Quality:  DVT Prophylaxis: Xarelto   CODE status: FULL   DISPO: pending clinical improvement.   Estimated date of discharge: To be determined  Discharge is dependent on: Improved clinical status  At this point  Patient is expected to be discharge to: Home versus rehab      Plan of care discussed with Patient and RN.     Coordinated care with providers and counseling re: treatment plan and workup     Odalys Castro MD    Supplementary Documentation:           I personally reviewed the available laboratories, imaging including operative report. I discussed/will discuss the case with patient and her nurse. I ordered laboratories studies. I adjusted medications including not applicable today. Medical decision making high, risk is high.     >55min spent, >50% spent counseling and coordinating care in the form of educating pt/family and d/w consultants and staff. Most of the time spent discussing the above plan.

## 2025-02-02 NOTE — PROGRESS NOTES
Progress Note  Shyla Wheeler Patient Status:  Inpatient    1935 MRN [id number]   Location [facility] 3W/SW Attending Odalys Castro MD   Hosp Day # 7 PCP Ray Goldman MD     Subjective   Wants to go home. Still on supplemental O2 at 3L. Rates control sub-optimal. No significant edema.     Objective:   /89 (BP Location: Right arm)   Pulse 91   Temp 97.7 °F (36.5 °C) (Axillary)   Resp 20   Ht 5' 5\" (1.651 m)   Wt 169 lb (76.7 kg)   SpO2 97%   BMI 28.12 kg/m²     Telemetry: afib rvr, rates in 120's to 130's     Intake/Output:    Intake/Output Summary (Last 24 hours) at 2025 1003  Last data filed at 2025 0607  Gross per 24 hour   Intake 900 ml   Output 650 ml   Net 250 ml       Wt Readings from Last 3 Encounters:   25 169 lb (76.7 kg)   25 167 lb 8 oz (76 kg)   23 171 lb (77.6 kg)         Labs:  Recent Labs   Lab 25  0713 25  0904 25  0706   * 146* 126*   BUN 41* 34* 43*   CREATSERUM 1.33* 1.42* 1.51*   EGFRCR 38* 36* 33*   CA 8.9 9.3 8.4*    136 133*   K 4.3  4.3 4.2 4.7    101 101   CO2 28.0 27.0 24.0     Recent Labs   Lab 25  0713 25  0904 25  0706   RBC 3.74* 3.72* 3.72*   HGB 11.3* 11.4* 11.8*   HCT 34.9* 35.0 34.6*   MCV 93.3 94.1 93.0   MCH 30.2 30.6 31.7   MCHC 32.4 32.6 34.1   RDW 14.5 14.5 14.4   NEPRELIM 6.46 6.55 7.17   WBC 8.7 8.2 8.7   .0 356.0 335.0         Recent Labs   Lab 25  1807 25  0234 25  0551   TROPHS 26 28 31         Review of Systems:     10 point review of systems completed and negative except as noted in HPI    Respiratory: + sob, remains on O2 via NC.  CV: Denies chest pain, palpitations, orthopnea, PND or dizziness.      Exam:     Physical Exam:  General: Alert and oriented x 3. No apparent distress.   HEENT: Normocephalic, neck supple, no thyromegaly or adenopathy.  Neck: No JVD, carotids 2+, no bruits.  Cardiac: Regular rate and rhythm. S1, S2  normal. No murmur, pericardial rub, S3, or extra cardiac sounds.  Lungs: Clear without wheezes, rales, rhonchi or dullness.  Normal excursions and effort.  Abdomen: Soft, non-tender. BS-present.  Extremities: Without clubbing or cyanosis. No edema.    Neurologic: Alert and oriented, normal affect. No focal defects  Skin: Warm and dry.     Medications:     dilTIAZem  30 mg Oral 4 times per day    metoprolol succinate  100 mg Oral 2x Daily(Beta Blocker)    enoxaparin  1 mg/kg Subcutaneous Q24H    furosemide  20 mg Oral Daily    ipratropium-albuterol  3 mL Nebulization q6h    piperacillin-tazobactam  3.375 g Intravenous Q8H    aspirin  81 mg Oral Daily    [Held by provider] atorvastatin  10 mg Oral Nightly    latanoprost  1 drop Both Eyes Nightly    pantoprazole  40 mg Oral QAM AC         Diagnostic Studies:         Assessment and Plan:     Assessment:  # Pneumonia   IV abx per primary team  Remains on supplemental O2   # afib RVR   On Toprol 150 mg bid with rates in 120's to 130's   Start short term PO diltiazem 30 mg tid; consider digoxin   Currently on Lovenox Afib dose for stroke prevention, needs plan for long term anticoagulation given \"multiple symptoms to DOAC\"   # acute on chronic HFrEF, EF 30-35%  New systolic dysfunction along with regional wall motion abnormalities   Has decline ischemic evaluation   Another differential is tachycardia-mediated cardiomyopathy   Continue diuresis to maintain euvolemia   GDMT limited d/t DEJAH   Order set for HF to see before discharge   # DEJAH - Cr stable   Mild hyponatremia  Renal US unremarkable   Per nephrology avoid SGLT 2 inhibitors and ACEs/ARB's       Plan:  Add short acting PO diltiazem and increase as BP allows  Can add digoxin if BP gets too low  Lower BB given Fatigue side effect   Multiple discussions regarding AC with DOAC; family convinced her symptoms are side effects of DOAC; continue lovenox while in hospital.     Plan of care discussed with patient,  [place].      Nikia Blum, APRN  2/2/2025  10:03 AM  Ph [phone] ([place])  Ph [phone] ([place])          CARDIOLOGIST ADDENDUM:    I agree with the findings above.  I have personally performed the Assessment and Plan in its entirety, as detailed above with my edits.    Jalil Baez M.D.   Cardiology/Electrophysiology   2/2/2025  L3  -----------------------------------------

## 2025-02-03 ENCOUNTER — APPOINTMENT (OUTPATIENT)
Dept: GENERAL RADIOLOGY | Facility: HOSPITAL | Age: 89
End: 2025-02-03
Attending: NURSE PRACTITIONER
Payer: MEDICARE

## 2025-02-03 PROCEDURE — 71045 X-RAY EXAM CHEST 1 VIEW: CPT | Performed by: NURSE PRACTITIONER

## 2025-02-03 PROCEDURE — 99233 SBSQ HOSP IP/OBS HIGH 50: CPT | Performed by: HOSPITALIST

## 2025-02-03 RX ORDER — VANCOMYCIN HYDROCHLORIDE 50 MG/ML
125 KIT ORAL DAILY
Status: DISCONTINUED | OUTPATIENT
Start: 2025-02-03 | End: 2025-02-08

## 2025-02-03 NOTE — CARDIAC REHAB
CARDIAC REHAB HEART FAILURE EDUCATION    Handouts provided and reviewed: CHF Booklet.     Activity: Resting in bed during consult,         Disease Process: Disease process reviewed.    Reviewed the following: DAILY WEIGHT MONITORING: Yes      SODIUM RESTRICTION: Yes      FLUID RESTRICTION: Yes      RISK FACTORS: Yes      SMOKING CESSATION: N/a this patient      HOME EXERCISE ACTIVITY: disucussed      OUTPATIENT CARDIAC REHAB: Referred to Cardiac Rehabilitation Phase 2, if appropriate      WHEN TO CONTACT YOUR PHYSICIAN: Yes      HEART FAILURE CLINIC: [phone]

## 2025-02-03 NOTE — PLAN OF CARE
Problem: Patient Centered Care  Goal: Patient preferences are identified and integrated in the patient's plan of care  Description: Interventions:  - What would you like us to know as we care for you? From home alone  - Provide timely, complete, and accurate information to patient/family  - Incorporate patient and family knowledge, values, beliefs, and cultural backgrounds into the planning and delivery of care  - Encourage patient/family to participate in care and decision-making at the level they choose  - Honor patient and family perspectives and choices  Outcome: Progressing     Problem: Patient/Family Goals  Goal: Patient/Family Long Term Goal  Description: Patient's Long Term Goal: To go home    Interventions:  - Monitor labs and vital signs  - Titrate oxygen  - Medication compliance  - Follow provider recommendations  - See additional Care Plan goals for specific interventions  Outcome: Progressing  Goal: Patient/Family Short Term Goal  Description: Patient's Short Term Goal: To improve breathing    Interventions:   - Oxygen  - IV antibiotics  - Medication compliance  - Follow provider recommendations  - See additional Care Plan goals for specific interventions  Outcome: Progressing     Problem: RESPIRATORY - ADULT  Goal: Achieves optimal ventilation and oxygenation  Description: INTERVENTIONS:  - Assess for changes in respiratory status  - Assess for changes in mentation and behavior  - Position to facilitate oxygenation and minimize respiratory effort  - Oxygen supplementation based on oxygen saturation or ABGs  - Provide Smoking Cessation handout, if applicable  - Encourage broncho-pulmonary hygiene including cough, deep breathe, Incentive Spirometry  - Assess the need for suctioning and perform as needed  - Assess and instruct to report SOB or any respiratory difficulty  - Respiratory Therapy support as indicated  - Manage/alleviate anxiety  - Monitor for signs/symptoms of CO2 retention  Outcome:  Progressing     Problem: CARDIOVASCULAR - ADULT  Goal: Maintains optimal cardiac output and hemodynamic stability  Description: INTERVENTIONS:  - Monitor vital signs, rhythm, and trends  - Monitor for bleeding, hypotension and signs of decreased cardiac output  - Evaluate effectiveness of vasoactive medications to optimize hemodynamic stability  - Monitor arterial and/or venous puncture sites for bleeding and/or hematoma  - Assess quality of pulses, skin color and temperature  - Assess for signs of decreased coronary artery perfusion - ex. Angina  - Evaluate fluid balance, assess for edema, trend weights  Outcome: Progressing  Goal: Absence of cardiac arrhythmias or at baseline  Description: INTERVENTIONS:  - Continuous cardiac monitoring, monitor vital signs, obtain 12 lead EKG if indicated  - Evaluate effectiveness of antiarrhythmic and heart rate control medications as ordered  - Initiate emergency measures for life threatening arrhythmias  - Monitor electrolytes and administer replacement therapy as ordered  Outcome: Progressing     Problem: PAIN - ADULT  Goal: Verbalizes/displays adequate comfort level or patient's stated pain goal  Description: INTERVENTIONS:  - Encourage pt to monitor pain and request assistance  - Assess pain using appropriate pain scale  - Administer analgesics based on type and severity of pain and evaluate response  - Implement non-pharmacological measures as appropriate and evaluate response  - Consider cultural and social influences on pain and pain management  - Manage/alleviate anxiety  - Utilize distraction and/or relaxation techniques  - Monitor for opioid side effects  - Notify MD/LIP if interventions unsuccessful or patient reports new pain  - Anticipate increased pain with activity and pre-medicate as appropriate  Outcome: Progressing     Problem: SAFETY ADULT - FALL  Goal: Free from fall injury  Description: INTERVENTIONS:  - Assess pt frequently for physical needs  - Identify  cognitive and physical deficits and behaviors that affect risk of falls.  - [place] fall precautions as indicated by assessment.  - Educate pt/family on patient safety including physical limitations  - Instruct pt to call for assistance with activity based on assessment  - Modify environment to reduce risk of injury  - Provide assistive devices as appropriate  - Consider OT/PT consult to assist with strengthening/mobility  - Encourage toileting schedule  Outcome: Progressing     Problem: DISCHARGE PLANNING  Goal: Discharge to home or other facility with appropriate resources  Description: INTERVENTIONS:  - Identify barriers to discharge w/pt and caregiver  - Include patient/family/discharge partner in discharge planning  - Arrange for needed discharge resources and transportation as appropriate  - Identify discharge learning needs (meds, wound care, etc)  - Arrange for interpreters to assist at discharge as needed  - Consider post-discharge preferences of patient/family/discharge partner  - Complete POLST form as appropriate  - Assess patient's ability to be responsible for managing their own health  - Refer to Case Management Department for coordinating discharge planning if the patient needs post-hospital services based on physician/LIP order or complex needs related to functional status, cognitive ability or social support system  Outcome: Progressing

## 2025-02-03 NOTE — CM/SW NOTE
02/03/25 1500   Choice of Post-Acute Provider   Informed patient of right to choose their preferred provider Yes   List of appropriate post-acute services provided to patient/family with quality data Yes   Information given to Patient;Daughter     Social work was able to speak to the patient's daughter via phone regarding [facility].    The Anticipated therapy need: Gradual Rehabilitative Therapy.    Social work advised the patient's daughter of current mobility and fall risk concerns with her going back home alone.    Social work emailed the daughter the [facility] list to [email or web address].    Social work will follow up for choice.    TITI/CM to remain available for support and/or discharge planning.     Azeb Galaviz MSW, LSW  Discharge Planner [id number]

## 2025-02-03 NOTE — SPIRITUAL CARE NOTE
Spiritual Care Visit Note    Patient Name: Shyla Wheeler Date of Spiritual Care Visit: 25   : 1935 Primary Dx: Acute hypoxemic respiratory failure (HCC)       Referred By: Referral From: Nurse    Spiritual Care Taxonomy:    Intended Effects: Build relationship of care and support    Methods: Exploring hope;Offer emotional support;Explore quality of life;Explore presence of God;Explore nature of God;Explore steven and values;Offer spiritual/Confucianism support;Offer support;Demonstrate acceptance;Encourage end of life review;Collaborate with care team member    Interventions: Acknowledge current situation;Active listening;Ask guided questions;Discuss concerns;Discuss spirituality/Orthodoxy with someone;Facilitate communication between patient and/or family member and care team;Facilitate life review;Identify supportive relationship(s);Invite someone to reminisce;[place];Silent prayer;Share words of hope and inspiration;Provide hospitality    Visit Type/Summary:     - Spiritual Care: Responded to a request via the on call phone Consulted with RN prior to visit. Offered empathic listening and emotional support. Patient and family expressed appreciation for  visit.  remains available as needed for follow up.    Spiritual Care support can be requested via an Epic consult. For urgent/immediate needs, please contact the On Call  at: [place]: ext [phone]    Rev Gina Hernandez MDiv

## 2025-02-03 NOTE — PHYSICAL THERAPY NOTE
PHYSICAL THERAPY TREATMENT NOTE - INPATIENT     Room Number: 338/338-A       Presenting Problem: acute on chronic heart failure, acute hypoxemia respiratory failure  Co-Morbidities : HTN, enlarged aorta, R knee surgery, CHF, a-fib, large hiatal hernia, and recent admit for multifocal pneumonia    Problem List  Principal Problem:    Acute hypoxemic respiratory failure (HCC)  Active Problems:    Acute on chronic heart failure, unspecified heart failure type (HCC)      PHYSICAL THERAPY ASSESSMENT   Patient demonstrates good  progress this session, goals  remain in progress.      Patient is requiring contact guard assist and minimal assist as a result of the following impairments: decreased functional strength, decreased endurance/aerobic capacity, impaired sitting and standing balance, decreased muscular endurance, and increased O2 needs from baseline.     Patient continues to function below baseline with bed mobility, transfers, gait, stair negotiation, maintaining seated position, standing prolonged periods, and performing household tasks.  Next session anticipate patient to progress bed mobility, transfers, gait, maintaining seated position, standing prolonged periods, and performing household tasks.  Physical Therapy will continue to follow patient for duration of hospitalization.    Patient functioning lower than baseline status. As pt at baseline lives alone, currently requires assist x 1 for all functional tasks. Patient continues to benefit from continued skilled PT services: to promote return to prior level of function and safety with continuous assistance and gradual rehabilitative therapy .    PLAN DURING HOSPITALIZATION  Nursing Mobility Recommendation : 1 Assist  PT Device Recommendation: Rolling walker  PT Treatment Plan: Bed mobility;Body mechanics;Coordination;Endurance;Energy conservation;Gait training;Strengthening;Transfer training;Balance training  Frequency (Obs): 5x/week     SUBJECTIVE  \"I'm going  to take a shower\"     OBJECTIVE  Precautions: Bed/chair alarm    PAIN ASSESSMENT   Ratin  Location: denies pain       BALANCE  Static Sitting: Good  Dynamic Sitting: Fair +  Static Standing: Fair  Dynamic Standing: Fair -    ACTIVITY TOLERANCE  Pulse: 67 (91bpm w activity)  Heart Rate Source: Monitor                    O2 WALK  Oxygen Therapy  SPO2% on Oxygen at Rest: 99  At rest oxygen flow (liters per minute): 2  SPO2% Ambulation on Room Air: 95    AM-PAC '6-Clicks' INPATIENT SHORT FORM - BASIC MOBILITY  How much difficulty does the patient currently have...  Patient Difficulty: Turning over in bed (including adjusting bedclothes, sheets and blankets)?: A Little   Patient Difficulty: Sitting down on and standing up from a chair with arms (e.g., wheelchair, bedside commode, etc.): A Little   Patient Difficulty: Moving from lying on back to sitting on the side of the bed?: A Little   How much help from another person does the patient currently need...   Help from Another: Moving to and from a bed to a chair (including a wheelchair)?: A Little   Help from Another: Need to walk in hospital room?: A Little   Help from Another: Climbing 3-5 steps with a railing?: A Lot     AM-PAC Score:  Raw Score: 17   Approx Degree of Impairment: 50.57%   Standardized Score (AM-PAC Scale): 42.13   CMS Modifier (G-Code): CK    FUNCTIONAL ABILITY STATUS  Functional Mobility/Gait Assessment  Gait Assistance: Contact guard assist;Minimum assistance  Distance (ft): 25ft; 15ft  Assistive Device: Rolling walker  Pattern: Shuffle (decreased rylee speed, flexed posture, poor walker management- cues to maintain closer proximity to walker. Tolerance with activity limited by fatigue.)  Sit to Stand: minimal assist. Task was labored requiring increased time to complete. Cues for anterior<>posterior weight shift to gain momentum to stand. Cues for pacing upon standing to allow body time to acclimate to change in position. CGA to maintain  static standing with bilateral UE support on RW.     Skilled Therapy Provided: Patient received sitting in bedside chair. RN approved activity. Therapist educated pt on POC and physiological benefits of mobilization. Patient agreeable to participate. Primary complaint is fatigue. Denies pain. Noted quick fatigue with exertion requiring seated rest break *SpO2 on 2L/min supplemental O2 via NC at rest: 99% with activity: 95%- maintain on room with while up in restroom with PCT, RN made aware.     The patient's Approx Degree of Impairment: 50.57% has been calculated based on documentation in the [facility] '6 clicks' Inpatient Daily Activity Short Form.  Research supports that patients with this level of impairment may benefit from rehab.  Final disposition will be made by interdisciplinary medical team.      Patient End of Session: In bathroom - nursing staff aware;With EH staff (in bathroom with PCT preparing for shower)    CURRENT GOALS   Goals to be met by: 1/31/25  Patient Goal Patient's self-stated goal is: to get back home and feel better   Goal #1 Patient is able to demonstrate supine - sit EOB @ level: modified independent      Goal #1   Current Status  NT   Goal #2 Patient is able to demonstrate transfers Sit to/from Stand at assistance level: modified independent with walker - rolling      Goal #2  Current Status  Min A with RW   Goal #3 Patient is able to ambulate 150 feet with assist device: walker - rolling at assistance level: modified independent   Goal #3   Current Status  CGA/Min A with RW, 25ft, 15ft   Goal #4 Patient will negotiate 1 stairs/one curb w/ assistive device and supervision   Goal #4   Current Status  NT   Goal #5 Patient to demonstrate independence with home activity/exercise instructions provided to patient in preparation for discharge.   Goal #5   Current Status  Progressing     Gait Training: 10 minutes  Therapeutic Activity: 13 minutes

## 2025-02-03 NOTE — PROGRESS NOTES
[facility] Progress Note    Shyla Wheeler Patient Status:  Inpatient    1935 MRN [id number]   Location [facility] 3W/SW Attending Nisreen Aguirre MD   Hosp Day # 8 PCP Ray Goldman MD     Subjective:  Denies cp, sob, orthopnea, griffith. On 3L 02 NC   +Ongoing fatigue. \"Because I'm not moving around as much.\"   In rate controlled afib     Objective:  /80 (BP Location: Left arm)   Pulse 99   Temp 97.5 °F (36.4 °C) (Axillary)   Resp 18   Ht 165.1 cm (5' 5\")   Wt 171 lb (77.6 kg)   SpO2 99%   BMI 28.46 kg/m²     Telemetry: Afib , 75 bpm       Intake/Output:    Intake/Output Summary (Last 24 hours) at 2/3/2025 0855  Last data filed at 2/3/2025 0508  Gross per 24 hour   Intake 1600 ml   Output 750 ml   Net 850 ml       Last 3 Weights   25 0505 171 lb (77.6 kg)   25 0658 169 lb (76.7 kg)   25 0416 169 lb 6.4 oz (76.8 kg)   25 0530 171 lb 9.6 oz (77.8 kg)   25 0514 170 lb 3.2 oz (77.2 kg)   25 0500 170 lb 8 oz (77.3 kg)   25 0400 171 lb 9.6 oz (77.8 kg)   25 2154 168 lb 6.4 oz (76.4 kg)   25 1740 162 lb (73.5 kg)   25 0313 167 lb 8 oz (76 kg)   25 0501 163 lb 6.4 oz (74.1 kg)   25 1457 156 lb 6.4 oz (70.9 kg)   25 1451 156 lb 6.4 oz (70.9 kg)   25 0956 161 lb 9.6 oz (73.3 kg)   25 0929 162 lb (73.5 kg)   23 0936 171 lb (77.6 kg)       Labs:  Recent Labs   Lab 25  0713 25  0904 25  07   * 146* 126*   BUN 41* 34* 43*   CREATSERUM 1.33* 1.42* 1.51*   EGFRCR 38* 36* 33*   CA 8.9 9.3 8.4*    136 133*   K 4.3  4.3 4.2 4.7    101 101   CO2 28.0 27.0 24.0     Recent Labs   Lab 25  0725  0904 25  07   RBC 3.74* 3.72* 3.72*   HGB 11.3* 11.4* 11.8*   HCT 34.9* 35.0 34.6*   MCV 93.3 94.1 93.0   MCH 30.2 30.6 31.7   MCHC 32.4 32.6 34.1   RDW 14.5 14.5 14.4   NEPRELIM 6.46 6.55 7.17   WBC 8.7 8.2 8.7   .0 356.0 335.0         No  results for input(s): \"TROP\", \"TROPHS\", \"CK\" in the last 168 hours.      Diagnostics:     1/20/25 Echo  1. Left ventricle: The cavity size was normal. Wall thickness was mildly      increased. Systolic function was moderately to markedly reduced. The      estimated ejection fraction was 30-35%, by biplane method of disks.      Akinesis of the anteroseptal and inferoseptal walls. Severe hypokinesis      of the entireinferior wall. Doppler parameters are consistent with      restrictive physiology, indicative of decreased left ventricular      diastolic compliance and/or increased left atrial pressure.   2. Right ventricle: The cavity size was normal. Systolic function was      normal.   Impressions:  Left ventricle dysfunction is new compared to echocardiogram   from 2016.       Review of Systems   Respiratory:  Positive for shortness of breath.    Cardiovascular:  Positive for leg swelling. Negative for chest pain, palpitations and orthopnea.     Physical Exam:    General: Alert and oriented x 3. No apparent distress.   HEENT: Normocephalic, anicteric sclera, neck supple, no thyromegaly or adenopathy.  Neck: No JVD, carotids 2+, no bruits.  Cardiac: Irregularly irregular rate & rhythm. S1, S2 normal. No murmur, pericardial rub, S3, or extra cardiac sounds.  Lungs: Diminished bilateral bases without wheezes, rales, rhonchi or dullness.  Normal excursions and effort.  Abdomen: Soft, non-tender. No organosplenomegally, mass or rebound, BS-present.  Extremities: Without clubbing or cyanosis.  +Trivial BLE edema   Neurologic: Alert and oriented, normal affect. No focal defects  Skin: Warm and dry.       Medications:   vancomycin  125 mg Oral Daily    dilTIAZem  30 mg Oral 4 times per day    metoprolol succinate  100 mg Oral 2x Daily(Beta Blocker)    furosemide  20 mg Oral QOD    enoxaparin  1 mg/kg Subcutaneous Q24H    ipratropium-albuterol  3 mL Nebulization q6h    piperacillin-tazobactam  3.375 g Intravenous Q8H     aspirin  81 mg Oral Daily    [Held by provider] atorvastatin  10 mg Oral Nightly    latanoprost  1 drop Both Eyes Nightly    pantoprazole  40 mg Oral QAM AC         Assessment:    Pneumonia  -On antibiotics per PMD     A-fib with RVR ; old LBBB  -Patient presented with persistent A-fib and intermittent RVR  -Initially likely secondary to beta-blockade interruption, patient had not been taking this upon discharge from last hospitalization    -On metoprolol succinate 100 mg bid & diltiazem 30mg q6h    -Elevated [id number]. 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 S/p IV lasix in ED . Subsequent diuretics held due to dejah. Restarted on lasix per Neph   -Continue GDMT as tolerated w/ toprol xl . No acei/arb/arni/mra/sglt2 due to DEJAH   -Compensated on exam     DEJAH   -Scr stable    -Diuretic resumed per Neph as above    -Renal ultrasound unremarkable    HTN   - low normal, stable     Elevated LFTs  -Improving   -Atorvastatin on hold, can likely resume on dc    -Per PMD     Plan:    In controlled atrial fibrillation on toprol xl 100mg bid & diltiazem 30mg q6h - will discuss use of diltiazem w/ monserrat BELL given low EF   Switched off of two DOACs per pt request due to persistent fatigue. Presently on subcutaneous lovenox. Will need long term plan for AC prior to discharge   2/1 pBNP 34,364. I/o inaccurate. Wt up trending. Repeat CXR   Denies dyspnea. On lasix 20mg every other day per Nephrology    Monitor strict I/o, daily wts & renal fx closely   Replenish electrolytes per cardiac protocol as needed. Keep K > 4, Mg > 2     Deilcia Paul, SARA, FPA-APRN,  FNP-BC, CCK   2/3/2025  9:04 AM  Ph [phone] ([place])  Ph [phone] ([place])      Seen/examined patient independently.  Agree with findings, assessment and plan as outlined above.     Patient reports improved symptoms; less fatigue and SOB.    Telemetry with A-fib, rates are better controlled last 24 hours: 80-90s.    In regards to decompensated HF, likely precipitated by uncontrolled atrial fibrillation.  Appreciate nephrology assistance with IV diuresis.  Continue rate control for A-fib, on metoprolol succinate 100 mg by twice daily and diltiazem 30 mg every 6 hours.    In regards to anticoagulation, will continue enoxaparin for now.  Patient will defer to daughter to trial oral Integrilin given her improved symptoms likely secondary to better rate control.    Rest of plan as above.    Ray Daniel, DO

## 2025-02-03 NOTE — PROGRESS NOTES
Progress Note     Shyla Wheeler Patient Status:  Inpatient    1935 MRN [id number]   Location [facility] 3W/SW Attending Odalys Castro MD   Hosp Day # 8 PCP Ray Goldman MD     Chief Complaint: patient presented with   Chief Complaint   Patient presents with    Difficulty Breathing       Subjective:   S:  Here with pneumonia  Denies cp, sob, dizziness    Review of Systems:   10 point ROS completed and was negative, except for pertinent positive and negatives stated in subjective.    Objective:   Vital signs:  Temp:  [97.3 °F (36.3 °C)-97.5 °F (36.4 °C)] 97.4 °F (36.3 °C)  Pulse:  [67-99] 92  Resp:  [18-20] 20  BP: ()/(62-85) 120/69  SpO2:  [94 %-99 %] 94 %    Wt Readings from Last 6 Encounters:   25 171 lb (77.6 kg)   25 167 lb 8 oz (76 kg)   23 171 lb (77.6 kg)   21 173 lb (78.5 kg)   21 170 lb (77.1 kg)   19 180 lb (81.6 kg)         Physical Exam:    General: No acute distress. Alert ,         Respiratory: Clear to auscultation bilaterally. No wheezes. No rhonchi.  Cardiovascular: S1, S2. Regular rate and rhythm. No murmurs, rubs or gallops.   Abdomen: Soft, nontender, nondistended.  Positive bowel sounds. No rebound or guarding.  Neurologic: No focal neurological deficits.   Musculoskeletal: Moves all extremities.  Extremities: No edema.    Results:   Diagnostic Data:      Labs:    Labs Last 24 Hours:   BMP     CBC    Other     Na - Cl - BUN - Glu -   Hb -   PTT - Procal -   K - CO2 - Cr -   WBC - >< PLT -  INR - CRP -   Renal Lytes Endo    Hct -   Trop - D dim -   eGFR - Ca - POC Gluc  -    LFT   pBNP - Lactic -   eGFR AA - PO4 - A1c -   AST - APk - Prot -  LDL -     Mg - TSH -   ALT - T kal - Alb -        COVID-19 Lab Results    COVID-19  Lab Results   Component Value Date    COVID19 Not Detected 2025    COVID19 Not Detected 2025       Pro-Calcitonin  No results for input(s): \"PCT\" in the last 168 hours.      Cardiac  Recent Labs   Lab  02/01/25  0553   PBNP 34,364*       Creatinine Kinase  No results for input(s): \"CK\" in the last 168 hours.    Inflammatory Markers  No results for input(s): \"CRP\", \"PATRIZIA\", \"LDH\", \"DDIMER\" in the last 168 hours.    Imaging: Imaging data reviewed in Epic.    Medications:    vancomycin  125 mg Oral Daily    dilTIAZem  30 mg Oral 4 times per day    metoprolol succinate  100 mg Oral 2x Daily(Beta Blocker)    furosemide  20 mg Oral QOD    enoxaparin  1 mg/kg Subcutaneous Q24H    ipratropium-albuterol  3 mL Nebulization q6h    piperacillin-tazobactam  3.375 g Intravenous Q8H    aspirin  81 mg Oral Daily    [Held by provider] atorvastatin  10 mg Oral Nightly    latanoprost  1 drop Both Eyes Nightly    pantoprazole  40 mg Oral QAM AC       Assessment & Plan:   ASSESSMENT / PLAN:     Acute hypoxic respiratory failure 2/2 combined systolic and diastolic HF and pneumonia  PNA  -Imaging reviewed  -Strep pneumo and legionella Ag negative  -Resp panel negative  -BNP elevated  -Trop and EKG reviewed  -Started on solu-medrol on admission--> now stopped  - will discontinue considering CHF exacerbation  -Started on Lasix 40 mg IV BID on admission  - IV zosyn completed course 2/3  -Duonebs q6hrs  -Cardiology on consult  -EF 30-35%, decreased from prior  -Continue BB  -Unable to start ACE/ARB/MRNA/ARNI/SGLT2 due to DEJAH  -on oral diuretics    DEJAH - improving  -Likely 2/2 overdiuresis?  -Cardiology on consult  Renal US - reviewed  -Making uine  -Monitor labs  -Baseline creatinine~1--> peaked at 1.81--> 1.51 today    Persistent afib with RVR  -Resume metoprolol 100 mg daily  -Started Rivaroxaban 15 mg daily, but now switched to Lovenox as family feels this is related to weakness and anxiety    HTN  -BP well controlled  -Monitor vitals    Elevated LFTs  -Improving  -Monitor labs       Quality:  DVT Prophylaxis: lovenox  CODE status: FULL   DISPO: pending clinical improvement.   Estimated date of discharge: To be determined  Discharge is  dependent on: Improved clinical status  At this point Patient is expected to be discharge to: Home versus rehab      Plan of care discussed with Patient and RN.     Coordinated care with providers and counseling re: treatment plan and workup   ALEKSANDER RAMIREZ MD  Supplementary Documentation:           I personally reviewed the available laboratories, imaging including operative report. I discussed/will discuss the case with patient and her nurse. I ordered laboratories studies. I adjusted medications including not applicable today. Medical decision making high, risk is high.     >55min spent, >50% spent counseling and coordinating care in the form of educating pt/family and d/w consultants and staff. Most of the time spent discussing the above plan.

## 2025-02-04 LAB
ANION GAP SERPL CALC-SCNC: 8 MMOL/L (ref 0–18)
BASOPHILS # BLD AUTO: 0.02 X10(3) UL (ref 0–0.2)
BASOPHILS NFR BLD AUTO: 0.3 %
BUN BLD-MCNC: 36 MG/DL (ref 9–23)
BUN/CREAT SERPL: 25.4 (ref 10–20)
CALCIUM BLD-MCNC: 8.3 MG/DL (ref 8.7–10.4)
CHLORIDE SERPL-SCNC: 99 MMOL/L (ref 98–112)
CO2 SERPL-SCNC: 32 MMOL/L (ref 21–32)
CREAT BLD-MCNC: 1.42 MG/DL
DEPRECATED RDW RBC AUTO: 50 FL (ref 35.1–46.3)
EGFRCR SERPLBLD CKD-EPI 2021: 36 ML/MIN/1.73M2 (ref 60–?)
EOSINOPHIL # BLD AUTO: 0.41 X10(3) UL (ref 0–0.7)
EOSINOPHIL NFR BLD AUTO: 5.4 %
ERYTHROCYTE [DISTWIDTH] IN BLOOD BY AUTOMATED COUNT: 14.9 % (ref 11–15)
GLUCOSE BLD-MCNC: 95 MG/DL (ref 70–99)
HCT VFR BLD AUTO: 34.3 %
HGB BLD-MCNC: 11 G/DL
IMM GRANULOCYTES # BLD AUTO: 0.03 X10(3) UL (ref 0–1)
IMM GRANULOCYTES NFR BLD: 0.4 %
LYMPHOCYTES # BLD AUTO: 0.95 X10(3) UL (ref 1–4)
LYMPHOCYTES NFR BLD AUTO: 12.5 %
MAGNESIUM SERPL-MCNC: 2.4 MG/DL (ref 1.6–2.6)
MCH RBC QN AUTO: 30.1 PG (ref 26–34)
MCHC RBC AUTO-ENTMCNC: 32.1 G/DL (ref 31–37)
MCV RBC AUTO: 94 FL
MONOCYTES # BLD AUTO: 0.42 X10(3) UL (ref 0.1–1)
MONOCYTES NFR BLD AUTO: 5.5 %
NEUTROPHILS # BLD AUTO: 5.75 X10 (3) UL (ref 1.5–7.7)
NEUTROPHILS # BLD AUTO: 5.75 X10(3) UL (ref 1.5–7.7)
NEUTROPHILS NFR BLD AUTO: 75.9 %
OSMOLALITY SERPL CALC.SUM OF ELEC: 296 MOSM/KG (ref 275–295)
PLATELET # BLD AUTO: 306 10(3)UL (ref 150–450)
POTASSIUM SERPL-SCNC: 3.7 MMOL/L (ref 3.5–5.1)
RBC # BLD AUTO: 3.65 X10(6)UL
SODIUM SERPL-SCNC: 139 MMOL/L (ref 136–145)
WBC # BLD AUTO: 7.6 X10(3) UL (ref 4–11)

## 2025-02-04 PROCEDURE — 99233 SBSQ HOSP IP/OBS HIGH 50: CPT | Performed by: HOSPITALIST

## 2025-02-04 RX ORDER — FUROSEMIDE 20 MG/1
20 TABLET ORAL DAILY
Status: DISCONTINUED | OUTPATIENT
Start: 2025-02-05 | End: 2025-02-08

## 2025-02-04 RX ORDER — METOPROLOL SUCCINATE 50 MG/1
150 TABLET, EXTENDED RELEASE ORAL
Status: DISCONTINUED | OUTPATIENT
Start: 2025-02-04 | End: 2025-02-08

## 2025-02-04 RX ORDER — POTASSIUM CHLORIDE 1500 MG/1
40 TABLET, EXTENDED RELEASE ORAL ONCE
Status: COMPLETED | OUTPATIENT
Start: 2025-02-04 | End: 2025-02-04

## 2025-02-04 NOTE — CM/SW NOTE
02/04/25 1600   Choice of Post-Acute Provider   Informed patient of right to choose their preferred provider Yes   List of appropriate post-acute services provided to patient/family with quality data Yes   Patient/family choice [facility]   Information given to Patient;Daughter     Social work received a call from the patient's daughter regarding HH choice.    The patient and her daughter would like to go with [facility].    [facility]idential is reserved in [place].    SW/CM to remain available for support and/or discharge planning.     Azeb Galaviz MSW, LSW  Discharge Planner [id number]

## 2025-02-04 NOTE — PLAN OF CARE
Patient is alert and oriented x4, forgetful. On 1L oxygen. Up with assist x1 with the walker. IV antibiotics discontinued today. CXR; see results. PT eval; see note. Possible ADELE at discharge.    Problem: Patient Centered Care  Goal: Patient preferences are identified and integrated in the patient's plan of care  Description: Interventions:  - What would you like us to know as we care for you? From home alone  - Provide timely, complete, and accurate information to patient/family  - Incorporate patient and family knowledge, values, beliefs, and cultural backgrounds into the planning and delivery of care  - Encourage patient/family to participate in care and decision-making at the level they choose  - Honor patient and family perspectives and choices  Outcome: Progressing     Problem: Patient/Family Goals  Goal: Patient/Family Long Term Goal  Description: Patient's Long Term Goal: To go home    Interventions:  - Monitor labs and vital signs  - Titrate oxygen  - Medication compliance  - Follow provider recommendations  - See additional Care Plan goals for specific interventions  Outcome: Progressing  Goal: Patient/Family Short Term Goal  Description: Patient's Short Term Goal: To improve breathing    Interventions:   - Oxygen  - IV antibiotics  - Medication compliance  - Follow provider recommendations  - See additional Care Plan goals for specific interventions  Outcome: Progressing     Problem: RESPIRATORY - ADULT  Goal: Achieves optimal ventilation and oxygenation  Description: INTERVENTIONS:  - Assess for changes in respiratory status  - Assess for changes in mentation and behavior  - Position to facilitate oxygenation and minimize respiratory effort  - Oxygen supplementation based on oxygen saturation or ABGs  - Provide Smoking Cessation handout, if applicable  - Encourage broncho-pulmonary hygiene including cough, deep breathe, Incentive Spirometry  - Assess the need for suctioning and perform as needed  -  Assess and instruct to report SOB or any respiratory difficulty  - Respiratory Therapy support as indicated  - Manage/alleviate anxiety  - Monitor for signs/symptoms of CO2 retention  Outcome: Progressing     Problem: CARDIOVASCULAR - ADULT  Goal: Maintains optimal cardiac output and hemodynamic stability  Description: INTERVENTIONS:  - Monitor vital signs, rhythm, and trends  - Monitor for bleeding, hypotension and signs of decreased cardiac output  - Evaluate effectiveness of vasoactive medications to optimize hemodynamic stability  - Monitor arterial and/or venous puncture sites for bleeding and/or hematoma  - Assess quality of pulses, skin color and temperature  - Assess for signs of decreased coronary artery perfusion - ex. Angina  - Evaluate fluid balance, assess for edema, trend weights  Outcome: Progressing  Goal: Absence of cardiac arrhythmias or at baseline  Description: INTERVENTIONS:  - Continuous cardiac monitoring, monitor vital signs, obtain 12 lead EKG if indicated  - Evaluate effectiveness of antiarrhythmic and heart rate control medications as ordered  - Initiate emergency measures for life threatening arrhythmias  - Monitor electrolytes and administer replacement therapy as ordered  Outcome: Progressing     Problem: PAIN - ADULT  Goal: Verbalizes/displays adequate comfort level or patient's stated pain goal  Description: INTERVENTIONS:  - Encourage pt to monitor pain and request assistance  - Assess pain using appropriate pain scale  - Administer analgesics based on type and severity of pain and evaluate response  - Implement non-pharmacological measures as appropriate and evaluate response  - Consider cultural and social influences on pain and pain management  - Manage/alleviate anxiety  - Utilize distraction and/or relaxation techniques  - Monitor for opioid side effects  - Notify MD/LIP if interventions unsuccessful or patient reports new pain  - Anticipate increased pain with activity and  pre-medicate as appropriate  Outcome: Progressing     Problem: SAFETY ADULT - FALL  Goal: Free from fall injury  Description: INTERVENTIONS:  - Assess pt frequently for physical needs  - Identify cognitive and physical deficits and behaviors that affect risk of falls.  - [place] fall precautions as indicated by assessment.  - Educate pt/family on patient safety including physical limitations  - Instruct pt to call for assistance with activity based on assessment  - Modify environment to reduce risk of injury  - Provide assistive devices as appropriate  - Consider OT/PT consult to assist with strengthening/mobility  - Encourage toileting schedule  Outcome: Progressing     Problem: DISCHARGE PLANNING  Goal: Discharge to home or other facility with appropriate resources  Description: INTERVENTIONS:  - Identify barriers to discharge w/pt and caregiver  - Include patient/family/discharge partner in discharge planning  - Arrange for needed discharge resources and transportation as appropriate  - Identify discharge learning needs (meds, wound care, etc)  - Arrange for interpreters to assist at discharge as needed  - Consider post-discharge preferences of patient/family/discharge partner  - Complete POLST form as appropriate  - Assess patient's ability to be responsible for managing their own health  - Refer to Case Management Department for coordinating discharge planning if the patient needs post-hospital services based on physician/LIP order or complex needs related to functional status, cognitive ability or social support system  Outcome: Progressing

## 2025-02-04 NOTE — PHYSICAL THERAPY NOTE
PHYSICAL THERAPY TREATMENT NOTE - INPATIENT     Room Number: 338/338-A       Presenting Problem: acute on chronic heart failure, acute hypoxemia respiratory failure  Co-Morbidities : HTN, enlarged aorta, R knee surgery, CHF, a-fib, large hiatal hernia, and recent admit for multifocal pneumonia    Problem List  Principal Problem:    Acute hypoxemic respiratory failure (HCC)  Active Problems:    Acute on chronic heart failure, unspecified heart failure type (HCC)      PHYSICAL THERAPY ASSESSMENT   Patient demonstrates good  progress this session, goals  remain in progress.      Patient is requiring stand-by assist and minimal assist as a result of the following impairments: decreased functional strength, decreased endurance/aerobic capacity, and medical status.     Patient continues to function below baseline with bed mobility, transfers, and gait.  Next session anticipate patient to progress bed mobility, transfers, and gait.  Physical Therapy will continue to follow patient for duration of hospitalization.    Patient continues to benefit from continued skilled PT services: at discharge to promote prior level of function and safety with additional support and return home with home health PT.    PLAN DURING HOSPITALIZATION  Nursing Mobility Recommendation : 1 Assist  PT Device Recommendation: Rolling walker  PT Treatment Plan: Bed mobility;Body mechanics;Coordination;Endurance;Energy conservation;Gait training;Strengthening;Transfer training;Balance training  Frequency (Obs): 5x/week     SUBJECTIVE  \"I was already up walking around today.\"     OBJECTIVE  Precautions: Bed/chair alarm    WEIGHT BEARING RESTRICTION       PAIN ASSESSMENT   Ratin  Location: denies pain       BALANCE  Static Sitting: Good  Dynamic Sitting: Fair +  Static Standing: Fair  Dynamic Standing: Fair    AM-PAC '6-Clicks' INPATIENT SHORT FORM - BASIC MOBILITY  How much difficulty does the patient currently have...  Patient Difficulty: Turning  over in bed (including adjusting bedclothes, sheets and blankets)?: None   Patient Difficulty: Sitting down on and standing up from a chair with arms (e.g., wheelchair, bedside commode, etc.): A Little   Patient Difficulty: Moving from lying on back to sitting on the side of the bed?: A Little   How much help from another person does the patient currently need...   Help from Another: Moving to and from a bed to a chair (including a wheelchair)?: A Little   Help from Another: Need to walk in hospital room?: A Little   Help from Another: Climbing 3-5 steps with a railing?: A Little     AM-PAC Score:  Raw Score: 19   Approx Degree of Impairment: 41.77%   Standardized Score (AM-PAC Scale): 45.44   CMS Modifier (G-Code): CK    FUNCTIONAL ABILITY STATUS  Functional Mobility/Gait Assessment  Gait Assistance: Supervision  Distance (ft): 75' x 2  Assistive Device: Rolling walker  Pattern: Shuffle  Rolling: stand-by assist  Supine to Sit: minimal assist  Sit to Supine: minimal assist  Sit to Stand: contact guard assist    The patient's Approx Degree of Impairment: 41.77% has been calculated based on documentation in the [facility] '6 clicks' Inpatient Daily Activity Short Form.  Research supports that patients with this level of impairment may benefit from home with home care.  Final disposition will be made by interdisciplinary medical team.    Patient End of Session: In bed;Needs met;Call light within reach;RN aware of session/findings;All patient questions and concerns addressed;Family present;Hospital anti-slip socks;Discussed recommendations with /    CURRENT GOALS   Goals to be met by: 1/31/25  Patient Goal Patient's self-stated goal is: to get back home and feel better   Goal #1 Patient is able to demonstrate supine - sit EOB @ level: modified independent      Goal #1   Current Status  min A    Goal #2 Patient is able to demonstrate transfers Sit to/from Stand at assistance level: modified  independent with walker - rolling      Goal #2  Current Status  SB A with RW   Goal #3 Patient is able to ambulate 150 feet with assist device: walker - rolling at assistance level: modified independent   Goal #3   Current Status  '   Goal #4 Patient will negotiate 1 stairs/one curb w/ assistive device and supervision   Goal #4   Current Status  NT   Goal #5 Patient to demonstrate independence with home activity/exercise instructions provided to patient in preparation for discharge.   Goal #5   Current Status  Progressing     Gait Trainin minutes

## 2025-02-04 NOTE — SPIRITUAL CARE NOTE
Spiritual Care Visit Note    Patient Name: Shyla Wheeler Date of Spiritual Care Visit: 25   : 1935 Primary Dx: Acute hypoxemic respiratory failure (HCC)       Referred By: Referral From: Nurse;Patient    Spiritual Care Taxonomy:    Intended Effects: Build relationship of care and support    Methods: Assist with spiritual/Spiritism practices;Encourage sharing of feelings;Encourage self reflection;Encourage story-telling;Encouraging spiritual/Spiritism practices;Offer emotional support;Offer spiritual/Spiritism support;Exploring hope    Interventions: Acknowledge current situation;Ask guided questions;Active listening;Ask guided questions about steven;Ask guided questions about purpose;Discuss concerns;Discuss plan of care;Prayer for healing;Invite someone to reminisce;Identify supportive relationship(s);Facilitate understanding of limitations;Provide hospitality;Share words of hope and inspiration    Visit Type/Summary:     - Spiritual Care: Responded to a request via the on call phone Consulted with RN prior to visit. Offered empathic listening and emotional support. Patient and family expressed appreciation for  visit.  remains available as needed for follow up.    Spiritual Care support can be requested via an Epic consult. For urgent/immediate needs, please contact the On Call  at: [place]: ext [phone]    Rev Gina Hernandez MDiv

## 2025-02-04 NOTE — PROGRESS NOTES
Progress Note     Shyla Wheeler Patient Status:  Inpatient    1935 MRN [id number]   Location [facility] 3W/SW Attending Odalys Castro MD   Hosp Day # 9 PCP Ray Goldman MD     Chief Complaint: patient presented with   Chief Complaint   Patient presents with    Difficulty Breathing       Subjective:   S:  Here with pneumonia  Denies cp, sob, dizziness  Tired and weak  Discussed with patient and family    Review of Systems:   10 point ROS completed and was negative, except for pertinent positive and negatives stated in subjective.    Objective:   Vital signs:  Temp:  [97.3 °F (36.3 °C)-98 °F (36.7 °C)] 97.3 °F (36.3 °C)  Pulse:  [67-95] 76  Resp:  [20] 20  BP: ()/(64-74) 121/70  SpO2:  [94 %-100 %] 100 %    Wt Readings from Last 6 Encounters:   25 171 lb (77.6 kg)   25 167 lb 8 oz (76 kg)   23 171 lb (77.6 kg)   21 173 lb (78.5 kg)   21 170 lb (77.1 kg)   19 180 lb (81.6 kg)         Physical Exam:    General: No acute distress. Alert ,         Respiratory: Clear to auscultation bilaterally. No wheezes. No rhonchi.  Cardiovascular: S1, S2. Regular rate and rhythm. No murmurs, rubs or gallops.   Abdomen: Soft, nontender, nondistended.  Positive bowel sounds. No rebound or guarding.  Neurologic: No focal neurological deficits.   Musculoskeletal: Moves all extremities.  Extremities: No edema.    Results:   Diagnostic Data:      Labs:    Labs Last 24 Hours:   BMP     CBC    Other     Na 139 Cl 99 BUN 36 Glu 95   Hb 11.0   PTT - Procal -   K 3.7 CO2 32.0 Cr 1.42   WBC 7.6 >< .0  INR - CRP -   Renal Lytes Endo    Hct 34.3   Trop - D dim -   eGFR - Ca 8.3 POC Gluc  -    LFT   pBNP - Lactic -   eGFR AA - PO4 - A1c -   AST - APk - Prot -  LDL -     Mg 2.4 TSH -   ALT - T kal - Alb -        COVID-19 Lab Results    COVID-19  Lab Results   Component Value Date    COVID19 Not Detected 2025    COVID19 Not Detected 2025       Pro-Calcitonin  No results  for input(s): \"PCT\" in the last 168 hours.      Cardiac  Recent Labs   Lab 02/01/25  0553   PBNP 34,364*       Creatinine Kinase  No results for input(s): \"CK\" in the last 168 hours.    Inflammatory Markers  No results for input(s): \"CRP\", \"PATRIZIA\", \"LDH\", \"DDIMER\" in the last 168 hours.    Imaging: Imaging data reviewed in Epic.    Medications:    vancomycin  125 mg Oral Daily    dilTIAZem  30 mg Oral 4 times per day    metoprolol succinate  100 mg Oral 2x Daily(Beta Blocker)    furosemide  20 mg Oral QOD    enoxaparin  1 mg/kg Subcutaneous Q24H    ipratropium-albuterol  3 mL Nebulization q6h    aspirin  81 mg Oral Daily    [Held by provider] atorvastatin  10 mg Oral Nightly    latanoprost  1 drop Both Eyes Nightly    pantoprazole  40 mg Oral QAM AC       Assessment & Plan:   ASSESSMENT / PLAN:     Acute hypoxic respiratory failure 2/2 combined systolic and diastolic HF and pneumonia  PNA  -Imaging reviewed  -Strep pneumo and legionella Ag negative  -Resp panel negative  -BNP elevated  -Trop and EKG reviewed  -Started on solu-medrol on admission--> now stopped  - will discontinue considering CHF exacerbation  -Started on Lasix 40 mg IV BID on admission  - IV zosyn completed course 2/3  -Duonebs q6hrs  -Cardiology on consult  -EF 30-35%, decreased from prior  -Continue BB  -Unable to start ACE/ARB/MRNA/ARNI/SGLT2 due to DEJAH  -on oral diuretics    DEJAH - improving  -Likely 2/2 overdiuresis?  -Cardiology on consult  Renal US - reviewed  -Making uine  -Monitor labs  -Baseline creatinine~1--> peaked at 1.81--> 1.51 -->1.42 today    Persistent afib with RVR  -cont metoprolol succinate 100 mg bid  -on cardizem 20mg q 6 hr. D/w cardiology re: long term plans  -Started Rivaroxaban 15 mg daily, but now switched to Lovenox as family feels this is related to weakness and anxiety  -d/w patient and family, resume xarelto, they are agreeable    HTN  -BP well controlled  -Monitor vitals    Elevated LFTs  -Improving  -Monitor labs        Quality:  DVT Prophylaxis: xarelto  CODE status: FULL   DISPO: pending clinical improvement.   Estimated date of discharge: To be determined  Discharge is dependent on: Improved clinical status  At this point Patient is expected to be discharge to: Home versus rehab      Plan of care discussed with Patient and RN.     Coordinated care with providers and counseling re: treatment plan and workup   ALEKSANDER RAMIREZ MD  Supplementary Documentation:           I personally reviewed the available laboratories, imaging including operative report. I discussed/will discuss the case with patient and her nurse. I ordered laboratories studies. I adjusted medications including not applicable today. Medical decision making high, risk is high.     >55min spent, >50% spent counseling and coordinating care in the form of educating pt/family and d/w consultants and staff. Most of the time spent discussing the above plan.

## 2025-02-04 NOTE — PLAN OF CARE
Problem: Patient Centered Care  Goal: Patient preferences are identified and integrated in the patient's plan of care  Description: Interventions:  - What would you like us to know as we care for you? From home alone  - Provide timely, complete, and accurate information to patient/family  - Incorporate patient and family knowledge, values, beliefs, and cultural backgrounds into the planning and delivery of care  - Encourage patient/family to participate in care and decision-making at the level they choose  - Honor patient and family perspectives and choices  Outcome: Progressing     Problem: Patient/Family Goals  Goal: Patient/Family Long Term Goal  Description: Patient's Long Term Goal: To go home    Interventions:  - Monitor labs and vital signs  - Titrate oxygen  - Medication compliance  - Follow provider recommendations  - See additional Care Plan goals for specific interventions  Outcome: Progressing  Goal: Patient/Family Short Term Goal  Description: Patient's Short Term Goal: To improve breathing    Interventions:   - Oxygen  - IV antibiotics  - Medication compliance  - Follow provider recommendations  - See additional Care Plan goals for specific interventions  Outcome: Progressing     Problem: RESPIRATORY - ADULT  Goal: Achieves optimal ventilation and oxygenation  Description: INTERVENTIONS:  - Assess for changes in respiratory status  - Assess for changes in mentation and behavior  - Position to facilitate oxygenation and minimize respiratory effort  - Oxygen supplementation based on oxygen saturation or ABGs  - Provide Smoking Cessation handout, if applicable  - Encourage broncho-pulmonary hygiene including cough, deep breathe, Incentive Spirometry  - Assess the need for suctioning and perform as needed  - Assess and instruct to report SOB or any respiratory difficulty  - Respiratory Therapy support as indicated  - Manage/alleviate anxiety  - Monitor for signs/symptoms of CO2 retention  Outcome:  Progressing     Problem: CARDIOVASCULAR - ADULT  Goal: Maintains optimal cardiac output and hemodynamic stability  Description: INTERVENTIONS:  - Monitor vital signs, rhythm, and trends  - Monitor for bleeding, hypotension and signs of decreased cardiac output  - Evaluate effectiveness of vasoactive medications to optimize hemodynamic stability  - Monitor arterial and/or venous puncture sites for bleeding and/or hematoma  - Assess quality of pulses, skin color and temperature  - Assess for signs of decreased coronary artery perfusion - ex. Angina  - Evaluate fluid balance, assess for edema, trend weights  Outcome: Progressing  Goal: Absence of cardiac arrhythmias or at baseline  Description: INTERVENTIONS:  - Continuous cardiac monitoring, monitor vital signs, obtain 12 lead EKG if indicated  - Evaluate effectiveness of antiarrhythmic and heart rate control medications as ordered  - Initiate emergency measures for life threatening arrhythmias  - Monitor electrolytes and administer replacement therapy as ordered  Outcome: Progressing     Problem: PAIN - ADULT  Goal: Verbalizes/displays adequate comfort level or patient's stated pain goal  Description: INTERVENTIONS:  - Encourage pt to monitor pain and request assistance  - Assess pain using appropriate pain scale  - Administer analgesics based on type and severity of pain and evaluate response  - Implement non-pharmacological measures as appropriate and evaluate response  - Consider cultural and social influences on pain and pain management  - Manage/alleviate anxiety  - Utilize distraction and/or relaxation techniques  - Monitor for opioid side effects  - Notify MD/LIP if interventions unsuccessful or patient reports new pain  - Anticipate increased pain with activity and pre-medicate as appropriate  Outcome: Progressing     Problem: SAFETY ADULT - FALL  Goal: Free from fall injury  Description: INTERVENTIONS:  - Assess pt frequently for physical needs  - Identify  cognitive and physical deficits and behaviors that affect risk of falls.  - [place] fall precautions as indicated by assessment.  - Educate pt/family on patient safety including physical limitations  - Instruct pt to call for assistance with activity based on assessment  - Modify environment to reduce risk of injury  - Provide assistive devices as appropriate  - Consider OT/PT consult to assist with strengthening/mobility  - Encourage toileting schedule  Outcome: Progressing     Problem: DISCHARGE PLANNING  Goal: Discharge to home or other facility with appropriate resources  Description: INTERVENTIONS:  - Identify barriers to discharge w/pt and caregiver  - Include patient/family/discharge partner in discharge planning  - Arrange for needed discharge resources and transportation as appropriate  - Identify discharge learning needs (meds, wound care, etc)  - Arrange for interpreters to assist at discharge as needed  - Consider post-discharge preferences of patient/family/discharge partner  - Complete POLST form as appropriate  - Assess patient's ability to be responsible for managing their own health  - Refer to Case Management Department for coordinating discharge planning if the patient needs post-hospital services based on physician/LIP order or complex needs related to functional status, cognitive ability or social support system  Outcome: Progressing

## 2025-02-04 NOTE — CONGREGATE LIVING REVIEW
[facility] Living Authorization    The [facility] Review Committee has reviewed this case and the patient IS APPROVED for discharge to a facility for Short Term Skilled once the following procedure is followed:     - The physician discharge instructions (contained within the IKE note for SNF) must inlcude the below appropriate and approved COVID instructions to the facility    For questions regarding CLRC approval process, please contact the CM assigned to the case.  For questions regarding RN discharge workflow, please contact the unit Clinical Leader.

## 2025-02-04 NOTE — PROGRESS NOTES
Progress Note  Shyla Wheeler Patient Status:  Inpatient    1935 MRN [id number]   Location [facility] 3W/SW Attending Rona Zaidi MD   Hosp Day # 9 PCP Ray Goldman MD     Subjective:  Pt denies CP, SOB, palpitations. Overall feeling much better.     Objective:  /81 (BP Location: Right arm)   Pulse 91   Temp 97.5 °F (36.4 °C) (Oral)   Resp 20   Ht 5' 5\" (1.651 m)   Wt 171 lb (77.6 kg)   SpO2 97%   BMI 28.46 kg/m²     Telemetry: afib 80's-90's, LBBB    Intake/Output:    Intake/Output Summary (Last 24 hours) at 2025 1625  Last data filed at 2025 1313  Gross per 24 hour   Intake 775 ml   Output 1000 ml   Net -225 ml       Last 3 Weights   25 0505 171 lb (77.6 kg)   25 0658 169 lb (76.7 kg)   25 0416 169 lb 6.4 oz (76.8 kg)   25 0530 171 lb 9.6 oz (77.8 kg)   25 0514 170 lb 3.2 oz (77.2 kg)   25 0500 170 lb 8 oz (77.3 kg)   25 0400 171 lb 9.6 oz (77.8 kg)   25 2154 168 lb 6.4 oz (76.4 kg)   25 1740 162 lb (73.5 kg)   25 0313 167 lb 8 oz (76 kg)   25 0501 163 lb 6.4 oz (74.1 kg)   25 1457 156 lb 6.4 oz (70.9 kg)   25 1451 156 lb 6.4 oz (70.9 kg)   25 0956 161 lb 9.6 oz (73.3 kg)   25 0929 162 lb (73.5 kg)   23 0936 171 lb (77.6 kg)       Labs:  Recent Labs   Lab 25  0904 25  0706 25  0529   * 126* 95   BUN 34* 43* 36*   CREATSERUM 1.42* 1.51* 1.42*   EGFRCR 36* 33* 36*   CA 9.3 8.4* 8.3*    133* 139   K 4.2 4.7 3.7    101 99   CO2 27.0 24.0 32.0     Recent Labs   Lab 25  0904 25  0706 25  0529   RBC 3.72* 3.72* 3.65*   HGB 11.4* 11.8* 11.0*   HCT 35.0 34.6* 34.3*   MCV 94.1 93.0 94.0   MCH 30.6 31.7 30.1   MCHC 32.6 34.1 32.1   RDW 14.5 14.4 14.9   NEPRELIM 6.55 7.17 5.75   WBC 8.2 8.7 7.6   .0 335.0 306.0         No results for input(s): \"TROP\", \"TROPHS\", \"CK\" in the last 168 hours.    Diagnostics:  No results found.    Review of Systems   Cardiovascular:  Negative for chest pain, dyspnea on exertion, leg swelling, orthopnea and palpitations.   Respiratory:  Negative for cough and shortness of breath.        Physical Exam:    Gen: alert, oriented x 3, NAD  Heent: pupils equal, reactive. Mucous membranes moist.   Neck: no jvd  Cardiac: irregular rate and rhythm, normal S1,S2, no murmur, clicks, rub or gallop  Lungs: diminished  Abd: soft, NT/ND +bs  Ext: BLE edema  Skin: Warm, dry  Neuro: No focal deficits      Medications:     [START ON 2/5/2025] furosemide  20 mg Oral Daily    rivaroxaban  15 mg Oral Daily with dinner    metoprolol succinate  150 mg Oral 2x Daily(Beta Blocker)    vancomycin  125 mg Oral Daily    ipratropium-albuterol  3 mL Nebulization q6h    aspirin  81 mg Oral Daily    [Held by provider] atorvastatin  10 mg Oral Nightly    latanoprost  1 drop Both Eyes Nightly    pantoprazole  40 mg Oral QAM AC       Assessment:  Persistent Atrial Fibrillation, Initially w/RVR  Readmitted w/RVR - was not taking prescribed BB at home  Now rate controlled  TSH WNL  On toprol 100mg po BID; diltiazem 30mg q 6 hrs  [id number] - previously on Eliquis; now on Xarelto 15mg po daily (reduced for CrCl)  Acute on Chronic HFrEF  proBNP 75484, CXR w/bilateral pleural effusions  1/2025 Echo LVEF 30-35%, severe hypokinesis w/regional variation  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  Pt deferred ischemic eval last admit with plan for OP - now declines  Initially on IV Lasix, now on oral - Lasix 20mg po daily; diuretics per nephrology  GDMT - on toprol; not on ACEI/ARB/ARNI/MRA/SGLT2i d/t DEJAH - avoid per nephrology  Pneumonia   S/P Antibx  Nebs  DEJAH - Cr improving  Nephrology following  Mildly Elevated LFTs - improved  Possibly r/t congestion on admit  Chronic LBBB  Hypertension   BP stable - still on low side  Hyperlipidemia - last LDL 75, statin on hold d/t LFTs  Ascending Thoracic Aortic Aneursym - 5.7cm on CT 1/18/2025    Plan:  AFib  with controlled HRs - with reduced LVEF, will discontinue diltiazem and increase toprol to 150mg po BID  Pt and family initially declined DOAC stating persistent fatigue - they are agreeable for Xarelto 15mg po daily. Resumed today per primary MD.   Pt appears mildly volume overloaded on exam - agree with increasing Lasix to 20mg po daily (vs every other day). Evaluate volume status tomorrow; may need increased diuresis.   Strict I&O, daily weights (standing if possible), daily BMP  LFTs improving - resume statin at discharge  Hopefully home tomorrow if rates and volume status are stable    Plan of care discussed with patient, RN.    Sommer Tripp, APRN  2/4/2025  4:25 PM  [phone] [facility]  [phone] [facility]        Seen/examined patient independently.  Agree with findings, assessment and plan as outlined above.     Patient reports feeling at her baseline, no further dyspnea.  Denies any chest pain.  Telemetry with rate controlled atrial fibrillation.  Rates are in the 70-80s.  Patient and daughter agreeable to retrial rivaroxaban.    In regards to persistent atrial fibrillation, patient now on better rate control.  Trial on DOAC, as above.  Will attempt to wean off diltiazem given negative inotrope in the setting of severe HF, will increase metoprolol succinate from 100 mg twice daily to 150 mg twice daily.     Rest of plan as above.    Ray rosado,

## 2025-02-05 ENCOUNTER — TELEPHONE (OUTPATIENT)
Dept: INTERNAL MEDICINE CLINIC | Facility: CLINIC | Age: 89
End: 2025-02-05

## 2025-02-05 ENCOUNTER — APPOINTMENT (OUTPATIENT)
Dept: GENERAL RADIOLOGY | Facility: HOSPITAL | Age: 89
End: 2025-02-05
Attending: HOSPITALIST
Payer: MEDICARE

## 2025-02-05 LAB
ANION GAP SERPL CALC-SCNC: 8 MMOL/L (ref 0–18)
BASOPHILS # BLD AUTO: 0.02 X10(3) UL (ref 0–0.2)
BASOPHILS NFR BLD AUTO: 0.3 %
BUN BLD-MCNC: 29 MG/DL (ref 9–23)
BUN/CREAT SERPL: 24.8 (ref 10–20)
CALCIUM BLD-MCNC: 8.6 MG/DL (ref 8.7–10.4)
CHLORIDE SERPL-SCNC: 102 MMOL/L (ref 98–112)
CO2 SERPL-SCNC: 26 MMOL/L (ref 21–32)
CREAT BLD-MCNC: 1.17 MG/DL
DEPRECATED RDW RBC AUTO: 50.9 FL (ref 35.1–46.3)
EGFRCR SERPLBLD CKD-EPI 2021: 45 ML/MIN/1.73M2 (ref 60–?)
EOSINOPHIL # BLD AUTO: 0.31 X10(3) UL (ref 0–0.7)
EOSINOPHIL NFR BLD AUTO: 4.9 %
ERYTHROCYTE [DISTWIDTH] IN BLOOD BY AUTOMATED COUNT: 15.3 % (ref 11–15)
GLUCOSE BLD-MCNC: 127 MG/DL (ref 70–99)
HCT VFR BLD AUTO: 35.7 %
HGB BLD-MCNC: 11.4 G/DL
IMM GRANULOCYTES # BLD AUTO: 0.02 X10(3) UL (ref 0–1)
IMM GRANULOCYTES NFR BLD: 0.3 %
LYMPHOCYTES # BLD AUTO: 0.85 X10(3) UL (ref 1–4)
LYMPHOCYTES NFR BLD AUTO: 13.3 %
MAGNESIUM SERPL-MCNC: 2.3 MG/DL (ref 1.6–2.6)
MCH RBC QN AUTO: 30.5 PG (ref 26–34)
MCHC RBC AUTO-ENTMCNC: 31.9 G/DL (ref 31–37)
MCV RBC AUTO: 95.5 FL
MONOCYTES # BLD AUTO: 0.37 X10(3) UL (ref 0.1–1)
MONOCYTES NFR BLD AUTO: 5.8 %
NEUTROPHILS # BLD AUTO: 4.81 X10 (3) UL (ref 1.5–7.7)
NEUTROPHILS # BLD AUTO: 4.81 X10(3) UL (ref 1.5–7.7)
NEUTROPHILS NFR BLD AUTO: 75.4 %
NT-PROBNP SERPL-MCNC: ABNORMAL PG/ML (ref ?–450)
OSMOLALITY SERPL CALC.SUM OF ELEC: 289 MOSM/KG (ref 275–295)
PLATELET # BLD AUTO: 331 10(3)UL (ref 150–450)
POTASSIUM SERPL-SCNC: 4.4 MMOL/L (ref 3.5–5.1)
POTASSIUM SERPL-SCNC: 4.4 MMOL/L (ref 3.5–5.1)
PROCALCITONIN SERPL-MCNC: 0.07 NG/ML (ref ?–0.05)
Q-T INTERVAL: 366 MS
QRS DURATION: 154 MS
QTC CALCULATION (BEZET): 490 MS
R AXIS: 9 DEGREES
RBC # BLD AUTO: 3.74 X10(6)UL
SODIUM SERPL-SCNC: 136 MMOL/L (ref 136–145)
T AXIS: 137 DEGREES
TROPONIN I SERPL HS-MCNC: 13 NG/L
TROPONIN I SERPL HS-MCNC: 15 NG/L
VENTRICULAR RATE: 108 BPM
WBC # BLD AUTO: 6.4 X10(3) UL (ref 4–11)

## 2025-02-05 PROCEDURE — 99223 1ST HOSP IP/OBS HIGH 75: CPT | Performed by: INTERNAL MEDICINE

## 2025-02-05 PROCEDURE — 71045 X-RAY EXAM CHEST 1 VIEW: CPT | Performed by: HOSPITALIST

## 2025-02-05 PROCEDURE — 99233 SBSQ HOSP IP/OBS HIGH 50: CPT | Performed by: HOSPITALIST

## 2025-02-05 RX ORDER — MORPHINE SULFATE 2 MG/ML
1 INJECTION, SOLUTION INTRAMUSCULAR; INTRAVENOUS ONCE
Status: DISCONTINUED | OUTPATIENT
Start: 2025-02-05 | End: 2025-02-08

## 2025-02-05 NOTE — OCCUPATIONAL THERAPY NOTE
OCCUPATIONAL THERAPY TREATMENT NOTE - INPATIENT        Room Number: 338/338-A     Presenting Problem: Respiratory failure    Problem List  Principal Problem:    Acute hypoxemic respiratory failure (HCC)  Active Problems:    Acute on chronic heart failure, unspecified heart failure type (HCC)      OCCUPATIONAL THERAPY ASSESSMENT   Patient demonstrates good  progress this session, goals remain in progress.    Patient is requiring stand-by assist and contact guard assist as a result of the following impairments: decreased functional strength and decreased endurance.    Patient continues to function near baseline with toileting, bathing, upper body dressing, lower body dressing, grooming, eating, bed mobility, transfers, static standing balance, dynamic standing balance, functional standing tolerance, and energy conservation strategies.  Next session anticipate patient to progress toileting, bathing, upper body dressing, lower body dressing, grooming, eating, bed mobility, transfers, static standing balance, dynamic standing balance, functional standing tolerance, and energy conservation strategies.  Occupational Therapy will continue to follow patient for duration of hospitalization.    Patient continues to benefit from continued skilled OT services: at discharge to promote prior level of function and safety with additional support and return home with home health OT.     PLAN DURING HOSPITALIZATION  OT Device Recommendations: None  OT Treatment Plan: Balance activities;Energy conservation/work simplification techniques;ADL training;IADL training;Functional transfer training;UE strengthening/ROM;Endurance training;Patient/Family education;Patient/Family training;Equipment eval/education;Compensatory technique education     SUBJECTIVE  \"I feel pretty good being up.\"    OBJECTIVE  Precautions: Bed/chair alarm    PAIN ASSESSMENT  Ratin    O2 SATURATIONS  Oxygen Therapy  SPO2% on Room Air at Rest: 92  SPO2% Ambulation  on Room Air: 96    ACTIVITIES OF DAILY LIVING ASSESSMENT  AM-PAC ‘6-Clicks’ Inpatient Daily Activity Short Form  How much help from another person does the patient currently need…  -   Putting on and taking off regular lower body clothing?: A Little  -   Bathing (including washing, rinsing, drying)?: A Little  -   Toileting, which includes using toilet, bedpan or urinal? : A Little  -   Putting on and taking off regular upper body clothing?: A Little  -   Taking care of personal grooming such as brushing teeth?: A Little  -   Eating meals?: A Little    AM-PAC Score:  Score: 18  Approx Degree of Impairment: 46.65%  Standardized Score (AM-PAC Scale): 38.66  CMS Modifier (G-Code): CK    FUNCTIONAL TRANSFER ASSESSMENT  Sit to Stand: Chair  Edge of Bed: Contact Guard Assist  Chair: Stand-by Assist  Toilet Transfer: Contact Guard Assist    BED MOBILITY  Rolling: Stand-by Assist  Supine to Sit : Contact Guard Assist  Sit to Supine (OT): Contact Guard Assist  Scooting: CGA    BALANCE ASSESSMENT  Static Sitting: Stand-by Assist  Static Standing: Contact Guard Assist    FUNCTIONAL ADL ASSESSMENT  Eating: Supervision  Grooming Seated: Supervision  Bathing Seated: Minimal Assist  UB Dressing Seated: Stand-by Assist  LB Dressing Seated: Minimal Assist  Toileting Seated: Contact Guard Assist    THERAPEUTIC EXERCISE     Skilled Therapy Provided: Patient received sitting in chair. Patient performing ADLs and functional mobility at a CGA-SBA level this session. Patient most limited by functional endurance. Patient education on use of RW and energy conservation strategies. Patient on RA this session and able to maintain oxygen saturations >90% throughout session. Education provided on body mechanics and how that manifests functionally while completing ADLs and functional mobility. Patient with good return demonstration on all education.     EDUCATION PROVIDED  Patient Education : Role of Occupational Therapy; Plan of Care; Discharge  Recommendations; DME Recommendations; Functional Transfer Techniques; Fall Prevention; Energy Conservation; Proper Body Mechanics  Patient's Response to Education: Verbalized Understanding; Returned Demonstration    The patient's Approx Degree of Impairment: 46.65% has been calculated based on documentation in the [facility] '6 clicks' Inpatient Daily Activity Short Form.  Research supports that patients with this level of impairment may benefit from home health OT.  Final disposition will be made by interdisciplinary medical team.    Patient End of Session: Up in chair;Needs met;Call light within reach;RN aware of session/findings;All patient questions and concerns addressed;Hospital anti-slip socks    OT Goals:  Patients self stated goal is: to return home      Patient will complete functional transfer with Mod I   Comment: ongoing    Patient will complete toileting with Mod I   Comment: ongoing    Patient will tolerate standing for 3-5 minutes in prep for adls with Mod I    Comment:ongoing    Patient will complete item retrieval with Mod I   Comment:ongoing            Goals  on: 2/15  Frequency: 3x week    Self-Care Home Management: 10 minutes

## 2025-02-05 NOTE — SPIRITUAL CARE NOTE
Spiritual Care Visit Note    Patient Name: Shyla Wheeler Date of Spiritual Care Visit: 25   : 1935 Primary Dx: Acute hypoxemic respiratory failure (HCC)       Referred By: Referral From: Nurse    Spiritual Care Taxonomy:    Intended Effects: Demonstrate caring and concern    Methods: Assist with spiritual/Congregation practices;Collaborate with care team member;Demonstrate acceptance;Encourage self care;Encourage self reflection;Encourage sharing of feelings;Encourage story-telling;Explore nature of God;Explore quality of life;Exploring hope;Offer emotional support;Offer spiritual/Congregation support;Offer support    Interventions: Acknowledge current situation;Acknowledge response to difficult experience;Active listening;Ask guided questions;Ask guided questions about steven;Ask guided questions about purpose;Ask questions to bring forth feelings;Assist with identifying strengths;Discuss concerns;Discuss plan of care;Facilitate communication;Facilitate communication between patient/family member(s);[place];Provide compassionate touch;Silent prayer    Visit Type/Summary:     - Spiritual Care: Responded to a request via the on call phone Offered empathic listening and emotional support. Patient and family expressed appreciation for  visit. Provided information regarding how to contact Spiritual Care and left a Spiritual Care information card.  remains available as needed for follow up.    Spiritual Care support can be requested via an Epic consult. For urgent/immediate needs, please contact the On Call  at: [place]: ext [phone]     Tianna Ramos

## 2025-02-05 NOTE — CONSULTS
Initial Pulmonary Medicine Inpatient Consultation           Consult requested by Dr. Zaidi for abnormal chest imaging and respiratory symptoms.        HPI: 89 yo woman with hx of HFrEF, PAF on anticoagulation, HTN admitted with acute hypoxemic respiratory failure secondary to diastolic and systolic HF and PNA.   Seen by cardiology and being diuresed.   Also started on zosyn which she completed course on 2/3/25. Received solumedrol upon admission but was stopped on 2/1/25.  CXR today shows multifocal PNA pattern. Urine leg, strep pneumo neg.  PCT checked today is 0.07  Pt also noted to have an DEJAH which has been improving.   Felt better yesterday but had a bad last night coughing. Was laying flat when it started but then was moved to a chair the rest of the night.   Not coughing as much today but is tired.   No fevers, chills.   Family at bedside during the visit today       REVIEW OF SYSTEMS:  Positives and negatives as stated in HPI. Remainder of 12 pt review of systems otherwise are negative.       PAST MEDICAL HISTORY:  Past Medical History:   Diagnosis Date    Enlarged aorta     Glaucoma     Other and unspecified hyperlipidemia     Right groin pain     Unspecified essential hypertension         PAST SURGICAL HISTORY:  Past Surgical History:   Procedure Laterality Date    Correct bunion,simple Right 1999    Bunionectomy    Electrocardiogram, complete  08/18/2010    Scanned to media tab     Knee surgery      right knee        PAST FAMILY HISTORY:  Family History   Problem Relation Age of Onset    Heart Attack Father     Heart Disease Father         Coronary Artery Disease    Heart Attack Mother     Hypertension Mother     Breast Cancer Sister 78        2 sisters    Arthritis Sister     Heart Attack Brother     Cancer Brother         pancreatic cancer        PAST SOCIAL HISTORY:  Social History     Socioeconomic History    Marital status:    Tobacco Use    Smoking status: Never    Smokeless tobacco: Never    Substance and Sexual Activity    Alcohol use: Yes     Alcohol/week: 0.0 standard drinks of alcohol     Comment: wine once every 2 mos    Drug use: No   Other Topics Concern    Caffeine Concern No     Comment: decaf coffee daily    Exercise Yes     Comment: water aerobics 2-3x week        ALLERGIES:  Allergies[1]     MEDS:  Home Medications:  Medications Taking[2]  Scheduled Medication:   morphINE PF  1 mg Intravenous Once    furosemide  20 mg Oral Daily    rivaroxaban  15 mg Oral Daily with dinner    metoprolol succinate  150 mg Oral 2x Daily(Beta Blocker)    vancomycin  125 mg Oral Daily    ipratropium-albuterol  3 mL Nebulization q6h    aspirin  81 mg Oral Daily    [Held by provider] atorvastatin  10 mg Oral Nightly    latanoprost  1 drop Both Eyes Nightly    pantoprazole  40 mg Oral QAM AC     Continuous Infusing Medication:    PRN Medications:    ALPRAZolam    ipratropium-albuterol    ondansetron    acetaminophen    hydrALAzine    zolpidem    alum-mag hydroxide-simethicone    guaiFENesin-codeine       PHYSICAL EXAM:  /82 (BP Location: Left arm)   Pulse 97   Temp 97.3 °F (36.3 °C) (Axillary)   Resp 20   Ht 5' 5\" (1.651 m)   Wt 169 lb 12.8 oz (77 kg)   SpO2 96%   BMI 28.26 kg/m²   CONSTITUTIONAL: alert, oriented, no apparent distress  HEENT: atraumatic normocephalic  MOUTH: mucous membranes are moist. No OP exudates  NECK/THROAT: no JVD. Trachea midline. No obvious thyromegaly  LUNG: clear upper b/l no wheezing, +bibasilar crackles. Chest symmetric with respiratory motion  HEART: regular rate and rhythm, no obvious murmers or gallops note  ABD: soft non tender. + bowel sounds. No organomegaly noted  EXT: no clubbing, cyanosis + b/l LE edema noted. Pulses intact grossly  NEURO/MUSCULOSKELETAL: no gross deficits  SKIN: warm, dry. No obvious lesions noted  LYMPH: no obvious LAD       IMAGES:  CXR 2/5/25 on my review CXR appears stable with mild lower lobe infiltrates and large    Conclusion:  Multifocal pneumonia involving the bilateral lower lobes and left upper lobe has progressed since 2/3/2025.     Chest CTA 1/18/25  CONCLUSION:   1. No evidence of aortic dissection.   2. Ascending thoracic aortic aneurysm measuring up to 5.7 cm.   3. No evidence of acute pulmonary embolism to the level of the proximal segmental pulmonary artery branches.   4. Extensive multifocal bilateral airspace opacities, which may reflect multifocal pneumonia. Follow-up is recommended to document resolution.   5. Small bilateral pleural effusions and associated basilar atelectasis, with or without concurrent infectious process.   6. Mediastinal lymphadenopathy, potentially reactive.   7. Mild interlobular septal thickening, suggesting pulmonary interstitial edema.   8. Dilatation of the main pulmonary artery trunk may relate to underlying pulmonary hypertension.    9. Large hiatal hernia with essentially complete intrathoracic herniation of the stomach.   10. Cardiomegaly with biatrial dilatation.   11. Kyphoplasty changes of the lower thoracic spine.   12. Lesser incidental findings as above.       LABS:  Recent Labs   Lab 02/02/25  0706 02/04/25  0529 02/05/25  0713   RBC 3.72* 3.65* 3.74*   HGB 11.8* 11.0* 11.4*   HCT 34.6* 34.3* 35.7   MCV 93.0 94.0 95.5   MCH 31.7 30.1 30.5   MCHC 34.1 32.1 31.9   RDW 14.4 14.9 15.3*   NEPRELIM 7.17 5.75 4.81   WBC 8.7 7.6 6.4   .0 306.0 331.0       Recent Labs   Lab 01/30/25  0713 01/31/25  0904 02/02/25  0706 02/04/25  0529 02/05/25  0713   * 146* 126* 95 127*   BUN 41* 34* 43* 36* 29*   CREATSERUM 1.33* 1.42* 1.51* 1.42* 1.17*   EGFRCR 38* 36* 33* 36* 45*   CA 8.9 9.3 8.4* 8.3* 8.6*   ALB 3.8 3.9  --   --   --     136 133* 139 136   K 4.3  4.3 4.2 4.7 3.7 4.4  4.4    101 101 99 102   CO2 28.0 27.0 24.0 32.0 26.0   ALKPHO 97 100  --   --   --    AST 40* 32  --   --   --    ALT 81* 67*  --   --   --    BILT 0.5 0.6  --   --   --    TP 6.2 6.6   --   --   --         ASSESSMENT/PLAN:  Acute hypoxemic respiratory failure  -secondary to HFrEF and PNA  -cardiology managing diuresis  -PNA treated with course of zosyn. Doubt she has a new PNA or HAP with pt being afebrile, normal WBCct stable CXR on my read, and low PCT level.   -urine leg and strep pneumo neg  -cough overnight was likely d/t her large HH and laying flat  -continue PPI  -aspiration precautions  -wean supp 02 as tolerated  -repeat chest CT scan in 1 month    PAF and HFrEF  -on xarelto and diuretics as per cards    DEJAH  -improving labs    Proph:  -DVT: xarelto     Thank you for the opportunity to care for Shyla ZAMUDIO Wheeler.       KAROL Meek DO, MPH  Pulmonary Critical Care Medicine  [facility]          [1]   Allergies  Allergen Reactions    Benadryl [Diphenhydramine] SWELLING    Ambien [Zolpidem] ANXIETY    Xanax [Alprazolam] ANXIETY    Ace Inhibitors     Amoxicillin NAUSEA ONLY   [2]   Outpatient Medications Marked as Taking for the 1/26/25 encounter (Hospital Encounter)   Medication Sig Dispense Refill    METOPROLOL SUCCINATE ER 50 MG Oral Tablet 24 Hr Take 2 tablets (100 mg total) by mouth daily. 60 tablet 1    RIVAROXABAN 15 MG Oral Tab Take 1 tablet (15 mg total) by mouth daily with lunch. 30 tablet 1    apixaban 2.5 MG Oral Tab Take 1 tablet (2.5 mg total) by mouth 2 (two) times daily. 60 tablet 2    furosemide 20 MG Oral Tab Take 1 tablet (20 mg total) by mouth every other day. OR as directed by cardiology 30 tablet 1    pravastatin 40 MG Oral Tab Take 1 tablet (40 mg total) by mouth nightly. 90 tablet 1    Calcium Carb-Cholecalciferol 600-200 MG-UNIT Oral Tab Take 1 tablet by mouth daily.      TRAVATAN Z 0.004 % Ophthalmic Solution Place 1 drop into both eyes at bedtime.      MULTIVITAMIN TAB/CAP Take 1 tablet by mouth daily.

## 2025-02-05 NOTE — PLAN OF CARE
Problem: Patient Centered Care  Goal: Patient preferences are identified and integrated in the patient's plan of care  Description: Interventions:  - What would you like us to know as we care for you? From home alone  - Provide timely, complete, and accurate information to patient/family  - Incorporate patient and family knowledge, values, beliefs, and cultural backgrounds into the planning and delivery of care  - Encourage patient/family to participate in care and decision-making at the level they choose  - Honor patient and family perspectives and choices  Outcome: Progressing     Problem: Patient/Family Goals  Goal: Patient/Family Long Term Goal  Description: Patient's Long Term Goal: To go home    Interventions:  - Monitor labs and vital signs  - Titrate oxygen  - Medication compliance  - Follow provider recommendations  - See additional Care Plan goals for specific interventions  Outcome: Progressing  Goal: Patient/Family Short Term Goal  Description: Patient's Short Term Goal: To improve breathing    Interventions:   - Oxygen  - IV antibiotics  - Medication compliance  - Follow provider recommendations  - See additional Care Plan goals for specific interventions  Outcome: Progressing     Problem: RESPIRATORY - ADULT  Goal: Achieves optimal ventilation and oxygenation  Description: INTERVENTIONS:  - Assess for changes in respiratory status  - Assess for changes in mentation and behavior  - Position to facilitate oxygenation and minimize respiratory effort  - Oxygen supplementation based on oxygen saturation or ABGs  - Provide Smoking Cessation handout, if applicable  - Encourage broncho-pulmonary hygiene including cough, deep breathe, Incentive Spirometry  - Assess the need for suctioning and perform as needed  - Assess and instruct to report SOB or any respiratory difficulty  - Respiratory Therapy support as indicated  - Manage/alleviate anxiety  - Monitor for signs/symptoms of CO2 retention  Outcome:  Progressing     Problem: CARDIOVASCULAR - ADULT  Goal: Maintains optimal cardiac output and hemodynamic stability  Description: INTERVENTIONS:  - Monitor vital signs, rhythm, and trends  - Monitor for bleeding, hypotension and signs of decreased cardiac output  - Evaluate effectiveness of vasoactive medications to optimize hemodynamic stability  - Monitor arterial and/or venous puncture sites for bleeding and/or hematoma  - Assess quality of pulses, skin color and temperature  - Assess for signs of decreased coronary artery perfusion - ex. Angina  - Evaluate fluid balance, assess for edema, trend weights  Outcome: Progressing  Goal: Absence of cardiac arrhythmias or at baseline  Description: INTERVENTIONS:  - Continuous cardiac monitoring, monitor vital signs, obtain 12 lead EKG if indicated  - Evaluate effectiveness of antiarrhythmic and heart rate control medications as ordered  - Initiate emergency measures for life threatening arrhythmias  - Monitor electrolytes and administer replacement therapy as ordered  Outcome: Progressing     Problem: PAIN - ADULT  Goal: Verbalizes/displays adequate comfort level or patient's stated pain goal  Description: INTERVENTIONS:  - Encourage pt to monitor pain and request assistance  - Assess pain using appropriate pain scale  - Administer analgesics based on type and severity of pain and evaluate response  - Implement non-pharmacological measures as appropriate and evaluate response  - Consider cultural and social influences on pain and pain management  - Manage/alleviate anxiety  - Utilize distraction and/or relaxation techniques  - Monitor for opioid side effects  - Notify MD/LIP if interventions unsuccessful or patient reports new pain  - Anticipate increased pain with activity and pre-medicate as appropriate  Outcome: Progressing     Problem: SAFETY ADULT - FALL  Goal: Free from fall injury  Description: INTERVENTIONS:  - Assess pt frequently for physical needs  - Identify  cognitive and physical deficits and behaviors that affect risk of falls.  - [place] fall precautions as indicated by assessment.  - Educate pt/family on patient safety including physical limitations  - Instruct pt to call for assistance with activity based on assessment  - Modify environment to reduce risk of injury  - Provide assistive devices as appropriate  - Consider OT/PT consult to assist with strengthening/mobility  - Encourage toileting schedule  Outcome: Progressing     Problem: DISCHARGE PLANNING  Goal: Discharge to home or other facility with appropriate resources  Description: INTERVENTIONS:  - Identify barriers to discharge w/pt and caregiver  - Include patient/family/discharge partner in discharge planning  - Arrange for needed discharge resources and transportation as appropriate  - Identify discharge learning needs (meds, wound care, etc)  - Arrange for interpreters to assist at discharge as needed  - Consider post-discharge preferences of patient/family/discharge partner  - Complete POLST form as appropriate  - Assess patient's ability to be responsible for managing their own health  - Refer to Case Management Department for coordinating discharge planning if the patient needs post-hospital services based on physician/LIP order or complex needs related to functional status, cognitive ability or social support system  Outcome: Progressing

## 2025-02-05 NOTE — HOME CARE LIAISON
Met with patient at the bedside. Patient is agreeable to [facility]. Residential brochure provided with contact information. All questions addressed and answered.

## 2025-02-05 NOTE — PROGRESS NOTES
Progress Note  Shyla Wheeler Patient Status:  Inpatient    1935 MRN [id number]   Location [facility] 3W/SW Attending Rona Zaidi MD   Hosp Day # 10 PCP Ray Goldman MD     SUBJECTIVE:    Denies chest pain, dyspnea, palpitations. Report dry, non-productive cough.     VITALS:  /82 (BP Location: Left arm)   Pulse 97   Temp 97.3 °F (36.3 °C) (Axillary)   Resp 20   Ht 5' 5\" (1.651 m)   Wt 169 lb 12.8 oz (77 kg)   SpO2 96%   BMI 28.26 kg/m²   INTAKE/OUTPUT:    Intake/Output Summary (Last 24 hours) at 2025 1141  Last data filed at 2025 1003  Gross per 24 hour   Intake 1295 ml   Output 751 ml   Net 544 ml     Last 3 Weights   25 0421 169 lb 12.8 oz (77 kg)   25 0505 171 lb (77.6 kg)   25 0658 169 lb (76.7 kg)   25 0416 169 lb 6.4 oz (76.8 kg)   25 0530 171 lb 9.6 oz (77.8 kg)   25 0514 170 lb 3.2 oz (77.2 kg)   25 0500 170 lb 8 oz (77.3 kg)   25 0400 171 lb 9.6 oz (77.8 kg)   25 2154 168 lb 6.4 oz (76.4 kg)   25 1740 162 lb (73.5 kg)   25 0313 167 lb 8 oz (76 kg)   25 0501 163 lb 6.4 oz (74.1 kg)   25 1457 156 lb 6.4 oz (70.9 kg)   25 1451 156 lb 6.4 oz (70.9 kg)   25 0956 161 lb 9.6 oz (73.3 kg)   25 0929 162 lb (73.5 kg)   23 0936 171 lb (77.6 kg)     LABS:  Recent Labs   Lab 25  0725  0525  0713   * 95 127*   BUN 43* 36* 29*   CREATSERUM 1.51* 1.42* 1.17*   EGFRCR 33* 36* 45*   CA 8.4* 8.3* 8.6*   * 139 136   K 4.7 3.7 4.4  4.4    99 102   CO2 24.0 32.0 26.0     Recent Labs   Lab 25  0725  0525   RBC 3.72* 3.65* 3.74*   HGB 11.8* 11.0* 11.4*   HCT 34.6* 34.3* 35.7   MCV 93.0 94.0 95.5   MCH 31.7 30.1 30.5   MCHC 34.1 32.1 31.9   RDW 14.4 14.9 15.3*   NEPRELIM 7.17 5.75 4.81   WBC 8.7 7.6 6.4   .0 306.0 331.0     No results for input(s): \"TROP\", \"CK\" in the last 168 hours.  DIAGNOSTICS:  TELEMETRY:  Afib HR 90s-100s    ROS: Negative unless noted above   PHYSICAL EXAM:  General: Alert and oriented x 3. No apparent distress.  HEENT: Normocephalic, sclera are nonicteric. Hearing appropriate bilaterally.  Neck: No JVD or Carotid bruits. Trachea midline.   Cardiac: IRRegular rate and rhythm. S1, S2 auscultated. No murmurs, rubs, or gallops appreciated.   Lungs: a/p dullness. Chest expansion symmetrical. Regular effort.  Abdomen: Soft, non-tender, +BS. No hepatosplenomegaly or appreciable masses.   Extremities: Without clubbing, cyanosis. Peripheral pulses are 2+. Edema none   Neurologic: Motor and sensory nerves grossly intact.   Psych: Appropriate affect   Skin: Warm and dry. No obvious lesions, wounds, or ulcerations.   MEDICATIONS:   morphINE PF  1 mg Intravenous Once    furosemide  20 mg Oral Daily    rivaroxaban  15 mg Oral Daily with dinner    metoprolol succinate  150 mg Oral 2x Daily(Beta Blocker)    vancomycin  125 mg Oral Daily    ipratropium-albuterol  3 mL Nebulization q6h    aspirin  81 mg Oral Daily    [Held by provider] atorvastatin  10 mg Oral Nightly    latanoprost  1 drop Both Eyes Nightly    pantoprazole  40 mg Oral QAM AC     ASSESSMENT:    Presented with Afib RVR, was recently discharged on beta-blocker but patient did not start it on discharge.      PNA  - Per primary, s/p ABX     PAF/ Chronic LBBB  - Presented with RVR, BB resumed, rates improved   - TSH unremarkable   - Reports fatigue from Eliquis therefore it was switched to renal dosed Xarelto this admission      Acute on Chronic HFrEF, LVEF 30-35%  - Offered [facility] but patient declined. Chest CT 1/18 with coronary calcifications   - proBNP 26,463, Repeat 17,770  - CXR without edema, Dry wt 167 lb, unclear today's wt is accurate as it is bed wt   - Appears compensated   - On BB. Not started on ARNI/ACE/ARB/MRNA/ SGLT2 with DEJAH      DEJAH  - Hstorically normal renal function, Renal US unremarkable   - Nephrology s/o      HTN- Controlled   HLD-  LDL 75, statin on hold 2/2 elevated LFTs  Elevated Liver Enzymes- Improving, US Liver last admission stable   Ascending Aortic Aneurysm- 5.7 cm     PLAN:  - Rates are reasonably controlled on current lopressor dose. Patient is agreeable to stay on current dose and Xarelto for a/c.   - Keep Lasix at daily dosing   - Appears compensated from a cardiac standpoint   - PNA workup per pulmonary and primary       Plan of care discussed with patient and RN.     Do Guevara, MSN, FNP-BC, [facility], [place], operated by [facility]  02/05/25   11:41 AM  [phone] [place]  [phone] [place]      Seen/examined patient independently.  Agree with findings, assessment and plan as outlined above.      Pt had coughing episode, given codeine and said that she did felt awful after it.     Telemetry with rate controlled atrial fibrillation.  Rates are in the 70-80s.     In regards to persistent atrial fibrillation, patient now on better rate control. With metoprolol succinate 150mg BID. Stopped diltiazem. So far tolerating Xarelto.      Rest of plan as above.     Ray rosado, DO

## 2025-02-05 NOTE — PLAN OF CARE
Problem: RESPIRATORY - ADULT  Goal: Achieves optimal ventilation and oxygenation  Description: INTERVENTIONS:  - Assess for changes in respiratory status  - Assess for changes in mentation and behavior  - Position to facilitate oxygenation and minimize respiratory effort  - Oxygen supplementation based on oxygen saturation or ABGs  - Provide Smoking Cessation handout, if applicable  - Encourage broncho-pulmonary hygiene including cough, deep breathe, Incentive Spirometry  - Assess the need for suctioning and perform as needed  - Assess and instruct to report SOB or any respiratory difficulty  - Respiratory Therapy support as indicated  - Manage/alleviate anxiety  - Monitor for signs/symptoms of CO2 retention  Outcome: Progressing     Problem: CARDIOVASCULAR - ADULT  Goal: Maintains optimal cardiac output and hemodynamic stability  Description: INTERVENTIONS:  - Monitor vital signs, rhythm, and trends  - Monitor for bleeding, hypotension and signs of decreased cardiac output  - Evaluate effectiveness of vasoactive medications to optimize hemodynamic stability  - Monitor arterial and/or venous puncture sites for bleeding and/or hematoma  - Assess quality of pulses, skin color and temperature  - Assess for signs of decreased coronary artery perfusion - ex. Angina  - Evaluate fluid balance, assess for edema, trend weights  Outcome: Progressing  Goal: Absence of cardiac arrhythmias or at baseline  Description: INTERVENTIONS:  - Continuous cardiac monitoring, monitor vital signs, obtain 12 lead EKG if indicated  - Evaluate effectiveness of antiarrhythmic and heart rate control medications as ordered  - Initiate emergency measures for life threatening arrhythmias  - Monitor electrolytes and administer replacement therapy as ordered  Outcome: Progressing     Problem: PAIN - ADULT  Goal: Verbalizes/displays adequate comfort level or patient's stated pain goal  Description: INTERVENTIONS:  - Encourage pt to monitor pain and  request assistance  - Assess pain using appropriate pain scale  - Administer analgesics based on type and severity of pain and evaluate response  - Implement non-pharmacological measures as appropriate and evaluate response  - Consider cultural and social influences on pain and pain management  - Manage/alleviate anxiety  - Utilize distraction and/or relaxation techniques  - Monitor for opioid side effects  - Notify MD/LIP if interventions unsuccessful or patient reports new pain  - Anticipate increased pain with activity and pre-medicate as appropriate  Outcome: Progressing     Problem: SAFETY ADULT - FALL  Goal: Free from fall injury  Description: INTERVENTIONS:  - Assess pt frequently for physical needs  - Identify cognitive and physical deficits and behaviors that affect risk of falls.  - [place] fall precautions as indicated by assessment.  - Educate pt/family on patient safety including physical limitations  - Instruct pt to call for assistance with activity based on assessment  - Modify environment to reduce risk of injury  - Provide assistive devices as appropriate  - Consider OT/PT consult to assist with strengthening/mobility  - Encourage toileting schedule  Outcome: Progressing     Problem: DISCHARGE PLANNING  Goal: Discharge to home or other facility with appropriate resources  Description: INTERVENTIONS:  - Identify barriers to discharge w/pt and caregiver  - Include patient/family/discharge partner in discharge planning  - Arrange for needed discharge resources and transportation as appropriate  - Identify discharge learning needs (meds, wound care, etc)  - Arrange for interpreters to assist at discharge as needed  - Consider post-discharge preferences of patient/family/discharge partner  - Complete POLST form as appropriate  - Assess patient's ability to be responsible for managing their own health  - Refer to Case Management Department for coordinating discharge planning if the patient needs  post-hospital services based on physician/LIP order or complex needs related to functional status, cognitive ability or social support system  Outcome: Progressing

## 2025-02-05 NOTE — TELEPHONE ENCOUNTER
Dr Goldman --- will you sign Home health orders?   Verbal order okay, and then [facility] will fax orders to office for signature.     Received call form JINA Pitts with [facility].   Discharge maybe tomorrow, 2/6/25   Hospitalized for difficulty breathing with underlying congestive heart failure.     Call JINA Pitts with [facility] at [phone] (Confidential Voicemail)

## 2025-02-05 NOTE — PROGRESS NOTES
Progress Note     Shyla Wheeler Patient Status:  Inpatient    1935 MRN [id number]   Location [facility] 3W/SW Attending Odalys Castro MD   Hosp Day # 10 PCP Ray Goldman MD     Chief Complaint: patient presented with   Chief Complaint   Patient presents with    Difficulty Breathing       Subjective:   S:  Here with pneumonia  Denies cp, sob, dizziness      Review of Systems:   10 point ROS completed and was negative, except for pertinent positive and negatives stated in subjective.    Objective:   Vital signs:  Temp:  [97.2 °F (36.2 °C)-97.9 °F (36.6 °C)] 97.4 °F (36.3 °C)  Pulse:  [] 92  Resp:  [20] 20  BP: (108-130)/(76-98) 114/98  SpO2:  [83 %-96 %] 94 %    Wt Readings from Last 6 Encounters:   25 169 lb 12.8 oz (77 kg)   25 167 lb 8 oz (76 kg)   23 171 lb (77.6 kg)   21 173 lb (78.5 kg)   21 170 lb (77.1 kg)   19 180 lb (81.6 kg)         Physical Exam:    General: No acute distress. Alert ,         Respiratory: Clear to auscultation bilaterally. No wheezes. No rhonchi.  Cardiovascular: S1, S2. Regular rate and rhythm. No murmurs, rubs or gallops.   Abdomen: Soft, nontender, nondistended.  Positive bowel sounds. No rebound or guarding.  Neurologic: No focal neurological deficits.   Musculoskeletal: Moves all extremities.  Extremities: No edema.    Results:   Diagnostic Data:      Labs:    Labs Last 24 Hours:   BMP     CBC    Other     Na 136 Cl 102 BUN 29 Glu 127   Hb 11.4   PTT - Procal 0.07   K 4.4; 4.4 CO2 26.0 Cr 1.17   WBC 6.4 >< .0  INR - CRP -   Renal Lytes Endo    Hct 35.7   Trop - D dim -   eGFR - Ca 8.6 POC Gluc  -    LFT   pBNP 17,770 Lactic -   eGFR AA - PO4 - A1c -   AST - APk - Prot -  LDL -     Mg 2.3 TSH -   ALT - T kal - Alb -        COVID-19 Lab Results    COVID-19  Lab Results   Component Value Date    COVID19 Not Detected 2025    COVID19 Not Detected 2025       Pro-Calcitonin  Recent Labs   Lab 25  0958    PCT 0.07*         Cardiac  Recent Labs   Lab 02/05/25  0959   PBNP 17,770*       Creatinine Kinase  No results for input(s): \"CK\" in the last 168 hours.    Inflammatory Markers  No results for input(s): \"CRP\", \"PATRIZIA\", \"LDH\", \"DDIMER\" in the last 168 hours.    Imaging: Imaging data reviewed in Epic.    Medications:    morphINE PF  1 mg Intravenous Once    furosemide  20 mg Oral Daily    rivaroxaban  15 mg Oral Daily with dinner    metoprolol succinate  150 mg Oral 2x Daily(Beta Blocker)    vancomycin  125 mg Oral Daily    ipratropium-albuterol  3 mL Nebulization q6h    aspirin  81 mg Oral Daily    [Held by provider] atorvastatin  10 mg Oral Nightly    latanoprost  1 drop Both Eyes Nightly    pantoprazole  40 mg Oral QAM AC       Assessment & Plan:   ASSESSMENT / PLAN:     Acute hypoxic respiratory failure 2/2 combined systolic and diastolic HF and pneumonia  PNA  -Imaging reviewed  -Strep pneumo and legionella Ag negative  -Resp panel negative  -BNP elevated  -Trop and EKG reviewed  -Started on solu-medrol on admission--> now stopped  - will discontinue considering CHF exacerbation  -Started on Lasix 40 mg IV BID on admission  - IV zosyn completed course 2/3  -Duonebs q6hrs  -Cardiology on consult  -EF 30-35%, decreased from prior  -Continue BB  -Unable to start ACE/ARB/MRNA/ARNI/SGLT2 due to DEJAH  -on oral diuretics  -with chest pain, appreciate pulmonology evaluation  -no indication for further abx      DEJAH - improving  -Likely 2/2 overdiuresis?  -Cardiology on consult  Renal US - reviewed  -Making uine  -Monitor labs  -Baseline creatinine~1--> peaked at 1.81--> 1.51 -->1.42 today    Persistent afib with RVR  -cont metoprolol succinate 100 mg bid  -on cardizem 20mg q 6 hr. D/w cardiology re: long term plans  -Started Rivaroxaban 15 mg daily, but now switched to Lovenox as family feels this is related to weakness and anxiety  -d/w patient and family, resume xarelto, they are agreeable    HTN  -BP well  controlled  -Monitor vitals    Elevated LFTs  -Improving  -Monitor labs    Chest pain after cough  -follow-up troponins and EKG reviewed       Quality:  DVT Prophylaxis: xarelto  CODE status: FULL   DISPO: pending clinical improvement.   Estimated date of discharge: To be determined  Discharge is dependent on: Improved clinical status  At this point Patient is expected to be discharge to: Home versus rehab      Plan of care discussed with Patient and RN.     Coordinated care with providers and counseling re: treatment plan and workup   ALEKSANDER RAMIREZ MD  Supplementary Documentation:           I personally reviewed the available laboratories, imaging including operative report. I discussed/will discuss the case with patient and her nurse. I ordered laboratories studies. I adjusted medications including not applicable today. Medical decision making high, risk is high.     >55min spent, >50% spent counseling and coordinating care in the form of educating pt/family and d/w consultants and staff. Most of the time spent discussing the above plan.

## 2025-02-06 LAB
ANION GAP SERPL CALC-SCNC: 11 MMOL/L (ref 0–18)
BASOPHILS # BLD AUTO: 0.03 X10(3) UL (ref 0–0.2)
BASOPHILS NFR BLD AUTO: 0.4 %
BUN BLD-MCNC: 31 MG/DL (ref 9–23)
BUN/CREAT SERPL: 24 (ref 10–20)
CALCIUM BLD-MCNC: 9 MG/DL (ref 8.7–10.4)
CHLORIDE SERPL-SCNC: 100 MMOL/L (ref 98–112)
CO2 SERPL-SCNC: 24 MMOL/L (ref 21–32)
CREAT BLD-MCNC: 1.29 MG/DL
DEPRECATED RDW RBC AUTO: 51.4 FL (ref 35.1–46.3)
EGFRCR SERPLBLD CKD-EPI 2021: 40 ML/MIN/1.73M2 (ref 60–?)
EOSINOPHIL # BLD AUTO: 0.07 X10(3) UL (ref 0–0.7)
EOSINOPHIL NFR BLD AUTO: 1 %
ERYTHROCYTE [DISTWIDTH] IN BLOOD BY AUTOMATED COUNT: 15.6 % (ref 11–15)
GLUCOSE BLD-MCNC: 155 MG/DL (ref 70–99)
HCT VFR BLD AUTO: 37.1 %
HGB BLD-MCNC: 11.9 G/DL
IMM GRANULOCYTES # BLD AUTO: 0.02 X10(3) UL (ref 0–1)
IMM GRANULOCYTES NFR BLD: 0.3 %
LYMPHOCYTES # BLD AUTO: 0.86 X10(3) UL (ref 1–4)
LYMPHOCYTES NFR BLD AUTO: 12.1 %
MAGNESIUM SERPL-MCNC: 2.4 MG/DL (ref 1.6–2.6)
MCH RBC QN AUTO: 30.5 PG (ref 26–34)
MCHC RBC AUTO-ENTMCNC: 32.1 G/DL (ref 31–37)
MCV RBC AUTO: 95.1 FL
MONOCYTES # BLD AUTO: 0.26 X10(3) UL (ref 0.1–1)
MONOCYTES NFR BLD AUTO: 3.6 %
NEUTROPHILS # BLD AUTO: 5.89 X10 (3) UL (ref 1.5–7.7)
NEUTROPHILS # BLD AUTO: 5.89 X10(3) UL (ref 1.5–7.7)
NEUTROPHILS NFR BLD AUTO: 82.6 %
OSMOLALITY SERPL CALC.SUM OF ELEC: 290 MOSM/KG (ref 275–295)
PLATELET # BLD AUTO: 334 10(3)UL (ref 150–450)
POTASSIUM SERPL-SCNC: 4.5 MMOL/L (ref 3.5–5.1)
RBC # BLD AUTO: 3.9 X10(6)UL
SODIUM SERPL-SCNC: 135 MMOL/L (ref 136–145)
WBC # BLD AUTO: 7.1 X10(3) UL (ref 4–11)

## 2025-02-06 PROCEDURE — 99233 SBSQ HOSP IP/OBS HIGH 50: CPT | Performed by: INTERNAL MEDICINE

## 2025-02-06 PROCEDURE — 99233 SBSQ HOSP IP/OBS HIGH 50: CPT | Performed by: HOSPITALIST

## 2025-02-06 RX ORDER — IPRATROPIUM BROMIDE AND ALBUTEROL SULFATE 2.5; .5 MG/3ML; MG/3ML
3 SOLUTION RESPIRATORY (INHALATION)
Status: DISCONTINUED | OUTPATIENT
Start: 2025-02-06 | End: 2025-02-08

## 2025-02-06 RX ORDER — METOPROLOL SUCCINATE 100 MG/1
150 TABLET, EXTENDED RELEASE ORAL 2 TIMES DAILY
Qty: 60 TABLET | Refills: 3 | Status: ON HOLD | OUTPATIENT
Start: 2025-02-06

## 2025-02-06 RX ORDER — QUETIAPINE FUMARATE 25 MG/1
25 TABLET, FILM COATED ORAL NIGHTLY PRN
Status: DISCONTINUED | OUTPATIENT
Start: 2025-02-06 | End: 2025-02-07

## 2025-02-06 RX ORDER — FUROSEMIDE 20 MG/1
20 TABLET ORAL DAILY
Qty: 30 TABLET | Refills: 3 | Status: SHIPPED | OUTPATIENT
Start: 2025-02-07 | End: 2025-02-07

## 2025-02-06 NOTE — PLAN OF CARE
Bed locked in low position, belongings in reach, bed/chair alarms on, call light in reach. Frequent rounding done, all patient needs met. Non-slip socks on/at bedside. Xanax given at patient request, she did get some sleep after dose. Possible DC home today.    Problem: RESPIRATORY - ADULT  Goal: Achieves optimal ventilation and oxygenation  Description: INTERVENTIONS:  - Assess for changes in respiratory status  - Assess for changes in mentation and behavior  - Position to facilitate oxygenation and minimize respiratory effort  - Oxygen supplementation based on oxygen saturation or ABGs  - Provide Smoking Cessation handout, if applicable  - Encourage broncho-pulmonary hygiene including cough, deep breathe, Incentive Spirometry  - Assess the need for suctioning and perform as needed  - Assess and instruct to report SOB or any respiratory difficulty  - Respiratory Therapy support as indicated  - Manage/alleviate anxiety  - Monitor for signs/symptoms of CO2 retention  Outcome: Progressing     Problem: CARDIOVASCULAR - ADULT  Goal: Maintains optimal cardiac output and hemodynamic stability  Description: INTERVENTIONS:  - Monitor vital signs, rhythm, and trends  - Monitor for bleeding, hypotension and signs of decreased cardiac output  - Evaluate effectiveness of vasoactive medications to optimize hemodynamic stability  - Monitor arterial and/or venous puncture sites for bleeding and/or hematoma  - Assess quality of pulses, skin color and temperature  - Assess for signs of decreased coronary artery perfusion - ex. Angina  - Evaluate fluid balance, assess for edema, trend weights  Outcome: Progressing  Goal: Absence of cardiac arrhythmias or at baseline  Description: INTERVENTIONS:  - Continuous cardiac monitoring, monitor vital signs, obtain 12 lead EKG if indicated  - Evaluate effectiveness of antiarrhythmic and heart rate control medications as ordered  - Initiate emergency measures for life threatening arrhythmias  -  Monitor electrolytes and administer replacement therapy as ordered  Outcome: Progressing     Problem: PAIN - ADULT  Goal: Verbalizes/displays adequate comfort level or patient's stated pain goal  Description: INTERVENTIONS:  - Encourage pt to monitor pain and request assistance  - Assess pain using appropriate pain scale  - Administer analgesics based on type and severity of pain and evaluate response  - Implement non-pharmacological measures as appropriate and evaluate response  - Consider cultural and social influences on pain and pain management  - Manage/alleviate anxiety  - Utilize distraction and/or relaxation techniques  - Monitor for opioid side effects  - Notify MD/LIP if interventions unsuccessful or patient reports new pain  - Anticipate increased pain with activity and pre-medicate as appropriate  Outcome: Progressing     Problem: SAFETY ADULT - FALL  Goal: Free from fall injury  Description: INTERVENTIONS:  - Assess pt frequently for physical needs  - Identify cognitive and physical deficits and behaviors that affect risk of falls.  - [place] fall precautions as indicated by assessment.  - Educate pt/family on patient safety including physical limitations  - Instruct pt to call for assistance with activity based on assessment  - Modify environment to reduce risk of injury  - Provide assistive devices as appropriate  - Consider OT/PT consult to assist with strengthening/mobility  - Encourage toileting schedule  Outcome: Progressing     Problem: DISCHARGE PLANNING  Goal: Discharge to home or other facility with appropriate resources  Description: INTERVENTIONS:  - Identify barriers to discharge w/pt and caregiver  - Include patient/family/discharge partner in discharge planning  - Arrange for needed discharge resources and transportation as appropriate  - Identify discharge learning needs (meds, wound care, etc)  - Arrange for interpreters to assist at discharge as needed  - Consider post-discharge  preferences of patient/family/discharge partner  - Complete POLST form as appropriate  - Assess patient's ability to be responsible for managing their own health  - Refer to Case Management Department for coordinating discharge planning if the patient needs post-hospital services based on physician/LIP order or complex needs related to functional status, cognitive ability or social support system  Outcome: Progressing

## 2025-02-06 NOTE — PROGRESS NOTES
02/06/25 1030   Mobility   Distance (ft) 10   SPO2% on Oxygen at Rest 95   At rest oxygen flow (liters per minute) 1   SPO2% Ambulation on Room Air 88   SPO2% Ambulation on Oxygen 93   Ambulation oxygen flow (liters per minute) 2

## 2025-02-06 NOTE — CM/SW NOTE
FOR MD TO COPY INTO PROGRESS NOTE AFTER WALK TEST:     I had a face to face visit with this patient and I evaluated the patient's O2 saturations.  The patient is mobile in their home and is in need of a portable oxygen tank.  The home oxygen will improve the patient's condition.        SW/CM to remain available for support and/or discharge planning.     Azeb Galaviz MSW, LSW  Discharge Planner [id number]

## 2025-02-06 NOTE — PROGRESS NOTES
Pulmonary Medicine Inpatient Progress Note           Consult requested by Dr. Zaidi for abnormal chest imaging and respiratory symptoms.        Subjective:  On 2 LNC but sats in upper 90s. Per RN she desaturates with movement.   Afebrile  Reports had a bad night sleeping. Received xanax last night      From the initial consultation  HPI: 89 yo woman with hx of HFrEF, PAF on anticoagulation, HTN admitted with acute hypoxemic respiratory failure secondary to diastolic and systolic HF and PNA.   Seen by cardiology and being diuresed.   Also started on zosyn which she completed course on 2/3/25. Received solumedrol upon admission but was stopped on 2/1/25.  CXR today shows multifocal PNA pattern. Urine leg, strep pneumo neg.  PCT checked today is 0.07  Pt also noted to have an DEJAH which has been improving.   Felt better yesterday but had a bad last night coughing. Was laying flat when it started but then was moved to a chair the rest of the night.   Not coughing as much today but is tired.   No fevers, chills.   Family at bedside during the visit today       REVIEW OF SYSTEMS:  Positives and negatives as stated in HPI. Remainder of 12 pt review of systems otherwise are negative.       PAST MEDICAL HISTORY:  Past Medical History:   Diagnosis Date    Enlarged aorta     Glaucoma     Other and unspecified hyperlipidemia     Right groin pain     Unspecified essential hypertension         PAST SURGICAL HISTORY:  Past Surgical History:   Procedure Laterality Date    Correct bunion,simple Right 1999    Bunionectomy    Electrocardiogram, complete  08/18/2010    Scanned to media tab     Knee surgery      right knee        PAST FAMILY HISTORY:  Family History   Problem Relation Age of Onset    Heart Attack Father     Heart Disease Father         Coronary Artery Disease    Heart Attack Mother     Hypertension Mother     Breast Cancer Sister 78        2 sisters    Arthritis Sister     Heart Attack Brother     Cancer Brother          pancreatic cancer        PAST SOCIAL HISTORY:  Social History     Socioeconomic History    Marital status:    Tobacco Use    Smoking status: Never    Smokeless tobacco: Never   Substance and Sexual Activity    Alcohol use: Yes     Alcohol/week: 0.0 standard drinks of alcohol     Comment: wine once every 2 mos    Drug use: No   Other Topics Concern    Caffeine Concern No     Comment: decaf coffee daily    Exercise Yes     Comment: water aerobics 2-3x week        ALLERGIES:  Allergies[1]     MEDS:  Home Medications:  Medications Taking[2]  Scheduled Medication:   ipratropium-albuterol  3 mL Nebulization Q6H WA    morphINE PF  1 mg Intravenous Once    furosemide  20 mg Oral Daily    rivaroxaban  15 mg Oral Daily with dinner    metoprolol succinate  150 mg Oral 2x Daily(Beta Blocker)    vancomycin  125 mg Oral Daily    aspirin  81 mg Oral Daily    atorvastatin  10 mg Oral Nightly    latanoprost  1 drop Both Eyes Nightly    pantoprazole  40 mg Oral QAM AC     Continuous Infusing Medication:    PRN Medications:    ALPRAZolam    ipratropium-albuterol    ondansetron    acetaminophen    hydrALAzine    zolpidem    alum-mag hydroxide-simethicone    guaiFENesin-codeine       PHYSICAL EXAM:  /77 (BP Location: Left arm)   Pulse 102   Temp 97.6 °F (36.4 °C) (Oral)   Resp 22   Ht 5' 5\" (1.651 m)   Wt 169 lb 12.8 oz (77 kg)   SpO2 96%   BMI 28.26 kg/m²   CONSTITUTIONAL: sitting in a chair. Wakes up easily but then dozes off  HEENT: atraumatic normocephalic  MOUTH: mucous membranes are moist. No OP exudates  NECK/THROAT: no JVD. Trachea midline. No obvious thyromegaly  LUNG: clear upper b/l no wheezing, +bibasilar crackles. Chest symmetric with respiratory motion  HEART: regular rate and rhythm, no obvious murmers or gallops note  ABD: soft non tender. + bowel sounds. No organomegaly noted  EXT: no clubbing, cyanosis + b/l LE edema noted       IMAGES:  CXR 2/5/25 on my review CXR appears stable with mild lower  lobe infiltrates and large HH  Conclusion:  Multifocal pneumonia involving the bilateral lower lobes and left upper lobe has progressed since 2/3/2025.     Chest CTA 1/18/25  CONCLUSION:   1. No evidence of aortic dissection.   2. Ascending thoracic aortic aneurysm measuring up to 5.7 cm.   3. No evidence of acute pulmonary embolism to the level of the proximal segmental pulmonary artery branches.   4. Extensive multifocal bilateral airspace opacities, which may reflect multifocal pneumonia. Follow-up is recommended to document resolution.   5. Small bilateral pleural effusions and associated basilar atelectasis, with or without concurrent infectious process.   6. Mediastinal lymphadenopathy, potentially reactive.   7. Mild interlobular septal thickening, suggesting pulmonary interstitial edema.   8. Dilatation of the main pulmonary artery trunk may relate to underlying pulmonary hypertension.    9. Large hiatal hernia with essentially complete intrathoracic herniation of the stomach.   10. Cardiomegaly with biatrial dilatation.   11. Kyphoplasty changes of the lower thoracic spine.   12. Lesser incidental findings as above.       LABS:  Recent Labs   Lab 02/04/25  0529 02/05/25  0713 02/06/25  0727   RBC 3.65* 3.74* 3.90   HGB 11.0* 11.4* 11.9*   HCT 34.3* 35.7 37.1   MCV 94.0 95.5 95.1   MCH 30.1 30.5 30.5   MCHC 32.1 31.9 32.1   RDW 14.9 15.3* 15.6*   NEPRELIM 5.75 4.81 5.89   WBC 7.6 6.4 7.1   .0 331.0 334.0       Recent Labs   Lab 01/31/25  0904 02/02/25  0706 02/04/25  0529 02/05/25  0713 02/06/25  0727   *   < > 95 127* 155*   BUN 34*   < > 36* 29* 31*   CREATSERUM 1.42*   < > 1.42* 1.17* 1.29*   EGFRCR 36*   < > 36* 45* 40*   CA 9.3   < > 8.3* 8.6* 9.0   ALB 3.9  --   --   --   --       < > 139 136 135*   K 4.2   < > 3.7 4.4  4.4 4.5      < > 99 102 100   CO2 27.0   < > 32.0 26.0 24.0   ALKPHO 100  --   --   --   --    AST 32  --   --   --   --    ALT 67*  --   --   --   --    BILT  0.6  --   --   --   --    TP 6.6  --   --   --   --     < > = values in this interval not displayed.        ASSESSMENT/PLAN:  Acute hypoxemic respiratory failure  -secondary to HFrEF and PNA  -cardiology managing diuresis  -PNA treated with course of zosyn. Doubt she has a new PNA or HAP with pt being afebrile, normal WBCct, stable CXR on my read, and low PCT level.   -urine leg and strep pneumo were neg  -coughing likely d/t her large HH and laying flat  -continue PPI  -aspiration precautions  -wean supp 02 as tolerated. Needing supp 02 b/c of multiple factors (HFrEF, recent tx for PNA, deconditioning)  -repeat chest CT scan in 1 month    PAF and HFrEF  -on xarelto and diuretics as per cards. proBNP is high    DEJAH  -improving labs    Proph:  -DVT: xarelto     Thank you for the opportunity to care for Shyla WheelerSaeed Meek DO, MPH  Pulmonary Critical Care Medicine  [facility]        [1]   Allergies  Allergen Reactions    Benadryl [Diphenhydramine] SWELLING    Ambien [Zolpidem] ANXIETY    Xanax [Alprazolam] ANXIETY    Ace Inhibitors     Amoxicillin NAUSEA ONLY   [2]   Outpatient Medications Marked as Taking for the 1/26/25 encounter (Hospital Encounter)   Medication Sig Dispense Refill    [START ON 2/7/2025] furosemide 20 MG Oral Tab Take 1 tablet (20 mg total) by mouth daily. 30 tablet 3    metoprolol succinate  MG Oral Tablet 24 Hr Take 1.5 tablets (150 mg total) by mouth in the morning and 1.5 tablets (150 mg total) before bedtime. 60 tablet 3    RIVAROXABAN 15 MG Oral Tab Take 1 tablet (15 mg total) by mouth daily with lunch. 30 tablet 1    apixaban 2.5 MG Oral Tab Take 1 tablet (2.5 mg total) by mouth 2 (two) times daily. 60 tablet 2    furosemide 20 MG Oral Tab Take 1 tablet (20 mg total) by mouth every other day. OR as directed by cardiology 30 tablet 1    pravastatin 40 MG Oral Tab Take 1 tablet (40 mg total) by mouth nightly. 90 tablet 1     Calcium Carb-Cholecalciferol 600-200 MG-UNIT Oral Tab Take 1 tablet by mouth daily.      TRAVATAN Z 0.004 % Ophthalmic Solution Place 1 drop into both eyes at bedtime.      MULTIVITAMIN TAB/CAP Take 1 tablet by mouth daily.

## 2025-02-06 NOTE — PROGRESS NOTES
Progress Note  Shyla Wheeler Patient Status:  Inpatient    1935 MRN [id number]   Location [facility] 3W/SW Attending Rona Zaidi MD   Hosp Day # 11 PCP Ray Goldman MD     SUBJECTIVE:    Denies chest pain, dyspnea, palpitations. Dry cough has improved, feels ready to discharge home.     VITALS:  /78 (BP Location: Left arm)   Pulse 105   Temp 97.6 °F (36.4 °C) (Oral)   Resp 22   Ht 5' 5\" (1.651 m)   Wt 169 lb 12.8 oz (77 kg)   SpO2 92%   BMI 28.26 kg/m²   INTAKE/OUTPUT:    Intake/Output Summary (Last 24 hours) at 2025 0647  Last data filed at 2025 0255  Gross per 24 hour   Intake 345 ml   Output 652 ml   Net -307 ml     Last 3 Weights   25 0421 169 lb 12.8 oz (77 kg)   25 0505 171 lb (77.6 kg)   25 0658 169 lb (76.7 kg)   25 0416 169 lb 6.4 oz (76.8 kg)   25 0530 171 lb 9.6 oz (77.8 kg)   25 0514 170 lb 3.2 oz (77.2 kg)   25 0500 170 lb 8 oz (77.3 kg)   25 0400 171 lb 9.6 oz (77.8 kg)   25 2154 168 lb 6.4 oz (76.4 kg)   25 1740 162 lb (73.5 kg)   25 0313 167 lb 8 oz (76 kg)   25 0501 163 lb 6.4 oz (74.1 kg)   25 1457 156 lb 6.4 oz (70.9 kg)   25 1451 156 lb 6.4 oz (70.9 kg)   25 0956 161 lb 9.6 oz (73.3 kg)   25 0929 162 lb (73.5 kg)   23 0936 171 lb (77.6 kg)     LABS:  Recent Labs   Lab 25  0725  0529 25  0713   * 95 127*   BUN 43* 36* 29*   CREATSERUM 1.51* 1.42* 1.17*   EGFRCR 33* 36* 45*   CA 8.4* 8.3* 8.6*   * 139 136   K 4.7 3.7 4.4  4.4    99 102   CO2 24.0 32.0 26.0     Recent Labs   Lab 25  0725  0525  0713   RBC 3.72* 3.65* 3.74*   HGB 11.8* 11.0* 11.4*   HCT 34.6* 34.3* 35.7   MCV 93.0 94.0 95.5   MCH 31.7 30.1 30.5   MCHC 34.1 32.1 31.9   RDW 14.4 14.9 15.3*   NEPRELIM 7.17 5.75 4.81   WBC 8.7 7.6 6.4   .0 306.0 331.0     No results for input(s): \"TROP\", \"CK\" in the last 168  hours.  DIAGNOSTICS:  TELEMETRY: Afib HR 90s-100s    ROS: Negative unless noted above   PHYSICAL EXAM:  General: Alert and oriented x 3. No apparent distress.  HEENT: Normocephalic, sclera are nonicteric. Hearing appropriate bilaterally.  Neck: No JVD or Carotid bruits. Trachea midline.   Cardiac: IRRegular rate and rhythm. S1, S2 auscultated. No murmurs, rubs, or gallops appreciated.   Lungs: a/p dullness. Chest expansion symmetrical. Regular effort.  Abdomen: Soft, non-tender, +BS. No hepatosplenomegaly or appreciable masses.   Extremities: Without clubbing, cyanosis. Peripheral pulses are 2+. Edema none   Neurologic: Motor and sensory nerves grossly intact.   Psych: Appropriate affect   Skin: Warm and dry. No obvious lesions, wounds, or ulcerations.   MEDICATIONS:   ipratropium-albuterol  3 mL Nebulization Q6H WA    morphINE PF  1 mg Intravenous Once    furosemide  20 mg Oral Daily    rivaroxaban  15 mg Oral Daily with dinner    metoprolol succinate  150 mg Oral 2x Daily(Beta Blocker)    vancomycin  125 mg Oral Daily    aspirin  81 mg Oral Daily    [Held by provider] atorvastatin  10 mg Oral Nightly    latanoprost  1 drop Both Eyes Nightly    pantoprazole  40 mg Oral QAM AC     ASSESSMENT:    Presented with Afib RVR, was recently discharged on beta-blocker but patient did not start it on discharge.      PNA  - Per primary, s/p ABX     PAF/ Chronic LBBB  - Presented with RVR, BB resumed, rates improved   - Eval by EP, no KACEY/DCCV  - TSH unremarkable   - Reports fatigue from Eliquis therefore it was switched to renal dosed Xarelto this admission. CrCl 27  - [id number] 5     Acute on Chronic HFrEF, LVEF 30-35%  - Offered [facility] but patient declined. Chest CT 1/18 with coronary calcifications   - proBNP 26,463, Repeat 17,770  - CXR without edema, Dry wt 167 lb, no wt today   - Appears compensated   - On BB. Not started on ARNI/ACE/ARB/MRNA/ SGLT2 with DEJAH      DEJAH  - Hstorically normal renal function, Renal US  unremarkable   - Nephrology s/o      HTN- Controlled   HLD- LDL 75, statin on hold 2/2 elevated LFTs  Elevated Liver Enzymes- Improving, US Liver last admission stable   Ascending Aortic Aneurysm- 5.7 cm     PLAN:  - Rates are reasonably controlled on current lopressor dose. Patient is agreeable to stay on current dose and Xarelto for a/c.   - Keep Lasix at daily dosing, BMP and follow up in one week   - Resume statin   - Appears compensated from a cardiac standpoint, ok for discharge. We will follow peripherally.     Plan of care discussed with patient and RN.     Do Guevara, MSN, FNP-BC, CCK  02/06/25   8:51 AM  [phone] [place]  [phone] [place]

## 2025-02-06 NOTE — TELEPHONE ENCOUNTER
Left message for Gisselle under he confidential voicemail giving verbal ok per  MD message below

## 2025-02-06 NOTE — PROGRESS NOTES
Progress Note     Shyla Wheeler Patient Status:  Inpatient    1935 MRN [id number]   Location [facility] 3W/SW Attending Odalys Castro MD   Hosp Day # 11 PCP Ray Goldman MD     Chief Complaint: patient presented with   Chief Complaint   Patient presents with    Difficulty Breathing       Subjective:   S:  Here with pneumonia  Denies cp, sob, dizziness  Had a bad night last night    Review of Systems:   10 point ROS completed and was negative, except for pertinent positive and negatives stated in subjective.    Objective:   Vital signs:  Temp:  [97.5 °F (36.4 °C)-97.9 °F (36.6 °C)] 97.5 °F (36.4 °C)  Pulse:  [] 105  Resp:  [20-22] 22  BP: (103-130)/(77-91) 130/91  SpO2:  [80 %-96 %] 96 %    Wt Readings from Last 6 Encounters:   25 169 lb 12.8 oz (77 kg)   25 167 lb 8 oz (76 kg)   23 171 lb (77.6 kg)   21 173 lb (78.5 kg)   21 170 lb (77.1 kg)   19 180 lb (81.6 kg)         Physical Exam:    General: No acute distress. Alert ,         Respiratory: Clear to auscultation bilaterally. No wheezes. No rhonchi.  Cardiovascular: S1, S2. Regular rate and rhythm. No murmurs, rubs or gallops.   Abdomen: Soft, nontender, nondistended.  Positive bowel sounds. No rebound or guarding.  Neurologic: No focal neurological deficits.   Musculoskeletal: Moves all extremities.  Extremities: No edema.    Results:   Diagnostic Data:      Labs:    Labs Last 24 Hours:   BMP     CBC    Other     Na 135 Cl 100 BUN 31 Glu 155   Hb 11.9   PTT - Procal -   K 4.5 CO2 24.0 Cr 1.29   WBC 7.1 >< .0  INR - CRP -   Renal Lytes Endo    Hct 37.1   Trop - D dim -   eGFR - Ca 9.0 POC Gluc  -    LFT   pBNP - Lactic -   eGFR AA - PO4 - A1c -   AST - APk - Prot -  LDL -     Mg 2.4 TSH -   ALT - T kal - Alb -        COVID-19 Lab Results    COVID-19  Lab Results   Component Value Date    COVID19 Not Detected 2025    COVID19 Not Detected 2025       Pro-Calcitonin  Recent Labs   Lab  02/05/25  0958   PCT 0.07*         Cardiac  Recent Labs   Lab 02/05/25  0959   PBNP 17,770*       Creatinine Kinase  No results for input(s): \"CK\" in the last 168 hours.    Inflammatory Markers  No results for input(s): \"CRP\", \"PATRIZIA\", \"LDH\", \"DDIMER\" in the last 168 hours.    Imaging: Imaging data reviewed in Epic.    Medications:    ipratropium-albuterol  3 mL Nebulization Q6H WA    morphINE PF  1 mg Intravenous Once    furosemide  20 mg Oral Daily    rivaroxaban  15 mg Oral Daily with dinner    metoprolol succinate  150 mg Oral 2x Daily(Beta Blocker)    vancomycin  125 mg Oral Daily    aspirin  81 mg Oral Daily    atorvastatin  10 mg Oral Nightly    latanoprost  1 drop Both Eyes Nightly    pantoprazole  40 mg Oral QAM AC       Assessment & Plan:   ASSESSMENT / PLAN:     Acute hypoxic respiratory failure 2/2 combined systolic and diastolic HF and pneumonia  PNA  -Imaging reviewed  -Strep pneumo and legionella Ag negative  -Resp panel negative  -BNP elevated  -Trop and EKG reviewed  -Started on solu-medrol on admission--> now stopped  - will discontinue considering CHF exacerbation  -Started on Lasix 40 mg IV BID on admission  - IV zosyn completed course 2/3  -Duonebs q6hrs  -Cardiology on consult  -EF 30-35%, decreased from prior  -Continue BB  -Unable to start ACE/ARB/MRNA/ARNI/SGLT2 due to DEJAH  -on oral diuretics  -with chest pain, appreciate pulmonology evaluation  -no indication for further abx  I had a face to face visit with this patient and I evaluated the patient's O2 saturations. The patient is mobile in their home and is in need of a portable oxygen tank. The home oxygen will improve the patient's condition.         DEJAH - improving  -Likely 2/2 overdiuresis?  -Cardiology on consult  Renal US - reviewed  -Making uine  -Monitor labs  -Baseline creatinine~1--> peaked at 1.81--> 1.51 -->1.42 today    Persistent afib with RVR  -cont metoprolol succinate 100 mg bid  -on cardizem 20mg q 6 hr. D/w cardiology re:  long term plans  -Started Rivaroxaban 15 mg daily, but now switched to Lovenox as family feels this is related to weakness and anxiety  -d/w patient and family, resume xarelto, they are agreeable    HTN  -BP well controlled  -Monitor vitals    Elevated LFTs  -Improving  -Monitor labs    Chest pain after cough  -follow-up troponins and EKG reviewed     Anxiety  -worse with xanax  -add as needed seroquel for agiation    Quality:  DVT Prophylaxis: xarelto  CODE status: FULL   DISPO: pending clinical improvement.   Estimated date of discharge: To be determined  Discharge is dependent on: Improved clinical status  At this point Patient is expected to be discharge to: Home versus rehab      Plan of care discussed with Patient and RN.     Coordinated care with providers and counseling re: treatment plan and workup   ALEKSANDER RAMIREZ MD  Supplementary Documentation:           I personally reviewed the available laboratories, imaging including operative report. I discussed/will discuss the case with patient and her nurse. I ordered laboratories studies. I adjusted medications including not applicable today. Medical decision making high, risk is high.     >55min spent, >50% spent counseling and coordinating care in the form of educating pt/family and d/w consultants and staff. Most of the time spent discussing the above plan.

## 2025-02-06 NOTE — PLAN OF CARE
1 L o2. Unable to wean patient, 02 walk completed by RN and physical therapy. Call light within reach. Safety precautions in place. Plan: Possible discharge home tomorrow w/ HHC and oxygen.  Problem: Patient Centered Care  Goal: Patient preferences are identified and integrated in the patient's plan of care  Description: Interventions:  - What would you like us to know as we care for you? From home alone  - Provide timely, complete, and accurate information to patient/family  - Incorporate patient and family knowledge, values, beliefs, and cultural backgrounds into the planning and delivery of care  - Encourage patient/family to participate in care and decision-making at the level they choose  - Honor patient and family perspectives and choices  Outcome: Progressing     Problem: Patient/Family Goals  Goal: Patient/Family Long Term Goal  Description: Patient's Long Term Goal: To go home    Interventions:  - Monitor labs and vital signs  - Titrate oxygen  - Medication compliance  - Follow provider recommendations  - See additional Care Plan goals for specific interventions  Outcome: Progressing  Goal: Patient/Family Short Term Goal  Description: Patient's Short Term Goal: To improve breathing    Interventions:   - Oxygen  - IV antibiotics  - Medication compliance  - Follow provider recommendations  - See additional Care Plan goals for specific interventions  Outcome: Progressing     Problem: RESPIRATORY - ADULT  Goal: Achieves optimal ventilation and oxygenation  Description: INTERVENTIONS:  - Assess for changes in respiratory status  - Assess for changes in mentation and behavior  - Position to facilitate oxygenation and minimize respiratory effort  - Oxygen supplementation based on oxygen saturation or ABGs  - Provide Smoking Cessation handout, if applicable  - Encourage broncho-pulmonary hygiene including cough, deep breathe, Incentive Spirometry  - Assess the need for suctioning and perform as needed  - Assess and  instruct to report SOB or any respiratory difficulty  - Respiratory Therapy support as indicated  - Manage/alleviate anxiety  - Monitor for signs/symptoms of CO2 retention  Outcome: Progressing     Problem: CARDIOVASCULAR - ADULT  Goal: Maintains optimal cardiac output and hemodynamic stability  Description: INTERVENTIONS:  - Monitor vital signs, rhythm, and trends  - Monitor for bleeding, hypotension and signs of decreased cardiac output  - Evaluate effectiveness of vasoactive medications to optimize hemodynamic stability  - Monitor arterial and/or venous puncture sites for bleeding and/or hematoma  - Assess quality of pulses, skin color and temperature  - Assess for signs of decreased coronary artery perfusion - ex. Angina  - Evaluate fluid balance, assess for edema, trend weights  Outcome: Progressing  Goal: Absence of cardiac arrhythmias or at baseline  Description: INTERVENTIONS:  - Continuous cardiac monitoring, monitor vital signs, obtain 12 lead EKG if indicated  - Evaluate effectiveness of antiarrhythmic and heart rate control medications as ordered  - Initiate emergency measures for life threatening arrhythmias  - Monitor electrolytes and administer replacement therapy as ordered  Outcome: Progressing     Problem: PAIN - ADULT  Goal: Verbalizes/displays adequate comfort level or patient's stated pain goal  Description: INTERVENTIONS:  - Encourage pt to monitor pain and request assistance  - Assess pain using appropriate pain scale  - Administer analgesics based on type and severity of pain and evaluate response  - Implement non-pharmacological measures as appropriate and evaluate response  - Consider cultural and social influences on pain and pain management  - Manage/alleviate anxiety  - Utilize distraction and/or relaxation techniques  - Monitor for opioid side effects  - Notify MD/LIP if interventions unsuccessful or patient reports new pain  - Anticipate increased pain with activity and pre-medicate as  appropriate  Outcome: Progressing     Problem: SAFETY ADULT - FALL  Goal: Free from fall injury  Description: INTERVENTIONS:  - Assess pt frequently for physical needs  - Identify cognitive and physical deficits and behaviors that affect risk of falls.  - [place] fall precautions as indicated by assessment.  - Educate pt/family on patient safety including physical limitations  - Instruct pt to call for assistance with activity based on assessment  - Modify environment to reduce risk of injury  - Provide assistive devices as appropriate  - Consider OT/PT consult to assist with strengthening/mobility  - Encourage toileting schedule  Outcome: Progressing     Problem: DISCHARGE PLANNING  Goal: Discharge to home or other facility with appropriate resources  Description: INTERVENTIONS:  - Identify barriers to discharge w/pt and caregiver  - Include patient/family/discharge partner in discharge planning  - Arrange for needed discharge resources and transportation as appropriate  - Identify discharge learning needs (meds, wound care, etc)  - Arrange for interpreters to assist at discharge as needed  - Consider post-discharge preferences of patient/family/discharge partner  - Complete POLST form as appropriate  - Assess patient's ability to be responsible for managing their own health  - Refer to Case Management Department for coordinating discharge planning if the patient needs post-hospital services based on physician/LIP order or complex needs related to functional status, cognitive ability or social support system  Outcome: Progressing

## 2025-02-06 NOTE — PHYSICAL THERAPY NOTE
PHYSICAL THERAPY TREATMENT NOTE - INPATIENT     Room Number: 338/338-A       Presenting Problem: acute on chronic heart failure, acute hypoxemia respiratory failure  Co-Morbidities : HTN, enlarged aorta, R knee surgery, CHF, a-fib, large hiatal hernia, and recent admit for multifocal pneumonia    Problem List  Principal Problem:    Acute hypoxemic respiratory failure (HCC)  Active Problems:    Acute on chronic heart failure, unspecified heart failure type (HCC)      PHYSICAL THERAPY ASSESSMENT   Patient demonstrates fair progress this session, goals  updated to reflect patient performance.      Patient is requiring minimal assist as a result of the following impairments: decreased functional strength, decreased endurance/aerobic capacity, impaired standing balance, impaired coordination, decreased muscular endurance, and medical status.     Patient continues to function below baseline with bed mobility, transfers, gait, maintaining seated position, standing prolonged periods, and performing household tasks.  Next session anticipate patient to progress bed mobility, transfers, gait, maintaining seated position, standing prolonged periods, and performing household tasks.  Physical Therapy will continue to follow patient for duration of hospitalization.    Patient continues to benefit from continued skilled PT services: at discharge to promote prior level of function.  Anticipate patient will return home with home health PT.    PLAN DURING HOSPITALIZATION  Nursing Mobility Recommendation : 1 Assist  PT Device Recommendation: Rolling walker  PT Treatment Plan: Bed mobility;Body mechanics;Endurance;Energy conservation;Patient education;Gait training;Range of motion;Strengthening;Transfer training;Balance training  Frequency (Obs): 5x/week     SUBJECTIVE  I feel drowsy with limited rest last night I really did not sleep much and I have had low energy today. I would like to try to sit up for my breakfast but honestly I am  not hungry.     OBJECTIVE  Precautions: Bed/chair alarm    WEIGHT BEARING RESTRICTION       PAIN ASSESSMENT   Ratin  Location: denies pain       BALANCE  Static Sitting: Fair +  Dynamic Sitting: Fair  Static Standing: Fair -  Dynamic Standing: Fair -    ACTIVITY TOLERANCE  Pulse: 103  Heart Rate Source: Monitor  Resp: 22                 O2 WALK  Oxygen Therapy  SPO2% on Oxygen at Rest: 95  At rest oxygen flow (liters per minute): 1  SPO2% Ambulation on Room Air: 88  SPO2% Ambulation on Oxygen: 93  Ambulation oxygen flow (liters per minute): 2    AM-PAC '6-Clicks' INPATIENT SHORT FORM - BASIC MOBILITY  How much difficulty does the patient currently have...  Patient Difficulty: Turning over in bed (including adjusting bedclothes, sheets and blankets)?: A Little   Patient Difficulty: Sitting down on and standing up from a chair with arms (e.g., wheelchair, bedside commode, etc.): A Little   Patient Difficulty: Moving from lying on back to sitting on the side of the bed?: A Little   How much help from another person does the patient currently need...   Help from Another: Moving to and from a bed to a chair (including a wheelchair)?: A Little   Help from Another: Need to walk in hospital room?: A Little   Help from Another: Climbing 3-5 steps with a railing?: A Lot     AM-PAC Score:  Raw Score: 17   Approx Degree of Impairment: 50.57%   Standardized Score (AM-PAC Scale): 42.13   CMS Modifier (G-Code): CK    FUNCTIONAL ABILITY STATUS  Functional Mobility/Gait Assessment  Gait Assistance: Minimum assistance  Distance (ft): 10  Assistive Device: Rolling walker  Pattern: Shuffle  Rolling: minimal assist  Supine to Sit: minimal assist  Sit to Supine: minimal assist  Sit to Stand: minimal assist    Skilled Therapy Provided: Pt received up to chair. Pt ed with LE therex x 10 reps per HEP for LE strength and ROM. Pt ed with transfers with min A with 2 lts 02 amb 10' with shuffling gait with 02 sats 88% pt assisted to chair  to eat breakfast 02 sats 95% on 1 lt 02 support. Pt self limiting what she would like to do with PT requesting rest in a chair to eat not wanting to amb out of room. Pt presetting as distractible with low attention to task with PT VC's for exercise. Pt is on track to return home with family assist and [facility] PT for family assist.     The patient's Approx Degree of Impairment: 50.57% has been calculated based on documentation in the [facility] '6 clicks' Inpatient Daily Activity Short Form.  Research supports that patients with this level of impairment may benefit from [facility] PT with family assist.    Final disposition will be made by interdisciplinary medical team.    THERAPEUTIC EXERCISES  Lower Extremity Ankle pumps  Heel slides  LAQ     Position Sitting & Standing       Patient End of Session: Up in chair;With  staff;Needs met;Call light within reach;Bracing education provided per handout;Hospital anti-slip socks;All patient questions and concerns addressed;Alarm set    CURRENT GOALS   Goals to be met by: 1/31/25  Patient Goal Patient's self-stated goal is: to get back home and feel better   Goal #1 Patient is able to demonstrate supine - sit EOB @ level: modified independent      Goal #1   Current Status  min A    Goal #2 Patient is able to demonstrate transfers Sit to/from Stand at assistance level: modified independent with walker - rolling      Goal #2  Current Status  SB A with RW   Goal #3 Patient is able to ambulate 150 feet with assist device: walker - rolling at assistance level: modified independent   Goal #3   Current Status  '   Goal #4 Patient will negotiate 1 stairs/one curb w/ assistive device and supervision   Goal #4   Current Status  NT   Goal #5 Patient to demonstrate independence with home activity/exercise instructions provided to patient in preparation for discharge.   Goal #5   Current Status  Progressing     Gait Training: 10 minutes  Therapeutic Exercise: 15 minutes

## 2025-02-07 ENCOUNTER — APPOINTMENT (OUTPATIENT)
Dept: GENERAL RADIOLOGY | Facility: HOSPITAL | Age: 89
End: 2025-02-07
Attending: INTERNAL MEDICINE
Payer: MEDICARE

## 2025-02-07 ENCOUNTER — TELEPHONE (OUTPATIENT)
Dept: INTERNAL MEDICINE UNIT | Facility: HOSPITAL | Age: 89
End: 2025-02-07

## 2025-02-07 LAB
ANION GAP SERPL CALC-SCNC: 8 MMOL/L (ref 0–18)
BASE EXCESS BLD CALC-SCNC: 2.1 MMOL/L (ref ?–2)
BASOPHILS # BLD AUTO: 0.02 X10(3) UL (ref 0–0.2)
BASOPHILS NFR BLD AUTO: 0.3 %
BUN BLD-MCNC: 38 MG/DL (ref 9–23)
BUN/CREAT SERPL: 27 (ref 10–20)
CALCIUM BLD-MCNC: 8.9 MG/DL (ref 8.7–10.4)
CHLORIDE SERPL-SCNC: 99 MMOL/L (ref 98–112)
CO2 SERPL-SCNC: 26 MMOL/L (ref 21–32)
CREAT BLD-MCNC: 1.41 MG/DL
DEPRECATED RDW RBC AUTO: 51 FL (ref 35.1–46.3)
EGFRCR SERPLBLD CKD-EPI 2021: 36 ML/MIN/1.73M2 (ref 60–?)
EOSINOPHIL # BLD AUTO: 0.07 X10(3) UL (ref 0–0.7)
EOSINOPHIL NFR BLD AUTO: 0.9 %
ERYTHROCYTE [DISTWIDTH] IN BLOOD BY AUTOMATED COUNT: 15.4 % (ref 11–15)
GLUCOSE BLD-MCNC: 115 MG/DL (ref 70–99)
HCO3 BLDA-SCNC: 26.5 MEQ/L (ref 21–27)
HCT VFR BLD AUTO: 35 %
HGB BLD-MCNC: 11.7 G/DL
IMM GRANULOCYTES # BLD AUTO: 0.03 X10(3) UL (ref 0–1)
IMM GRANULOCYTES NFR BLD: 0.4 %
LYMPHOCYTES # BLD AUTO: 0.9 X10(3) UL (ref 1–4)
LYMPHOCYTES NFR BLD AUTO: 11.6 %
MCH RBC QN AUTO: 31.7 PG (ref 26–34)
MCHC RBC AUTO-ENTMCNC: 33.4 G/DL (ref 31–37)
MCV RBC AUTO: 94.9 FL
MODIFIED ALLEN TEST: POSITIVE
MONOCYTES # BLD AUTO: 0.38 X10(3) UL (ref 0.1–1)
MONOCYTES NFR BLD AUTO: 4.9 %
NEUTROPHILS # BLD AUTO: 6.34 X10 (3) UL (ref 1.5–7.7)
NEUTROPHILS # BLD AUTO: 6.34 X10(3) UL (ref 1.5–7.7)
NEUTROPHILS NFR BLD AUTO: 81.9 %
O2 CT BLD-SCNC: 16.4 VOL% (ref 15–23)
OSMOLALITY SERPL CALC.SUM OF ELEC: 286 MOSM/KG (ref 275–295)
OXYGEN LITERS/MINUTE: 2.5 L/MIN
PCO2 BLDA: 44 MM HG (ref 35–45)
PH BLDA: 7.4 [PH] (ref 7.35–7.45)
PLATELET # BLD AUTO: 272 10(3)UL (ref 150–450)
PO2 BLDA: 100 MM HG (ref 80–100)
POTASSIUM SERPL-SCNC: 4.8 MMOL/L (ref 3.5–5.1)
PUNCTURE CHARGE: YES
RBC # BLD AUTO: 3.69 X10(6)UL
SAO2 % BLDA: 98.6 % (ref 94–100)
SODIUM SERPL-SCNC: 133 MMOL/L (ref 136–145)
WBC # BLD AUTO: 7.7 X10(3) UL (ref 4–11)

## 2025-02-07 PROCEDURE — 99233 SBSQ HOSP IP/OBS HIGH 50: CPT | Performed by: INTERNAL MEDICINE

## 2025-02-07 PROCEDURE — 99233 SBSQ HOSP IP/OBS HIGH 50: CPT | Performed by: HOSPITALIST

## 2025-02-07 PROCEDURE — 71045 X-RAY EXAM CHEST 1 VIEW: CPT | Performed by: INTERNAL MEDICINE

## 2025-02-07 RX ORDER — FUROSEMIDE 20 MG/1
40 TABLET ORAL DAILY
Qty: 30 TABLET | Refills: 3 | Status: ON HOLD | OUTPATIENT
Start: 2025-02-07

## 2025-02-07 RX ORDER — FUROSEMIDE 10 MG/ML
20 INJECTION INTRAMUSCULAR; INTRAVENOUS ONCE
Status: COMPLETED | OUTPATIENT
Start: 2025-02-07 | End: 2025-02-07

## 2025-02-07 NOTE — CM/SW NOTE
Social work was able to confirm with HME that the portable tank has been delivered to bedside an the delivery has been set up with the patient's daughter.    Social work went to patient's bedside to confirm that the tank is delivered.    Care team ware.    SW/CM to remain available for support and/or discharge planning.     Azeb Galaviz MSW, LSW  Discharge Planner [id number]

## 2025-02-07 NOTE — PLAN OF CARE
Patient is alert and oriented times 2-3. On 2L NC. Dyspnea on exertion. Plan for CXR d/t increase work of breathing and ABG's. Once medically cleared will go homme with HH pending oxygen delivery.     Problem: Patient Centered Care  Goal: Patient preferences are identified and integrated in the patient's plan of care  Description: Interventions:  - What would you like us to know as we care for you? From home alone  - Provide timely, complete, and accurate information to patient/family  - Incorporate patient and family knowledge, values, beliefs, and cultural backgrounds into the planning and delivery of care  - Encourage patient/family to participate in care and decision-making at the level they choose  - Honor patient and family perspectives and choices  Outcome: Progressing     Problem: Patient/Family Goals  Goal: Patient/Family Short Term Goal  Description: Patient's Short Term Goal: To improve breathing    Interventions:   - Oxygen  - IV antibiotics  - Medication compliance  - Follow provider recommendations  - See additional Care Plan goals for specific interventions  Outcome: Progressing     Problem: RESPIRATORY - ADULT  Goal: Achieves optimal ventilation and oxygenation  Description: INTERVENTIONS:  - Assess for changes in respiratory status  - Assess for changes in mentation and behavior  - Position to facilitate oxygenation and minimize respiratory effort  - Oxygen supplementation based on oxygen saturation or ABGs  - Provide Smoking Cessation handout, if applicable  - Encourage broncho-pulmonary hygiene including cough, deep breathe, Incentive Spirometry  - Assess the need for suctioning and perform as needed  - Assess and instruct to report SOB or any respiratory difficulty  - Respiratory Therapy support as indicated  - Manage/alleviate anxiety  - Monitor for signs/symptoms of CO2 retention  Outcome: Progressing     Problem: CARDIOVASCULAR - ADULT  Goal: Absence of cardiac arrhythmias or at  baseline  Description: INTERVENTIONS:  - Continuous cardiac monitoring, monitor vital signs, obtain 12 lead EKG if indicated  - Evaluate effectiveness of antiarrhythmic and heart rate control medications as ordered  - Initiate emergency measures for life threatening arrhythmias  - Monitor electrolytes and administer replacement therapy as ordered  Outcome: Progressing

## 2025-02-07 NOTE — PLAN OF CARE
Patient  very anxious last night, PRN seroquel given with good result. She finally was able to sleep through the night, and while asleep her HR was mostly in 80-90's. Continues to need oxygen.    Problem: Patient Centered Care  Goal: Patient preferences are identified and integrated in the patient's plan of care  Description: Interventions:  - What would you like us to know as we care for you? From home alone  - Provide timely, complete, and accurate information to patient/family  - Incorporate patient and family knowledge, values, beliefs, and cultural backgrounds into the planning and delivery of care  - Encourage patient/family to participate in care and decision-making at the level they choose  - Honor patient and family perspectives and choices  Outcome: Progressing     Problem: RESPIRATORY - ADULT  Goal: Achieves optimal ventilation and oxygenation  Description: INTERVENTIONS:  - Assess for changes in respiratory status  - Assess for changes in mentation and behavior  - Position to facilitate oxygenation and minimize respiratory effort  - Oxygen supplementation based on oxygen saturation or ABGs  - Provide Smoking Cessation handout, if applicable  - Encourage broncho-pulmonary hygiene including cough, deep breathe, Incentive Spirometry  - Assess the need for suctioning and perform as needed  - Assess and instruct to report SOB or any respiratory difficulty  - Respiratory Therapy support as indicated  - Manage/alleviate anxiety  - Monitor for signs/symptoms of CO2 retention  Outcome: Progressing     Problem: CARDIOVASCULAR - ADULT  Goal: Maintains optimal cardiac output and hemodynamic stability  Description: INTERVENTIONS:  - Monitor vital signs, rhythm, and trends  - Monitor for bleeding, hypotension and signs of decreased cardiac output  - Evaluate effectiveness of vasoactive medications to optimize hemodynamic stability  - Monitor arterial and/or venous puncture sites for bleeding and/or hematoma  - Assess  quality of pulses, skin color and temperature  - Assess for signs of decreased coronary artery perfusion - ex. Angina  - Evaluate fluid balance, assess for edema, trend weights  Outcome: Progressing  Goal: Absence of cardiac arrhythmias or at baseline  Description: INTERVENTIONS:  - Continuous cardiac monitoring, monitor vital signs, obtain 12 lead EKG if indicated  - Evaluate effectiveness of antiarrhythmic and heart rate control medications as ordered  - Initiate emergency measures for life threatening arrhythmias  - Monitor electrolytes and administer replacement therapy as ordered  Outcome: Progressing     Problem: PAIN - ADULT  Goal: Verbalizes/displays adequate comfort level or patient's stated pain goal  Description: INTERVENTIONS:  - Encourage pt to monitor pain and request assistance  - Assess pain using appropriate pain scale  - Administer analgesics based on type and severity of pain and evaluate response  - Implement non-pharmacological measures as appropriate and evaluate response  - Consider cultural and social influences on pain and pain management  - Manage/alleviate anxiety  - Utilize distraction and/or relaxation techniques  - Monitor for opioid side effects  - Notify MD/LIP if interventions unsuccessful or patient reports new pain  - Anticipate increased pain with activity and pre-medicate as appropriate  Outcome: Progressing     Problem: SAFETY ADULT - FALL  Goal: Free from fall injury  Description: INTERVENTIONS:  - Assess pt frequently for physical needs  - Identify cognitive and physical deficits and behaviors that affect risk of falls.  - [place] fall precautions as indicated by assessment.  - Educate pt/family on patient safety including physical limitations  - Instruct pt to call for assistance with activity based on assessment  - Modify environment to reduce risk of injury  - Provide assistive devices as appropriate  - Consider OT/PT consult to assist with strengthening/mobility  -  Encourage toileting schedule  Outcome: Progressing     Problem: DISCHARGE PLANNING  Goal: Discharge to home or other facility with appropriate resources  Description: INTERVENTIONS:  - Identify barriers to discharge w/pt and caregiver  - Include patient/family/discharge partner in discharge planning  - Arrange for needed discharge resources and transportation as appropriate  - Identify discharge learning needs (meds, wound care, etc)  - Arrange for interpreters to assist at discharge as needed  - Consider post-discharge preferences of patient/family/discharge partner  - Complete POLST form as appropriate  - Assess patient's ability to be responsible for managing their own health  - Refer to Case Management Department for coordinating discharge planning if the patient needs post-hospital services based on physician/LIP order or complex needs related to functional status, cognitive ability or social support system  Outcome: Progressing

## 2025-02-07 NOTE — TELEPHONE ENCOUNTER
This RN called patient daughter Chaya, contact information provided as requested by patient for new PCP.

## 2025-02-07 NOTE — PROGRESS NOTES
Progress Note     Shyla Wheeler Patient Status:  Inpatient    1935 MRN [id number]   Location [facility] 3W/SW Attending Odalys Castro MD   Hosp Day # 12 PCP Ray Goldman MD     Chief Complaint: patient presented with   Chief Complaint   Patient presents with    Difficulty Breathing       Subjective:   S:  Here with pneumonia  Denies cp, sob, dizziness  Slept deep and sound last night  Sob mild  Seen twice    Review of Systems:   10 point ROS completed and was negative, except for pertinent positive and negatives stated in subjective.    Objective:   Vital signs:  Temp:  [97.9 °F (36.6 °C)-98.2 °F (36.8 °C)] 98 °F (36.7 °C)  Pulse:  [] 76  Resp:  [20-22] 20  BP: (106-131)/() 130/72  SpO2:  [92 %-99 %] 99 %    Wt Readings from Last 6 Encounters:   25 169 lb 12.8 oz (77 kg)   25 167 lb 8 oz (76 kg)   23 171 lb (77.6 kg)   21 173 lb (78.5 kg)   21 170 lb (77.1 kg)   19 180 lb (81.6 kg)         Physical Exam:    General: No acute distress. Alert ,         Respiratory: Clear to auscultation bilaterally. No wheezes. No rhonchi.  Cardiovascular: S1, S2. Regular rate and rhythm. No murmurs, rubs or gallops.   Abdomen: Soft, nontender, nondistended.  Positive bowel sounds. No rebound or guarding.  Neurologic: No focal neurological deficits.   Musculoskeletal: Moves all extremities.  Extremities: No edema.    Results:   Diagnostic Data:      Labs:    Labs Last 24 Hours:   BMP     CBC    Other     Na 133 Cl 99 BUN 38 Glu 115   Hb 11.7   PTT - Procal -   K 4.8 CO2 26.0 Cr 1.41   WBC 7.7 >< .0  INR - CRP -   Renal Lytes Endo    Hct 35.0   Trop - D dim -   eGFR - Ca 8.9 POC Gluc  -    LFT   pBNP - Lactic -   eGFR AA - PO4 - A1c -   AST - APk - Prot -  LDL -     Mg - TSH -   ALT - T kal - Alb -        COVID-19 Lab Results    COVID-19  Lab Results   Component Value Date    COV Not Detected 2025    COVID19 Not Detected 2025        Pro-Calcitonin  Recent Labs   Lab 02/05/25  0958   PCT 0.07*         Cardiac  Recent Labs   Lab 02/05/25  0959   PBNP 17,770*       Creatinine Kinase  No results for input(s): \"CK\" in the last 168 hours.    Inflammatory Markers  No results for input(s): \"CRP\", \"PATRIZIA\", \"LDH\", \"DDIMER\" in the last 168 hours.    Imaging: Imaging data reviewed in Epic.    Medications:    ipratropium-albuterol  3 mL Nebulization Q6H WA    morphINE PF  1 mg Intravenous Once    furosemide  20 mg Oral Daily    rivaroxaban  15 mg Oral Daily with dinner    metoprolol succinate  150 mg Oral 2x Daily(Beta Blocker)    vancomycin  125 mg Oral Daily    aspirin  81 mg Oral Daily    atorvastatin  10 mg Oral Nightly    latanoprost  1 drop Both Eyes Nightly    pantoprazole  40 mg Oral QAM AC       Assessment & Plan:   ASSESSMENT / PLAN:     Acute hypoxic respiratory failure 2/2 combined systolic and diastolic HF and pneumonia  PNA  -Imaging reviewed  -Strep pneumo and legionella Ag negative  -Resp panel negative  -BNP elevated  -Trop and EKG reviewed  -Started on solu-medrol on admission--> now stopped  - will discontinue considering CHF exacerbation  -Started on Lasix 40 mg IV BID on admission  - IV zosyn completed course 2/3  -Duonebs q6hrs  -Cardiology on consult  -EF 30-35%, decreased from prior  -Continue BB  -Unable to start ACE/ARB/MRNA/ARNI/SGLT2 due to DEJAH  -on oral diuretics  -with chest pain, appreciate pulmonology evaluation  -no indication for further abx  I had a face to face visit with this patient and I evaluated the patient's O2 saturations. The patient is mobile in their home and is in need of a portable oxygen tank. The home oxygen will improve the patient's condition.   -Repeat chest x-ray showed small pleural effusions, atelectasis, cardiomegaly with mild pulmonary vascular congestion and diffuse pulmonary interstitial edema  -Given 20 mg IV Lasix x 1 and increased Lasix to 40 mg daily      DEJAH - improving  -Likely 2/2  overdiuresis?  -Cardiology on consult  Renal US - reviewed  -Making uine  -Monitor labs  -Baseline creatinine~1--> peaked at 1.81--> 1.51 -->1.42-->1.29-->1.41today    Persistent afib with RVR  -cont metoprolol succinate 100 mg bid  -on cardizem 20mg q 6 hr. D/w cardiology re: long term plans  -Started Rivaroxaban 15 mg daily, but now switched to Lovenox as family feels this is related to weakness and anxiety  -d/w patient and family, resume xarelto, they are agreeable    HTN  -BP well controlled  -Monitor vitals    Elevated LFTs  -Improving  -Monitor labs    Chest pain after cough  -follow-up troponins and EKG reviewed     Anxiety  -worse with xanax  -add as needed seroquel for agiation    Quality:  DVT Prophylaxis: xarelto  CODE status: FULL   DISPO: pending clinical improvement.   Estimated date of discharge: To be determined  Discharge is dependent on: Improved clinical status  At this point Patient is expected to be discharge to: Home later today or tomorrow depending on lethargy  Goals of care conversation: Discussed with patient's daughter and granddaughter given her age and comorbidities and now prolonged hospitalization she is at high risk for rehospitalization.  Discussed it would be appropriate to set limits, they are understanding and will think on it further      Plan of care discussed with Patient and RN.     Coordinated care with providers and counseling re: treatment plan and workup   ALEKSANDER RAMIREZ MD  Supplementary Documentation:           I personally reviewed the available laboratories, imaging including operative report. I discussed/will discuss the case with patient and her nurse. I ordered laboratories studies. I adjusted medications including not applicable today. Medical decision making high, risk is high.     >55min spent, >50% spent counseling and coordinating care in the form of educating pt/family and d/w consultants and staff. Most of the time spent discussing the above plan.

## 2025-02-07 NOTE — PROGRESS NOTES
Pulmonary Medicine Inpatient Progress Note           Consult requested by Dr. Zaidi for abnormal chest imaging and respiratory symptoms.        Subjective:  Down to 1 LNC but appears quite short of breath and confused this am  Afebrile otherwise      From the initial consultation  HPI: 88 yo woman with hx of HFrEF, PAF on anticoagulation, HTN admitted with acute hypoxemic respiratory failure secondary to diastolic and systolic HF and PNA.   Seen by cardiology and being diuresed.   Also started on zosyn which she completed course on 2/3/25. Received solumedrol upon admission but was stopped on 2/1/25.  CXR today shows multifocal PNA pattern. Urine leg, strep pneumo neg.  PCT checked today is 0.07  Pt also noted to have an DEJAH which has been improving.   Felt better yesterday but had a bad last night coughing. Was laying flat when it started but then was moved to a chair the rest of the night.   Not coughing as much today but is tired.   No fevers, chills.   Family at bedside during the visit today       REVIEW OF SYSTEMS:  Positives and negatives as stated in HPI. Remainder of 12 pt review of systems otherwise are negative.       PAST MEDICAL HISTORY:  Past Medical History:   Diagnosis Date    Enlarged aorta     Glaucoma     Other and unspecified hyperlipidemia     Right groin pain     Unspecified essential hypertension         PAST SURGICAL HISTORY:  Past Surgical History:   Procedure Laterality Date    Correct bunion,simple Right 1999    Bunionectomy    Electrocardiogram, complete  08/18/2010    Scanned to media tab     Knee surgery      right knee        PAST FAMILY HISTORY:  Family History   Problem Relation Age of Onset    Heart Attack Father     Heart Disease Father         Coronary Artery Disease    Heart Attack Mother     Hypertension Mother     Breast Cancer Sister 78        2 sisters    Arthritis Sister     Heart Attack Brother     Cancer Brother         pancreatic cancer        PAST SOCIAL  HISTORY:  Social History     Socioeconomic History    Marital status:    Tobacco Use    Smoking status: Never    Smokeless tobacco: Never   Substance and Sexual Activity    Alcohol use: Yes     Alcohol/week: 0.0 standard drinks of alcohol     Comment: wine once every 2 mos    Drug use: No   Other Topics Concern    Caffeine Concern No     Comment: decaf coffee daily    Exercise Yes     Comment: water aerobics 2-3x week        ALLERGIES:  Allergies[1]     MEDS:  Home Medications:  Medications Taking[2]  Scheduled Medication:   ipratropium-albuterol  3 mL Nebulization Q6H WA    morphINE PF  1 mg Intravenous Once    furosemide  20 mg Oral Daily    rivaroxaban  15 mg Oral Daily with dinner    metoprolol succinate  150 mg Oral 2x Daily(Beta Blocker)    vancomycin  125 mg Oral Daily    aspirin  81 mg Oral Daily    atorvastatin  10 mg Oral Nightly    latanoprost  1 drop Both Eyes Nightly    pantoprazole  40 mg Oral QAM AC     Continuous Infusing Medication:    PRN Medications:    QUEtiapine    ipratropium-albuterol    ondansetron    acetaminophen    hydrALAzine    zolpidem    alum-mag hydroxide-simethicone    guaiFENesin-codeine       PHYSICAL EXAM:  /72 (BP Location: Left arm)   Pulse 76   Temp 98 °F (36.7 °C) (Oral)   Resp 20   Ht 5' 5\" (1.651 m)   Wt 169 lb 12.8 oz (77 kg)   SpO2 99%   BMI 28.26 kg/m²   CONSTITUTIONAL: appears confused, and breathing heavily  HEENT: atraumatic normocephalic  MOUTH: mucous membranes are moist. No OP exudates  NECK/THROAT: no JVD. Trachea midline. No obvious thyromegaly  LUNG: tachy, shallow breaths upper b/l no wheezing, +bibasilar crackles. Chest symmetric with respiratory motion  HEART: regular rate and rhythm, no obvious murmers or gallops note  ABD: soft non tender. + bowel sounds. No organomegaly noted  EXT: no clubbing, cyanosis + b/l LE edema noted       IMAGES:  CXR 2/5/25 on my review CXR appears stable with mild lower lobe infiltrates and large    Conclusion:  Multifocal pneumonia involving the bilateral lower lobes and left upper lobe has progressed since 2/3/2025.     Chest CTA 1/18/25  CONCLUSION:   1. No evidence of aortic dissection.   2. Ascending thoracic aortic aneurysm measuring up to 5.7 cm.   3. No evidence of acute pulmonary embolism to the level of the proximal segmental pulmonary artery branches.   4. Extensive multifocal bilateral airspace opacities, which may reflect multifocal pneumonia. Follow-up is recommended to document resolution.   5. Small bilateral pleural effusions and associated basilar atelectasis, with or without concurrent infectious process.   6. Mediastinal lymphadenopathy, potentially reactive.   7. Mild interlobular septal thickening, suggesting pulmonary interstitial edema.   8. Dilatation of the main pulmonary artery trunk may relate to underlying pulmonary hypertension.    9. Large hiatal hernia with essentially complete intrathoracic herniation of the stomach.   10. Cardiomegaly with biatrial dilatation.   11. Kyphoplasty changes of the lower thoracic spine.   12. Lesser incidental findings as above.       LABS:  Recent Labs   Lab 02/05/25  0713 02/06/25  0727 02/07/25  0847   RBC 3.74* 3.90 3.69*   HGB 11.4* 11.9* 11.7*   HCT 35.7 37.1 35.0   MCV 95.5 95.1 94.9   MCH 30.5 30.5 31.7   MCHC 31.9 32.1 33.4   RDW 15.3* 15.6* 15.4*   NEPRELIM 4.81 5.89 6.34   WBC 6.4 7.1 7.7   .0 334.0 272.0       Recent Labs   Lab 02/05/25  0713 02/06/25  0727 02/07/25  0848   * 155* 115*   BUN 29* 31* 38*   CREATSERUM 1.17* 1.29* 1.41*   EGFRCR 45* 40* 36*   CA 8.6* 9.0 8.9    135* 133*   K 4.4  4.4 4.5 4.8    100 99   CO2 26.0 24.0 26.0        ASSESSMENT/PLAN:  Acute hypoxemic respiratory failure  -secondary to HFrEF and PNA  -cardiology managing diuresis  -PNA treated with course of zosyn. Doubt she has a new PNA or HAP with pt being afebrile, normal WBCct, stable CXR on my read, and low PCT level.   -urine  leg and strep pneumo were neg  -coughing likely d/t her large HH and laying flat  -continue PPI  -aspiration precautions  -wean supp 02 as tolerated. Needing supp 02 b/c of multiple factors (HFrEF, recent tx for PNA, deconditioning)  -repeat chest CT scan in 1 month  -more short of breath this am (2/7/25) and confused. Will order a CXR and ABG to evaluate further    PAF and HFrEF  -on xarelto and diuretics as per cards. proBNP is high    DEJAH  -improving labs    Proph:  -DVT: xarelto     Thank you for the opportunity to care for Shyla Wheeler.       KAROL Meek DO, MPH  Pulmonary Critical Care Medicine  [facility]          [1]   Allergies  Allergen Reactions    Benadryl [Diphenhydramine] SWELLING    Ambien [Zolpidem] ANXIETY    Xanax [Alprazolam] ANXIETY    Ace Inhibitors     Amoxicillin NAUSEA ONLY   [2]   Outpatient Medications Marked as Taking for the 1/26/25 encounter (Hospital Encounter)   Medication Sig Dispense Refill    furosemide 20 MG Oral Tab Take 1 tablet (20 mg total) by mouth daily. 30 tablet 3    metoprolol succinate  MG Oral Tablet 24 Hr Take 1.5 tablets (150 mg total) by mouth in the morning and 1.5 tablets (150 mg total) before bedtime. 60 tablet 3    RIVAROXABAN 15 MG Oral Tab Take 1 tablet (15 mg total) by mouth daily with lunch. 30 tablet 1    apixaban 2.5 MG Oral Tab Take 1 tablet (2.5 mg total) by mouth 2 (two) times daily. 60 tablet 2    furosemide 20 MG Oral Tab Take 1 tablet (20 mg total) by mouth every other day. OR as directed by cardiology 30 tablet 1    pravastatin 40 MG Oral Tab Take 1 tablet (40 mg total) by mouth nightly. 90 tablet 1    Calcium Carb-Cholecalciferol 600-200 MG-UNIT Oral Tab Take 1 tablet by mouth daily.      TRAVATAN Z 0.004 % Ophthalmic Solution Place 1 drop into both eyes at bedtime.      MULTIVITAMIN TAB/CAP Take 1 tablet by mouth daily.

## 2025-02-08 LAB
ANION GAP SERPL CALC-SCNC: 10 MMOL/L (ref 0–18)
BASOPHILS # BLD AUTO: 0.03 X10(3) UL (ref 0–0.2)
BASOPHILS NFR BLD AUTO: 0.4 %
BUN BLD-MCNC: 45 MG/DL (ref 9–23)
BUN/CREAT SERPL: 26.8 (ref 10–20)
CALCIUM BLD-MCNC: 8.9 MG/DL (ref 8.7–10.4)
CHLORIDE SERPL-SCNC: 98 MMOL/L (ref 98–112)
CO2 SERPL-SCNC: 22 MMOL/L (ref 21–32)
CREAT BLD-MCNC: 1.68 MG/DL
DEPRECATED RDW RBC AUTO: 54.4 FL (ref 35.1–46.3)
EGFRCR SERPLBLD CKD-EPI 2021: 29 ML/MIN/1.73M2 (ref 60–?)
EOSINOPHIL # BLD AUTO: 0.17 X10(3) UL (ref 0–0.7)
EOSINOPHIL NFR BLD AUTO: 2.3 %
ERYTHROCYTE [DISTWIDTH] IN BLOOD BY AUTOMATED COUNT: 15.7 % (ref 11–15)
GLUCOSE BLD-MCNC: 126 MG/DL (ref 70–99)
HCT VFR BLD AUTO: 38.7 %
HGB BLD-MCNC: 12.2 G/DL
IMM GRANULOCYTES # BLD AUTO: 0.03 X10(3) UL (ref 0–1)
IMM GRANULOCYTES NFR BLD: 0.4 %
LYMPHOCYTES # BLD AUTO: 0.87 X10(3) UL (ref 1–4)
LYMPHOCYTES NFR BLD AUTO: 11.6 %
MAGNESIUM SERPL-MCNC: 2.4 MG/DL (ref 1.6–2.6)
MCH RBC QN AUTO: 31.6 PG (ref 26–34)
MCHC RBC AUTO-ENTMCNC: 31.5 G/DL (ref 31–37)
MCV RBC AUTO: 100.3 FL
MONOCYTES # BLD AUTO: 0.4 X10(3) UL (ref 0.1–1)
MONOCYTES NFR BLD AUTO: 5.3 %
NEUTROPHILS # BLD AUTO: 6 X10 (3) UL (ref 1.5–7.7)
NEUTROPHILS # BLD AUTO: 6 X10(3) UL (ref 1.5–7.7)
NEUTROPHILS NFR BLD AUTO: 80 %
NT-PROBNP SERPL-MCNC: ABNORMAL PG/ML (ref ?–450)
OSMOLALITY SERPL CALC.SUM OF ELEC: 283 MOSM/KG (ref 275–295)
PLATELET # BLD AUTO: 251 10(3)UL (ref 150–450)
POTASSIUM SERPL-SCNC: 5 MMOL/L (ref 3.5–5.1)
RBC # BLD AUTO: 3.86 X10(6)UL
SODIUM SERPL-SCNC: 130 MMOL/L (ref 136–145)
WBC # BLD AUTO: 7.5 X10(3) UL (ref 4–11)

## 2025-02-08 PROCEDURE — 99239 HOSP IP/OBS DSCHRG MGMT >30: CPT | Performed by: HOSPITALIST

## 2025-02-08 PROCEDURE — 99232 SBSQ HOSP IP/OBS MODERATE 35: CPT | Performed by: INTERNAL MEDICINE

## 2025-02-08 NOTE — DISCHARGE SUMMARY
[facility]  part of [facility]    Discharge Summary    Shyla Wheeler Patient Status:  Inpatient    1935 MRN [id number]   Location [facility] 3W/SW Attending Rona Zaidi MD   Hosp Day # 13 PCP Ray Goldman MD     Date of Admission: 2025 Disposition: Home or Self Care     Date of Discharge: 25      Admitting Diagnosis: Acute on chronic heart failure, unspecified heart failure type (HCC) [I50.9]  Acute hypoxemic respiratory failure (HCC) [J96.01]    Hospital Discharge Diagnoses:  Acute on ch. Hypoxemic respiratory failure    Lace+ Score: 77  59-90 High Risk  29-58 Medium Risk  0-28   Low Risk.    TCM Follow-Up Recommendation:  LACE > 58: High Risk of readmission after discharge from the hospital.      Problem List:   Patient Active Problem List   Diagnosis    Knee pain    Arthritis of knee, degenerative    Hip pain    Ankle arthritis    Lumbar radiculopathy    Lumbar stenosis    ACE inhibitor intolerance    Left bundle branch block    Urinary tract infection without hematuria    Skin lesion of back    Thoracic aortic aneurysm without rupture    Fatigue    Mixed hyperlipidemia    Vitamin D deficiency    Herpes zoster without complication    Acquired hypothyroidism    Routine history and physical examination of adult    Acute bilateral low back pain without sciatica    Nocturnal enuresis    Atrial fibrillation, new onset (HCC)    Acute hypoxemic respiratory failure (HCC)    Acute on chronic congestive heart failure, unspecified heart failure type (HCC)    Acute on chronic heart failure, unspecified heart failure type (HCC)       Reason for Admission:   Acute hypoxic respiratory failure 2/2 combined systolic and diastolic HF and pneumonia  PNA  DEJAH   Persistent afib with RVR  HTN  Elevated LFTs  Chest pain after cough  Anxiety    Physical Exam:   General appearance: alert, appears stated age and cooperative  Pulmonary:  clear to auscultation  bilaterally  Cardiovascular: S1, S2 normal, no murmur, click, rub or gallop, regular rate and rhythm  Abdominal: soft, non-tender; bowel sounds normal; no masses,  no organomegaly  Extremities: extremities normal, atraumatic, no cyanosis or edema  Psychiatric: calm      History of Present Illness:   Per Dr. Cooper Mansfield LIZZ Wheeler is a(n) 89 year old female, with a past medical history significant for thoracic aortic aneurysm, large hiatal hernia and recently admitted with multifocal pneumonia congestive heart failure new onset [place] was recently discharged from the hospital comes back 2 days later complaining of increasing shortness of breath and wheezing.  She was prescribed diuretics which she claims she has been taking religiously with minimal to no improvement in her breathing status and in fact denies urinating more than usual spite taking her medication.  Denies any recent fever or chills denies any chest pain or palpitations.  Chest x-ray done in ER shows evidence of increased opacity suggestive of worsening pneumonia.       Hospital Course:   Acute hypoxic respiratory failure 2/2 combined systolic and diastolic HF and pneumonia  PNA  -Imaging reviewed  -Strep pneumo and legionella Ag negative  -Resp panel negative  -BNP elevated  -Trop and EKG reviewed  -Started on solu-medrol on admission--> now stopped  - will discontinue considering CHF exacerbation  -Started on Lasix 40 mg IV BID on admission  - IV zosyn completed course 2/3  -Duonebs q6hrs  -Cardiology on consult  -EF 30-35%, decreased from prior  -Continue BB  -Unable to start ACE/ARB/MRNA/ARNI/SGLT2 due to DEJAH  -on oral diuretics  -with chest pain, appreciate pulmonology evaluation  -no indication for further abx  I had a face to face visit with this patient and I evaluated the patient's O2 saturations. The patient is mobile in their home and is in need of a portable oxygen tank. The home oxygen will improve the patient's condition.   -Repeat chest x-ray  showed small pleural effusions, atelectasis, cardiomegaly with mild pulmonary vascular congestion and diffuse pulmonary interstitial edema  -Given 20 mg IV Lasix x 1 and increased Lasix to 40 mg daily, d/w patient and family will need close op follow-up  -may need to tolerate a higher creatinine to optimize fluid status        DEJAH - improving  Hyponatremia  -Likely 2/2 overdiuresis?  -Cardiology on consult  Renal US - reviewed  -Making uine  -Monitor labs  -Baseline creatinine~1--> peaked at 1.81--> 1.51 -->1.42-->1.29-->1.41-->1.68 today  -op monitoring of labs  -repeat kidney function as op     Persistent afib with RVR  -cont metoprolol succinate 100 mg bid  -on cardizem 20mg q 6 hr. D/w cardiology re: long term plans  -Started Rivaroxaban 15 mg daily, but now switched to Lovenox as family feels this is related to weakness and anxiety  -d/w patient and family, resume xarelto, they are agreeable     HTN  -BP well controlled  -Monitor vitals     Elevated LFTs  -op f.u     Chest pain after cough  -follow-up troponins and EKG reviewed     Anxiety  -worse with xanax  -add as needed seroquel for agiation    Goals of care: discussed code status and goals of care. Family not ready to set limits at this juncture.   Consultations: cardiology  pulmonology    Procedures: n/a    Complications: n/a    Discharge Condition: Good    Discharge Medications:      Discharge Medications        START taking these medications        Instructions Prescription details   rivaroxaban 15 MG Tabs  Commonly known as: Xarelto      Take 1 tablet (15 mg total) by mouth daily with lunch.   Quantity: 30 tablet  Refills: 1            CHANGE how you take these medications        Instructions Prescription details   furosemide 20 MG Tabs  Commonly known as: Lasix  What changed:   how much to take  when to take this  additional instructions      Take 2 tablets (40 mg total) by mouth daily.   Quantity: 30 tablet  Refills: 3     metoprolol succinate   MG Tb24  Commonly known as: Toprol XL  What changed:   medication strength  how much to take  when to take this      Take 1.5 tablets (150 mg total) by mouth in the morning and 1.5 tablets (150 mg total) before bedtime.   Quantity: 60 tablet  Refills: 3            CONTINUE taking these medications        Instructions Prescription details   Calcium Carb-Cholecalciferol 600-200 MG-UNIT Tabs  Notes to patient: Resume home routine      Take 1 tablet by mouth daily.   Refills: 0     MULTIVITAMIN TAB/CAP      Take 1 tablet by mouth daily.   Refills: 0     pravastatin 40 MG Tabs  Commonly known as: Pravachol      Take 1 tablet (40 mg total) by mouth nightly.   Quantity: 90 tablet  Refills: 1     Travatan Z 0.004 % Soln  Generic drug: travoprost (MARLEY free)      Place 1 drop into both eyes at bedtime.   Refills: 0            STOP taking these medications      apixaban 2.5 MG Tabs  Commonly known as: Eliquis        aspirin 81 MG Tbec        levoFLOXacin 500 MG Tabs  Commonly known as: Levaquin                  Where to Get Your Medications        These medications were sent to [id number] - [place], [place] - [address] [phone], [phone]  [address] [address]      Phone: [phone]   furosemide 20 MG Tabs  metoprolol succinate  MG Tb24  rivaroxaban 15 MG Tabs         Follow up Visits: Follow-up with pcp in 1 week    Follow up Labs: bmp     Other Discharge Instructions: follow-up with cardiology as instructed    ALEKSANDER RAMIREZ MD  2/8/2025  2:12 PM    > 35 min

## 2025-02-08 NOTE — PLAN OF CARE
Shyla Wheeler is A&O x 3 with forgetfulness and confusion.  Lives at home alone.    One assist with walker.  Continent of bowel and bladder.  VSS:  stable; soft BP this AM.  Pain: denies.  Plan:  probable discharge later today  Bed in lowest position, alarms on, and call light within reach.  Will continue to monitor and treat.    Problem: Patient Centered Care  Goal: Patient preferences are identified and integrated in the patient's plan of care  Description: Interventions:  - What would you like us to know as we care for you? From home alone  - Provide timely, complete, and accurate information to patient/family  - Incorporate patient and family knowledge, values, beliefs, and cultural backgrounds into the planning and delivery of care  - Encourage patient/family to participate in care and decision-making at the level they choose  - Honor patient and family perspectives and choices  Outcome: Progressing     Problem: Patient/Family Goals  Goal: Patient/Family Long Term Goal  Description: Patient's Long Term Goal: To go home    Interventions:  - Monitor labs and vital signs  - Titrate oxygen  - Medication compliance  - Follow provider recommendations  - See additional Care Plan goals for specific interventions  Outcome: Progressing  Goal: Patient/Family Short Term Goal  Description: Patient's Short Term Goal: To improve breathing    Interventions:   - Oxygen  - IV antibiotics  - Medication compliance  - Follow provider recommendations  - See additional Care Plan goals for specific interventions  Outcome: Progressing     Problem: RESPIRATORY - ADULT  Goal: Achieves optimal ventilation and oxygenation  Description: INTERVENTIONS:  - Assess for changes in respiratory status  - Assess for changes in mentation and behavior  - Position to facilitate oxygenation and minimize respiratory effort  - Oxygen supplementation based on oxygen saturation or ABGs  - Provide Smoking Cessation handout, if applicable  - Encourage  broncho-pulmonary hygiene including cough, deep breathe, Incentive Spirometry  - Assess the need for suctioning and perform as needed  - Assess and instruct to report SOB or any respiratory difficulty  - Respiratory Therapy support as indicated  - Manage/alleviate anxiety  - Monitor for signs/symptoms of CO2 retention  Outcome: Progressing     Problem: CARDIOVASCULAR - ADULT  Goal: Maintains optimal cardiac output and hemodynamic stability  Description: INTERVENTIONS:  - Monitor vital signs, rhythm, and trends  - Monitor for bleeding, hypotension and signs of decreased cardiac output  - Evaluate effectiveness of vasoactive medications to optimize hemodynamic stability  - Monitor arterial and/or venous puncture sites for bleeding and/or hematoma  - Assess quality of pulses, skin color and temperature  - Assess for signs of decreased coronary artery perfusion - ex. Angina  - Evaluate fluid balance, assess for edema, trend weights  Outcome: Progressing  Goal: Absence of cardiac arrhythmias or at baseline  Description: INTERVENTIONS:  - Continuous cardiac monitoring, monitor vital signs, obtain 12 lead EKG if indicated  - Evaluate effectiveness of antiarrhythmic and heart rate control medications as ordered  - Initiate emergency measures for life threatening arrhythmias  - Monitor electrolytes and administer replacement therapy as ordered  Outcome: Progressing     Problem: PAIN - ADULT  Goal: Verbalizes/displays adequate comfort level or patient's stated pain goal  Description: INTERVENTIONS:  - Encourage pt to monitor pain and request assistance  - Assess pain using appropriate pain scale  - Administer analgesics based on type and severity of pain and evaluate response  - Implement non-pharmacological measures as appropriate and evaluate response  - Consider cultural and social influences on pain and pain management  - Manage/alleviate anxiety  - Utilize distraction and/or relaxation techniques  - Monitor for opioid  side effects  - Notify MD/LIP if interventions unsuccessful or patient reports new pain  - Anticipate increased pain with activity and pre-medicate as appropriate  Outcome: Progressing     Problem: SAFETY ADULT - FALL  Goal: Free from fall injury  Description: INTERVENTIONS:  - Assess pt frequently for physical needs  - Identify cognitive and physical deficits and behaviors that affect risk of falls.  - [place] fall precautions as indicated by assessment.  - Educate pt/family on patient safety including physical limitations  - Instruct pt to call for assistance with activity based on assessment  - Modify environment to reduce risk of injury  - Provide assistive devices as appropriate  - Consider OT/PT consult to assist with strengthening/mobility  - Encourage toileting schedule  Outcome: Progressing     Problem: DISCHARGE PLANNING  Goal: Discharge to home or other facility with appropriate resources  Description: INTERVENTIONS:  - Identify barriers to discharge w/pt and caregiver  - Include patient/family/discharge partner in discharge planning  - Arrange for needed discharge resources and transportation as appropriate  - Identify discharge learning needs (meds, wound care, etc)  - Arrange for interpreters to assist at discharge as needed  - Consider post-discharge preferences of patient/family/discharge partner  - Complete POLST form as appropriate  - Assess patient's ability to be responsible for managing their own health  - Refer to Case Management Department for coordinating discharge planning if the patient needs post-hospital services based on physician/LIP order or complex needs related to functional status, cognitive ability or social support system  Outcome: Progressing

## 2025-02-08 NOTE — DISCHARGE PLANNING
Patient was provided with discharge instructions, education, and follow up information. Patient's family present for discharge instructions with patient's consent. Prescriptions were already sent electronically to patient's pharmacy. Patient's [facility] called and Xarelto is filled and ready for patient, copay over $500. 30 day free trial coupon for Xarelto provided to [employer]. Patient verbalizes understanding of follow up information, specifically changes in medications, follow ups, when to take the next dose of each medication, heart failure discharge instructions, daily weights, diet and lifestyle changes. Patient has no questions after reviewing all instructions and will be going home with her family.     Natalia ATKINSON, Discharge Leader [id number]

## 2025-02-08 NOTE — CM/SW NOTE
Care giver list sent to nurse via tube station as requested by pt/family.    RN case manager to remain available for support and/or discharge planning.     Audrey Huber BS, RN, CCM  PRN RN [facility]  Ext.  [phone]

## 2025-02-08 NOTE — PROGRESS NOTES
[facility]  part of [facility]    Progress Note    Shyla Wheeler Patient Status:  Inpatient    1935 MRN [id number]   Location [facility] 3W/SW Attending Rona Zaidi MD   Hosp Day # 13 PCP Ray Goldman MD         Subjective:   Subjective:  Patient was seen and examined  Up to the chair on 1 L of oxygen and feels better  No active cough or sputum or wheezes  Objective:   Blood pressure 93/78, pulse 92, temperature 97.8 °F (36.6 °C), temperature source Oral, resp. rate 20, height 5' 5\" (1.651 m), weight 169 lb 12.8 oz (77 kg), SpO2 98%, not currently breastfeeding.  Physical Exam  Constitutional:       General: She is not in acute distress.  HENT:      Head: Atraumatic.   Eyes:      General: No scleral icterus.  Cardiovascular:      Rate and Rhythm: Normal rate.      Heart sounds: Murmur heard.      No gallop.   Pulmonary:      Comments: Good air exchange to both lungs with minimal basilar rales with no wheezes or rhonchi  Abdominal:      General: Abdomen is flat. Bowel sounds are normal.      Palpations: Abdomen is soft.   Musculoskeletal:      Right lower leg: Edema present.      Left lower leg: Edema present.   Neurological:      Mental Status: She is oriented to person, place, and time.         Results:   Lab Results   Component Value Date    WBC 7.5 2025    HGB 12.2 2025    HCT 38.7 2025    .0 2025    CREATSERUM 1.68 (H) 2025    BUN 45 (H) 2025     (L) 2025    K 5.0 2025    CL 98 2025    CO2 22.0 2025     (H) 2025    CA 8.9 2025    ALB 3.9 2025    ALKPHO 100 2025    BILT 0.6 2025    TP 6.6 2025    AST 32 2025    ALT 67 (H) 2025    PTT 24.4 2025    T4F 1.1 2024    TSH 4.417 2025    MG 2.4 2025    PHOS 3.2 2025    TROPHS 13 2025    B12 562 2024       XR CHEST AP PORTABLE  (CPT=71045)    Result Date:  2/7/2025  CONCLUSION:  1. Small pleural effusions and associated basilar atelectasis, with or without superimposed pneumonia.  2. Cardiomegaly with mild pulmonary vascular congestion and diffuse pulmonary interstitial edema.  3. Aneurysmal dilatation of the thoracic aorta.      Dictated by (CST): Ricardo Padilla MD on 2/07/2025 at 11:45 AM     Finalized by (CST): Ricardo Padilla MD on 2/07/2025 at 11:48 AM               Assessment & Plan:       1-Acute hypoxemic respiratory failure  -secondary to HFrEF and ? PNA  Diuresis and CHF management per cardiology  Completed course of antibiotics  Clinically better and now down to 1 L of oxygen       2-PAF and HFrEF  -on xarelto        3-Proph:  -DVT: xarelto    Pulmonary status better              Niko Perez MD  2/8/2025

## 2025-02-08 NOTE — CM/SW NOTE
02/08/25 1400   Discharge disposition   Expected discharge disposition Home-Health   Post Acute Care Provider Residential   DME/Infusion Providers [facility]   Discharge transportation Private car     [facility] liaison notified of dc today via secure chat.  Home O2 tank at bedside.    Residential home healthcare  P:[phone]  F:[phone]    Rebekah Self RN, BSN  Nurse   [phone]

## 2025-02-08 NOTE — PLAN OF CARE
Bed locked in low position, belongings in reach, bed/chair alarm on, call light in reach. Frequent rounding done, all patient needs met. Non-slip socks on/at bedside. Patient spent most of the night in the recliner, didn't get much sleep. This morning she verbalized possibly wanting to have help at home - placed a consult to  for any  resources they may have.     Problem: RESPIRATORY - ADULT  Goal: Achieves optimal ventilation and oxygenation  Description: INTERVENTIONS:  - Assess for changes in respiratory status  - Assess for changes in mentation and behavior  - Position to facilitate oxygenation and minimize respiratory effort  - Oxygen supplementation based on oxygen saturation or ABGs  - Provide Smoking Cessation handout, if applicable  - Encourage broncho-pulmonary hygiene including cough, deep breathe, Incentive Spirometry  - Assess the need for suctioning and perform as needed  - Assess and instruct to report SOB or any respiratory difficulty  - Respiratory Therapy support as indicated  - Manage/alleviate anxiety  - Monitor for signs/symptoms of CO2 retention  Outcome: Progressing     Problem: CARDIOVASCULAR - ADULT  Goal: Maintains optimal cardiac output and hemodynamic stability  Description: INTERVENTIONS:  - Monitor vital signs, rhythm, and trends  - Monitor for bleeding, hypotension and signs of decreased cardiac output  - Evaluate effectiveness of vasoactive medications to optimize hemodynamic stability  - Monitor arterial and/or venous puncture sites for bleeding and/or hematoma  - Assess quality of pulses, skin color and temperature  - Assess for signs of decreased coronary artery perfusion - ex. Angina  - Evaluate fluid balance, assess for edema, trend weights  Outcome: Progressing  Goal: Absence of cardiac arrhythmias or at baseline  Description: INTERVENTIONS:  - Continuous cardiac monitoring, monitor vital signs, obtain 12 lead EKG if indicated  - Evaluate effectiveness of antiarrhythmic and  heart rate control medications as ordered  - Initiate emergency measures for life threatening arrhythmias  - Monitor electrolytes and administer replacement therapy as ordered  Outcome: Progressing     Problem: PAIN - ADULT  Goal: Verbalizes/displays adequate comfort level or patient's stated pain goal  Description: INTERVENTIONS:  - Encourage pt to monitor pain and request assistance  - Assess pain using appropriate pain scale  - Administer analgesics based on type and severity of pain and evaluate response  - Implement non-pharmacological measures as appropriate and evaluate response  - Consider cultural and social influences on pain and pain management  - Manage/alleviate anxiety  - Utilize distraction and/or relaxation techniques  - Monitor for opioid side effects  - Notify MD/LIP if interventions unsuccessful or patient reports new pain  - Anticipate increased pain with activity and pre-medicate as appropriate  Outcome: Progressing     Problem: SAFETY ADULT - FALL  Goal: Free from fall injury  Description: INTERVENTIONS:  - Assess pt frequently for physical needs  - Identify cognitive and physical deficits and behaviors that affect risk of falls.  - [place] fall precautions as indicated by assessment.  - Educate pt/family on patient safety including physical limitations  - Instruct pt to call for assistance with activity based on assessment  - Modify environment to reduce risk of injury  - Provide assistive devices as appropriate  - Consider OT/PT consult to assist with strengthening/mobility  - Encourage toileting schedule  Outcome: Progressing     Problem: DISCHARGE PLANNING  Goal: Discharge to home or other facility with appropriate resources  Description: INTERVENTIONS:  - Identify barriers to discharge w/pt and caregiver  - Include patient/family/discharge partner in discharge planning  - Arrange for needed discharge resources and transportation as appropriate  - Identify discharge learning needs (meds,  wound care, etc)  - Arrange for interpreters to assist at discharge as needed  - Consider post-discharge preferences of patient/family/discharge partner  - Complete POLST form as appropriate  - Assess patient's ability to be responsible for managing their own health  - Refer to Case Management Department for coordinating discharge planning if the patient needs post-hospital services based on physician/LIP order or complex needs related to functional status, cognitive ability or social support system  Outcome: Progressing

## 2025-02-10 ENCOUNTER — TELEPHONE (OUTPATIENT)
Dept: INTERNAL MEDICINE CLINIC | Facility: CLINIC | Age: 89
End: 2025-02-10

## 2025-02-10 ENCOUNTER — PATIENT OUTREACH (OUTPATIENT)
Dept: CASE MANAGEMENT | Age: 89
End: 2025-02-10

## 2025-02-10 NOTE — TELEPHONE ENCOUNTER
DARREN Junior I received a call from Era ATKINSON from [facility] to inform you patient  got admitted to Home health over the weekend.  Due to Pneumonia. Patient is on 1 liter of O2     no rashes , no suspicious lesions normal...

## 2025-02-11 VITALS
RESPIRATION RATE: 19 BRPM | HEART RATE: 98 BPM | TEMPERATURE: 98 F | BODY MASS INDEX: 28.29 KG/M2 | WEIGHT: 169.81 LBS | DIASTOLIC BLOOD PRESSURE: 76 MMHG | OXYGEN SATURATION: 96 % | SYSTOLIC BLOOD PRESSURE: 114 MMHG | HEIGHT: 65 IN

## 2025-02-11 NOTE — PROGRESS NOTES
Left message on mailbox for patient to call nurse care manager back for transitional care management/hospital follow-up call.  Nurse care  information included in message.      Unable to reach pt after several attempts. NCM closing encounter.

## 2025-02-11 NOTE — PROGRESS NOTES
Received a secure chat from LOR SLADE for patient to see the [facility] at [facility]. Called patient at home and the information for the [facility] was provided to the daughter and that Dr Zaidi wanted her to see them as well. Patient dtr requesting IS as they never received it at [facility].Will look into this for her as Dr Zaidi ordered this at [facility].

## 2025-02-12 ENCOUNTER — TELEPHONE (OUTPATIENT)
Dept: INTERNAL MEDICINE CLINIC | Facility: CLINIC | Age: 89
End: 2025-02-12

## 2025-02-12 NOTE — TELEPHONE ENCOUNTER
Lisa from  called to inform PCP patient started physical therapy this week once a week for 2 weeks then every other week for the next 6 weeks.     Lisa is asking for a call back, family is asking if patient can be weaned off the oxygen?

## 2025-02-13 ENCOUNTER — MED REC SCAN ONLY (OUTPATIENT)
Dept: INTERNAL MEDICINE CLINIC | Facility: CLINIC | Age: 89
End: 2025-02-13

## 2025-02-14 ENCOUNTER — LAB REQUISITION (OUTPATIENT)
Dept: LAB | Facility: HOSPITAL | Age: 89
End: 2025-02-14
Payer: MEDICARE

## 2025-02-14 DIAGNOSIS — I10 ESSENTIAL (PRIMARY) HYPERTENSION: ICD-10-CM

## 2025-02-14 LAB
ANION GAP SERPL CALC-SCNC: 16 MMOL/L (ref 0–18)
BUN BLD-MCNC: 66 MG/DL (ref 9–23)
BUN/CREAT SERPL: 30 (ref 10–20)
CALCIUM BLD-MCNC: 9.7 MG/DL (ref 8.7–10.4)
CHLORIDE SERPL-SCNC: 102 MMOL/L (ref 98–112)
CO2 SERPL-SCNC: 23 MMOL/L (ref 21–32)
CREAT BLD-MCNC: 2.2 MG/DL
EGFRCR SERPLBLD CKD-EPI 2021: 21 ML/MIN/1.73M2 (ref 60–?)
FASTING STATUS PATIENT QL REPORTED: NO
GLUCOSE BLD-MCNC: 153 MG/DL (ref 70–99)
OSMOLALITY SERPL CALC.SUM OF ELEC: 314 MOSM/KG (ref 275–295)
POTASSIUM SERPL-SCNC: 4.9 MMOL/L (ref 3.5–5.1)
SODIUM SERPL-SCNC: 141 MMOL/L (ref 136–145)

## 2025-02-14 PROCEDURE — 80048 BASIC METABOLIC PNL TOTAL CA: CPT | Performed by: INTERNAL MEDICINE

## 2025-02-16 ENCOUNTER — HOSPITAL ENCOUNTER (INPATIENT)
Facility: HOSPITAL | Age: 89
LOS: 7 days | Discharge: NURSING, REHAB OR LONG-TERM CARE | End: 2025-02-23
Attending: EMERGENCY MEDICINE | Admitting: INTERNAL MEDICINE
Payer: MEDICARE

## 2025-02-16 ENCOUNTER — APPOINTMENT (OUTPATIENT)
Dept: GENERAL RADIOLOGY | Facility: HOSPITAL | Age: 89
End: 2025-02-16
Attending: EMERGENCY MEDICINE
Payer: MEDICARE

## 2025-02-16 DIAGNOSIS — I50.9 ACUTE ON CHRONIC CONGESTIVE HEART FAILURE, UNSPECIFIED HEART FAILURE TYPE (HCC): Primary | ICD-10-CM

## 2025-02-16 DIAGNOSIS — J96.21 ACUTE ON CHRONIC HYPOXIC RESPIRATORY FAILURE (HCC): ICD-10-CM

## 2025-02-16 LAB
ALBUMIN SERPL-MCNC: 3.9 G/DL (ref 3.2–4.8)
ALP LIVER SERPL-CCNC: 396 U/L
ALT SERPL-CCNC: 457 U/L
ANION GAP SERPL CALC-SCNC: 14 MMOL/L (ref 0–18)
AST SERPL-CCNC: 293 U/L (ref ?–34)
BASE EXCESS BLD CALC-SCNC: 1.6 MMOL/L (ref ?–2)
BASOPHILS # BLD AUTO: 0.01 X10(3) UL (ref 0–0.2)
BASOPHILS NFR BLD AUTO: 0.1 %
BILIRUB DIRECT SERPL-MCNC: 0.7 MG/DL (ref ?–0.3)
BILIRUB SERPL-MCNC: 1.4 MG/DL (ref 0.2–1.1)
BUN BLD-MCNC: 86 MG/DL (ref 9–23)
BUN/CREAT SERPL: 36.6 (ref 10–20)
CALCIUM BLD-MCNC: 9.2 MG/DL (ref 8.7–10.4)
CHLORIDE SERPL-SCNC: 102 MMOL/L (ref 98–112)
CO2 SERPL-SCNC: 25 MMOL/L (ref 21–32)
CREAT BLD-MCNC: 2.35 MG/DL
DEPRECATED RDW RBC AUTO: 57.5 FL (ref 35.1–46.3)
EGFRCR SERPLBLD CKD-EPI 2021: 19 ML/MIN/1.73M2 (ref 60–?)
EOSINOPHIL # BLD AUTO: 0.02 X10(3) UL (ref 0–0.7)
EOSINOPHIL NFR BLD AUTO: 0.2 %
ERYTHROCYTE [DISTWIDTH] IN BLOOD BY AUTOMATED COUNT: 17.2 % (ref 11–15)
FLUAV + FLUBV RNA SPEC NAA+PROBE: NEGATIVE
FLUAV + FLUBV RNA SPEC NAA+PROBE: NEGATIVE
GLUCOSE BLD-MCNC: 112 MG/DL (ref 70–99)
HCO3 BLDA-SCNC: 26.1 MEQ/L (ref 21–27)
HCT VFR BLD AUTO: 40.3 %
HGB BLD-MCNC: 12.7 G/DL
IMM GRANULOCYTES # BLD AUTO: 0.04 X10(3) UL (ref 0–1)
IMM GRANULOCYTES NFR BLD: 0.5 %
LYMPHOCYTES # BLD AUTO: 0.61 X10(3) UL (ref 1–4)
LYMPHOCYTES NFR BLD AUTO: 7.5 %
MAGNESIUM SERPL-MCNC: 2.9 MG/DL (ref 1.6–2.6)
MCH RBC QN AUTO: 30.7 PG (ref 26–34)
MCHC RBC AUTO-ENTMCNC: 31.5 G/DL (ref 31–37)
MCV RBC AUTO: 97.3 FL
MODIFIED ALLEN TEST: POSITIVE
MONOCYTES # BLD AUTO: 0.53 X10(3) UL (ref 0.1–1)
MONOCYTES NFR BLD AUTO: 6.5 %
NEUTROPHILS # BLD AUTO: 6.96 X10 (3) UL (ref 1.5–7.7)
NEUTROPHILS # BLD AUTO: 6.96 X10(3) UL (ref 1.5–7.7)
NEUTROPHILS NFR BLD AUTO: 85.2 %
NT-PROBNP SERPL-MCNC: ABNORMAL PG/ML (ref ?–450)
O2 CT BLD-SCNC: 17 VOL% (ref 15–23)
OSMOLALITY SERPL CALC.SUM OF ELEC: 319 MOSM/KG (ref 275–295)
OXYGEN LITERS/MINUTE: 4 L/MIN
PCO2 BLDA: 30 MM HG (ref 35–45)
PH BLDA: 7.51 [PH] (ref 7.35–7.45)
PLATELET # BLD AUTO: 231 10(3)UL (ref 150–450)
PO2 BLDA: 74 MM HG (ref 80–100)
POTASSIUM SERPL-SCNC: 4.9 MMOL/L (ref 3.5–5.1)
PROT SERPL-MCNC: 6.6 G/DL (ref 5.7–8.2)
PUNCTURE CHARGE: YES
RBC # BLD AUTO: 4.14 X10(6)UL
RSV RNA SPEC NAA+PROBE: NEGATIVE
SAO2 % BLDA: 97 % (ref 94–100)
SARS-COV-2 RNA RESP QL NAA+PROBE: NOT DETECTED
SODIUM SERPL-SCNC: 141 MMOL/L (ref 136–145)
WBC # BLD AUTO: 8.2 X10(3) UL (ref 4–11)

## 2025-02-16 PROCEDURE — 99223 1ST HOSP IP/OBS HIGH 75: CPT | Performed by: INTERNAL MEDICINE

## 2025-02-16 PROCEDURE — 71045 X-RAY EXAM CHEST 1 VIEW: CPT | Performed by: EMERGENCY MEDICINE

## 2025-02-16 RX ORDER — METOPROLOL SUCCINATE 50 MG/1
150 TABLET, EXTENDED RELEASE ORAL 2 TIMES DAILY
Status: DISCONTINUED | OUTPATIENT
Start: 2025-02-17 | End: 2025-02-23

## 2025-02-16 RX ORDER — IPRATROPIUM BROMIDE AND ALBUTEROL SULFATE 2.5; .5 MG/3ML; MG/3ML
3 SOLUTION RESPIRATORY (INHALATION) ONCE
Status: COMPLETED | OUTPATIENT
Start: 2025-02-16 | End: 2025-02-16

## 2025-02-16 RX ORDER — ACETAMINOPHEN 325 MG/1
650 TABLET ORAL EVERY 6 HOURS PRN
Status: DISCONTINUED | OUTPATIENT
Start: 2025-02-16 | End: 2025-02-23

## 2025-02-16 RX ORDER — FUROSEMIDE 10 MG/ML
40 INJECTION INTRAMUSCULAR; INTRAVENOUS
Status: DISCONTINUED | OUTPATIENT
Start: 2025-02-17 | End: 2025-02-20

## 2025-02-16 RX ORDER — DOXEPIN HYDROCHLORIDE 50 MG/1
1 CAPSULE ORAL DAILY
COMMUNITY

## 2025-02-16 RX ORDER — ACETAMINOPHEN 325 MG/1
1-2 TABLET ORAL EVERY 6 HOURS PRN
COMMUNITY

## 2025-02-16 RX ORDER — LATANOPROST 50 UG/ML
1 SOLUTION/ DROPS OPHTHALMIC NIGHTLY
Status: DISCONTINUED | OUTPATIENT
Start: 2025-02-16 | End: 2025-02-23

## 2025-02-16 RX ORDER — FUROSEMIDE 10 MG/ML
40 INJECTION INTRAMUSCULAR; INTRAVENOUS ONCE
Status: COMPLETED | OUTPATIENT
Start: 2025-02-16 | End: 2025-02-16

## 2025-02-16 NOTE — ED INITIAL ASSESSMENT (HPI)
Over the past week worsening fatigue, weakness and shortness of breath +chest pain    Reports she was discharged from here last Saturday, recently dx afib and PNA.    On 2L NC at home.     Desating 86% on RA, placed on 4L NC

## 2025-02-17 ENCOUNTER — APPOINTMENT (OUTPATIENT)
Dept: ULTRASOUND IMAGING | Facility: HOSPITAL | Age: 89
End: 2025-02-17
Attending: INTERNAL MEDICINE
Payer: MEDICARE

## 2025-02-17 ENCOUNTER — TELEPHONE (OUTPATIENT)
Dept: INTERNAL MEDICINE CLINIC | Facility: CLINIC | Age: 89
End: 2025-02-17

## 2025-02-17 LAB
ALBUMIN SERPL-MCNC: 3.7 G/DL (ref 3.2–4.8)
ALBUMIN/GLOB SERPL: 1.6 {RATIO} (ref 1–2)
ALP LIVER SERPL-CCNC: 355 U/L
ALT SERPL-CCNC: 407 U/L
ANION GAP SERPL CALC-SCNC: 12 MMOL/L (ref 0–18)
AST SERPL-CCNC: 227 U/L (ref ?–34)
BASOPHILS # BLD AUTO: 0.02 X10(3) UL (ref 0–0.2)
BASOPHILS NFR BLD AUTO: 0.2 %
BILIRUB DIRECT SERPL-MCNC: 0.7 MG/DL (ref ?–0.3)
BILIRUB SERPL-MCNC: 1.4 MG/DL (ref 0.2–1.1)
BUN BLD-MCNC: 75 MG/DL (ref 9–23)
BUN/CREAT SERPL: 33.3 (ref 10–20)
CALCIUM BLD-MCNC: 8.7 MG/DL (ref 8.7–10.4)
CHLORIDE SERPL-SCNC: 107 MMOL/L (ref 98–112)
CHOLEST SERPL-MCNC: 109 MG/DL (ref ?–200)
CO2 SERPL-SCNC: 24 MMOL/L (ref 21–32)
CREAT BLD-MCNC: 2.25 MG/DL
DEPRECATED RDW RBC AUTO: 60.1 FL (ref 35.1–46.3)
EGFRCR SERPLBLD CKD-EPI 2021: 20 ML/MIN/1.73M2 (ref 60–?)
EOSINOPHIL # BLD AUTO: 0.09 X10(3) UL (ref 0–0.7)
EOSINOPHIL NFR BLD AUTO: 1 %
ERYTHROCYTE [DISTWIDTH] IN BLOOD BY AUTOMATED COUNT: 17.6 % (ref 11–15)
GLOBULIN PLAS-MCNC: 2.3 G/DL (ref 2–3.5)
GLUCOSE BLD-MCNC: 86 MG/DL (ref 70–99)
HCT VFR BLD AUTO: 41.9 %
HDLC SERPL-MCNC: 39 MG/DL (ref 40–59)
HGB BLD-MCNC: 13 G/DL
IMM GRANULOCYTES # BLD AUTO: 0.03 X10(3) UL (ref 0–1)
IMM GRANULOCYTES NFR BLD: 0.3 %
INR BLD: 1.36 (ref 0.8–1.2)
LDLC SERPL CALC-MCNC: 56 MG/DL (ref ?–100)
LYMPHOCYTES # BLD AUTO: 0.59 X10(3) UL (ref 1–4)
LYMPHOCYTES NFR BLD AUTO: 6.7 %
MAGNESIUM SERPL-MCNC: 2.8 MG/DL (ref 1.6–2.6)
MCH RBC QN AUTO: 30.4 PG (ref 26–34)
MCHC RBC AUTO-ENTMCNC: 31 G/DL (ref 31–37)
MCV RBC AUTO: 98.1 FL
MONOCYTES # BLD AUTO: 0.66 X10(3) UL (ref 0.1–1)
MONOCYTES NFR BLD AUTO: 7.5 %
NEUTROPHILS # BLD AUTO: 7.4 X10 (3) UL (ref 1.5–7.7)
NEUTROPHILS # BLD AUTO: 7.4 X10(3) UL (ref 1.5–7.7)
NEUTROPHILS NFR BLD AUTO: 84.3 %
NONHDLC SERPL-MCNC: 70 MG/DL (ref ?–130)
OSMOLALITY SERPL CALC.SUM OF ELEC: 318 MOSM/KG (ref 275–295)
PLATELET # BLD AUTO: 192 10(3)UL (ref 150–450)
POTASSIUM SERPL-SCNC: 4.2 MMOL/L (ref 3.5–5.1)
PROT SERPL-MCNC: 6 G/DL (ref 5.7–8.2)
PROTHROMBIN TIME: 17.6 SECONDS (ref 11.6–14.8)
Q-T INTERVAL: 354 MS
Q-T INTERVAL: 450 MS
QRS DURATION: 154 MS
QRS DURATION: 156 MS
QTC CALCULATION (BEZET): 487 MS
QTC CALCULATION (BEZET): 506 MS
R AXIS: -34 DEGREES
R AXIS: -37 DEGREES
RBC # BLD AUTO: 4.27 X10(6)UL
SODIUM SERPL-SCNC: 143 MMOL/L (ref 136–145)
T AXIS: 136 DEGREES
T AXIS: 139 DEGREES
TRIGL SERPL-MCNC: 64 MG/DL (ref 30–149)
TROPONIN I SERPL HS-MCNC: 42 NG/L
TROPONIN I SERPL HS-MCNC: 47 NG/L
TSI SER-ACNC: 4.74 UIU/ML (ref 0.55–4.78)
VENTRICULAR RATE: 114 BPM
VENTRICULAR RATE: 76 BPM
VLDLC SERPL CALC-MCNC: 9 MG/DL (ref 0–30)
WBC # BLD AUTO: 8.8 X10(3) UL (ref 4–11)

## 2025-02-17 PROCEDURE — 99233 SBSQ HOSP IP/OBS HIGH 50: CPT | Performed by: INTERNAL MEDICINE

## 2025-02-17 PROCEDURE — 76700 US EXAM ABDOM COMPLETE: CPT | Performed by: INTERNAL MEDICINE

## 2025-02-17 NOTE — PLAN OF CARE
Problem: CARDIOVASCULAR - ADULT  Goal: Maintains optimal cardiac output and hemodynamic stability  Description: INTERVENTIONS:  - Monitor vital signs, rhythm, and trends  - Monitor for bleeding, hypotension and signs of decreased cardiac output  - Evaluate effectiveness of vasoactive medications to optimize hemodynamic stability  - Monitor arterial and/or venous puncture sites for bleeding and/or hematoma  - Assess quality of pulses, skin color and temperature  - Assess for signs of decreased coronary artery perfusion - ex. Angina  - Evaluate fluid balance, assess for edema, trend weights  Outcome: Progressing  Goal: Absence of cardiac arrhythmias or at baseline  Description: INTERVENTIONS:  - Continuous cardiac monitoring, monitor vital signs, obtain 12 lead EKG if indicated  - Evaluate effectiveness of antiarrhythmic and heart rate control medications as ordered  - Initiate emergency measures for life threatening arrhythmias  - Monitor electrolytes and administer replacement therapy as ordered  Outcome: Progressing     Problem: RESPIRATORY - ADULT  Goal: Achieves optimal ventilation and oxygenation  Description: INTERVENTIONS:  - Assess for changes in respiratory status  - Assess for changes in mentation and behavior  - Position to facilitate oxygenation and minimize respiratory effort  - Oxygen supplementation based on oxygen saturation or ABGs  - Provide Smoking Cessation handout, if applicable  - Encourage broncho-pulmonary hygiene including cough, deep breathe, Incentive Spirometry  - Assess the need for suctioning and perform as needed  - Assess and instruct to report SOB or any respiratory difficulty  - Respiratory Therapy support as indicated  - Manage/alleviate anxiety  - Monitor for signs/symptoms of CO2 retention  Outcome: Progressing     Problem: METABOLIC/FLUID AND ELECTROLYTES - ADULT  Goal: Electrolytes maintained within normal limits  Description: INTERVENTIONS:  - Monitor labs and rhythm and  assess patient for signs and symptoms of electrolyte imbalances  - Administer electrolyte replacement as ordered  - Monitor response to electrolyte replacements, including rhythm and repeat lab results as appropriate  - Fluid restriction as ordered  - Instruct patient on fluid and nutrition restrictions as appropriate  Outcome: Progressing  Goal: Hemodynamic stability and optimal renal function maintained  Description: INTERVENTIONS:  - Monitor labs and assess for signs and symptoms of volume excess or deficit  - Monitor intake, output and patient weight  - Monitor urine specific gravity, serum osmolarity and serum sodium as indicated or ordered  - Monitor response to interventions for patient's volume status, including labs, urine output, blood pressure (other measures as available)  - Encourage oral intake as appropriate  - Instruct patient on fluid and nutrition restrictions as appropriate  Outcome: Progressing

## 2025-02-17 NOTE — CONSULTS
[facility]  part of [facility]    Cardiology Consultation    Shyla Wheeler Patient Status:  Inpatient    1935 MRN [id number]   Location [facility] 2W/SW Attending Marleen Osborn MD   Hosp Day # 1 PCP Ray Goldman MD     Date of Admission:  2025  Date of Consult:  2025  Reason for Consultation: CHF    History of Present Illness:   Patient is a 89 year old female with sig PMH/o persistent atrial fibrillation, HFrEF, hypertension, old LBBB, ascending aortic aneurysm who presents with shortness of breath. Pt was recently discharged from the hospital secondary to pneumonia. Had been dealing with increasing O2 requirements with home health. No F/C or shakes. Denies any LH/dizziness. Adherent with PO lasix 40mg daily.   In the ED, she required 4L of supplemental O2. Chest imaging with pulmonary edema.  Labs with elevated proBNP of 43,936 (previously 35,189), elevated serum creatinine of 2.25.   Given IV lasix x 1 with improvement.     Assessment and Plan:   DEJAH on CKD III, appreciate nephrology assistance  Asc Ao aneurysm, stable    Volume overload  Acute exacerbation of HFrEF  -Presents with increasing O2 requirements and dyspnea  -Labs with elevated proBNP of 43,936  -Chest imaging on arrival with pulmonary edema  -Recent TTE with decreased LVEF of 30-35% (2025)   -Patient had deferred LHC at the time per goals of care  -Etiology is unclear, likely suboptimal outpatient diuresis as pt been adherent to oral furosemide 40 mg daily  -Symptoms have slightly improved, still on 4 L of supplemental O2 after IV Lasix 40 mg x 1    -Continue IV furosemide 40 mg twice daily   -Strict I/O's   -Closely monitor renal function/electrolytes   -Will need nephrology assistance given underlying DEJAH on CKD in the setting of volume overload    A-fib, persistent  -Patient presents with persistent A-fib and intermittent RVR  -Likely secondary to CHF exacerbation  -Resume beta-blocker,  metoprolol succinate 150 mg daily, continue to optimize rate control  -Elevated [id number]-PHHt, continue rivaroxaban 15 mg daily    Past Medical History  Past Medical History:    Enlarged aorta    Glaucoma    Other and unspecified hyperlipidemia    Right groin pain    Unspecified essential hypertension       Past Surgical History  Past Surgical History:   Procedure Laterality Date    Correct bunion,simple Right 1999    Bunionectomy    Electrocardiogram, complete  08/18/2010    Scanned to media tab     Knee surgery      right knee       Family History  Family History   Problem Relation Age of Onset    Heart Attack Father     Heart Disease Father         Coronary Artery Disease    Heart Attack Mother     Hypertension Mother     Breast Cancer Sister 78        2 sisters    Arthritis Sister     Heart Attack Brother     Cancer Brother         pancreatic cancer       Social History  Pediatric History   Patient Parents    Not on file     Other Topics Concern     Service Not Asked    Blood Transfusions Not Asked    Caffeine Concern No     Comment: decaf coffee daily    Occupational Exposure Not Asked    Hobby Hazards Not Asked    Sleep Concern Not Asked    Stress Concern Not Asked    Weight Concern Not Asked    Special Diet Not Asked    Back Care Not Asked    Exercise Yes     Comment: water aerobics 2-3x week    Bike Helmet Not Asked    Seat Belt Not Asked    Self-Exams Not Asked   Social History Narrative    The patient uses the following assistive device(s):  rolling walker.      The patient does live in a home with stairs. 12 stairs           Current Medications:  Current Facility-Administered Medications   Medication Dose Route Frequency    rivaroxaban (Xarelto) tab 15 mg  15 mg Oral Daily with lunch    latanoprost (Xalatan) 0.005 % ophthalmic solution 1 drop  1 drop Ophthalmic Nightly    metoprolol succinate ER (Toprol XL) 24 hr tab 150 mg  150 mg Oral BID    acetaminophen (Tylenol) tab 650 mg  650 mg Oral Q6H PRN     melatonin tab 3 mg  3 mg Oral Nightly PRN    furosemide (Lasix) 10 mg/mL injection 40 mg  40 mg Intravenous BID (Diuretic)     Medications Prior to Admission   Medication Sig    multivitamin Oral Tab Take 1 tablet by mouth daily.    acetaminophen 325 MG Oral Tab Take 1-2 tablets (325-650 mg total) by mouth every 6 (six) hours as needed for Pain.    furosemide 20 MG Oral Tab Take 2 tablets (40 mg total) by mouth daily. (Patient taking differently: Take 1 tablet (20 mg total) by mouth daily.)    metoprolol succinate  MG Oral Tablet 24 Hr Take 1.5 tablets (150 mg total) by mouth in the morning and 1.5 tablets (150 mg total) before bedtime.    RIVAROXABAN 15 MG Oral Tab Take 1 tablet (15 mg total) by mouth daily with lunch.    pravastatin 40 MG Oral Tab Take 1 tablet (40 mg total) by mouth nightly.    Calcium Carb-Cholecalciferol 600-200 MG-UNIT Oral Tab Take 1 tablet by mouth 2 (two) times daily.    TRAVATAN Z 0.004 % Ophthalmic Solution Place 1 drop into both eyes at bedtime.       Allergies  Allergies[1]    Review of Systems:   ROS  10 point review of systems completed and negative except as noted.    Physical Exam:   Patient Vitals for the past 24 hrs:   BP Temp Temp src Pulse Resp SpO2 Weight   02/17/25 0800 133/90 98.2 °F (36.8 °C) Temporal 97 19 98 % --   02/17/25 0700 126/87 -- -- 103 (!) 33 100 % --   02/17/25 0600 135/81 -- -- 100 13 100 % --   02/17/25 0500 133/89 -- -- 99 (!) 27 93 % --   02/17/25 0400 (!) 134/98 97.2 °F (36.2 °C) Temporal 78 (!) 31 100 % --   02/17/25 0300 136/86 -- -- 92 (!) 28 96 % --   02/17/25 0200 125/85 -- -- 84 (!) 35 92 % --   02/17/25 0100 (!) 138/93 -- -- 85 (!) 27 95 % --   02/17/25 0009 -- -- -- -- -- 98 % 158 lb 4.6 oz (71.8 kg)   02/17/25 0000 (!) 133/100 97.6 °F (36.4 °C) Temporal 84 14 98 % --   02/16/25 2300 131/88 -- -- 85 17 91 % --   02/16/25 2245 -- -- -- 84 24 100 % --   02/16/25 2130 119/74 -- -- 109 23 100 % --   02/16/25 2115 109/76 -- -- 82 (!) 28 99 % --    02/16/25 2054 123/88 -- -- 106 -- -- --   02/16/25 2000 117/85 -- -- 84 24 -- --   02/16/25 1954 (!) 103/92 -- -- 118 26 96 % --   02/16/25 1942 (!) 134/103 -- -- 106 26 95 % --   02/16/25 1900 (!) 120/91 -- -- (!) 123 18 98 % --   02/16/25 1830 121/84 -- -- 105 (!) 30 93 % --   02/16/25 1815 (!) 119/98 -- -- 96 (!) 39 100 % --   02/16/25 1757 124/86 97.8 °F (36.6 °C) Temporal 107 24 97 % 162 lb (73.5 kg)       Intake/Output:   Last 3 shifts:   Intake/Output                   02/15/25 0700 - 02/16/25 0659 (Not Admitted) 02/16/25 0700 - 02/17/25 0659 02/17/25 0700 - 02/18/25 0659       Intake    Total Intake -- -- --       Output    Urine  --  650  --    Urine -- 150 --    Incontinent Urine Occurrence -- 1 x --    Output (mL) (External Urinary Catheter) -- 500 --    Emesis/NG output  --  --  0    Emesis -- -- 0    Total Output -- 650 0       Net I/O     -- -650 0          Vent Settings:    Hemodynamic parameters (last 24 hours):    Scheduled Meds:    rivaroxaban  15 mg Oral Daily with lunch    latanoprost  1 drop Ophthalmic Nightly    metoprolol succinate ER  150 mg Oral BID    furosemide  40 mg Intravenous BID (Diuretic)     Continuous Infusions:     Physical Exam:  General: Alert and oriented x 3. No apparent distress.   HEENT: Normocephalic, anicteric sclera, neck supple, no thyromegaly or adenopathy.  Neck: Elevated JVD, carotids 2+, no bruits.  Cardiac: Irregularly irregular  Lungs: Diminished breath sounds throughout  Abdomen: Soft, non-tender. No organosplenomegally, mass or rebound, BS-present.  Extremities: Trivial BLE edema.    Neurologic: Alert and oriented, normal affect. No focal defects  Skin: Warm and dry.     Results:   Laboratory Data:  Lab Results   Component Value Date    WBC 8.8 02/17/2025    HGB 13.0 02/17/2025    HCT 41.9 02/17/2025    .0 02/17/2025    CREATSERUM 2.25 (H) 02/17/2025    BUN 75 (H) 02/17/2025     02/17/2025    K 4.2 02/17/2025     02/17/2025    CO2 24.0  02/17/2025    GLU 86 02/17/2025    CA 8.7 02/17/2025    ALB 3.7 02/17/2025    ALKPHO 355 (H) 02/17/2025    TP 6.0 02/17/2025     (H) 02/17/2025     (H) 02/17/2025    PTT 24.4 01/18/2025    T4F 1.1 08/05/2024    TSH 4.740 02/17/2025    MG 2.8 (H) 02/17/2025    PHOS 3.2 01/31/2025    B12 562 08/05/2024         Recent Labs   Lab 02/14/25  1300 02/16/25  1822 02/17/25  0638   * 112* 86   BUN 66* 86* 75*   CREATSERUM 2.20* 2.35* 2.25*   CA 9.7 9.2 8.7    141 143   K 4.9 4.9 4.2    102 107   CO2 23.0 25.0 24.0     Recent Labs   Lab 02/16/25  1822 02/17/25  0839   RBC 4.14 4.27   HGB 12.7 13.0   HCT 40.3 41.9   MCV 97.3 98.1   MCH 30.7 30.4   MCHC 31.5 31.0   RDW 17.2* 17.6*   NEPRELIM 6.96 7.40   WBC 8.2 8.8   .0 192.0       No results for input(s): \"BNPML\" in the last 168 hours.    No results for input(s): \"TROP\", \"CK\" in the last 168 hours.    Imaging:  US ABDOMEN COMPLETE (CPT=76700)    Result Date: 2/17/2025  CONCLUSION:  1. No sonographic evidence of hydronephrosis.  2. Nonspecific mildly heterogeneous appearance of the hepatic parenchyma with otherwise sonographically unremarkable appearance.  3. Negative for hepatobiliary dilatation.  4. Lesser incidental findings as above.   elm-remote.    Dictated by (CST): Ricardo Padilla MD on 2/17/2025 at 10:41 AM     Finalized by (CST): Ricardo Padilla MD on 2/17/2025 at 10:45 AM          XR CHEST AP PORTABLE  (CPT=71045)    Result Date: 2/16/2025  CONCLUSION:  1. CHF/fluid overload. 2. Large left retrocardiac hiatal hernia.    Dictated by (CST): Julius Jin MD on 2/16/2025 at 7:44 PM     Finalized by (CST): Julius Jin MD on 2/16/2025 at 7:46 PM           Thank you for allowing me to participate in the care of your patient.    Ray Daniel, [facility] [place]       [1]   Allergies  Allergen Reactions    Benadryl [Diphenhydramine] SWELLING    Ambien [Zolpidem] ANXIETY    Xanax [Alprazolam] ANXIETY    Ace  Inhibitors     Amoxicillin NAUSEA ONLY

## 2025-02-17 NOTE — CARDIAC REHAB
CARDIAC REHAB HEART FAILURE EDUCATION    Handouts provided and reviewed: CHF Booklet.     Reviewed the following: DAILY WEIGHT MONITORING: reviewed - pt states she has a scale at home      SODIUM RESTRICTION: reviewed      FLUID RESTRICTION: reviewed      RISK FACTORS: reviewed      SMOKING CESSATION: non smoker      HOME EXERCISE ACTIVITY: discussed chair exercises when patient is able      OUTPATIENT CARDIAC REHAB: information included in booklet for phase 2 cardiac rehab       WHEN TO CONTACT YOUR PHYSICIAN: highlighted warning sign sheet      HEART FAILURE CLINIC: [phone]

## 2025-02-17 NOTE — PROGRESS NOTES
Progress Note     Shyla Wheeler Patient Status:  Inpatient    1935 MRN [id number]   Location [facility] 2W/SW Attending Marleen Osborn MD   Hosp Day # 1 PCP Ray Goldman MD     Chief Complaint: Acute exacerbation CHF, acute on chronic respiratory failure with hypoxia    Subjective:   S: Patient fatigued, denies shortness of breath.  Son at bedside  Patient requesting breakfast is n.p.o. for ultrasound liver due to transaminitis this morning.  Remains on 4 to 5 L nasal cannula (was on 1 to 2 L at home since recent discharge on 2025 for pneumonia)      No other acute complaints or other nursing concerns or overnight events    Review of Systems:   10 point ROS completed and was negative, except for pertinent positive and negatives stated in subjective.    Objective:   Vital signs:  Temp:  [97.2 °F (36.2 °C)-98.2 °F (36.8 °C)] 98.2 °F (36.8 °C)  Pulse:  [] 82  Resp:  [13-39] 20  BP: (103-138)/() 125/85  SpO2:  [91 %-100 %] 100 %    Wt Readings from Last 6 Encounters:   25 158 lb 4.6 oz (71.8 kg)   25 169 lb 12.8 oz (77 kg)   25 167 lb 8 oz (76 kg)   23 171 lb (77.6 kg)   21 173 lb (78.5 kg)   21 170 lb (77.1 kg)         Physical Exam:    General: No acute distress. Alert ,       fatigued on 4 L nasal cannula  Respiratory: Diminished breath sounds bilaterally no wheezes. No rhonchi.  Cardiovascular: S1, S2. Regular rate and rhythm. No murmurs, rubs or gallops.   Abdomen: Soft, nontender, nondistended.  Positive bowel sounds. No rebound or guarding.  Neurologic: No focal neurological deficits.   Musculoskeletal: Moves all extremities.  Extremities: +1 lower extremity edema bilaterally    Results:   Diagnostic Data:      Labs:    Labs Last 24 Hours:   BMP     CBC    Other     Na 143 Cl 107 BUN 75 Glu 86   Hb 13.0   PTT - Procal -   K 4.2 CO2 24.0 Cr 2.25   WBC 8.8 >< .0  INR 1.36 CRP -   Renal Lytes Endo    Hct 41.9   Trop - D dim -   eGFR -  Ca 8.7 POC Gluc  -    LFT   pBNP 43,936 Lactic -   eGFR AA - PO4 - A1c -    APk 355 Prot 6.0  LDL -     Mg 2.8 TSH 4.740    T kal 1.4 Alb 3.7        COVID-19 Lab Results    COVID-19  Lab Results   Component Value Date    COVID19 Not Detected 02/16/2025    COVID19 Not Detected 01/26/2025    COVID19 Not Detected 01/18/2025       Pro-Calcitonin  No results for input(s): \"PCT\" in the last 168 hours.    Cardiac  Recent Labs   Lab 02/16/25 2113   PBNP 43,936*       Creatinine Kinase  No results for input(s): \"CK\" in the last 168 hours.    Inflammatory Markers  No results for input(s): \"CRP\", \"PATRIZIA\", \"LDH\", \"DDIMER\" in the last 168 hours.    Imaging: Imaging data reviewed in Epic.    Medications:    rivaroxaban  15 mg Oral Daily with lunch    latanoprost  1 drop Ophthalmic Nightly    metoprolol succinate ER  150 mg Oral BID    furosemide  40 mg Intravenous BID (Diuretic)       Assessment & Plan:   ASSESSMENT / PLAN:     Acute on chronic respiratory failure with hypoxia  CHF exacerbation  Remains on 4 to 5 L nasal cannula (was on 1 to 2 L at home since recent discharge on 2/8/2025 for pneumonia)  -Diurese as renal function permits  -CHF protocol  -Cardiology consult-diuresis per cardiology   -SLP ordered  -Patient stable to transfer out of ICU to cardiac floor today    Acute kidney injury on chronic kidney disease, likely due to diuretics  -Monitor  -Consider nephrology consult if not improving  -Renal ultrasound in process     Elevated transaminases, worsening may be due to hepatic congestion from CHF  -RUQ ultrasound in process  -Hold statins     HTN  A-fib  Anxiety  -Resume home meds as appropriate  -Continue Xarelto, metoprolol     HLD  -Hold statins given elevated LFTs        Quality:  DVT Prophylaxis: Xarelto  CODE status: full  HILLARY: TBD      Coordinated care with providers and counseling re: treatment plan and workup     Marleen Osborn MD    Supplementary Documentation:          **Certification      PHYSICIAN Certification of Need for Inpatient Hospitalization - Initial Certification    Patient will require inpatient services that will reasonably be expected to span two midnight's based on the clinical documentation in H+P.   Based on patients current state of illness, I anticipate that, after discharge, patient will require TBD.

## 2025-02-17 NOTE — ED PROVIDER NOTES
Patient Seen in: [facility] Emergency Department      History     Chief Complaint   Patient presents with    Difficulty Breathing     Stated Complaint: Shortness of breath    Subjective:   HPI      89-year-old female presents for evaluation for shortness of breath.  Patient reports that she has been feeling short of breath and having some chest tightness and feeling weak and tired.  Family states that she was at discharge from the hospital 1 week ago with home health and has been slowly declining since.  They have had going up on her oxygen.  They state that her lower extremity swelling does seem to be a little bit improved.  She denies any fevers, chills, nausea, vomiting, abdominal pain, diarrhea.  She has not had her evening medications yet.    Objective:     Past Medical History:    Enlarged aorta    Glaucoma    Other and unspecified hyperlipidemia    Right groin pain    Unspecified essential hypertension              Past Surgical History:   Procedure Laterality Date    Correct bunion,simple Right 1999    Bunionectomy    Electrocardiogram, complete  08/18/2010    Scanned to media tab     Knee surgery      right knee                Social History     Socioeconomic History    Marital status:    Tobacco Use    Smoking status: Never    Smokeless tobacco: Never   Substance and Sexual Activity    Alcohol use: Yes     Alcohol/week: 0.0 standard drinks of alcohol     Comment: wine once every 2 mos    Drug use: No   Other Topics Concern    Caffeine Concern No     Comment: decaf coffee daily    Exercise Yes     Comment: water aerobics 2-3x week   Social History Narrative    The patient uses the following assistive device(s):  rolling walker.      The patient does live in a home with stairs. 12 stairs     Social Drivers of Health     Food Insecurity: No Food Insecurity (1/26/2025)    Food Insecurity     Food Insecurity: Never true   Transportation Needs: No Transportation Needs (1/26/2025)     Transportation Needs     Lack of Transportation: No   Housing Stability: Low Risk  (2025)    Housing Stability     Housing Instability: No                  Physical Exam     ED Triage Vitals [25 1757]   /86   Pulse 107   Resp 24   Temp 97.8 °F (36.6 °C)   Temp src Temporal   SpO2 97 %   O2 Device Nasal cannula       Current Vitals:   Vital Signs  BP: 119/74  Pulse: 109  Resp: 23  Temp: 97.8 °F (36.6 °C)  Temp src: Temporal  MAP (mmHg): 87    Oxygen Therapy  SpO2: 100 %  O2 Device: Nasal cannula  O2 Flow Rate (L/min): 4 L/min        Physical Exam  Vitals and nursing note reviewed.   Constitutional:       Appearance: Normal appearance.   HENT:      Head: Normocephalic and atraumatic.   Cardiovascular:      Rate and Rhythm: Tachycardia present. Rhythm irregularly irregular.      Pulses: Normal pulses.           Radial pulses are 2+ on the right side and 2+ on the left side.        Dorsalis pedis pulses are 2+ on the right side and 2+ on the left side.        Posterior tibial pulses are 2+ on the right side and 2+ on the left side.      Heart sounds: Normal heart sounds.   Pulmonary:      Effort: Tachypnea and accessory muscle usage present.      Breath sounds: Wheezing and rales present.   Musculoskeletal:         General: Normal range of motion.      Cervical back: Normal range of motion.      Right lower le+ Pitting Edema present.      Left lower le+ Pitting Edema present.   Skin:     General: Skin is warm and dry.   Neurological:      General: No focal deficit present.      Mental Status: She is alert.             ED Course     Labs Reviewed   CBC WITH DIFFERENTIAL WITH PLATELET - Abnormal; Notable for the following components:       Result Value    RDW-SD 57.5 (*)     RDW 17.2 (*)     Lymphocyte Absolute 0.61 (*)     All other components within normal limits   BASIC METABOLIC PANEL (8) - Abnormal; Notable for the following components:    Glucose 112 (*)     BUN 86 (*)     Creatinine 2.35  (*)     BUN/CREA Ratio 36.6 (*)     Calculated Osmolality 319 (*)     eGFR-Cr 19 (*)     All other components within normal limits   MAGNESIUM - Abnormal; Notable for the following components:    Magnesium 2.9 (*)     All other components within normal limits   HEPATIC FUNCTION PANEL (7) - Abnormal; Notable for the following components:     (*)      (*)     Alkaline Phosphatase 396 (*)     Bilirubin, Total 1.4 (*)     Bilirubin, Direct 0.7 (*)     All other components within normal limits   ARTERIAL BLOOD GAS - Abnormal; Notable for the following components:    ABG pH 7.51 (*)     ABG pCO2 30 (*)     ABG PO2 74 (*)     All other components within normal limits   SARS-COV-2/FLU A AND B/RSV BY PCR (GENEXPERT) - Normal    Narrative:     This test is intended for the qualitative detection and differentiation of SARS-CoV-2, influenza A, influenza B, and respiratory syncytial virus (RSV) viral RNA in nasopharyngeal or nares swabs from individuals suspected of respiratory viral infection consistent with COVID-19 by their healthcare provider. Signs and symptoms of respiratory viral infection due to SARS-CoV-2, influenza, and RSV can be similar.    Test performed using the Xpert Xpress SARS-CoV-2/FLU/RSV (real time RT-PCR)  assay on the [employer] instrument, [address], [place] [address].   This test is being used under the Food and Drug Administration's Emergency Use Authorization.    The authorized Fact Sheet for Healthcare Providers for this assay is available upon request from the laboratory.   PRO BETA NATRIURETIC PEPTIDE   RAINBOW DRAW BLUE     EKG    Rate, intervals and axes as noted on EKG Report.  Rate: 114  Rhythm: Atrial Fibrillation  Reading: no stemi, interpreted by myself.            ED Course as of 02/16/25 2212  ------------------------------------------------------------  Time: 02/16 2031  Value: XR CHEST AP PORTABLE  (CPT=71045)  Comment: Per my independent interpretation, patient's CXR  demonstrates pulmonary edema.                [facility]          Admission disposition: 2/16/2025  8:46 PM           Medical Decision Making  Differential diagnosis includes but is not limited to CHF, PNA, arrhythmia, electrolyte disturbance, viral syndrome, etc.    Patient CBC is unremarkable.  CMP consistent with chronic renal disease.  Patient did have atrial fibrillation with occasional RVR, she was given her home oral dose of metoprolol with improvement in the heart rate.  Patient is clinically volume overloaded with congestive heart failure noted on her chest x-ray.  She is requiring increased oxygen supplementation.  She is given IV Lasix.  Patient is currently maintaining normal oxygen saturations on 4 L however given her clinical appearance there is concern for high risk for decompensation so patient will be placed to the PCU for close monitoring.  Cardiology is on consult.    Rhythm Strip: Rate 98 atrial fibrillation rhythm as interpreted by myself. The cardiac monitor was ordered secondary to the patient's hypoxia.     Complicating factors: The patient  has a past medical history of Enlarged aorta, Glaucoma, Other and unspecified hyperlipidemia, Right groin pain, and Unspecified essential hypertension. and  has a past surgical history that includes knee surgery; correct bunion,simple (Right, 1999); and electrocardiogram, complete (08/18/2010). that contribute to the medical complexity of this ED evaluation.     Medical Record Review: I personally reviewed available prior medical records for any recent pertinent discharge summaries, testing, and procedures, and reviewed those reports.      Problems Addressed:  Acute on chronic congestive heart failure, unspecified heart failure type (HCC): chronic illness or injury with severe exacerbation, progression, or side effects of treatment  Acute on chronic hypoxic respiratory failure (HCC): chronic illness or injury with severe exacerbation, progression, or side effects  of treatment that poses a threat to life or bodily functions    Amount and/or Complexity of Data Reviewed  External Data Reviewed: notes.     Details: Patient's discharge summary from February 8 reviewed, patient was admitted for Afib RVR, CHF, pneumonia with acute kidney injury.  Labs: ordered. Decision-making details documented in ED Course.  Radiology: ordered and independent interpretation performed. Decision-making details documented in ED Course.  ECG/medicine tests: ordered and independent interpretation performed. Decision-making details documented in ED Course.  Discussion of management or test interpretation with external provider(s): Discussed with Dr. Robles who accepts admission.  LOR Hull accepts cards consult.     Risk  Decision regarding hospitalization.    Critical Care  Total time providing critical care: minutes (54 minutes including time spent examining and re-evaluating the patient, ordering and reviewing laboratory tests, documenting, reviewing previous records, obtaining information from the family, and speaking with consultants, admitting doctors, nurses and medics and excludes any time spent on procedures.  )        Disposition and Plan     Clinical Impression:  1. Acute on chronic congestive heart failure, unspecified heart failure type (HCC)    2. Acute on chronic hypoxic respiratory failure (HCC)         Disposition:  Admit  2/16/2025  8:46 pm    Follow-up:  No follow-up provider specified.  We recommend that you schedule follow up care with a primary care provider within the next three months to obtain basic health screening including reassessment of your blood pressure.      Medications Prescribed:  Current Discharge Medication List              Supplementary Documentation:         Hospital Problems       Present on Admission  Date Reviewed: 2/16/2025            ICD-10-CM Noted POA    * (Principal) Acute on chronic congestive heart failure, unspecified heart failure type (HCC) I50.9  1/18/2025 Yes

## 2025-02-17 NOTE — H&P
[facility]  part of formerly [facility]                                                                                                          History and Physical     Shyla Wheeler Patient Status:  Emergency    1935 MRN [id number]   Location [facility] EMERGENCY DEPARTMENT Attending Franko Trent*   Hosp Day # 0 PCP Ray Goldman MD     Patient sleepy, history obtained primarily from daughter and granddaughter at bedside.    Chief Complaint: Difficulty breathing, weakness    Subjective:    Shyla Wheeler is a 89 year old female with history of HTN, HLD, heart failure, A-fib, anxiety presents to the ED today for evaluation of chest pain, shortness of breath and fatigue.  Was hospitalized between  -25 for acute hypoxemic respiratory failure due to heart failure and possible pneumonia.  During that hospitalization she was also diagnosed with new onset A-fib.  Also acute kidney injury.  Completed course of antibiotics.  Echocardiogram showed EF 30 to 35% which was decreased from prior.  Per family, since discharge she has been compliant with medications.  She was doing very well up until yesterday.  Also note that she has had mild intermittent episodes of confusion.  She has been using home oxygen intermittently.  She has not reported any pain.  No nausea, vomiting or diarrhea.    At presentation today she is tachypneic, tachycardic  Labs are creatinine 2.35, elevated transaminases.  COVID/RSV/influenza negative  CXR: CHF/fluid overload.  Large left retrocardiac hiatal hernia.    IV Lasix, nebs initiated in the ED.  Cardiology consulted.  Patient will be admitted for further treatment.    History/Other:      Past Medical History:  Past Medical History:    Enlarged aorta    Glaucoma    Other and unspecified hyperlipidemia    Right groin pain    Unspecified essential hypertension        Past Surgical History:   Past Surgical History:   Procedure Laterality Date     Correct bunion,simple Right 1999    Bunionectomy    Electrocardiogram, complete  08/18/2010    Scanned to media tab     Knee surgery      right knee       Social History:  reports that she has never smoked. She has never used smokeless tobacco. She reports current alcohol use. She reports that she does not use drugs.    Family History:   Family History   Problem Relation Age of Onset    Heart Attack Father     Heart Disease Father         Coronary Artery Disease    Heart Attack Mother     Hypertension Mother     Breast Cancer Sister 78        2 sisters    Arthritis Sister     Heart Attack Brother     Cancer Brother         pancreatic cancer       Allergies: Allergies[1]    Medications:  Medications Ordered Prior to Encounter[2]    Review of Systems:   A comprehensive 14 point review of systems was completed.    Pertinent positives and negatives noted in the HPI.    Objective:     /88   Pulse 106   Temp 97.8 °F (36.6 °C) (Temporal)   Resp 24   Wt 162 lb (73.5 kg)   SpO2 96%   BMI 26.96 kg/m²   General: No acute distress.  HEENT: Normocephalic, atraumatic.  Neck: Supple.  Respiratory: Normal effort.  Mild Rales  Cardiovascular: Irregular.  Mild tachycardia.   Abdomen: Soft, nontender distended.  Neurologic: Sleepy but arouses easily.  Oriented x 3.  No focal deficits.  Musculoskeletal: No tenderness or deformity.  Extremities: Plus BLE edema  Psychiatric: Calm, cooperative    Results:      Labs:  Labs Last 24 Hours:   BMP     CBC    Other     Na 141 Cl 102 BUN 86 Glu 112   Hb 12.7   PTT - Procal -   K 4.9 CO2 25.0 Cr 2.35   WBC 8.2 >< .0  INR - CRP -   Renal Lytes Endo    Hct 40.3   Trop - D dim -   eGFR - Ca 9.2 POC Gluc  -    LFT   pBNP - Lactic -   eGFR AA - PO4 - A1c -    APk 396 Prot 6.6  LDL -     Mg 2.9 TSH -    T kal 1.4 Alb 3.9          COVID-19 Lab Results    COVID-19  Lab Results   Component Value Date    COVID19 Not Detected 02/16/2025    COVID19 Not Detected 01/26/2025     COVID19 Not Detected 01/18/2025       Pro-Calcitonin  No results for input(s): \"PCT\" in the last 168 hours.    Cardiac  No results for input(s): \"TROP\", \"PBNP\" in the last 168 hours.    Creatinine Kinase  No results for input(s): \"CK\" in the last 168 hours.    Inflammatory Markers  No results for input(s): \"CRP\", \"PATRIZIA\", \"LDH\", \"DDIMER\" in the last 168 hours.    Imaging: Imaging data reviewed in Epic.    Assessment & Plan:    Acute on chronic respiratory failure with hypoxia  CHF exacerbation  -Admit  -Diurese as renal function permits  -CHF protocol  -Cardiology consult    Acute kidney injury on chronic kidney disease, likely due to diuretics  -Monitor  -Consider nephrology consult if not improving  -Renal ultrasound    Elevated transaminases, worsening may be due to hepatic congestion from CHF  -RUQ ultrasound  -Hold statins    HTN  A-fib  Anxiety  -Resume home meds as appropriate  -Continue Xarelto, metoprolol    HLD  -Hold statins given elevated LFTs      Quality:  DVT Prophylaxis: Xarelto  CODE status:   HILLARY: TBD      Plan of care discussed with patient and family at bedside. Acknowledged understanding and agrees to plan. Also discussed with the ED physician.    High MDM  Time spent on this admission - examining patient, obtaining history, reviewing previous medical records, going over test results/imaging and discussing plan of care. More than 50% of the time was spent in counseling and/or coordination of care related to CHF, respiratory failure.   All questions answered.     Viridiana Robles MD  2/16/2025                   [1]   Allergies  Allergen Reactions    Benadryl [Diphenhydramine] SWELLING    Ambien [Zolpidem] ANXIETY    Xanax [Alprazolam] ANXIETY    Ace Inhibitors     Amoxicillin NAUSEA ONLY   [2]   Current Facility-Administered Medications on File Prior to Encounter   Medication Dose Route Frequency Provider Last Rate Last Admin    [COMPLETED] furosemide (Lasix) 10 mg/mL injection 20 mg  20 mg  Intravenous Once Rona Zaidi MD   20 mg at 25 1200    [COMPLETED] potassium chloride (Klor-Con M20) tab 40 mEq  40 mEq Oral Once Rona Zaidi MD   40 mEq at 25 1730    [COMPLETED] metoprolol succinate ER (Toprol XL) 24 hr tab 50 mg  50 mg Oral Once Delicia Paul APN   50 mg at 25 1238    [COMPLETED] potassium chloride (Klor-Con M20) tab 40 mEq  40 mEq Oral Once Nisreen Aguirre MD   40 mEq at 25 1930    [COMPLETED] furosemide (Lasix) 10 mg/mL injection 40 mg  40 mg Intravenous Once Sandee Urbina MD   40 mg at 25 1943    [COMPLETED] Perflutren Lipid Microsphere (DEFINITY) 6.52 MG/ML injection 1.5 mL  1.5 mL Intravenous ONCE PRN Bhumika Arredondo MD   1.5 mL at 25 1027    [COMPLETED] furosemide (Lasix) 10 mg/mL injection 20 mg  20 mg Intravenous Once Bhumika Arredondo MD   20 mg at 25 1239    [COMPLETED] dilTIAZem (cardIZEM) 25 mg/5mL injection 10 mg  10 mg Intravenous Once Jorge L Weller MD   10 mg at 25 1112    [] dilTIAZem 10 mg BOLUS FROM BAG infusion  10 mg Intravenous Q1H PRN Jorge L Weller MD        [COMPLETED] furosemide (Lasix) 10 mg/mL injection 40 mg  40 mg Intravenous Once Jorge L Weller MD   40 mg at 25 1113    [COMPLETED] iopamidol 76% (ISOVUE-370) injection for power injector  80 mL Intravenous ONCE PRN Jorge L Weller MD   80 mL at 25 1253    [COMPLETED] azithromycin (Zithromax) tab 500 mg  500 mg Oral Daily Bhumika Arredondo MD   500 mg at 25     Current Outpatient Medications on File Prior to Encounter   Medication Sig Dispense Refill    furosemide 20 MG Oral Tab Take 2 tablets (40 mg total) by mouth daily. 30 tablet 3    metoprolol succinate  MG Oral Tablet 24 Hr Take 1.5 tablets (150 mg total) by mouth in the morning and 1.5 tablets (150 mg total) before bedtime. 60 tablet 3    RIVAROXABAN 15 MG Oral Tab Take 1 tablet (15 mg total) by mouth daily with lunch. 30 tablet 1    pravastatin 40 MG  Oral Tab Take 1 tablet (40 mg total) by mouth nightly. 90 tablet 1    Calcium Carb-Cholecalciferol 600-200 MG-UNIT Oral Tab Take 1 tablet by mouth daily.      TRAVATAN Z 0.004 % Ophthalmic Solution Place 1 drop into both eyes at bedtime.      MULTIVITAMIN TAB/CAP Take 1 tablet by mouth daily.        5

## 2025-02-17 NOTE — HOME CARE LIAISON
Patient is current with [facility] for RN and PT services   Billing Type: Third-Party Bill

## 2025-02-17 NOTE — PLAN OF CARE
Shyla Wheeler Patient Status:  Emergency    1935 MRN [id number]   Location [facility] EMERGENCY DEPARTMENT Attending Franko Trent*   Hosp Day # 0 PCP Ray Goldman MD     Cardiology Nocturnal APN Note    Briefly: (Documentation from chart review)     Shyla Wheeler is a 89 F  who presented with progressively worsening fatigue weakness and dypnea with chest pain was d/c'd last Saturday for afib and pna Oxygen 2L NC at home Here O2 sats 86% RA She also  has a PMH/PSH of:       Past Medical History:    Enlarged aorta    Glaucoma    Other and unspecified hyperlipidemia    Right groin pain    Unspecified essential hypertension       Primary Cardiologist María     Vital Signs:       2025     8:00 PM 2025     8:54 PM   Vitals History   /85 123/88   Pulse 84 106   Resp 24         Labs:   Lab Results   Component Value Date    WBC 8.2 2025    HGB 12.7 2025    HCT 40.3 2025    .0 2025    CREATSERUM 2.35 2025    BUN 86 2025     2025    K 4.9 2025     2025    CO2 25.0 2025     2025    CA 9.2 2025    ALB 3.9 2025    ALKPHO 396 2025    BILT 1.4 2025    TP 6.6 2025     2025     2025    MG 2.9 2025       Diagnostics:   XR CHEST AP PORTABLE  (CPT=71045)    Result Date: 2025  PROCEDURE: XR CHEST AP PORTABLE  (CPT=71045) TIME: 7:05 p.m.  COMPARISON: [facility], CT ANGIOGRAPHY CHEST (CPT=71275), 2025, 12:50 PM.  [facility], XR CHEST AP PORTABLE (CPT=71045), 2025, 11:07 AM.  INDICATIONS: Shortness of breath, weakness, and fatigue.  TECHNIQUE:   Single view.   FINDINGS:  CARDIAC/VASC: Cardiomegaly. Pulmonary venous congestion.  Stable aneurysmal dilatation of thoracic aorta. MEDIAST/FRANCOISE:   Left retrocardiac hiatal hernia. LUNGS/PLEURA: Mild pulmonary interstitial edema.  Right basilar pleural  effusion. BONES: Kyphoplasties for treatment of lower thoracic compression fractures. OTHER: Negative.          CONCLUSION:  1. CHF/fluid overload. 2. Large left retrocardiac hiatal hernia.    Dictated by (CST): Julius Jin MD on 2/16/2025 at 7:44 PM     Finalized by (CST): Julius Jin MD on 2/16/2025 at 7:46 PM          Echo 1/20/25    1. Left ventricle: The cavity size was normal. Wall thickness was mildly      increased. Systolic function was moderately to markedly reduced. The      estimated ejection fraction was 30-35%, by biplane method of disks.      Akinesis of the anteroseptal and inferoseptal walls. Severe hypokinesis      of the entireinferior wall. Doppler parameters are consistent with      restrictive physiology, indicative of decreased left ventricular      diastolic compliance and/or increased left atrial pressure.   2. Right ventricle: The cavity size was normal. Systolic function was      normal.   Impressions:  Left ventricle dysfunction is new compared to echocardiogram   from 2016.   *   Allergies:  Allergies[1]    Medications:  No current facility-administered medications for this encounter.    Assessment:     Afib RVR  EKG shows afib rvr lbbb rate 114   On mtp succ 150 mg bid at home - concern for compliance given prev admit   HR likely compensatory dt fluid overload & hypoxia  CHADSVASC 5 on xarelto 15 mg daily   HASBLED 4      Acute on Chronic HFreF  EF 30-35% as above  On bb, furosemide   No ARNI/ACE/ARB/MRNA/ SGLT2 with DEJAH   CxR 1.Fluid overload   Trop pending pBNP 43,936  Previously discussed [facility] but patient declined Chest CT 1/18 w/coronary calcifications s  K 4.9 Creat 2.35 BUN 86 Mg 2.9   Currently diuresing   Lasix 40 mg IV given in ED  Metop succ 150 mg given in ED   Transferred to PCU given high possibility of needing BiPAP  With fluid overload and low 02 Sat 86% upon arrival  Now with O2 4L nc    DEJAH  Creat 2.35 bun 86   Monitor urine output and cmp    Recent PNA   WBC 8.2  H&H 12.7/40.3 plt 231  Covid RSV Inf A& B neg  Cxr fluid overload   Uses home oxygen     HTN  -   controlled   123/88 O2 sats 96% O2 4L    HL hold statin will eval as OP has elevated LFTs currently     Elevated Liver enzymes   recent US liver in past few month        As Ao Aneurym   5.7 appears stable on xray above            Plan:  Trend trop if indicated obtain EKG  Repeat tsh  Continue diuresis   Consider repeat echo   Consider renal consult  Resume statin when able   Strict I/O daily weights  Cmp mag cbc in am   Ordrs in sign and hold   - Continue to monitor overnight  - Formal Cardiology consult to follow in AM.       Mare Harkins NP  [facility]  2/16/2025  8:57 PM                                 [1]   Allergies  Allergen Reactions    Benadryl [Diphenhydramine] SWELLING    Ambien [Zolpidem] ANXIETY    Xanax [Alprazolam] ANXIETY    Ace Inhibitors     Amoxicillin NAUSEA ONLY

## 2025-02-17 NOTE — OCCUPATIONAL THERAPY NOTE
OCCUPATIONAL THERAPY EVALUATION - INPATIENT     Room Number: 206/206-A  Evaluation Date: 2/17/2025  Type of Evaluation: Initial  Presenting Problem: Acute on chronic CHF    Physician Order: IP Consult to Occupational Therapy  Reason for Therapy: ADL/IADL Dysfunction and Discharge Planning    OCCUPATIONAL THERAPY ASSESSMENT   Patient is a 89 year old female admitted 2/16/2025 for acute on chronic CHF; pt recently d/c from [facility] back to home; she is known to this OT from prior admission and typically very independent.  Prior to admission, patient's baseline is home alone but family has been staying with her; was requiring increased assist with daily activities and was sent home on NC/O2.  Patient is currently functioning below baseline with self care and basic mobility.  Patient is requiring up to Mod  A as a result of the following impairments: endurance, activity tolerance, deconditioning, balance, mobility, weakness. Occupational Therapy will continue to follow for duration of hospitalization.    Patient will benefit from continued skilled OT Services to promote return to prior level of function and safety with continuous assistance and gradual rehabilitative therapy.    PLAN DURING HOSPITALIZATION  OT Device Recommendations: TBD  OT Treatment Plan: Balance activities;Energy conservation/work simplification techniques;ADL training;Functional transfer training;Endurance training;Patient/Family education;Patient/Family training;Compensatory technique education     OCCUPATIONAL THERAPY MEDICAL/SOCIAL HISTORY   Problem List  Principal Problem:    Acute on chronic congestive heart failure, unspecified heart failure type (HCC)  Active Problems:    Acute on chronic hypoxic respiratory failure (HCC)    HOME SITUATION  Type of Home: House  Home Layout: One level  Lives With: Alone  Patient Regularly Uses: Glasses; Rolling walker    SUBJECTIVE  I wasn't home for too long     OCCUPATIONAL THERAPY EXAMINATION       OBJECTIVE  Precautions: Cardiac  Fall Risk: High fall risk      PAIN ASSESSMENT  Ratin      ACTIVITY TOLERANCE  Pulse: 86        BP: 115/83             O2 SATURATIONS  Oxygen Therapy  SPO2% on Oxygen at Rest: 96  At rest oxygen flow (liters per minute): 5  SPO2% Ambulation on Oxygen: 93  Ambulation oxygen flow (liters per minute): 5    COGNITION  Lethargic but following commands; delayed processing; AOX3    ACTIVITIES OF DAILY LIVING ASSESSMENT  AM-PAC ‘6-Clicks’ Inpatient Daily Activity Short Form  How much help from another person does the patient currently need…  -   Putting on and taking off regular lower body clothing?: A Lot  -   Bathing (including washing, rinsing, drying)?: A Lot  -   Toileting, which includes using toilet, bedpan or urinal? : A Lot  -   Putting on and taking off regular upper body clothing?: A Lot  -   Taking care of personal grooming such as brushing teeth?: A Little  -   Eating meals?: A Little    AM-PAC Score:  Score: 14  Approx Degree of Impairment: 59.67%  Standardized Score (AM-PAC Scale): 33.39  CMS Modifier (G-Code): CK    FUNCTIONAL TRANSFER ASSESSMENT  Sit to Stand: Edge of Bed  Edge of Bed: Moderate Assist    BED MOBILITY  Rolling: Minimal Assist  Supine to Sit : Minimal Assist  Scooting: Min A    BALANCE ASSESSMENT  Static Sitting: Supervision  Static Standing: Minimal Assist    FUNCTIONAL ADL ASSESSMENT  Eating: Supervision  Grooming Seated: Minimal Assist  Bathing Seated: Moderate Assist  UB Dressing Seated: Minimal Assist  LB Dressing Seated: Moderate Assist  Toileting Seated: Moderate Assist    THERAPEUTIC EXERCISE     Skilled Therapy Provided: TF Training, Energy conservation, Fxl mobility engagement; pt requiring increased time for all transitional movements and therapeutic rest breaks; pt requiring increased assist with all basic self care and tolerating only bed to chair; at prior admission, pt was up and ambulatory in hallway and to bathroom with staff; this a  significant decline in function since last admission; may benefit from rehab at d/c.     EDUCATION PROVIDED  Patient Education : Role of Occupational Therapy; Plan of Care; Discharge Recommendations; Functional Transfer Techniques; Fall Prevention; Posture/Positioning  Patient's Response to Education: Verbalized Understanding    The patient's Approx Degree of Impairment: 59.67% has been calculated [facility].  Research supports that patients with this level of impairment may benefit from GR.  Final disposition will be made by interdisciplinary medical team.     Patient End of Session: Up in chair;Call light within reach;Needs met;RN aware of session/findings;All patient questions and concerns addressed    OT Goals  Patients self stated goal is: to get better     Patient will complete functional transfer with SBA   Comment:     Patient will complete toileting with SBA   Comment:     Patient will tolerate standing for 3-5 minutes in prep for adls with SBA    Comment:    Patient will complete item retrieval with SBA   Comment:          Goals  on: 3/15  Frequency: 3-5x week    Patient Evaluation Complexity Level:   Occupational Profile/Medical History MODERATE - Expanded review of history including review of medical or therapy record   Specific performance deficits impacting engagement in ADL/IADL MODERATE  3 - 5 performance deficits   Client Assessment/Performance Deficits MODERATE - Comorbidities and min to mod modifications of tasks    Clinical Decision Making MODERATE - Analysis of occupational profile, detailed assessments, several treatment options    Overall Complexity MODERATE     OT Session Time: 23 minutes  Self-Care Home Management: 0 minutes  Therapeutic Activity: 23 minutes    Nilson Chau, Occupational Therapist, OTR/L ext [phone]

## 2025-02-17 NOTE — PHYSICAL THERAPY NOTE
PHYSICAL THERAPY EVALUATION - INPATIENT     Room Number: 206/206-A  Evaluation Date: 2/17/2025  Type of Evaluation: Initial   Physician Order: PT Eval and Treat    Presenting Problem: CHF exacerbation, acute on chronic hypoxic respiratory failure  Co-Morbidities : Afib, respiratory failure, CHF, HTN, enlarged aorta, R knee surgery  Reason for Therapy: Mobility Dysfunction and Discharge Planning    PHYSICAL THERAPY ASSESSMENT   Patient is a 89 year old female admitted 2/16/2025 for CHF and respiratory exacerbations.  Prior to admission, patient's baseline is Ruben using a rolling walker.  Patient is currently functioning below baseline with bed mobility, transfers, and gait.  Patient is requiring moderate assist as a result of the following impairments: decreased functional strength, impaired standing balance, decreased muscular endurance, and medical status.  Physical Therapy will continue to follow for duration of hospitalization.    Patient will benefit from continued skilled PT Services to promote return to prior level of function and safety with continuous assistance and gradual rehabilitative therapy .    PLAN DURING HOSPITALIZATION  Nursing Mobility Recommendation : 1 Assist  PT Device Recommendation: Rolling walker;Gait belt  PT Treatment Plan: Bed mobility;Body mechanics;Endurance;Energy conservation;Patient education;Family education;Gait training;Range of motion;Transfer training;Balance training;Stoop training;Strengthening  Rehab Potential : Fair  Frequency (Obs): 3-5x/week     PHYSICAL THERAPY MEDICAL/SOCIAL HISTORY   History related to current admission: Was hospitalized between 1/26 -2/8/25 for acute hypoxemic respiratory failure due to heart failure and possible pneumonia.  During that hospitalization she was also diagnosed with new onset A-fib.  Also acute kidney injury. Was requiring min-CGA using a walker, D/C'ed home with home-health PT      Problem List  Principal Problem:    Acute on chronic  congestive heart failure, unspecified heart failure type (HCC)  Active Problems:    Acute on chronic hypoxic respiratory failure (HCC)      HOME SITUATION  Type of Home: House  Home Layout: One level  Stairs to Enter : 1   Railing: No    Stairs to Bedroom: 0    Railing: No    Lives With: Alone (daughter lives nearby, supportive family)        Patient Regularly Uses: Glasses;Rolling walker     Prior Level of Bay: Ruben with rolling walker     SUBJECTIVE  \"I am so tired.\"    PHYSICAL THERAPY EXAMINATION   OBJECTIVE  Precautions: Cardiac;Bed/chair alarm  Fall Risk: High fall risk    WEIGHT BEARING RESTRICTION  none    PAIN ASSESSMENT  Ratin  Location: denies       COGNITION  Overall Cognitive Status:  WFL - within functional limits    RANGE OF MOTION AND STRENGTH ASSESSMENT  Upper extremity ROM and strength are within functional limits  BUEs  Lower extremity ROM is within functional limits  BLEs  Lower extremity strength is functionally limited requiring increased assist with sit-to-stand     BALANCE  Static Sitting: Fair  Dynamic Sitting: Fair -  Static Standing: Poor +  Dynamic Standing: Poor      ACTIVITY TOLERANCE  Pulse: 86  Heart Rate Source: Monitor     BP: 115/83  BP Location: Right arm  BP Method: Automatic  Patient Position: Semi-Collazo    O2 WALK  Oxygen Therapy  SPO2% on Oxygen at Rest: 96  At rest oxygen flow (liters per minute): 5  SPO2% Ambulation on Oxygen: 93  Ambulation oxygen flow (liters per minute): 5    AM-PAC '6-Clicks' INPATIENT SHORT FORM - BASIC MOBILITY  How much difficulty does the patient currently have...  Patient Difficulty: Turning over in bed (including adjusting bedclothes, sheets and blankets)?: A Lot   Patient Difficulty: Sitting down on and standing up from a chair with arms (e.g., wheelchair, bedside commode, etc.): A Lot   Patient Difficulty: Moving from lying on back to sitting on the side of the bed?: A Lot   How much help from another person does the patient  currently need...   Help from Another: Moving to and from a bed to a chair (including a wheelchair)?: A Lot   Help from Another: Need to walk in hospital room?: A Lot   Help from Another: Climbing 3-5 steps with a railing?: Total     AM-PAC Score:  Raw Score: 11   Approx Degree of Impairment: 72.57%   Standardized Score (AM-PAC Scale): 33.86   CMS Modifier (G-Code): CL    FUNCTIONAL ABILITY STATUS  Functional Mobility/Gait Assessment  Gait Assistance: Moderate assistance  Distance (ft): 3' bed to chair, assist for weight shift to allow limb advancement, assist for managing walker  Assistive Device: Rolling walker  Pattern: Shuffle  Supine to Sit: moderate assist  Sit to Stand: moderate assist    Exercise/Education Provided:  Education Provided To: Patient  Patient Education: Role of Physical Therapy;Plan of Care;Discharge Recommendations;Functional Transfer Techniques;Fall Prevention;Energy Conservation;Proper Body Mechanics;Gait Training  Patient's Response to Education: Verbalized Understanding;Returned Demonstration;Requires Further Education          Skilled Therapy Provided: Patient functionally declined since previous admission, requiring overall modA, unable to ambulate household distances 2/2 impaired standing balance and muscular endurance.     Patient seen for evaluation in coordination with occupational therapy to maximize patient safety and function. Patient received semi-fowlers in bed, agreeable to physical therapy evaluation. Vital signs monitored as noted above, no adverse symptoms and patient stable during session. Next session anticipate to progress bed mobility, transfers, and gait.    Patient history and/or personal factors that may impact the plan of care include home accessibility concerns, limited social support, co-morbidities (Afib, respiratory failure, CHF, HTN, enlarged aorta, R knee surgery) affecting medical status, and has history of recent hospitalization. Based on the physical therapy  examination of the noted systems and functional activity/participation limitations, the patient presentation is evolving given the patient demonstrates worsening of previously stable condition and demonstrates worsening of co-morbidities.    The patient's Approx Degree of Impairment: 72.57% has been calculated based on documentation in the [facility] '6 clicks' Inpatient Basic Mobility Short Form.  Research supports that patients with this level of impairment may benefit from a rehab facility.  Final disposition will be made by interdisciplinary medical team.    Patient End of Session: Up in chair;Needs met;Call light within reach;RN aware of session/findings;All patient questions and concerns addressed;Hospital anti-slip socks;Alarm set    CURRENT GOALS  Goals to be met by: 3/17/25  Patient Goal Patient's self-stated goal is: return home safely   Goal #1 Patient is able to demonstrate supine - sit EOB @ level: minimum assistance     Goal #1   Current Status    Goal #2 Patient is able to demonstrate transfers EOB to/from BSC at assistance level: minimum assistance with walker - rolling     Goal #2  Current Status    Goal #3 Patient is able to ambulate 30 feet with assist device: walker - rolling at assistance level: minimum assistance   Goal #3   Current Status    Goal #4 Patient is able to demonstrate transfers sit to/from stand at assistance level: Albert with rolling walker      Goal #4   Current Status    Goal #5 Patient to demonstrate independence with home activity/exercise instructions provided to patient in preparation for discharge.   Goal #5   Current Status    Goal #6    Goal #6  Current Status      Patient Evaluation Complexity Level:  History High - 3 or more personal factors and/or co-morbidities   Examination of body systems Moderate - addressing a total of 3 or more elements   Clinical Presentation  Moderate - Evolving   Clinical Decision Making  Moderate Complexity       Therapeutic Activity:  15  minutes      Katya Santana, PT, DPT  [facility]  Rehab Services - Physical Therapy  [id number]

## 2025-02-17 NOTE — TELEPHONE ENCOUNTER
Myah, OT - [facility] called, verified patient's Name and . She is calling to inform patient's PCP that patient was taken to the ER yesterday by daughter. States patient did complain of trouble breathing on Saturday, and then yesterday was just not feeling right.     Chart reviewed. Patient seen at [facility] Emergency Department, currently admitted.     Dr. Jones BANKS.

## 2025-02-17 NOTE — DISCHARGE INSTRUCTIONS
Resume services with [facility]  [phone]    Going Home Instructions  In this section you will find the tools which will guide you through the first few days after you leave the hospital. Continued use of these tools will help you develop the skills necessary to keep your heart failure under control.     Home Care Instructions Following Heart Failure - the most important things to do every day include:   Weigh yourself and review the “Self-Check Plan” sheet every morning.   Call your cardiologist office if you are in the “Pay Attention-Use Caution” (yellow zone) or “Medical Alert-Warning!” (red zone) as outlined in the Self-Check Plan sheet.  Take your medicines as prescribed.  Limit your sodium (salt) intake.  Know when to call your cardiologist, primary doctor, or nurse.  Know when to seek emergency care.      Things for You to Remember:   1. See your doctor or healthcare provider as written on your discharge instructions.  It is important that you attend this appointment to make sure your symptoms are under control.     2. Your recommended sodium intake is 7995-1043 mg daily.    3.  Weigh yourself every day.    4. Some exercise and activity is important to help keep your heart functioning and strong. Unless instructed not to exercise, you may walk at a slow to moderate pace for 10-15 minutes 2-3 days per week to start. Pace your activity to prevent shortness of breath or fatigue. Stop exercising if you develop chest pain, lightheadedness, or significant shortness of breath.       Call Your Cardiologist If:   You gain 2-3 pounds in one day or 5 pounds in one week.  You have more difficulty breathing.  You are getting more tired with normal activity.  You are more short of breath lying down, or awaken at night short of breath.  You have swelling of your feet or legs.  You urinate less often during the day and more often at night.  You have cramps in your legs.  You have blurred vision or see  yellowish-green halos around objects of lights.    Go to the Emergency Room If:   You have pain or tightness in your chest  You are extremely short of breath  You are coughing up pink-frothy mucus  You are traveling and develop symptoms of worsening heart failure      ** Please follow up with your cardiologist or Advanced Practice Provider as written on your discharge instructions. If you are not provided with an appointment, let your nurse know so you can get an appointment**

## 2025-02-17 NOTE — DIETARY NOTE
Deferred Cardiac Education Note    Consult received for diet education per order set. Education deferred until pt transferred out of CCU or until s/p intervention and when appropriate for teaching.     Marlys Pantoja RD, LDN, [facility] ([id number])

## 2025-02-17 NOTE — ED QUICK NOTES
Rounding Completed    Plan of Care reviewed. Waiting for bed.  Elimination needs assessed.  Provided restroom, fresh sheets, warm blanket.    Bed is locked and in lowest position. Call light within reach.

## 2025-02-17 NOTE — PLAN OF CARE
Patient A&Ox4. MD notified of elevated trop. All safety measures maintained. Report given to CHINA Cline     Problem: Patient Centered Care  Goal: Patient preferences are identified and integrated in the patient's plan of care  Description: Interventions:  - What would you like us to know as we care for you?   - Provide timely, complete, and accurate information to patient/family  - Incorporate patient and family knowledge, values, beliefs, and cultural backgrounds into the planning and delivery of care  - Encourage patient/family to participate in care and decision-making at the level they choose  - Honor patient and family perspectives and choices  Outcome: Progressing     Problem: Patient/Family Goals  Goal: Patient/Family Long Term Goal  Description: Patient's Long Term Goal:     Interventions:  - See additional Care Plan goals for specific interventions  Outcome: Progressing  Goal: Patient/Family Short Term Goal  Description: Patient's Short Term Goal:

## 2025-02-17 NOTE — ED QUICK NOTES
Orders for admission, patient is aware of plan and ready to go upstairs. Any questions, please call ED RN Ty at extension [phone].     Patient Covid vaccination status: Fully vaccinated     COVID Test Ordered in ED: SARS-CoV-2/Flu A and B/RSV by PCR (GeneXpert)    COVID Suspicion at Admission: N/A    Running Infusions:  None    Mental Status/LOC at time of transport: A+Ox4    Other pertinent information:   CIWA score: N/A   NIH score:  N/A

## 2025-02-17 NOTE — SLP NOTE
ADULT SWALLOWING EVALUATION    ASSESSMENT    ASSESSMENT/OVERALL IMPRESSION:  PPE REQUIRED. THIS THERAPIST WORE GLOVES, DROPLET MASK, AND GOGGLES FOR DURATION OF EVALUATION. HANDS WASHED UPON ENTRANCE/EXIT.    SLP BSSE orders received and acknowledged. A swallow evaluation warranted as pt at risk for aspiration. Pt afebrile with clear vocal quality, on 5L/Min, with oxygen saturation at 100. Pt with no prior hx of dysphagia at [facility].   Pt positioned upright in bed with family at bedside. Pt with no complaints of pain, RN aware. Oral [facility] exam completed and overall reduced strength and ROM observed. Pt reports partials are at home. Pt with adequate oral acceptance and bilabial seal across all trials. Pt with intact bite, mildly prolonged mastication of solids, and increased SHEEBA. Pt's swallow response appears slightly delayed with reduced hyolaryngeal elevation/excursion. No clinical signs of aspiration (e.g., immediate/delayed throat clear, immediate/delayed cough, wet vocal quality, increased O2 effort) observed across all trials. Oxygen status remained >98 t/o the entire evaluation.     At this time, pt presents with mild oral dysphagia and probable pharyngeal dysfunction. Recommend an easy to chew diet and thin liquids with strict adherence to safe swallowing compensatory strategies. Results and recommendations reviewed with RN, pt, and family. Pt/pt's family v/u to all results/recommendations. Recommendations remain written on whiteboard. SLP collaborated with RN for MD diet orders.       PLAN: SLP to f/u x2-3 meal assessments, monitor CXR, and VFSS if clinically warranted.     RECOMMENDATIONS   Diet Recommendations - Solids: Soft/ Easy to chew  Diet Recommendations - Liquids: Thin Liquids    Compensatory Strategies Recommended: Alternate consistencies  Aspiration Precautions: Upright position;Slow rate;Small bites;Small sips;No straw  Medication Administration Recommendations: Whole in puree  Treatment  Plan/Recommendations: Aspiration precautions    HISTORY   MEDICAL HISTORY  Reason for Referral: R/O aspiration    Problem List  Principal Problem:    Acute on chronic congestive heart failure, unspecified heart failure type (HCC)  Active Problems:    Acute on chronic hypoxic respiratory failure (HCC)      Past Medical History  Past Medical History:    Enlarged aorta    Glaucoma    Other and unspecified hyperlipidemia    Right groin pain    Unspecified essential hypertension       Prior Living Situation: Home alone  Diet Prior to Admission: Regular;Thin liquids  Precautions: Aspiration    Patient/Family Goals: PO intake    SWALLOWING HISTORY  Current Diet Consistency: Regular;Thin liquids  Dysphagia History: None at [facility]    Imaging Results:     CXR 2/16/25:  CONCLUSION:   1. CHF/fluid overload.   2. Large left retrocardiac hiatal hernia.            Dictated by (CST): Julius Jin MD on 2/16/2025 at 7:44 PM       Finalized by (CST): Julius Jin MD on 2/16/2025 at 7:46 PM     OBJECTIVE   ORAL MOTOR EXAMINATION  Dentition: Natural (partials not present)  Symmetry: Within Functional Limits  Strength: Overall reduced     Range of Motion: Within Functional Limits  Rate of Motion: Reduced    Voice Quality: Clear  Respiratory Status: Supplemental O2;Nasal cannula  Consistencies Trialed: Thin liquids;Puree;Soft solid;Hard solid  Method of Presentation: Self presentation;Spoon;Staff/Clinician assistance;Cup;Single sips  Patient Positioned: Upright;Midline    Oral Phase of Swallow: Impaired  Bolus Retrieval: Intact  Bilabial Seal: Intact  Bolus Formation: Intact  Bolus Propulsion: Impaired  Mastication: Impaired       Pharyngeal Phase of Swallow: Appears Impaired  Laryngeal Elevation Timing: Appears impaired  Laryngeal Elevation Strength: Appears impaired     (Please note: Silent aspiration cannot be evaluated clinically. Videofluoroscopic Swallow Study is required to rule-out silent aspiration.)    Esophageal Phase of  Swallow: No complaints consistent with possible esophageal involvement    GOALS  Goal #1 The patient will tolerate easy to chew consistency and thin liquids without overt signs or symptoms of aspiration with 100 % accuracy over 1-2 session(s).  In Progress   Goal #2 The patient/family/caregiver will demonstrate understanding and implementation of aspiration precautions and swallow strategies independently over 2-3 session(s).    In Progress   Goal #3 The patient will tolerate trial upgrade of regular consistency and thin liquids without overt signs or symptoms of aspiration with 100 % accuracy over 2-3 session(s).  In Progress     FOLLOW UP  Treatment Plan/Recommendations: Aspiration precautions  Duration: 1 week  Follow Up Needed (Documentation Required): Yes  SLP Follow-up Date: 02/18/25    Thank you for your referral.   If you have any questions, please contact KIMBERLYN Vogel M.S. CCC-SLP  Speech Language Pathologist  Phone Number [phone]

## 2025-02-17 NOTE — PLAN OF CARE
Problem: Patient Centered Care  Goal: Patient preferences are identified and integrated in the patient's plan of care  Description: Interventions:  - What would you like us to know as we care for you?   - Provide timely, complete, and accurate information to patient/family  - Incorporate patient and family knowledge, values, beliefs, and cultural backgrounds into the planning and delivery of care  - Encourage patient/family to participate in care and decision-making at the level they choose  - Honor patient and family perspectives and choices  Outcome: Progressing     Problem: Patient/Family Goals  Goal: Patient/Family Long Term Goal  Description: Patient's Long Term Goal:     Interventions:  -   - See additional Care Plan goals for specific interventions  Outcome: Progressing  Goal: Patient/Family Short Term Goal  Description: Patient's Short Term Goal:     Interventions:   -   - See additional Care Plan goals for specific interventions  Outcome: Progressing     Problem: CARDIOVASCULAR - ADULT  Goal: Maintains optimal cardiac output and hemodynamic stability  Description: INTERVENTIONS:  - Monitor vital signs, rhythm, and trends  - Monitor for bleeding, hypotension and signs of decreased cardiac output  - Evaluate effectiveness of vasoactive medications to optimize hemodynamic stability  - Monitor arterial and/or venous puncture sites for bleeding and/or hematoma  - Assess quality of pulses, skin color and temperature  - Assess for signs of decreased coronary artery perfusion - ex. Angina  - Evaluate fluid balance, assess for edema, trend weights  Outcome: Progressing  Goal: Absence of cardiac arrhythmias or at baseline  Description: INTERVENTIONS:  - Continuous cardiac monitoring, monitor vital signs, obtain 12 lead EKG if indicated  - Evaluate effectiveness of antiarrhythmic and heart rate control medications as ordered  - Initiate emergency measures for life threatening arrhythmias  - Monitor electrolytes and  administer replacement therapy as ordered  Outcome: Progressing     Problem: RESPIRATORY - ADULT  Goal: Achieves optimal ventilation and oxygenation  Description: INTERVENTIONS:  - Assess for changes in respiratory status  - Assess for changes in mentation and behavior  - Position to facilitate oxygenation and minimize respiratory effort  - Oxygen supplementation based on oxygen saturation or ABGs  - Provide Smoking Cessation handout, if applicable  - Encourage broncho-pulmonary hygiene including cough, deep breathe, Incentive Spirometry  - Assess the need for suctioning and perform as needed  - Assess and instruct to report SOB or any respiratory difficulty  - Respiratory Therapy support as indicated  - Manage/alleviate anxiety  - Monitor for signs/symptoms of CO2 retention  Outcome: Progressing     Problem: METABOLIC/FLUID AND ELECTROLYTES - ADULT  Goal: Electrolytes maintained within normal limits  Description: INTERVENTIONS:  - Monitor labs and rhythm and assess patient for signs and symptoms of electrolyte imbalances  - Administer electrolyte replacement as ordered  - Monitor response to electrolyte replacements, including rhythm and repeat lab results as appropriate  - Fluid restriction as ordered  - Instruct patient on fluid and nutrition restrictions as appropriate  Outcome: Progressing  Goal: Hemodynamic stability and optimal renal function maintained  Description: INTERVENTIONS:  - Monitor labs and assess for signs and symptoms of volume excess or deficit  - Monitor intake, output and patient weight  - Monitor urine specific gravity, serum osmolarity and serum sodium as indicated or ordered  - Monitor response to interventions for patient's volume status, including labs, urine output, blood pressure (other measures as available)  - Encourage oral intake as appropriate  - Instruct patient on fluid and nutrition restrictions as appropriate  Outcome: Progressing

## 2025-02-18 LAB
ALBUMIN SERPL-MCNC: 3.3 G/DL (ref 3.2–4.8)
ALP LIVER SERPL-CCNC: 327 U/L
ALT SERPL-CCNC: 336 U/L
ANION GAP SERPL CALC-SCNC: 13 MMOL/L (ref 0–18)
AST SERPL-CCNC: 172 U/L (ref ?–34)
BILIRUB DIRECT SERPL-MCNC: 0.5 MG/DL (ref ?–0.3)
BILIRUB SERPL-MCNC: 1 MG/DL (ref 0.2–1.1)
BUN BLD-MCNC: 81 MG/DL (ref 9–23)
BUN/CREAT SERPL: 33.3 (ref 10–20)
CALCIUM BLD-MCNC: 8.1 MG/DL (ref 8.7–10.4)
CHLORIDE SERPL-SCNC: 103 MMOL/L (ref 98–112)
CO2 SERPL-SCNC: 28 MMOL/L (ref 21–32)
CREAT BLD-MCNC: 2.43 MG/DL
EGFRCR SERPLBLD CKD-EPI 2021: 19 ML/MIN/1.73M2 (ref 60–?)
GLUCOSE BLD-MCNC: 97 MG/DL (ref 70–99)
MAGNESIUM SERPL-MCNC: 2.6 MG/DL (ref 1.6–2.6)
OSMOLALITY SERPL CALC.SUM OF ELEC: 322 MOSM/KG (ref 275–295)
POTASSIUM SERPL-SCNC: 3.6 MMOL/L (ref 3.5–5.1)
PROT SERPL-MCNC: 5.8 G/DL (ref 5.7–8.2)
SODIUM SERPL-SCNC: 144 MMOL/L (ref 136–145)

## 2025-02-18 PROCEDURE — 99233 SBSQ HOSP IP/OBS HIGH 50: CPT | Performed by: HOSPITALIST

## 2025-02-18 PROCEDURE — 99223 1ST HOSP IP/OBS HIGH 75: CPT | Performed by: INTERNAL MEDICINE

## 2025-02-18 RX ORDER — POTASSIUM CHLORIDE 1500 MG/1
40 TABLET, EXTENDED RELEASE ORAL ONCE
Status: COMPLETED | OUTPATIENT
Start: 2025-02-18 | End: 2025-02-18

## 2025-02-18 NOTE — PLAN OF CARE
Problem: Patient Centered Care  Goal: Patient preferences are identified and integrated in the patient's plan of care  Description: Interventions:  - What would you like us to know as we care for you?   - Provide timely, complete, and accurate information to patient/family  - Incorporate patient and family knowledge, values, beliefs, and cultural backgrounds into the planning and delivery of care  - Encourage patient/family to participate in care and decision-making at the level they choose  - Honor patient and family perspectives and choices  Outcome: Progressing     Problem: Patient/Family Goals  Goal: Patient/Family Long Term Goal  Description: Patient's Long Term Goal:     Interventions:  - See additional Care Plan goals for specific interventions  Outcome: Progressing  Goal: Patient/Family Short Term Goal  Description: Patient's Short Term Goal:     Interventions:   - See additional Care Plan goals for specific interventions  Outcome: Progressing     Problem: CARDIOVASCULAR - ADULT  Goal: Maintains optimal cardiac output and hemodynamic stability  Description: INTERVENTIONS:  - Monitor vital signs, rhythm, and trends  - Monitor for bleeding, hypotension and signs of decreased cardiac output  - Evaluate effectiveness of vasoactive medications to optimize hemodynamic stability  - Monitor arterial and/or venous puncture sites for bleeding and/or hematoma  - Assess quality of pulses, skin color and temperature  - Assess for signs of decreased coronary artery perfusion - ex. Angina  - Evaluate fluid balance, assess for edema, trend weights  Outcome: Progressing  Goal: Absence of cardiac arrhythmias or at baseline  Description: INTERVENTIONS:  - Continuous cardiac monitoring, monitor vital signs, obtain 12 lead EKG if indicated  - Evaluate effectiveness of antiarrhythmic and heart rate control medications as ordered  - Initiate emergency measures for life threatening arrhythmias  - Monitor electrolytes and  administer replacement therapy as ordered  Outcome: Progressing     Problem: RESPIRATORY - ADULT  Goal: Achieves optimal ventilation and oxygenation  Description: INTERVENTIONS:  - Assess for changes in respiratory status  - Assess for changes in mentation and behavior  - Position to facilitate oxygenation and minimize respiratory effort  - Oxygen supplementation based on oxygen saturation or ABGs  - Provide Smoking Cessation handout, if applicable  - Encourage broncho-pulmonary hygiene including cough, deep breathe, Incentive Spirometry  - Assess the need for suctioning and perform as needed  - Assess and instruct to report SOB or any respiratory difficulty  - Respiratory Therapy support as indicated  - Manage/alleviate anxiety  - Monitor for signs/symptoms of CO2 retention  Outcome: Progressing     Problem: METABOLIC/FLUID AND ELECTROLYTES - ADULT  Goal: Electrolytes maintained within normal limits  Description: INTERVENTIONS:  - Monitor labs and rhythm and assess patient for signs and symptoms of electrolyte imbalances  - Administer electrolyte replacement as ordered  - Monitor response to electrolyte replacements, including rhythm and repeat lab results as appropriate  - Fluid restriction as ordered  - Instruct patient on fluid and nutrition restrictions as appropriate  Outcome: Progressing  Goal: Hemodynamic stability and optimal renal function maintained  Description: INTERVENTIONS:  - Monitor labs and assess for signs and symptoms of volume excess or deficit  - Monitor intake, output and patient weight  - Monitor urine specific gravity, serum osmolarity and serum sodium as indicated or ordered  - Monitor response to interventions for patient's volume status, including labs, urine output, blood pressure (other measures as available)  - Encourage oral intake as appropriate  - Instruct patient on fluid and nutrition restrictions as appropriate  Outcome: Progressing

## 2025-02-18 NOTE — SLP NOTE
SPEECH DAILY NOTE - INPATIENT    ASSESSMENT & PLAN   ASSESSMENT  PPE REQUIRED. THIS THERAPIST WORE GLOVES, DROPLET MASK, AND GOGGLES FOR DURATION OF EVALUATION. HANDS WASHED UPON ENTRANCE/EXIT.    SLP f/u for ongoing dysphagia tx/meal assessment per recommendations of easy to chew/thin per BSE. RN reports pt tolerates diet and medication well with no overt clinical s/s aspiration. Pt denies any swallowing challenges.     Pt positioned upright in bed. Pt afebrile, tolerating 4L/Min O2NC with oxygen status 99 prior to the start of oral trials. SLP reviewed aspiration precautions and safe swallowing compensatory strategies with the patient. Safe swallow guidelines remain written on the white board in purple. Diet recommendations remain on the whiteboard in the room. Patient v/u. Provided minimal assistance, pt tolerates easy to chew and thin liquids via open cup with no overt clinical signs/symptoms of aspiration. Pt trialed with regular solids and no CSA observed, however, prolonged mastication. Trials discontinued. Oxygen status remained >97 t/o the entire session. Respiratory Rate WFL.     PLAN: SLP to f/u x2 meal assessments, monitor CXR, and VFSS if clinically warranted.     Diet Recommendations - Solids: Soft/ Easy to chew  Diet Recommendations - Liquids: Thin Liquids    Compensatory Strategies Recommended: Alternate consistencies  Aspiration Precautions: Upright position;Slow rate;Small bites;Small sips;No straw  Medication Administration Recommendations: Whole in puree    Patient Experiencing Pain: No      Treatment Plan  Treatment Plan/Recommendations: Aspiration precautions    Interdisciplinary Communication: Discussed with RN  Recommendations posted at bedside            GOALS  Goal #1 The patient will tolerate easy to chew consistency and thin liquids without overt signs or symptoms of aspiration with 100 % accuracy over 1-2 session(s).  In Progress   Goal #2 The patient/family/caregiver will demonstrate  understanding and implementation of aspiration precautions and swallow strategies independently over 2-3 session(s).    In Progress   Goal #3 The patient will tolerate trial upgrade of regular consistency and thin liquids without overt signs or symptoms of aspiration with 100 % accuracy over 2-3 session(s).  In Progress     FOLLOW UP  Follow Up Needed (Documentation Required): Yes  SLP Follow-up Date: 02/19/25  Duration: 1 week    Session: 1    If you have any questions, please contact KIMBERLYN Vogel M.S. CCC-SLP  Speech Language Pathologist  Phone Number [phone]

## 2025-02-18 NOTE — PROGRESS NOTES
Progress Note     Shyla Wheeler Patient Status:  Inpatient    1935 MRN [id number]   Location [facility] 2W/SW Attending Marleen Osborn MD   Hosp Day # 2 PCP Ray Goldman MD     Chief Complaint: Acute exacerbation CHF, acute on chronic respiratory failure with hypoxia    Subjective:   S: Patient fatigued, denies shortness of breath. Resting in bed.        No other acute complaints or other nursing concerns or overnight events    Review of Systems:   10 point ROS completed and was negative, except for pertinent positive and negatives stated in subjective.    Objective:   Vital signs:  Temp:  [96.8 °F (36 °C)-98.5 °F (36.9 °C)] 97 °F (36.1 °C)  Pulse:  [] 90  Resp:  [18-24] 22  BP: ()/(63-89) 109/74  SpO2:  [92 %-100 %] 100 %    Wt Readings from Last 6 Encounters:   25 158 lb 11.7 oz (72 kg)   25 169 lb 12.8 oz (77 kg)   25 167 lb 8 oz (76 kg)   23 171 lb (77.6 kg)   21 173 lb (78.5 kg)   21 170 lb (77.1 kg)         Physical Exam:      Gen: No acute distress  Pulm: Lungs clear, normal respiratory effort  CV: Heart with regular rate and rhythm  Abd: Abdomen soft, nontender, nondistended, bowel sounds present  Neuro: No acute focal deficits  MSK: moves extremities  Skin: Warm and dry  Psych: Normal affect  Ext: +le edema      Results:   Diagnostic Data:      Labs:    Labs Last 24 Hours:   BMP     CBC    Other     Na 144 Cl 103 BUN 81 Glu 97   Hb -   PTT - Procal -   K 3.6 CO2 28.0 Cr 2.43   WBC - >< PLT -  INR - CRP -   Renal Lytes Endo    Hct -   Trop - D dim -   eGFR - Ca 8.1 POC Gluc  -    LFT   pBNP - Lactic -   eGFR AA - PO4 - A1c -   AST - APk - Prot -  LDL -     Mg 2.6 TSH -   ALT - T kal - Alb -        COVID-19 Lab Results    COVID-19  Lab Results   Component Value Date    COVID19 Not Detected 2025    COVID19 Not Detected 2025    COVID19 Not Detected 2025       Pro-Calcitonin  No results for input(s): \"PCT\" in the last 168  hours.    Cardiac  Recent Labs   Lab 02/16/25  2113   PBNP 43,936*       Creatinine Kinase  No results for input(s): \"CK\" in the last 168 hours.    Inflammatory Markers  No results for input(s): \"CRP\", \"PATRIZIA\", \"LDH\", \"DDIMER\" in the last 168 hours.    Imaging: Imaging data reviewed in Epic.    Medications:    rivaroxaban  15 mg Oral Daily with lunch    latanoprost  1 drop Ophthalmic Nightly    metoprolol succinate ER  150 mg Oral BID    furosemide  40 mg Intravenous BID (Diuretic)       Assessment & Plan:   ASSESSMENT / PLAN:     Acute on chronic respiratory failure with hypoxia  CHF exacerbation  Was on 4 to 5 L nasal cannula yesterday (was on 1 to 2 L at home since recent discharge on 2/8/2025 for pneumonia) - weaned to 2L this am  -Diurese as renal function permits  -CHF protocol  -Cardiology consult-diuresis per cardiology   -SLP ordered  -Patient stable to transfer out of ICU to cardiac floor today  -slight increase in cr today - renal consulted    Acute kidney injury on chronic kidney disease, likely due to diuretics  -Monitor - worse today  -Nephrology consulted  -renal ultrasound pending     Elevated transaminases, worsening may be due to hepatic congestion from CHF  -RUQ ultrasound reviewed  - no acute process - will recheck lft today and trend  -Hold statins     HTN  A-fib  Anxiety  -Resume home meds as appropriate  -Continue Xarelto, metoprolol     HLD  -Hold statins given elevated LFTs        Quality:  DVT Prophylaxis: Xarelto  CODE status: full  HILLARY: TBD      Coordinated care with providers and counseling re: treatment plan and workup     Supplementary Documentation:         **Certification      PHYSICIAN Certification of Need for Inpatient Hospitalization - Initial Certification    Patient will require inpatient services that will reasonably be expected to span two midnight's based on the clinical documentation in H+P.   Based on patients current state of illness, I anticipate that, after discharge,  patient will require TBD.

## 2025-02-18 NOTE — PROGRESS NOTES
[facility]  part of [facility]    Cardiology Progress Note    Shyla Wheeler Patient Status:  Inpatient    1935 MRN [id number]   Location [facility] 2W/SW Attending Nicolle Levine MD   Hosp Day # 2 PCP Ray Goldman MD     Interval History   Breathing improved, no CP  Tele with rate controlled AF    Assessment and Plan:   DEJAH on CKD III, appreciate nephrology assistance  Asc Ao aneurysm, stable     Acute exacerbation of HFrEF  -Presented with increasing O2 requirements and dyspnea  -Labs with elevated proBNP of 43,936  -Chest imaging on arrival with pulmonary edema  -Recent TTE with decreased LVEF of 30-35% (2025)                  -Patient had deferred LHC at the time per goals of care  -Etiology is unclear, likely suboptimal outpatient diuresis as pt been adherent to oral furosemide 40 mg daily  -Symptoms have improved, improved breathing   -wean O2 to keep SpO2 >90   -continue IV furosemide 40mg BID   -Strict I/Os   -Closely monitor renal function / lytes, slight uptrend in Cr this AM  -Will need nephrology assistance given underlying DEJAH on CKD in the setting of volume overload     A-fib, persistent  -Patient presents with persistent A-fib and intermittent RVR  -Likely secondary to CHF exacerbation  -continue beta-blocker, metoprolol succinate 150 mg daily, continue to optimize rate control  -Elevated [id number], continue rivaroxaban 15 mg daily    Objective:   Patient Vitals for the past 24 hrs:   BP Temp Temp src Pulse Resp SpO2 Weight   25 0916 109/74 -- -- 90 -- 100 % --   25 0800 93/78 97 °F (36.1 °C) Temporal 85 22 92 % --   25 0635 125/69 -- -- 90 18 100 % --   25 0403 112/86 96.9 °F (36.1 °C) Temporal 94 21 99 % --   25 0400 -- -- -- -- -- -- 72 kg   25 0000 117/89 96.9 °F (36.1 °C) Temporal 88 19 95 % --   25 2000 113/63 96.8 °F (36 °C) Temporal 105 18 100 % --   25 1600 119/70 98.5 °F (36.9 °C) Temporal 85  20 99 % --   02/17/25 1400 126/69 -- -- 86 24 100 % --   02/17/25 1230 115/83 -- -- 86 -- -- --   02/17/25 1225 115/83 98.2 °F (36.8 °C) Temporal 86 23 98 % --   02/17/25 1200 115/83 -- -- 98 22 100 % --   02/17/25 1100 125/85 -- -- 82 20 100 % --       Intake/Output:   Last 3 shifts:   Intake/Output                   02/16/25 0700 - 02/17/25 0659 02/17/25 0700 - 02/18/25 0659 02/18/25 0700 - 02/19/25 0659       Intake    P.O.  --  300  200    P.O. -- 300 200    Total Intake -- 300 200       Output    Urine  650  1230  --    Urine 150 -- --    Incontinent Urine Occurrence 1 x -- --    Output (mL) (External Urinary Catheter) 500 1230 --    Emesis/NG output  --  0  --    Emesis -- 0 --    Total Output 650 1230 --       Net I/O     -650 -930 200             Vent Settings:      Hemodynamic parameters (last 24 hours):      Scheduled Meds:    rivaroxaban  15 mg Oral Daily with lunch    latanoprost  1 drop Ophthalmic Nightly    metoprolol succinate ER  150 mg Oral BID    furosemide  40 mg Intravenous BID (Diuretic)       Continuous Infusions:     Results:     Lab Results   Component Value Date    WBC 8.8 02/17/2025    HGB 13.0 02/17/2025    HCT 41.9 02/17/2025    .0 02/17/2025    CREATSERUM 2.43 (H) 02/18/2025    BUN 81 (H) 02/18/2025     02/18/2025    K 3.6 02/18/2025     02/18/2025    CO2 28.0 02/18/2025    GLU 97 02/18/2025    CA 8.1 (L) 02/18/2025    ALB 3.7 02/17/2025    ALKPHO 355 (H) 02/17/2025    BILT 1.4 (H) 02/17/2025    TP 6.0 02/17/2025     (H) 02/17/2025     (H) 02/17/2025    PTT 24.4 01/18/2025    INR 1.36 (H) 02/17/2025    T4F 1.1 08/05/2024    TSH 4.740 02/17/2025    MG 2.6 02/18/2025    PHOS 3.2 01/31/2025    B12 562 08/05/2024       Recent Labs   Lab 02/16/25  1822 02/17/25  0638 02/18/25  0759   * 86 97   BUN 86* 75* 81*   CREATSERUM 2.35* 2.25* 2.43*   CA 9.2 8.7 8.1*    143 144   K 4.9 4.2 3.6    107 103   CO2 25.0 24.0 28.0     Recent Labs   Lab  25  1822 25  0839   RBC 4.14 4.27   HGB 12.7 13.0   HCT 40.3 41.9   MCV 97.3 98.1   MCH 30.7 30.4   MCHC 31.5 31.0   RDW 17.2* 17.6*   NEPRELIM 6.96 7.40   WBC 8.2 8.8   .0 192.0       No results for input(s): \"BNPML\" in the last 168 hours.    No results for input(s): \"TROP\", \"CK\" in the last 168 hours.    US ABDOMEN COMPLETE (CPT=76700)    Result Date: 2025  CONCLUSION:  1. No sonographic evidence of hydronephrosis.  2. Nonspecific mildly heterogeneous appearance of the hepatic parenchyma with otherwise sonographically unremarkable appearance.  3. Negative for hepatobiliary dilatation.  4. Lesser incidental findings as above.   elm-remote.    Dictated by (CST): Ricardo Padilla MD on 2025 at 10:41 AM     Finalized by (CST): Ricardo Padilla MD on 2025 at 10:45 AM          XR CHEST AP PORTABLE  (CPT=71045)    Result Date: 2025  CONCLUSION:  1. CHF/fluid overload. 2. Large left retrocardiac hiatal hernia.    Dictated by (CST): Julius Jin MD on 2025 at 7:44 PM     Finalized by (CST): Julius Jin MD on 2025 at 7:46 PM         EKG 12 Lead    Result Date: 2025  Atrial fibrillation with rapid ventricular response Left axis deviation Left bundle branch block Abnormal ECG When compared with ECG of 2025 06:15, The axis Shifted left Confirmed by MANNY LOFTON CASH ([id number]) on 2025 5:53:17 PM    EKG 12 Lead    Result Date: 2025  Atrial fibrillation Left axis deviation Left bundle branch block Abnormal ECG When compared with ECG of 2025 17:45, Vent. rate has decreased BY  38 BPM Confirmed by MANNY MATHIAS JORDAN ([id number]) on 2025 8:36:03 AM   CARD ECHO 2D DOPPLER CONTRAST (CPT=93306)    Result Date: 2025  Transthoracic Echocardiogram Name:Shyla Wheeler Date: 2025 :  1935 Ht:  (60in)  BP: 144 / 56 MRN:  [id number]    Age:  89years    Wt:  (163lb) HR: 72bpm Loc:  [facility]       Gndr: F          BSA: 1.71m^2 Sonographer: Laura VELASQUEZ  Ordering:    Jorge L Weller ---------------------------------------------------------------------------- History/Indications:   Elevated Troponin. ---------------------------------------------------------------------------- Procedure information:  A transthoracic complete 2D study was performed. Additional evaluation included M-mode, complete spectral Doppler, and color Doppler.  Patient status:  Inpatient.  Location:  Bedside.    Comparison was made to the study of 05/04/2024.    This was a routine study. Transthoracic echocardiography for diagnosis. Image quality was adequate. The study was technically limited due to poor acoustic window availability and body habitus. Intravenous contrast (Definity) was administered to enhance regional wall motion assessment. ---------------------------------------------------------------------------- Conclusions: 1. Left ventricle: The cavity size was normal. Wall thickness was mildly    increased. Systolic function was moderately to markedly reduced. The    estimated ejection fraction was 30-35%, by biplane method of disks.    Akinesis of the anteroseptal and inferoseptal walls. Severe hypokinesis    of the entireinferior wall. Doppler parameters are consistent with    restrictive physiology, indicative of decreased left ventricular    diastolic compliance and/or increased left atrial pressure. 2. Right ventricle: The cavity size was normal. Systolic function was    normal. Impressions:  Left ventricle dysfunction is new compared to echocardiogram from 2016. * ---------------------------------------------------------------------------- * Findings: Left ventricle:  The cavity size was normal. Wall thickness was mildly increased. Systolic function was moderately to markedly reduced. The estimated ejection fraction was 30-35%, by biplane method of disks. Regional wall motion abnormalities:   Akinesis of the anteroseptal and inferoseptal walls.  Severe hypokinesis of the  entireinferior wall. Doppler parameters are consistent with restrictive physiology, indicative of decreased left ventricular diastolic compliance and/or increased left atrial pressure. Left atrium:  The atrium was markedly dilated. Right ventricle:  The cavity size was normal. Systolic function was normal. Right atrium:  The atrium was dilated. Mitral valve:  The annulus was mildly calcified. The leaflets were mildly calcified. No evidence for prolapse.  Doppler:  Transvalvular velocity was within the normal range. There was no evidence for stenosis. There was moderate regurgitation. Aortic valve:   The valve was probably trileaflet.    Doppler: Transvalvular velocity was within the normal range. There was no evidence for stenosis. There was mild to moderate regurgitation. Tricuspid valve:  The annulus is normal-sized. The leaflets are normal thickness. No echocardiographic evidence for tricuspid prolapse.  Doppler: Transvalvular velocity was within the normal range. There was no evidence for stenosis. There was mild-moderate regurgitation. Pulmonic valve:   Not well visualized. Pericardium:   There was no pericardial effusion. Pleura:  No evidence of pleural fluid accumulation. Systemic veins:  Central venous respirophasic diameter changes are blunted (< 50%). Inferior vena cava: The IVC was dilated. ---------------------------------------------------------------------------- Measurements  Left ventricle                    Value        Ref  LV end-diastolic volume, 3D       246   ml     ---------  LV end-systolic volume, 3D        163   ml     ---------  LV ejection fraction, 3D      (L) 33    %      54 - 74  LV end-diastolic volume/bsa,  (H) 144   ml/m^2 <=71  3D  LV end-systolic volume/bsa,   (H) 95    ml/m^2 <=28  3D  LV wall mass, 3D                  175   g      ---------  LV wall mass/bsa, 3D              102   g/m^2  ---------  IVS thickness, ED, PLAX       (H) 1.2   cm     0.6 - 0.9  LV ID, ED, PLAX                    5.1   cm     3.8 - 5.2  LV ID, ES, PLAX               (H) 4.6   cm     2.2 - 3.5  LV PW thickness, ED, PLAX     (H) 1.0   cm     0.6 - 0.9  IVS/LV PW ratio, ED, PLAX         1.20         ---------  LV PW/LV ID ratio, ED, PLAX       0.2          ---------  LV ejection fraction          (L) 22    %      54 - 74  Stroke volume/bsa, 2D             25    ml/m^2 ---------  LV end-diastolic volume, 1-p      137   ml     48 - 140  A4C  LV ejection fraction, 1-p A4C (L) 36    %      46 - 78  Stroke volume, 1-p A4C            49    ml     ---------  LV end-diastolic volume/bsa,      80    ml/m^2 30 - 82  1-p A4C  Stroke volume/bsa, 1-p A4C        29    ml/m^2 ---------  LV end-diastolic volume, 2-p  (H) 148   ml     46 - 106  LV end-systolic volume, 2-p   (H) 95    ml     14 - 42  LV ejection fraction, 2-p     (L) 36    %      54 - 74  Stroke volume, 2-p                53    ml     ---------  LV end-diastolic volume/bsa,  (H) 86    ml/m^2 29 - 61  2-p  LV end-systolic volume/bsa,   (H) 55    ml/m^2 8 - 24  2-p  Stroke volume/bsa, 2-p            31    ml/m^2 ---------  LV e', lateral                (L) 7.6   cm/sec >=10.0  LV E/e', lateral              (H) 19           <=13  LV e', medial                 (L) 4.6   cm/sec >=7.0  LV E/e', medial                   32           ---------  LV e', average                    6.1   cm/sec ---------  LV E/e', average              (H) 24           <=14  LVOT                              Value        Ref  LVOT ID                           1.8   cm     ---------  LVOT peak velocity, S             1.09  m/sec  ---------  LVOT VTI, S                       16.6  cm     ---------  LVOT peak gradient, S             5     mm Hg  ---------  LVOT mean gradient, S             3     mm Hg  ---------  Stroke volume (SV), LVOT DP       42    ml     ---------  Stroke index (SV/bsa), LVOT       25    ml/m^2 ---------  DP  Aortic valve                      Value        Ref  Aortic leaflet  separation, MM     1.2   cm     ---------  Aortic pressure half-time         586   ms     ---------  Aortic regurg velocity, ED        3.13  m/sec  ---------  Aortic regurg deceleration        144   cm/s^2 ---------  Aortic regurg pressure            636   ms     ---------  half-time  Aortic regurg gradient, ED        39    mm Hg  ---------  Ascending aorta                   Value        Ref  Ascending aorta ID            (H) 4.7   cm     1.9 - 3.5  Left atrium                       Value        Ref  LA volume, S                  (H) 121   ml     22 - 52  LA volume/bsa, S              (H) 71    ml/m^2 16 - 34  LA volume, ES, 1-p A4C        (H) 116   ml     22 - 52  LA volume, ES, 1-p A2C        (H) 109   ml     22 - 52  LA volume, ES, A/L                123   ml     ---------  LA volume/bsa, ES, A/L        (H) 72    ml/m^2 16 - 34  LA volume, ES, 3D             (H) 102   ml     22 - 52  LA volume/bsa, ES, 3D             60    ml/m^2 ---------  Mitral valve                      Value        Ref  Mitral E-wave peak velocity       1.48  m/sec  ---------  Mitral A-wave peak velocity       0.44  m/sec  ---------  Mitral deceleration time          197   ms     ---------  Mitral peak gradient, D           9     mm Hg  ---------  Mitral E/A ratio, peak            3.4          ---------  Pulmonary artery                  Value        Ref  PA pressure, S, DP                45    mm Hg  ---------  PA pressure, ED, DP               38    mm Hg  ---------  Tricuspid valve                   Value        Ref  Tricuspid regurg peak             2.64  m/sec  <=2.8  velocity  Tricuspid peak RV-RA gradient     30    mm Hg  ---------  Systemic veins                    Value        Ref  Estimated CVP                     15    mm Hg  ---------  Inferior vena cava                Value        Ref  ID                            (H) 2.4   cm     <=2.1  Right ventricle                   Value        Ref  TAPSE, 2D                         2.00   cm     >=1.70  TAPSE, MM                         2.00  cm     >=1.70  RV pressure, S, DP                45    mm Hg  ---------  RV s', lateral                    14.1  cm/sec >=9.5  Pulmonic valve                    Value        Ref  Pulmonic regurg peak velocity     3.4   m/sec  ---------  Pulmonic regurg peak gradient     46    mm Hg  ---------  Pulmonic regurg velocity, ED      2.39  m/sec  ---------  Pulmonic regurg gradient, ED      23    mm Hg  --------- Legend: (L)  and  (H)  santy values outside specified reference range. ---------------------------------------------------------------------------- Prepared and electronically signed by Ray Daniel 01/20/2025 13:29        Exam:     Physical Exam:  General: Alert and oriented x 3. No apparent distress.   HEENT: Normocephalic, anicteric sclera, neck supple, no thyromegaly or adenopathy.  Neck: No JVD, carotids 2+, no bruits.  Cardiac: Irreg irreg  Lungs: Clear without wheezes, rales, rhonchi or dullness.  Normal excursions and effort.  Abdomen: Soft, non-tender. No organosplenomegally, mass or rebound, BS-present.  Extremities: Trace BLE edema.    Neurologic: Alert and oriented, normal affect. No focal defects  Skin: Warm and dry.     Ray Daniel DO  [facility]

## 2025-02-18 NOTE — CONGREGATE LIVING REVIEW
[facility] Living Authorization    The [facility] Review Committee has reviewed this case and the patient IS APPROVED for discharge to a facility for Short Term Skilled once the following procedure is followed:     - The physician discharge instructions (contained within the IEK note for SNF) must inlcude the below appropriate and approved COVID instructions to the facility    For questions regarding CLRC approval process, please contact the CM assigned to the case.  For questions regarding RN discharge workflow, please contact the unit Clinical Leader.

## 2025-02-18 NOTE — CM/SW NOTE
02/17/25 1800   CM/SW Referral Data   Referral Source    Reason for Referral Readmission;Discharge planning   Informant Daughter   Readmission Assessment   Factors that patient feels contributed to this readmission Acute/Chronic Clinical Presentation   Pt's living situation prior to admission? Home alone   Pt's level of independence at discharge? No assist/independent (minimal)   Pt. received education on diagnoses at time of discharge? Yes   Did you know who and how to call someone if you felt worse? Yes   Did any new symptoms or issues develop after you were discharged? Yes   ----Post D/C symptoms: Symptoms/issue related to previous hospitalization Yes   Did you understand your discharge instructions? Yes   Were medications taken as indicated on discharge instructions? Yes   Was full assessment completed by SW/ROMELIA on prior admission? Yes   Was the recommended discharge plan achieved? Yes   Was pt. discharged w/out services? No   Medical Hx   Does patient have an established PCP? Yes  (Ray Goldman)   Patient Info   Patient's Current Mental Status at Time of Assessment Confused or unable to complete assessment   Patient's Home Environment House   Number of Levels in Home 1   Number of Stair in Home 2 steps to enter   Patient lives with Alone   Patient Status Prior to Admission   Independent with ADLs and Mobility No   Pt. requires assistance with Driving;Finances   Services in place prior to admission Home Health Care;DME/Supplies at home   Home Health Provider Info [facility]  (RN, PT, and OT)   Type of DME/Supplies Standard Walker;Wheelchair;Raised Toilet Seat;Grab Bars;Shower Chair   Discharge Needs   Anticipated D/C needs Subacute rehab   Services Requested   Submitted to [facility] Yes   PASRR Level 1 Submitted Yes     Pt discussed during nursing rounds. Dx CHF exacerbation on 4L (1L prior to admission). Home alone, independent w/walker prior to admission. Pt's daughter and granddaughter  live nearby and assist as needed. Pt was set up with [facility] and E for home O2 last admission. JOSE L entered for RN, PT, and OT. Anticipated therapy need: Gradual Rehabilitative Therapy. [facility] review pending. [facility] referrals sent in AIDIN. List of accepting facilities will be needed for choice.     PASRR level 1 screen completed and uploaded to aidin referral.    2/18/2025 at 1720: [facility] approved [facility] [place]. List of accepting facilities emailed to pt's daughter Chaya at [email or web address] to confirm choice for [facility] [place].    1825: Pt';s daughter notified CM that [facility] is choice for [facility] [place], facility reserved in AIDIN. Pt now on 2L O2 (1L is baselineL.    Pt will transfer to CV unit once bed is available.    Plan: [facility] for [facility] pending medical clearance.    / to remain available for support and/or discharge planning.     SANDEE Franklin    [phone]

## 2025-02-19 LAB
ALBUMIN SERPL-MCNC: 3.5 G/DL (ref 3.2–4.8)
ALBUMIN/GLOB SERPL: 1.5 {RATIO} (ref 1–2)
ALP LIVER SERPL-CCNC: 296 U/L
ALT SERPL-CCNC: 301 U/L
ANION GAP SERPL CALC-SCNC: 11 MMOL/L (ref 0–18)
AST SERPL-CCNC: 138 U/L (ref ?–34)
BILIRUB SERPL-MCNC: 1 MG/DL (ref 0.2–1.1)
BILIRUB UR QL: NEGATIVE
BUN BLD-MCNC: 74 MG/DL (ref 9–23)
BUN/CREAT SERPL: 39.6 (ref 10–20)
CALCIUM BLD-MCNC: 8.1 MG/DL (ref 8.7–10.4)
CHLORIDE SERPL-SCNC: 105 MMOL/L (ref 98–112)
CLARITY UR: CLEAR
CO2 SERPL-SCNC: 31 MMOL/L (ref 21–32)
CREAT BLD-MCNC: 1.87 MG/DL
DEPRECATED RDW RBC AUTO: 57.8 FL (ref 35.1–46.3)
EGFRCR SERPLBLD CKD-EPI 2021: 26 ML/MIN/1.73M2 (ref 60–?)
ERYTHROCYTE [DISTWIDTH] IN BLOOD BY AUTOMATED COUNT: 16.8 % (ref 11–15)
GLOBULIN PLAS-MCNC: 2.3 G/DL (ref 2–3.5)
GLUCOSE BLD-MCNC: 103 MG/DL (ref 70–99)
GLUCOSE UR-MCNC: NORMAL MG/DL
HCT VFR BLD AUTO: 38.3 %
HGB BLD-MCNC: 12.6 G/DL
HGB UR QL STRIP.AUTO: NEGATIVE
KETONES UR-MCNC: NEGATIVE MG/DL
LEUKOCYTE ESTERASE UR QL STRIP.AUTO: 25
MAGNESIUM SERPL-MCNC: 2.4 MG/DL (ref 1.6–2.6)
MCH RBC QN AUTO: 31.6 PG (ref 26–34)
MCHC RBC AUTO-ENTMCNC: 32.9 G/DL (ref 31–37)
MCV RBC AUTO: 96 FL
NITRITE UR QL STRIP.AUTO: NEGATIVE
OSMOLALITY SERPL CALC.SUM OF ELEC: 326 MOSM/KG (ref 275–295)
PH UR: 5.5 [PH] (ref 5–8)
PLATELET # BLD AUTO: 211 10(3)UL (ref 150–450)
POTASSIUM SERPL-SCNC: 3.4 MMOL/L (ref 3.5–5.1)
POTASSIUM SERPL-SCNC: 3.4 MMOL/L (ref 3.5–5.1)
POTASSIUM SERPL-SCNC: 4.1 MMOL/L (ref 3.5–5.1)
PROT SERPL-MCNC: 5.8 G/DL (ref 5.7–8.2)
PROT UR-MCNC: NEGATIVE MG/DL
RBC # BLD AUTO: 3.99 X10(6)UL
SODIUM SERPL-SCNC: 147 MMOL/L (ref 136–145)
SP GR UR STRIP: 1.01 (ref 1–1.03)
UROBILINOGEN UR STRIP-ACNC: NORMAL
WBC # BLD AUTO: 7.6 X10(3) UL (ref 4–11)

## 2025-02-19 PROCEDURE — 99233 SBSQ HOSP IP/OBS HIGH 50: CPT | Performed by: HOSPITALIST

## 2025-02-19 PROCEDURE — 99233 SBSQ HOSP IP/OBS HIGH 50: CPT | Performed by: INTERNAL MEDICINE

## 2025-02-19 RX ORDER — POTASSIUM CHLORIDE 1500 MG/1
40 TABLET, EXTENDED RELEASE ORAL EVERY 4 HOURS
Status: COMPLETED | OUTPATIENT
Start: 2025-02-19 | End: 2025-02-19

## 2025-02-19 NOTE — PROGRESS NOTES
Pt alert and oriented x3. Pt on 2L. Eating dinner. Fall precautions in place. Family at bedside.

## 2025-02-19 NOTE — PROGRESS NOTES
Progress Note  Shyla Wheeler Patient Status:  Inpatient    1935 MRN [id number]   Location [facility] 3W/SW Attending Nicolle Levine MD   Hosp Day # 3 PCP Ray Goldman MD     Subjective:  Denies cardiac complaints, c/o dry cough    Objective:  /84 (BP Location: Right arm)   Pulse 88   Temp 97.7 °F (36.5 °C) (Oral)   Resp 18   Wt 157 lb 1.6 oz (71.3 kg)   SpO2 98%   BMI 26.14 kg/m²     Telemetry: AF rate controlled 70s-80s, frequent PVCs    Intake/Output:    Intake/Output Summary (Last 24 hours) at 2025 1311  Last data filed at 2025 0909  Gross per 24 hour   Intake 360 ml   Output 2850 ml   Net -2490 ml     Last 3 Weights   25 0500 157 lb 1.6 oz (71.3 kg)   25 0400 158 lb 11.7 oz (72 kg)   25 0009 158 lb 4.6 oz (71.8 kg)   25 1757 162 lb (73.5 kg)   25 0421 169 lb 12.8 oz (77 kg)   25 0505 171 lb (77.6 kg)   25 0658 169 lb (76.7 kg)   25 0416 169 lb 6.4 oz (76.8 kg)   25 0530 171 lb 9.6 oz (77.8 kg)   25 0514 170 lb 3.2 oz (77.2 kg)   25 0500 170 lb 8 oz (77.3 kg)   25 0400 171 lb 9.6 oz (77.8 kg)   25 2154 168 lb 6.4 oz (76.4 kg)   25 1740 162 lb (73.5 kg)   25 0313 167 lb 8 oz (76 kg)   25 0501 163 lb 6.4 oz (74.1 kg)   25 1457 156 lb 6.4 oz (70.9 kg)   25 1451 156 lb 6.4 oz (70.9 kg)   25 0956 161 lb 9.6 oz (73.3 kg)   25 0929 162 lb (73.5 kg)     Labs:  Recent Labs   Lab 25  0638 25  0759 25  0746   GLU 86 97 103*   BUN 75* 81* 74*   CREATSERUM 2.25* 2.43* 1.87*   EGFRCR 20* 19* 26*   CA 8.7 8.1* 8.1*    144 147*   K 4.2 3.6 3.4*  3.4*    103 105   CO2 24.0 28.0 31.0     Recent Labs   Lab 25  1822 25  0839 25  0746   RBC 4.14 4.27 3.99   HGB 12.7 13.0 12.6   HCT 40.3 41.9 38.3   MCV 97.3 98.1 96.0   MCH 30.7 30.4 31.6   MCHC 31.5 31.0 32.9   RDW 17.2* 17.6* 16.8*   NEPRELIM 6.96 7.40  --    WBC 8.2  8.8 7.6   .0 192.0 211.0     Recent Labs   Lab 02/16/25  2351 02/17/25  0638   TROPHS 47* 42*     Lab Results   Component Value Date/Time    HDL 39 (L) 02/16/2025 11:51 PM    LDL 56 02/16/2025 11:51 PM    TRIG 64 02/16/2025 11:51 PM     No results found for: \"DDIMER\"  Lab Results   Component Value Date    TSH 4.740 02/17/2025     Review of Systems:   Constitutional: No fevers, chills, fatigue or night sweats.  Respiratory: Denies cough, wheezing or shortness of breath.  CV: Denies chest pain, palpitations, orthopnea, PND or dizziness.  GI: No nausea, vomiting or diarrhea. No blood in stools.    Physical Exam:   General: Alert, cooperative, no distress, appears stated age.  Neck: + JVD.  Lungs: Clear to auscultation bilaterally.  Chest wall: No tenderness or deformity.  Heart: Irregularly irregular rate and rhythm, S1, S2 normal, no murmur, click, rub or gallop.  Abdomen: Soft, non-tender. Bowel sounds normal. No masses,  No organomegaly.  Extremities: Extremities normal, atraumatic, no cyanosis, mild BLE edema.  Pulses: 2+ and symmetric all extremities.  Neurologic: Grossly intact.    Medications:   potassium chloride  40 mEq Oral Q4H    rivaroxaban  15 mg Oral Daily with lunch    latanoprost  1 drop Ophthalmic Nightly    metoprolol succinate ER  150 mg Oral BID    furosemide  40 mg Intravenous BID (Diuretic)     Assessment:    89 year old female with PMH persistent atrial fibrillation, HFrEF, HTN, chronic LBBB, ascending aortic aneurysm who presented with shortness of breath.     Acute on chronic HFrEF  Volume overloaded on admission with shortness of breath  -possibly 2/2 ischemic cardiomyopathy with known hx aortic aneurysm and WMA on echo but pt refused C due to goals of care  -LVEF 30-35% on echo last admission with severe hypokinesis of entire inferior wall, akinesis of anteroseptal and inferoseptal walls  -proBNP 43,936 on admission, CXR revealed mild pulmonary interstitial edema and right basilar  pleural effusion   -diuresing with IV lasix BID with good response  -I/Os net neg ~4L, 2.65L UOP past 24 hours, weight down 5#  -Cr trending down with diuresis, nephrology following  -third HF admission in one month, consider cardioMEMS for reduction in admissions  -GDMT: lasix, toprol, not on ACE/ARB/MRA/ARNI due to DEJAH    DEJAH   GFR 26, Cr 1.87 currently  -recent admission with DEJAH, discharge cr 1.68, GFR 29  -nephrology following,holding nephrotoxic meds  -renal US unremarkable last admission, repeat this admission with no evidence of hydronephrosis or hepatobiliary dilatation    Persistent atrial fibrillation   Rates controlled on toprol  -OAC with xarelto  -CHADS-VASc= 6 for age, gender, HTN, HF, aortic aneurysm    HTN-controlled    Hypokalemia  K+ 3.4  -replete per protocol    Hypernatremia-na 147    PVCs  K+ low on BMP this morning, replaced per protocol  -recheck K  this afternoon, replete as needed      Plan:  -Continue IV lasix 40mg BID with improving creatinine, possibly transition to PO tomorrow if stable  -monitor strict I/Os, daily weights, daily BMP  -Continue toprol, xarelto  -replete K as needed  -long cardioMEMS discussion with patient and family at bedside, they are willing to consider, education materials provided      Plan of care discussed with patient, RN.        Michelle Sainz, MSN, APN, FNP-BC, CCK  2/19/2025  1:11 PM  [phone] [place]  [phone] [place]      Seen/examined patient independently.  Agree with findings, assessment and plan as outlined above.     Reports feeling better. No further SOB. No CP    In regards to HFrEF exacerbation and associated congestive nephropathy, volume status has improved with IV diuresis along with her renal function. Continue IV diuresis, will need to transition to high potency/dose of diuretic on discharge (consider switching to torsemide). Will need to consider at least RHC prior to discharge and cardioMEMs. Appreciate nephrology consultation.      In regards to AF, rates are controlled. Continue BB and AC for elevated CHADSVASc.    Rest of plan as above.    Ray Daniel, DO

## 2025-02-19 NOTE — PROGRESS NOTES
Progress Note     Shyla Wheeler Patient Status:  Inpatient    1935 MRN [id number]   Location [facility] 2W/SW Attending Marleen Osborn MD   Hosp Day # 3 PCP Ray Goldman MD     Chief Complaint: Acute exacerbation CHF, acute on chronic respiratory failure with hypoxia    Subjective:     S: Patient feeling okay.  Still very tired.  No worsening sob.        No other acute complaints or other nursing concerns or overnight events    Review of Systems:   10 point ROS completed and was negative, except for pertinent positive and negatives stated in subjective.    Objective:   Vital signs:  Temp:  [96.8 °F (36 °C)-98.3 °F (36.8 °C)] 97.7 °F (36.5 °C)  Pulse:  [67-90] 88  Resp:  [18-22] 18  BP: ()/(68-84) 124/84  SpO2:  [97 %-100 %] 98 %    Wt Readings from Last 6 Encounters:   25 157 lb 1.6 oz (71.3 kg)   25 169 lb 12.8 oz (77 kg)   25 167 lb 8 oz (76 kg)   23 171 lb (77.6 kg)   21 173 lb (78.5 kg)   21 170 lb (77.1 kg)         Physical Exam:      Gen: No acute distress  Pulm: Lungs clear, normal respiratory effort  CV: Heart with regular rate and rhythm  Abd: Abdomen soft, nontender, nondistended, bowel sounds present  Neuro: No acute focal deficits  MSK: moves extremities  Skin: Warm and dry  Psych: Normal affect  Ext: +le edema - improving slowly      Results:   Diagnostic Data:      Labs:    Labs Last 24 Hours:   BMP     CBC    Other     Na 147 Cl 105 BUN 74 Glu 103   Hb 12.6   PTT - Procal -   K 3.4; 3.4 CO2 31.0 Cr 1.87   WBC 7.6 >< .0  INR - CRP -   Renal Lytes Endo    Hct 38.3   Trop - D dim -   eGFR - Ca 8.1 POC Gluc  -    LFT   pBNP - Lactic -   eGFR AA - PO4 - A1c -    APk 296 Prot 5.8  LDL -     Mg 2.4 TSH -    T kal 1.0 Alb 3.5        COVID-19 Lab Results    COVID-19  Lab Results   Component Value Date    COVID19 Not Detected 2025    COVID19 Not Detected 2025    COVID19 Not Detected 2025        Pro-Calcitonin  No results for input(s): \"PCT\" in the last 168 hours.    Cardiac  Recent Labs   Lab 02/16/25  2113   PBNP 43,936*       Creatinine Kinase  No results for input(s): \"CK\" in the last 168 hours.    Inflammatory Markers  No results for input(s): \"CRP\", \"PATRIZIA\", \"LDH\", \"DDIMER\" in the last 168 hours.    Imaging: Imaging data reviewed in Epic.    Medications:    potassium chloride  40 mEq Oral Q4H    rivaroxaban  15 mg Oral Daily with lunch    latanoprost  1 drop Ophthalmic Nightly    metoprolol succinate ER  150 mg Oral BID    furosemide  40 mg Intravenous BID (Diuretic)       Assessment & Plan:   ASSESSMENT / PLAN:     Acute on chronic respiratory failure with hypoxia  CHF exacerbation  Was on 4 to 5 L nasal cannula yesterday (was on 1 to 2 L at home since recent discharge on 2/8/2025 for pneumonia) - weaned to 2L this am  -Diurese as renal function permits  -CHF protocol  -Cardiology consult-diuresis per cardiology  -SLP ordered  -Patient stable to transfer out of ICU  -renal consulted due to slight increase in creatinine    Acute kidney injury on chronic kidney disease, possible cardiorenal syndrome?  -Nephrology consulted  -will continue diuresis, Cr improved today     Elevated transaminases, worsening may be due to hepatic congestion from CHF  -RUQ ultrasound reviewed  - no acute process - lft trending down  -Hold statins     HTN  A-fib  Anxiety  -Resume home meds as appropriate  -Continue Xarelto, metoprolol     HLD  -Hold statins given elevated LFTs        Quality:  DVT Prophylaxis: Xarelto  CODE status: full  HILLARY: TBD      Coordinated care with providers and counseling re: treatment plan and workup     Supplementary Documentation:       MDM: high    **Certification      PHYSICIAN Certification of Need for Inpatient Hospitalization - Initial Certification    Patient will require inpatient services that will reasonably be expected to span two midnight's based on the clinical documentation in  H+P.   Based on patients current state of illness, I anticipate that, after discharge, patient will require TBD.

## 2025-02-19 NOTE — CONSULTS
[facility]  part of [facility]    Report of Consultation    Shyla Wheeler Patient Status:  Inpatient    1935 MRN [id number]   Location [facility] 3W/SW Attending Nicolle Levine MD   Hosp Day # 3 PCP Ray Goldman MD     Date of Admission:  2025  Date of Consult:  2025  Reason for Consultation:   Elevated creat  heart failure     History of Present Illness:   Patient is a 89 year old female who was admitted to the hospital for Acute on chronic congestive heart failure, unspecified heart failure type (HCC):  This is third admit for pt  ,  and    she keeps coming in w [facility] and edema   Hx htn enlarged aorta.. baseline creat 1.3-1.5    During this admits has been diuresed. Now comes in again sob  Echo shows reduced EF 30%  severe hypokinesis of inferior wall, akinesis of anteroseptal walls  Also has diastolic issues. No major valvular abnormalities  On xray some chf seen, pro bnp 43k,  was lower in past.  At home taking lasix 20 day, toprol xarelto   was placed on xarelto for recent afib.  When left hospital early feb creat 1.68  now 2.43  LFT's up but better  ast 172    these have risen since .  Acc to famioy edema better  on 2 liters oxygen     Past Medical History  Past Medical History:    Enlarged aorta    Glaucoma    Other and unspecified hyperlipidemia    Right groin pain    Unspecified essential hypertension       Past Surgical History  Past Surgical History:   Procedure Laterality Date    Correct bunion,simple Right 1999    Bunionectomy    Electrocardiogram, complete  2010    Scanned to media tab     Knee surgery      right knee       Family History  Family History   Problem Relation Age of Onset    Heart Attack Father     Heart Disease Father         Coronary Artery Disease    Heart Attack Mother     Hypertension Mother     Breast Cancer Sister 78        2 sisters    Arthritis Sister     Heart Attack Brother     Cancer Brother          pancreatic cancer       Social History  Social History     Socioeconomic History    Marital status:      Spouse name: Not on file    Number of children: Not on file    Years of education: Not on file    Highest education level: Not on file   Occupational History    Not on file   Tobacco Use    Smoking status: Never    Smokeless tobacco: Never   Substance and Sexual Activity    Alcohol use: Yes     Alcohol/week: 0.0 standard drinks of alcohol     Comment: wine once every 2 mos    Drug use: No    Sexual activity: Not on file   Other Topics Concern     Service Not Asked    Blood Transfusions Not Asked    Caffeine Concern No     Comment: decaf coffee daily    Occupational Exposure Not Asked    Hobby Hazards Not Asked    Sleep Concern Not Asked    Stress Concern Not Asked    Weight Concern Not Asked    Special Diet Not Asked    Back Care Not Asked    Exercise Yes     Comment: water aerobics 2-3x week    Bike Helmet Not Asked    Seat Belt Not Asked    Self-Exams Not Asked   Social History Narrative    The patient uses the following assistive device(s):  rolling walker.      The patient does live in a home with stairs. 12 stairs     Social Drivers of Health     Food Insecurity: No Food Insecurity (2/17/2025)    NCSS - Food Insecurity     Worried About Running Out of Food in the Last Year: No     Ran Out of Food in the Last Year: No   Transportation Needs: No Transportation Needs (2/17/2025)    NCSS - Transportation     Lack of Transportation: No   Stress: Not on file   Housing Stability: Not At Risk (2/17/2025)    NCSS - Housing/Utilities     Has Housing: Yes     Worried About Losing Housing: No     Unable to Get Utilities: No          Current Medications:  Current Facility-Administered Medications   Medication Dose Route Frequency    rivaroxaban (Xarelto) tab 15 mg  15 mg Oral Daily with lunch    latanoprost (Xalatan) 0.005 % ophthalmic solution 1 drop  1 drop Ophthalmic Nightly    metoprolol succinate  ER (Toprol XL) 24 hr tab 150 mg  150 mg Oral BID    acetaminophen (Tylenol) tab 650 mg  650 mg Oral Q6H PRN    melatonin tab 3 mg  3 mg Oral Nightly PRN    furosemide (Lasix) 10 mg/mL injection 40 mg  40 mg Intravenous BID (Diuretic)     Medications Prior to Admission   Medication Sig    multivitamin Oral Tab Take 1 tablet by mouth daily.    acetaminophen 325 MG Oral Tab Take 1-2 tablets (325-650 mg total) by mouth every 6 (six) hours as needed for Pain.    furosemide 20 MG Oral Tab Take 2 tablets (40 mg total) by mouth daily. (Patient taking differently: Take 1 tablet (20 mg total) by mouth daily.)    metoprolol succinate  MG Oral Tablet 24 Hr Take 1.5 tablets (150 mg total) by mouth in the morning and 1.5 tablets (150 mg total) before bedtime.    RIVAROXABAN 15 MG Oral Tab Take 1 tablet (15 mg total) by mouth daily with lunch.    pravastatin 40 MG Oral Tab Take 1 tablet (40 mg total) by mouth nightly.    Calcium Carb-Cholecalciferol 600-200 MG-UNIT Oral Tab Take 1 tablet by mouth 2 (two) times daily.    TRAVATAN Z 0.004 % Ophthalmic Solution Place 1 drop into both eyes at bedtime.       Allergies  Allergies[1]      Review of Systems:   Constitutional: feels weak but not too bad  Eyes: negative for irritation, redness and visual disturbance  Ears, nose, mouth, throat, and face: negative for hearing loss and sore throat  Respiratory:a bit sob  Cardiovascular: negative for chest pain, + griffith , 2+ edemaand palpitations  Gastrointestinal: negative for abdominal pain, diarrhea and nausea  Genitourinary:negative for dysuria, frequency and hematuria  Hematologic/lymphatic: negative for bleeding and easy bruising  Musculoskeletal:negative for back pain, bone pain and muscle weakness  Neurological: negative for gait problems, memory problems and seizures  Behavioral/Psych: negative for anxiety and depression  Endocrine: negative for polyuria and weight loss     Physical Exam:   Blood pressure 96/68, pulse 74,  temperature 98.3 °F (36.8 °C), temperature source Oral, resp. rate 20, weight 158 lb 11.7 oz (72 kg), SpO2 98%, not currently breastfeeding.    Intake/Output Summary (Last 24 hours) at 2/19/2025 0122  Last data filed at 2/19/2025 0100  Gross per 24 hour   Intake 320 ml   Output 2400 ml   Net -2080 ml     Wt Readings from Last 3 Encounters:   02/18/25 158 lb 11.7 oz (72 kg)   02/05/25 169 lb 12.8 oz (77 kg)   01/21/25 167 lb 8 oz (76 kg)       General appearance: alert, appears stated age and cooperative  Head: Normocephalic, atraumatic  Eyes: conjunctivae/corneas clear  Throat: lips, mucosa, and tongue normal; teeth and gums normal  Neck:  no JVD, supple, symmetrical  Pulmonary:  clear to auscultation bilaterally  Cardiovascular  irregular thythm  Abdominal: soft, non-tender; non distended, bowel sounds normal   Extremities:2+ edema  Pulses: pedal pulses palpable  Skin: No rashes or lesions  Lymph nodes: Cervical, supraclavicular normal.  Neurologic: Grossly normal  Psychiatric: calm    Results:     Laboratory Data:  Lab Results   Component Value Date    WBC 8.8 02/17/2025    HGB 13.0 02/17/2025    HCT 41.9 02/17/2025    .0 02/17/2025    CREATSERUM 2.43 (H) 02/18/2025    BUN 81 (H) 02/18/2025     02/18/2025    K 3.6 02/18/2025     02/18/2025    CO2 28.0 02/18/2025    GLU 97 02/18/2025    CA 8.1 (L) 02/18/2025    ALB 3.3 02/18/2025    ALKPHO 327 (H) 02/18/2025    BILT 1.0 02/18/2025    TP 5.8 02/18/2025     (H) 02/18/2025     (H) 02/18/2025    PTT 24.4 01/18/2025    INR 1.36 (H) 02/17/2025    T4F 1.1 08/05/2024    TSH 4.740 02/17/2025    MG 2.6 02/18/2025    PHOS 3.2 01/31/2025    B12 562 08/05/2024       Imaging:  @LUIS ALBERTO@    ABDOMEN COMPLETE (CPT=76700)    Result Date: 2/17/2025  CONCLUSION:  1. No sonographic evidence of hydronephrosis.  2. Nonspecific mildly heterogeneous appearance of the hepatic parenchyma with otherwise sonographically unremarkable appearance.  3.  Negative for hepatobiliary dilatation.  4. Lesser incidental findings as above.   elm-remote.    Dictated by (CST): Ricardo Padilla MD on 2/17/2025 at 10:41 AM     Finalized by (CST): Ricardo Padilla MD on 2/17/2025 at 10:45 AM                Impression:     Patient Active Problem List   Diagnosis    Knee pain    Arthritis of knee, degenerative    Hip pain    Ankle arthritis    Lumbar radiculopathy    Lumbar stenosis    ACE inhibitor intolerance    Left bundle branch block    Urinary tract infection without hematuria    Skin lesion of back    Thoracic aortic aneurysm without rupture    Fatigue    Mixed hyperlipidemia    Vitamin D deficiency    Herpes zoster without complication    Acquired hypothyroidism    Routine history and physical examination of adult    Acute bilateral low back pain without sciatica    Nocturnal enuresis    Atrial fibrillation, new onset (HCC)    Acute hypoxemic respiratory failure (HCC)    Acute on chronic congestive heart failure, unspecified heart failure type (HCC)    Acute on chronic heart failure, unspecified heart failure type (HCC)    Acute on chronic hypoxic respiratory failure (HCC)        Recommendations:  1 continueing chf  will need more [id number] diuriesis.. start iv lasix 40 mg bid per cards   Strict intake and output  Maybe trial of intotropes?  Consider entresto  sglt when more stable  Avoid nephrtoxic drugs  Recheck ua   prev renal us unremarkable   Cpm  disucssed w daughter and granddaughter   Diuresis may need to be aggressive.  Elevaed LFT's  likely passive cngestion  liver US unremarkable  Thanks will follow       Thank you for allowing me to participate in the care of your patient.    Tono Calero MD  2/19/2025         [1]   Allergies  Allergen Reactions    Benadryl [Diphenhydramine] SWELLING    Ambien [Zolpidem] ANXIETY    Xanax [Alprazolam] ANXIETY    Ace Inhibitors     Amoxicillin NAUSEA ONLY

## 2025-02-19 NOTE — PROGRESS NOTES
[facility]  part of [facility]    Progress Note    Shyla Wheeler Patient Status:  Inpatient    1935 MRN [id number]   Location [facility] 3W/SW Attending Nicolle Levine MD   Hosp Day # 3 PCP Ray Goldman MD       Subjective:   Shyla Wheeler is a(n) 89 year old female     ROS:     Constitutional feeling better  ENMT:  Negative for ear drainage, hearing loss and nasal drainage  Eyes:  Negative for eye discharge and vision loss  Cardiovascular: Breathing easier on 2 L  Respiratory:  Negative for cough, dyspnea and wheezing  Gastrointestinal:  Negative for abdominal pain, constipation, decreased appetite, diarrhea and vomiting  Genitourinary:  Negative for dysuria and hematuria  Endocrine:  Negative for abnormal sleep patterns, increased activity, polydipsia and polyphagia  Hema/Lymph:  Negative for easy bleeding and easy bruising  Integumentary:  Negative for pruritus and rash  Musculoskeletal:  Negative for bone/joint symptoms  Neurological:  Negative for gait disturbance  Psychiatric:  Negative for inappropriate interaction and psychiatric symptoms      Vitals:    25 1353   BP: 101/85   Pulse: 106   Resp: 20   Temp: 97.6 °F (36.4 °C)           PHYSICAL EXAM:   Constitutional: appears well hydrated alert and responsive no acute distress noted  Head/Face: normocephalic  Eyes/Vision: normal extraocular motion is intact  Nose/Mouth/Throat:mucous membranes are moist and no oral lesions are noted  Neck/Thyroid: neck is supple without adenopathy  Lymphatic: no abnormal cervical, supraclavicular adenopathy is noted  Respiratory:  lungs are clear to auscultation bilaterally, normal respiratory effort  Cardiovascula irregular rhythm  Abdomen: soft, non-tender, non-distended, BS normal  Vascular: well perfused femoral, and pedal pulses normal  Skin/Hair: no unusual rashes present, no abnormal bruising noted  Back/Spine: no abnormalities noted  Musculoskeletal: full ROM all  extremities good strength  no deformities  Extremities trace edema  Neurological:  Grossly normal    Results:     Laboratory Data:  Lab Results   Component Value Date    WBC 7.6 02/19/2025    HGB 12.6 02/19/2025    HCT 38.3 02/19/2025    .0 02/19/2025    CREATSERUM 1.87 (H) 02/19/2025    BUN 74 (H) 02/19/2025     (H) 02/19/2025    K 4.1 02/19/2025     02/19/2025    CO2 31.0 02/19/2025     (H) 02/19/2025    CA 8.1 (L) 02/19/2025    ALB 3.5 02/19/2025    ALKPHO 296 (H) 02/19/2025    BILT 1.0 02/19/2025    TP 5.8 02/19/2025     (H) 02/19/2025     (H) 02/19/2025    PTT 24.4 01/18/2025    INR 1.36 (H) 02/17/2025    T4F 1.1 08/05/2024    TSH 4.740 02/17/2025    MG 2.4 02/19/2025    PHOS 3.2 01/31/2025    B12 562 08/05/2024       Imaging:  [unfilled]   No results found.      ASSESSMENT/PLAN:   Assessment       1 congestive heart failure decreased ejection fraction good urine output    On Lasix 40 IV twice a day tolerating treatment consider using SGLT and Entresto eventually discussed with family daughter and granddaughter overall renal function improving creatinine down to   1.87 will replace potassium LFTs improving  Liver ultrasound looks okay    2/19/2025  Tono Calero MD

## 2025-02-19 NOTE — DIETARY NOTE
Brief Nutrition Note:    Pt admitted for acute on chronic congestive heart failure. Pt screened at no nutrition risk at admission by RN. RD consulted for \"heart failure diet education\". Chart reviewed, pt seen by heart failure . Pt visited, family at bedside. Verbally reviewed basic low sodium diet restriction. Provided with Heart Failure Nutrition Therapy handout to reinforce. Receptive to instruction. May benefit from outpt f/u. Expect fair compliance.     Gogo Negrete MS, ELROY, RDN, LDN  Clinical Dietitian  P: [phone]

## 2025-02-19 NOTE — PLAN OF CARE
Pt is back to baseline home O2 level (2L NC); Pt is desperate for something to drink but unfortunately is under a fluid restriction  Problem: Patient Centered Care  Goal: Patient preferences are identified and integrated in the patient's plan of care  Description: Interventions:  - What would you like us to know as we care for you? I have a Home Health Aide that comes to the house and helps me.  Please give me an extra copy of things for them  - Provide timely, complete, and accurate information to patient/family  - Incorporate patient and family knowledge, values, beliefs, and cultural backgrounds into the planning and delivery of care  - Encourage patient/family to participate in care and decision-making at the level they choose  - Honor patient and family perspectives and choices  Outcome: Progressing     Problem: Patient/Family Goals  Goal: Patient/Family Long Term Goal  Description: Patient's Long Term Goal: Learn ways to prevent CHF exacerbation in the future    Interventions:  - Pay attention when Case Management  gives me things that I need to remember and keep records of  - See additional Care Plan goals for specific interventions  Outcome: Progressing  Goal: Patient/Family Short Term Goal  Description: Patient's Short Term Goal: Get out of the hospital as soon as possible    Interventions:   - Follow the recommendations of my healthcare team  - See additional Care Plan goals for specific interventions  Outcome: Progressing     Problem: CARDIOVASCULAR - ADULT  Goal: Maintains optimal cardiac output and hemodynamic stability  Description: INTERVENTIONS:  - Monitor vital signs, rhythm, and trends  - Monitor for bleeding, hypotension and signs of decreased cardiac output  - Evaluate effectiveness of vasoactive medications to optimize hemodynamic stability  - Monitor arterial and/or venous puncture sites for bleeding and/or hematoma  - Assess quality of pulses, skin color and temperature  - Assess for signs of  decreased coronary artery perfusion - ex. Angina  - Evaluate fluid balance, assess for edema, trend weights  Outcome: Progressing  Goal: Absence of cardiac arrhythmias or at baseline  Description: INTERVENTIONS:  - Continuous cardiac monitoring, monitor vital signs, obtain 12 lead EKG if indicated  - Evaluate effectiveness of antiarrhythmic and heart rate control medications as ordered  - Initiate emergency measures for life threatening arrhythmias  - Monitor electrolytes and administer replacement therapy as ordered  Outcome: Progressing     Problem: RESPIRATORY - ADULT  Goal: Achieves optimal ventilation and oxygenation  Description: INTERVENTIONS:  - Assess for changes in respiratory status  - Assess for changes in mentation and behavior  - Position to facilitate oxygenation and minimize respiratory effort  - Oxygen supplementation based on oxygen saturation or ABGs  - Provide Smoking Cessation handout, if applicable  - Encourage broncho-pulmonary hygiene including cough, deep breathe, Incentive Spirometry  - Assess the need for suctioning and perform as needed  - Assess and instruct to report SOB or any respiratory difficulty  - Respiratory Therapy support as indicated  - Manage/alleviate anxiety  - Monitor for signs/symptoms of CO2 retention  Outcome: Progressing     Problem: METABOLIC/FLUID AND ELECTROLYTES - ADULT  Goal: Electrolytes maintained within normal limits  Description: INTERVENTIONS:  - Monitor labs and rhythm and assess patient for signs and symptoms of electrolyte imbalances  - Administer electrolyte replacement as ordered  - Monitor response to electrolyte replacements, including rhythm and repeat lab results as appropriate  - Fluid restriction as ordered  - Instruct patient on fluid and nutrition restrictions as appropriate  Outcome: Progressing  Goal: Hemodynamic stability and optimal renal function maintained  Description: INTERVENTIONS:  - Monitor labs and assess for signs and symptoms of  volume excess or deficit  - Monitor intake, output and patient weight  - Monitor urine specific gravity, serum osmolarity and serum sodium as indicated or ordered  - Monitor response to interventions for patient's volume status, including labs, urine output, blood pressure (other measures as available)  - Encourage oral intake as appropriate  - Instruct patient on fluid and nutrition restrictions as appropriate  Outcome: Progressing

## 2025-02-19 NOTE — PROGRESS NOTES
Heart Failure Nurse  Progress Note    Patient was seen for Heart Failure Coaching on 1/29/2025  Cardiomems pamphlet and QR code brought in for patient to view. LOR Sainz discussed cardiomems at bedside with the patient. Patient and family will view materials and education.  Patient is adherent to daily weight monitoring?                                      _x__ yes   ___ no    If no, barriers to daily weight monitoring:    Patient is adherent to revieing the Symptom Tracker Worksheet daily?  __x_ yes   ___ no    If no, barriers to reviewing daily:    Patient is following a 2000 mg sodium diet                                             __x_ yes  ___ no    If no, barriers to 2000 mg sodium diet:    Patient is adherent to medication regimen                                              ___x yes  ___ no    If no, barriers to medication regimen:    Patient went to follow-up appointment(s)      __x_ yes  ___ no    If no, barriers to attending appointment:    Instructed patient to let their nurse know if they have any questions or need reeducation regarding heart failure and their nurse can contact the Heart Failure Coaches who will come see them again.      Taina Esquivel RN HF  [phone]    Heart Failure

## 2025-02-19 NOTE — OCCUPATIONAL THERAPY NOTE
OCCUPATIONAL THERAPY TREATMENT NOTE - INPATIENT        Room Number: 344/344-A     Presenting Problem: Acute on chronic CHF    Problem List  Principal Problem:    Acute on chronic congestive heart failure, unspecified heart failure type (HCC)  Active Problems:    Acute on chronic hypoxic respiratory failure (HCC)      OCCUPATIONAL THERAPY ASSESSMENT   Patient demonstrates fair progress this session, goals remain in progress.  Patient demonstrates improved activity tolerance and improved engagement in ADLs and functional tasks during this session, however, pt not attending to cues and requiring maximal redirection for safety and positioning in RW; pt rcvd in bed and discussed with OT urgency to have BM; Pt requiring Mod  A for bed mobility and INCREASED time to problem solve sequencing; sitting at EOB ~5 minutes with SBA with BUE support; stood to RW with Mod A and demo'd lateral weight shift with Mod A ; pt ambulatory to bathroom with Min-Mod A with MAX cues to maintain close contact with RW; pt completed toileting tasks with Max A for hygiene thoroughness; toilet t/f with Mod A; grooming at sink with Min-Mod A for standing balance (increased assist for dynamic/reaching) pt ambulated back to bedside chair with Min A; O2 stable on 2L; HR and BP WNL; pt left up in chair with PCT present;   Patient continues to benefit from continued skilled OT services: to promote return to prior level of function and safety with continuous assistance and gradual rehabilitative therapy.     PLAN DURING HOSPITALIZATION  OT Device Recommendations: TBD  OT Treatment Plan: Balance activities;Energy conservation/work simplification techniques;ADL training;Functional transfer training;Endurance training;Patient/Family education;Patient/Family training;Compensatory technique education     SUBJECTIVE  I need to go now!    OBJECTIVE  Precautions: Cardiac    WEIGHT BEARING RESTRICTION     PAIN ASSESSMENT  Ratin    ACTIVITY TOLERANCE             [id number]             O2 SATURATIONS       ACTIVITIES OF DAILY LIVING ASSESSMENT  AM-PAC ‘6-Clicks’ Inpatient Daily Activity Short Form  How much help from another person does the patient currently need…  -   Putting on and taking off regular lower body clothing?: A Lot  -   Bathing (including washing, rinsing, drying)?: A Lot  -   Toileting, which includes using toilet, bedpan or urinal? : A Lot  -   Putting on and taking off regular upper body clothing?: A Lot  -   Taking care of personal grooming such as brushing teeth?: A Little  -   Eating meals?: A Little    AM-PAC Score:  Score: 14  Approx Degree of Impairment: 59.67%  Standardized Score (AM-PAC Scale): 33.39  CMS Modifier (G-Code): CK      EDUCATION PROVIDED  Patient Education : Role of Occupational Therapy; Plan of Care; Discharge Recommendations; Functional Transfer Techniques; Fall Prevention; Posture/Positioning  Patient's Response to Education: Verbalized Understanding    The patient's Approx Degree of Impairment: 59.67% has been calculated based on documentation in the [facility] '6 clicks' Inpatient Daily Activity Short Form.  Research supports that patients with this level of impairment may benefit from GR.  Final disposition will be made by interdisciplinary medical team.    Patient End of Session: Up in chair    OT Goals:      OT Goals  Patients self stated goal is: to get better     Patient will complete functional transfer with SBA   Comment: Mod A     Patient will complete toileting with SBA   Comment: Max A     Patient will tolerate standing for 3-5 minutes in prep for adls with SBA    Comment:ongoing    Patient will complete item retrieval with SBA   Comment:ongoing          Goals  on: 3/15  Frequency: 3-5x week      OT Session Time: 38 minutes  Self-Care Home Management: 30 minutes  Therapeutic Activity: 8 minutes

## 2025-02-19 NOTE — PLAN OF CARE
Weaned to 2L. Family at bedside updated on plan of care. Fluid restriction reinforced with pt. All safety measures in place. Family updated on room change to 344.    Problem: Patient Centered Care  Goal: Patient preferences are identified and integrated in the patient's plan of care  Description: Interventions:  - What would you like us to know as we care for you? Home alone, my daughter is close by  - Provide timely, complete, and accurate information to patient/family  - Incorporate patient and family knowledge, values, beliefs, and cultural backgrounds into the planning and delivery of care  - Encourage patient/family to participate in care and decision-making at the level they choose  - Honor patient and family perspectives and choices  Outcome: Progressing     Problem: CARDIOVASCULAR - ADULT  Goal: Maintains optimal cardiac output and hemodynamic stability  Description: INTERVENTIONS:  - Monitor vital signs, rhythm, and trends  - Monitor for bleeding, hypotension and signs of decreased cardiac output  - Evaluate effectiveness of vasoactive medications to optimize hemodynamic stability  - Monitor arterial and/or venous puncture sites for bleeding and/or hematoma  - Assess quality of pulses, skin color and temperature  - Assess for signs of decreased coronary artery perfusion - ex. Angina  - Evaluate fluid balance, assess for edema, trend weights  Outcome: Progressing  Goal: Absence of cardiac arrhythmias or at baseline  Description: INTERVENTIONS:  - Continuous cardiac monitoring, monitor vital signs, obtain 12 lead EKG if indicated  - Evaluate effectiveness of antiarrhythmic and heart rate control medications as ordered  - Initiate emergency measures for life threatening arrhythmias  - Monitor electrolytes and administer replacement therapy as ordered  Outcome: Progressing     Problem: RESPIRATORY - ADULT  Goal: Achieves optimal ventilation and oxygenation  Description: INTERVENTIONS:  - Assess for changes in  respiratory status  - Assess for changes in mentation and behavior  - Position to facilitate oxygenation and minimize respiratory effort  - Oxygen supplementation based on oxygen saturation or ABGs  - Provide Smoking Cessation handout, if applicable  - Encourage broncho-pulmonary hygiene including cough, deep breathe, Incentive Spirometry  - Assess the need for suctioning and perform as needed  - Assess and instruct to report SOB or any respiratory difficulty  - Respiratory Therapy support as indicated  - Manage/alleviate anxiety  - Monitor for signs/symptoms of CO2 retention  Outcome: Progressing     Problem: METABOLIC/FLUID AND ELECTROLYTES - ADULT  Goal: Electrolytes maintained within normal limits  Description: INTERVENTIONS:  - Monitor labs and rhythm and assess patient for signs and symptoms of electrolyte imbalances  - Administer electrolyte replacement as ordered  - Monitor response to electrolyte replacements, including rhythm and repeat lab results as appropriate  - Fluid restriction as ordered  - Instruct patient on fluid and nutrition restrictions as appropriate  Outcome: Progressing  Goal: Hemodynamic stability and optimal renal function maintained  Description: INTERVENTIONS:  - Monitor labs and assess for signs and symptoms of volume excess or deficit  - Monitor intake, output and patient weight  - Monitor urine specific gravity, serum osmolarity and serum sodium as indicated or ordered  - Monitor response to interventions for patient's volume status, including labs, urine output, blood pressure (other measures as available)  - Encourage oral intake as appropriate  - Instruct patient on fluid and nutrition restrictions as appropriate  Outcome: Progressing

## 2025-02-19 NOTE — SLP NOTE
SPEECH DAILY NOTE - INPATIENT    ASSESSMENT & PLAN   ASSESSMENT  PPE REQUIRED. THIS THERAPIST WORE GLOVES FOR DURATION OF SESSION. HANDS WASHED UPON ENTRANCE/EXIT.    SLP f/u for ongoing dysphagia tx/meal assessment per recommendations of soft easy to chew/thin liquids per BSE. RN reports pt tolerates diet and medication well with no overt clinical s/s aspiration. Pt denies any swallowing challenges.     Pt positioned upright in bed, alert/cooperative. Pt afebrile, tolerating 2L/Min O2NC with oxygen status 98% prior to the start of oral trials. SLP reviewed aspiration precautions and safe swallowing compensatory strategies with the patient. Safe swallow guidelines remain written on the white board in purple. Patient v/u. Provided mod assistance, pt tolerates hard solids and thin liquids via straw with no overt clinical signs/symptoms of aspiration. Oxygen status remained stable t/o the entire session. Recommend remain on current diet with adherence to aspiration precautions and swallow strategies.    SLP to f/u with meal assessment x1-2, monitor  CXR, and VFSS if any overt CSA and/or decline in CXR. RN alerted with results and recommendations.     MOST RECENT CXR 2/16  CONCLUSION:   1. CHF/fluid overload.   2. Large left retrocardiac hiatal hernia.         Diet Recommendations - Solids: Soft/ Easy to chew  Diet Recommendations - Liquids: Thin Liquids    Compensatory Strategies Recommended: Alternate consistencies  Aspiration Precautions: Upright position;Slow rate;Small bites;Small sips;No straw  Medication Administration Recommendations: Whole in puree    Patient Experiencing Pain: No              Treatment Plan  Treatment Plan/Recommendations: Aspiration precautions    Interdisciplinary Communication: Plan posted at bedside          GOALS  Goal #1 The patient will tolerate easy to chew consistency and thin liquids without overt signs or symptoms of aspiration with 100 % accuracy over 1-2 session(s).  In Progress    Goal #2 The patient/family/caregiver will demonstrate understanding and implementation of aspiration precautions and swallow strategies independently over 2-3 session(s).    In Progress   Goal #3 The patient will tolerate trial upgrade of regular consistency and thin liquids without overt signs or symptoms of aspiration with 100 % accuracy over 2-3 session(s).  In Progress     FOLLOW UP  Follow Up Needed (Documentation Required): Yes  SLP Follow-up Date: 02/20/25  Duration: 1 week    Session: 2    If you have any questions, please contact KIMBERLYN Tello M.S. CCC-SLP  Speech Language Pathologist  Phone Number [phone]

## 2025-02-20 PROBLEM — N18.32 STAGE 3B CHRONIC KIDNEY DISEASE (HCC): Status: ACTIVE | Noted: 2025-02-20

## 2025-02-20 PROBLEM — N18.32 STAGE 3B CHRONIC KIDNEY DISEASE (HCC): Status: ACTIVE | Noted: 2025-01-01

## 2025-02-20 LAB
ANION GAP SERPL CALC-SCNC: 9 MMOL/L (ref 0–18)
BUN BLD-MCNC: 63 MG/DL (ref 9–23)
BUN/CREAT SERPL: 36.4 (ref 10–20)
CALCIUM BLD-MCNC: 8.1 MG/DL (ref 8.7–10.4)
CHLORIDE SERPL-SCNC: 102 MMOL/L (ref 98–112)
CO2 SERPL-SCNC: 36 MMOL/L (ref 21–32)
CREAT BLD-MCNC: 1.73 MG/DL
EGFRCR SERPLBLD CKD-EPI 2021: 28 ML/MIN/1.73M2 (ref 60–?)
GLUCOSE BLD-MCNC: 95 MG/DL (ref 70–99)
MAGNESIUM SERPL-MCNC: 2.3 MG/DL (ref 1.6–2.6)
OSMOLALITY SERPL CALC.SUM OF ELEC: 322 MOSM/KG (ref 275–295)
POTASSIUM SERPL-SCNC: 3.9 MMOL/L (ref 3.5–5.1)
SODIUM SERPL-SCNC: 147 MMOL/L (ref 136–145)

## 2025-02-20 PROCEDURE — 99233 SBSQ HOSP IP/OBS HIGH 50: CPT | Performed by: INTERNAL MEDICINE

## 2025-02-20 RX ORDER — TORSEMIDE 20 MG/1
40 TABLET ORAL DAILY
Status: DISCONTINUED | OUTPATIENT
Start: 2025-02-21 | End: 2025-02-23

## 2025-02-20 RX ORDER — FUROSEMIDE 40 MG/1
40 TABLET ORAL
Status: DISCONTINUED | OUTPATIENT
Start: 2025-02-20 | End: 2025-02-20

## 2025-02-20 NOTE — SLP NOTE
SPEECH DAILY NOTE - INPATIENT    ASSESSMENT & PLAN   ASSESSMENT    Proper PPE worn. Hands sanitized upon entrance/exit Pt room.       Pt alert, afebrile and on 2L/min 02 via NC. Pt seen for swallow analysis per ongoing swallow recommendations recommendations (after consulting with RN). Pt agreed to participate. Pt's preferred method of learning verbal.  Pt upright in bed; observed with current diet of soft ETC (Pt's preferred consistency)/thin liquids for monitoring diet tolerance. Swallowing precautions/strategies discussed; .Pt able to self cue for execution. Functional bite reflex/mastication/lingual skills on soft ETC. No significant oral retention. Pharyngeal response appeared to trigger within 1-2 sec per hyolaryngeal elevation to completion (functional rise/strength per palpation). No overt CSA on soft ETC nor thin liquids. Collaborated with RN regarding Pt's swallowing plan of care. Per RN, Pt with good tolerance of current diet. No report of difficulty taking meds. No new CXR completed. Sp02 ~93% during this session. Call light within Pt's reach upon SLP discharge from room.        CXR 2/16/25:  CONCLUSION:   1. CHF/fluid overload.   2. Large left retrocardiac hiatal hernia.         PLAN: Pt is discharged from skilled swallowing intervention secondary to functional swallowing skills on least restrictive safest diet.       Diet Recommendations - Solids: Soft/ Easy to chew  Diet Recommendations - Liquids: Thin Liquids    Compensatory Strategies Recommended: Alternate consistencies  Aspiration Precautions: Upright position;Slow rate;Small bites;Small sips;No straw  Medication Administration Recommendations: Whole in puree    Patient Experiencing Pain: No  Treatment Plan  Treatment Plan/Recommendations: Aspiration precautions  Interdisciplinary Communication: Discussed with RN  Plan posted at bedside      GOALS  Goal #1 The patient will tolerate easy to chew consistency and thin liquids without overt signs or  symptoms of aspiration with 100 % accuracy over 1-2 session(s).    No CSA on current diet.       GOAL MET     Goal #2 The patient/family/caregiver will demonstrate understanding and implementation of aspiration precautions and swallow strategies independently over 2-3 session(s).    Pt self-cued for use of all swallow precautions.     GOAL MET     Goal #3 The patient will tolerate trial upgrade of regular consistency and thin liquids without overt signs or symptoms of aspiration with 100 % accuracy over 2-3 session(s).    Pt desires to remain on soft ETC food.  BOLD   GOAL Discontinued     FOLLOW UP  Follow Up Needed (Documentation Required): No  SLP Follow-up Date: 02/20/25  Duration: 1 week    Session: 3    If you have any questions, please contact   Erin Black M.S. [facility]/SLP  Speech-Language Pathologist  [facility]  #[phone]

## 2025-02-20 NOTE — PLAN OF CARE
Notified by RN. Patient and family have decided not purse right or left heart cath.     Do Guevara, MSN, FNP-BC, CCK  02/20/25   5:12 PM  [phone] [place]  [phone] [place]

## 2025-02-20 NOTE — PLAN OF CARE
Problem: Patient Centered Care  Goal: Patient preferences are identified and integrated in the patient's plan of care  Description: Interventions:  - What would you like us to know as we care for you? From home alone   - Provide timely, complete, and accurate information to patient/family  - Incorporate patient and family knowledge, values, beliefs, and cultural backgrounds into the planning and delivery of care  - Encourage patient/family to participate in care and decision-making at the level they choose  - Honor patient and family perspectives and choices  Outcome: Progressing     Problem: Patient/Family Goals  Goal: Patient/Family Long Term Goal  Description: Patient's Long Term Goal: to go home     Interventions:  - follow MD orders  - See additional Care Plan goals for specific interventions  Outcome: Progressing  Goal: Patient/Family Short Term Goal  Description: Patient's Short Term Goal: manage sob     Interventions:   - follow MD orders  - See additional Care Plan goals for specific interventions  Outcome: Progressing     Problem: CARDIOVASCULAR - ADULT  Goal: Maintains optimal cardiac output and hemodynamic stability  Description: INTERVENTIONS:  - Monitor vital signs, rhythm, and trends  - Monitor for bleeding, hypotension and signs of decreased cardiac output  - Evaluate effectiveness of vasoactive medications to optimize hemodynamic stability  - Monitor arterial and/or venous puncture sites for bleeding and/or hematoma  - Assess quality of pulses, skin color and temperature  - Assess for signs of decreased coronary artery perfusion - ex. Angina  - Evaluate fluid balance, assess for edema, trend weights  Outcome: Progressing  Goal: Absence of cardiac arrhythmias or at baseline  Description: INTERVENTIONS:  - Continuous cardiac monitoring, monitor vital signs, obtain 12 lead EKG if indicated  - Evaluate effectiveness of antiarrhythmic and heart rate control medications as ordered  - Initiate emergency  measures for life threatening arrhythmias  - Monitor electrolytes and administer replacement therapy as ordered  Outcome: Progressing     Problem: RESPIRATORY - ADULT  Goal: Achieves optimal ventilation and oxygenation  Description: INTERVENTIONS:  - Assess for changes in respiratory status  - Assess for changes in mentation and behavior  - Position to facilitate oxygenation and minimize respiratory effort  - Oxygen supplementation based on oxygen saturation or ABGs  - Provide Smoking Cessation handout, if applicable  - Encourage broncho-pulmonary hygiene including cough, deep breathe, Incentive Spirometry  - Assess the need for suctioning and perform as needed  - Assess and instruct to report SOB or any respiratory difficulty  - Respiratory Therapy support as indicated  - Manage/alleviate anxiety  - Monitor for signs/symptoms of CO2 retention  Outcome: Progressing     Problem: METABOLIC/FLUID AND ELECTROLYTES - ADULT  Goal: Electrolytes maintained within normal limits  Description: INTERVENTIONS:  - Monitor labs and rhythm and assess patient for signs and symptoms of electrolyte imbalances  - Administer electrolyte replacement as ordered  - Monitor response to electrolyte replacements, including rhythm and repeat lab results as appropriate  - Fluid restriction as ordered  - Instruct patient on fluid and nutrition restrictions as appropriate  Outcome: Progressing  Goal: Hemodynamic stability and optimal renal function maintained  Description: INTERVENTIONS:  - Monitor labs and assess for signs and symptoms of volume excess or deficit  - Monitor intake, output and patient weight  - Monitor urine specific gravity, serum osmolarity and serum sodium as indicated or ordered  - Monitor response to interventions for patient's volume status, including labs, urine output, blood pressure (other measures as available)  - Encourage oral intake as appropriate  - Instruct patient on fluid and nutrition restrictions as  appropriate  Outcome: Progressing

## 2025-02-20 NOTE — PROGRESS NOTES
Progress Note  Shyla Wheeler Patient Status:  Inpatient    1935 MRN [id number]   Location [facility] 3W/SW Attending Nicolle Levine MD   Hosp Day # 4 PCP Ray Goldman MD     Subjective:  Denies cardiac complaints, c/o dry cough, denies sob, still on 3L O2    Objective:  /84 (BP Location: Right arm)   Pulse 86   Temp 97.8 °F (36.6 °C) (Oral)   Resp 18   Wt 157 lb 9.6 oz (71.5 kg)   SpO2 93%   BMI 26.23 kg/m²     Telemetry: AF rate controlled 70s-80s, frequent PVCs, brief runs NSVT    Intake/Output:    Intake/Output Summary (Last 24 hours) at 2025 0751  Last data filed at 2025 0211  Gross per 24 hour   Intake 480 ml   Output 1350 ml   Net -870 ml     Last 3 Weights   25 0504 157 lb 9.6 oz (71.5 kg)   25 0500 157 lb 1.6 oz (71.3 kg)   25 0400 158 lb 11.7 oz (72 kg)   25 0009 158 lb 4.6 oz (71.8 kg)   25 1757 162 lb (73.5 kg)   25 0421 169 lb 12.8 oz (77 kg)   25 0505 171 lb (77.6 kg)   25 0658 169 lb (76.7 kg)   25 0416 169 lb 6.4 oz (76.8 kg)   25 0530 171 lb 9.6 oz (77.8 kg)   25 0514 170 lb 3.2 oz (77.2 kg)   25 0500 170 lb 8 oz (77.3 kg)   25 0400 171 lb 9.6 oz (77.8 kg)   25 2154 168 lb 6.4 oz (76.4 kg)   25 1740 162 lb (73.5 kg)   25 0313 167 lb 8 oz (76 kg)   25 0501 163 lb 6.4 oz (74.1 kg)   25 1457 156 lb 6.4 oz (70.9 kg)   25 1451 156 lb 6.4 oz (70.9 kg)   25 0956 161 lb 9.6 oz (73.3 kg)   25 0929 162 lb (73.5 kg)     Labs:  Recent Labs   Lab 25  0638 25  0759 25  0746 25  1446   GLU 86 97 103*  --    BUN 75* 81* 74*  --    CREATSERUM 2.25* 2.43* 1.87*  --    EGFRCR 20* 19* 26*  --    CA 8.7 8.1* 8.1*  --     144 147*  --    K 4.2 3.6 3.4*  3.4* 4.1    103 105  --    CO2 24.0 28.0 31.0  --      Recent Labs   Lab 25  1822 25  0839 25  0746   RBC 4.14 4.27 3.99   HGB 12.7 13.0 12.6    HCT 40.3 41.9 38.3   MCV 97.3 98.1 96.0   MCH 30.7 30.4 31.6   MCHC 31.5 31.0 32.9   RDW 17.2* 17.6* 16.8*   NEPRELIM 6.96 7.40  --    WBC 8.2 8.8 7.6   .0 192.0 211.0     Recent Labs   Lab 02/16/25  2351 02/17/25  0638   TROPHS 47* 42*     Lab Results   Component Value Date/Time    HDL 39 (L) 02/16/2025 11:51 PM    LDL 56 02/16/2025 11:51 PM    TRIG 64 02/16/2025 11:51 PM     No results found for: \"DDIMER\"  Lab Results   Component Value Date    TSH 4.740 02/17/2025     Review of Systems:   Constitutional: No fevers, chills, fatigue or night sweats.  Respiratory: Denies cough, wheezing or shortness of breath.  CV: Denies chest pain, palpitations, orthopnea, PND or dizziness.  GI: No nausea, vomiting or diarrhea. No blood in stools.    Physical Exam:   General: Alert, cooperative, no distress, appears stated age.  Neck: no JVD.  Lungs: Clear to auscultation bilaterally.  Chest wall: No tenderness or deformity.  Heart: Irregularly irregular rate and rhythm, S1, S2 normal, no murmur, click, rub or gallop.  Abdomen: Soft, non-tender. Bowel sounds normal. No masses,  No organomegaly.  Extremities: Extremities normal, atraumatic, no cyanosis, trace BLE edema.  Pulses: 2+ and symmetric all extremities.  Neurologic: Grossly intact.    Medications:   rivaroxaban  15 mg Oral Daily with lunch    latanoprost  1 drop Ophthalmic Nightly    metoprolol succinate ER  150 mg Oral BID    furosemide  40 mg Intravenous BID (Diuretic)     Assessment:    89 year old female with PMH persistent atrial fibrillation, HFrEF, HTN, chronic LBBB, ascending aortic aneurysm who presented with shortness of breath.     Acute on chronic HFrEF  Volume overloaded on admission with shortness of breath  -possibly 2/2 ischemic cardiomyopathy with known hx aortic aneurysm and WMA on echo but pt refused LHC due to goals of care  -LVEF 30-35% on echo last admission with severe hypokinesis of entire inferior wall, akinesis of anteroseptal and  inferoseptal walls  -proBNP 43,936 on admission, CXR revealed mild pulmonary interstitial edema and right basilar pleural effusion   -diuresing with IV lasix BID with good response  -I/Os net neg ~4.8L, 1.35L UOP past 24 hours, weight down 5#  -Cr trending down with diuresis, nephrology following  -third HF admission in one month, should consider cardioMEMS for reduction in admissions and volume control  -GDMT: lasix, toprol, not on ACE/ARB/MRA/ARNI due to DEJAH    DEJAH   GFR 26, Cr 1.73 currently  -recent admission with DEJAH, discharge cr 1.68, GFR 29  -nephrology following,holding nephrotoxic meds  -renal US unremarkable last admission, repeat this admission with no evidence of hydronephrosis or hepatobiliary dilatation    Persistent atrial fibrillation   Rates controlled on toprol  -OAC with xarelto  -CHADS-VASc= 6 for age, gender, HTN, HF, aortic aneurysm    HTN-controlled    Hypokalemia  improved  -replete per protocol    Hypernatremia-na 147    PVCs  NSVT  K+ low on BMP this morning, replaced per protocol  -recheck K+ this afternoon, replete as needed  -EF 30-35%, possibly 2/2 ischemic, discussed right and left heart cath, she will consider      Plan:  -Transition to PO lasix 40mg BID today  -monitor strict I/Os, daily weights, daily BMP  -Continue toprol, xarelto  -replete K as needed  -long cardioMEMS discussion with patient and family at bedside, they are willing to consider, education materials provided  -discussed right and left heart cath, she states she would like to speak to her daughter about it more      Plan of care discussed with patient, RN.        Michelle Sainz, MSN, APN, FNP-BC, CCK  02/20/25  7:52 AM  [phone] [place]  [phone] [place]      ===============================================    Seen/examined patient independently.  Agree with findings, assessment and plan as outlined above, with the following additions/revisions:    Pt reports feeling at baseline.   Discussed both ischemic  evaluation with LHC and RHC for hemodynamic assessment; patient and family have decided not to pursue either.     In regards to chronic HFrEF, will transition to PO diuretics, trial PO torsemide 40mg daily. Check BNP/proBNP tomorrow to establish baseline, especially as patient has refused RHC for better invasive hemodynamic assessment. In regards to etiology of rEF, pt has refused LHC at this time as well, although understandable given elevated risk for JES.     Rest of plan is as above.     I have personally performed the medical decision making in its entirety.     Ray Daniel, DO

## 2025-02-20 NOTE — PLAN OF CARE
Plan: IV lasix, ADELE Carpenter Trace      Problem: Patient Centered Care  Goal: Patient preferences are identified and integrated in the patient's plan of care  Description: Interventions:  - What would you like us to know as we care for you?   - Provide timely, complete, and accurate information to patient/family  - Incorporate patient and family knowledge, values, beliefs, and cultural backgrounds into the planning and delivery of care  - Encourage patient/family to participate in care and decision-making at the level they choose  - Honor patient and family perspectives and choices  Outcome: Progressing     Problem: Patient/Family Goals  Goal: Patient/Family Long Term Goal  Description: Patient's Long Term Goal:     Interventions:  -   - See additional Care Plan goals for specific interventions  Outcome: Progressing  Goal: Patient/Family Short Term Goal  Description: Patient's Short Term Goal:     Interventions:   -   - See additional Care Plan goals for specific interventions  Outcome: Progressing     Problem: CARDIOVASCULAR - ADULT  Goal: Maintains optimal cardiac output and hemodynamic stability  Description: INTERVENTIONS:  - Monitor vital signs, rhythm, and trends  - Monitor for bleeding, hypotension and signs of decreased cardiac output  - Evaluate effectiveness of vasoactive medications to optimize hemodynamic stability  - Monitor arterial and/or venous puncture sites for bleeding and/or hematoma  - Assess quality of pulses, skin color and temperature  - Assess for signs of decreased coronary artery perfusion - ex. Angina  - Evaluate fluid balance, assess for edema, trend weights  Outcome: Progressing  Goal: Absence of cardiac arrhythmias or at baseline  Description: INTERVENTIONS:  - Continuous cardiac monitoring, monitor vital signs, obtain 12 lead EKG if indicated  - Evaluate effectiveness of antiarrhythmic and heart rate control medications as ordered  - Initiate emergency measures for life threatening  arrhythmias  - Monitor electrolytes and administer replacement therapy as ordered  Outcome: Progressing     Problem: RESPIRATORY - ADULT  Goal: Achieves optimal ventilation and oxygenation  Description: INTERVENTIONS:  - Assess for changes in respiratory status  - Assess for changes in mentation and behavior  - Position to facilitate oxygenation and minimize respiratory effort  - Oxygen supplementation based on oxygen saturation or ABGs  - Provide Smoking Cessation handout, if applicable  - Encourage broncho-pulmonary hygiene including cough, deep breathe, Incentive Spirometry  - Assess the need for suctioning and perform as needed  - Assess and instruct to report SOB or any respiratory difficulty  - Respiratory Therapy support as indicated  - Manage/alleviate anxiety  - Monitor for signs/symptoms of CO2 retention  Outcome: Progressing     Problem: METABOLIC/FLUID AND ELECTROLYTES - ADULT  Goal: Electrolytes maintained within normal limits  Description: INTERVENTIONS:  - Monitor labs and rhythm and assess patient for signs and symptoms of electrolyte imbalances  - Administer electrolyte replacement as ordered  - Monitor response to electrolyte replacements, including rhythm and repeat lab results as appropriate  - Fluid restriction as ordered  - Instruct patient on fluid and nutrition restrictions as appropriate  Outcome: Progressing  Goal: Hemodynamic stability and optimal renal function maintained  Description: INTERVENTIONS:  - Monitor labs and assess for signs and symptoms of volume excess or deficit  - Monitor intake, output and patient weight  - Monitor urine specific gravity, serum osmolarity and serum sodium as indicated or ordered  - Monitor response to interventions for patient's volume status, including labs, urine output, blood pressure (other measures as available)  - Encourage oral intake as appropriate  - Instruct patient on fluid and nutrition restrictions as appropriate  Outcome: Progressing

## 2025-02-20 NOTE — PHYSICAL THERAPY NOTE
PHYSICAL THERAPY TREATMENT NOTE - INPATIENT     Room Number: 344/344-A       Presenting Problem: CHF exacerbation, acute on chronic hypoxic respiratory failure  Co-Morbidities : Afib, respiratory failure, CHF, HTN, enlarged aorta, R knee surgery    Problem List  Principal Problem:    Acute on chronic congestive heart failure, unspecified heart failure type (HCC)  Active Problems:    Acute on chronic hypoxic respiratory failure (HCC)      PHYSICAL THERAPY ASSESSMENT   Patient demonstrates good  progress this session as pt is able to increase ambulation distance, goals  remain in progress.      Patient is requiring minimal assist and moderate assist as a result of the following impairments: decreased functional strength, decreased endurance/aerobic capacity, pain, impaired sitting and standing balance, decreased muscular endurance, medical status, and increased O2 needs from baseline.     Patient continues to function below baseline with bed mobility, transfers, gait, and standing prolonged periods.  Next session anticipate patient to progress transfers and gait.  Physical Therapy will continue to follow patient for duration of hospitalization.    Patient continues to benefit from continued skilled PT services: to promote return to prior level of function and safety with continuous assistance and gradual rehabilitative therapy .    PLAN DURING HOSPITALIZATION  Nursing Mobility Recommendation : 1 Assist  PT Device Recommendation: Rolling walker;Gait belt  PT Treatment Plan: Bed mobility;Body mechanics;Endurance;Energy conservation;Patient education;Family education;Gait training;Range of motion;Transfer training;Balance training;Stoop training;Strengthening  Frequency (Obs): 3-5x/week     SUBJECTIVE  After brushing teeth, \"It's refreshing!\"    OBJECTIVE  Precautions: Cardiac    WEIGHT BEARING RESTRICTION       PAIN ASSESSMENT   Ratin  Location: denies       BALANCE  Static Sitting: Fair  Dynamic Sitting: Fair  -  Static Standing: Poor +  Dynamic Standing: Poor +    ACTIVITY TOLERANCE  Pt on 3L supplemental O2 throughout  Denie SOB    AM-PAC '6-Clicks' INPATIENT SHORT FORM - BASIC MOBILITY  How much difficulty does the patient currently have...  Patient Difficulty: Turning over in bed (including adjusting bedclothes, sheets and blankets)?: A Lot   Patient Difficulty: Sitting down on and standing up from a chair with arms (e.g., wheelchair, bedside commode, etc.): A Lot   Patient Difficulty: Moving from lying on back to sitting on the side of the bed?: A Lot   How much help from another person does the patient currently need...   Help from Another: Moving to and from a bed to a chair (including a wheelchair)?: A Little   Help from Another: Need to walk in hospital room?: A Little   Help from Another: Climbing 3-5 steps with a railing?: Total     AM-PAC Score:  Raw Score: 13   Approx Degree of Impairment: 64.91%   Standardized Score (AM-PAC Scale): 36.74   CMS Modifier (G-Code): CL    FUNCTIONAL ABILITY STATUS  Functional Mobility/Gait Assessment  Gait Assistance: Moderate assistance  Distance (ft): 30,30  Assistive Device: Rolling walker  Pattern: Shuffle  Rolling:  not tested  Supine to Sit:  not tested  Sit to Supine:  not tested  Sit to Stand: moderate assist    Skilled Therapy Provided: Pt approached for therapy sitting in chair.  Gait belt applied.  Pt completed sit>stand to RW with Min A.  Pt ambulated 30 feet and then completed stand>sit to shower chair.  Pt completed hygiene care (washing face, brushing teeth, rinsing mouth) while seated, reaching outside base of support for sink and products with SBA.  Pt completed sit>stand from shower chair with Mod A and verbal, manual cues for hand placement and sequencing.  Pt ambulates with increased trunk flexion and requires verbal and manual cues to maintain body within RW base of support.  Pt ambulates with significantly reduced gait speed.    The patient's Approx Degree  of Impairment: 64.91% has been calculated based on documentation in the [facility] '6 clicks' Inpatient Daily Activity Short Form.  Research supports that patients with this level of impairment may benefit from inpatient rehabilitaiton.  Final disposition will be made by interdisciplinary medical team.    THERAPEUTIC EXERCISES  Lower Extremity Alternating marching  Ankle pumps  Hip AB/AD  LAQ  BLE x10 reps   Position Sitting       Patient End of Session: Up in chair;Needs met;Call light within reach;RN aware of session/findings;All patient questions and concerns addressed    CURRENT GOALS   Goals to be met by: 3/17/25  Patient Goal Patient's self-stated goal is: return home safely   Goal #1 Patient is able to demonstrate supine - sit EOB @ level: minimum assistance      Goal #1   Current Status  In progress/ongoing   Goal #2 Patient is able to demonstrate transfers EOB to/from BSC at assistance level: minimum assistance with walker - rolling      Goal #2  Current Status  In progress/ongoing   Goal #3 Patient is able to ambulate 30 feet with assist device: walker - rolling at assistance level: minimum assistance   Goal #3   Current Status  MET 2/20  Updated: 60 feet with RW Min A   Goal #4 Patient is able to demonstrate transfers sit to/from stand at assistance level: Albert with rolling walker       Goal #4   Current Status  In progress/ongoing   Goal #5 Patient to demonstrate independence with home activity/exercise instructions provided to patient in preparation for discharge.   Goal #5   Current Status  In progress/ongoing   Goal #6     Goal #6  Current Status       Gait Training: 15 minutes  Therapeutic Activity: 15 minutes  Therapeutic Exercise: 10 minutes

## 2025-02-21 LAB
ANION GAP SERPL CALC-SCNC: 9 MMOL/L (ref 0–18)
BNP SERPL-MCNC: 2254 PG/ML (ref ?–100)
BUN BLD-MCNC: 58 MG/DL (ref 9–23)
BUN/CREAT SERPL: 37.9 (ref 10–20)
CALCIUM BLD-MCNC: 8.3 MG/DL (ref 8.7–10.4)
CHLORIDE SERPL-SCNC: 102 MMOL/L (ref 98–112)
CO2 SERPL-SCNC: 32 MMOL/L (ref 21–32)
CREAT BLD-MCNC: 1.53 MG/DL
EGFRCR SERPLBLD CKD-EPI 2021: 33 ML/MIN/1.73M2 (ref 60–?)
GLUCOSE BLD-MCNC: 130 MG/DL (ref 70–99)
NT-PROBNP SERPL-MCNC: ABNORMAL PG/ML (ref ?–450)
OSMOLALITY SERPL CALC.SUM OF ELEC: 314 MOSM/KG (ref 275–295)
POTASSIUM SERPL-SCNC: 3.6 MMOL/L (ref 3.5–5.1)
SODIUM SERPL-SCNC: 143 MMOL/L (ref 136–145)

## 2025-02-21 PROCEDURE — 99233 SBSQ HOSP IP/OBS HIGH 50: CPT | Performed by: INTERNAL MEDICINE

## 2025-02-21 RX ORDER — POTASSIUM CHLORIDE 1500 MG/1
40 TABLET, EXTENDED RELEASE ORAL EVERY 4 HOURS
Status: COMPLETED | OUTPATIENT
Start: 2025-02-21 | End: 2025-02-21

## 2025-02-21 NOTE — PROGRESS NOTES
Progress Note  Shyla Wheeler Patient Status:  Inpatient    1935 MRN [id number]   Location [facility] 3W/SW Attending Nicolle Levine MD   Hosp Day # 5 PCP Ray Goldman MD     Subjective:  Denies cardiac complaints, c/o dry cough, denies sob, still on 2L O2, refusing RLHC    Objective:  /69 (BP Location: Right arm)   Pulse 90   Temp 97.3 °F (36.3 °C) (Oral)   Resp 18   Wt 159 lb 11.2 oz (72.4 kg)   SpO2 93%   BMI 26.58 kg/m²     Telemetry: AF rate controlled 70s-80s, frequent PVCs, brief runs NSVT increasing in frequency    Intake/Output:    Intake/Output Summary (Last 24 hours) at 2025 1111  Last data filed at 2025 1108  Gross per 24 hour   Intake 320 ml   Output 200 ml   Net 120 ml     Last 3 Weights   25 0455 159 lb 11.2 oz (72.4 kg)   25 0504 157 lb 9.6 oz (71.5 kg)   25 0500 157 lb 1.6 oz (71.3 kg)   25 0400 158 lb 11.7 oz (72 kg)   25 0009 158 lb 4.6 oz (71.8 kg)   25 1757 162 lb (73.5 kg)   25 0421 169 lb 12.8 oz (77 kg)   25 0505 171 lb (77.6 kg)   25 0658 169 lb (76.7 kg)   25 0416 169 lb 6.4 oz (76.8 kg)   25 0530 171 lb 9.6 oz (77.8 kg)   25 0514 170 lb 3.2 oz (77.2 kg)   25 0500 170 lb 8 oz (77.3 kg)   25 0400 171 lb 9.6 oz (77.8 kg)   25 2154 168 lb 6.4 oz (76.4 kg)   25 1740 162 lb (73.5 kg)   25 0313 167 lb 8 oz (76 kg)   25 0501 163 lb 6.4 oz (74.1 kg)   25 1457 156 lb 6.4 oz (70.9 kg)   25 1451 156 lb 6.4 oz (70.9 kg)   25 0956 161 lb 9.6 oz (73.3 kg)   25 0929 162 lb (73.5 kg)     Labs:  Recent Labs   Lab 25  0746 25  1446 25  0716 25  0926   *  --  95 130*   BUN 74*  --  63* 58*   CREATSERUM 1.87*  --  1.73* 1.53*   EGFRCR 26*  --  28* 33*   CA 8.1*  --  8.1* 8.3*   *  --  147* 143   K 3.4*  3.4* 4.1 3.9 3.6     --  102 102   CO2 31.0  --  36.0* 32.0     Recent Labs   Lab  02/16/25  1822 02/17/25  0839 02/19/25  0746   RBC 4.14 4.27 3.99   HGB 12.7 13.0 12.6   HCT 40.3 41.9 38.3   MCV 97.3 98.1 96.0   MCH 30.7 30.4 31.6   MCHC 31.5 31.0 32.9   RDW 17.2* 17.6* 16.8*   NEPRELIM 6.96 7.40  --    WBC 8.2 8.8 7.6   .0 192.0 211.0     Recent Labs   Lab 02/16/25  2351 02/17/25  0638   TROPHS 47* 42*     Lab Results   Component Value Date/Time    HDL 39 (L) 02/16/2025 11:51 PM    LDL 56 02/16/2025 11:51 PM    TRIG 64 02/16/2025 11:51 PM     No results found for: \"DDIMER\"  Lab Results   Component Value Date    TSH 4.740 02/17/2025     Review of Systems:   Constitutional: No fevers, chills, fatigue or night sweats.  Respiratory: Denies cough, wheezing or shortness of breath.  CV: Denies chest pain, palpitations, orthopnea, PND or dizziness.  GI: No nausea, vomiting or diarrhea. No blood in stools.    Physical Exam:   General: Alert, cooperative, no distress, appears stated age.  Neck: no JVD.  Lungs: Clear to auscultation bilaterally.  Chest wall: No tenderness or deformity.  Heart: Irregularly irregular rate and rhythm, S1, S2 normal, no murmur, click, rub or gallop.  Abdomen: Soft, non-tender. Bowel sounds normal. No masses,  No organomegaly.  Extremities: Extremities normal, atraumatic, no cyanosis, trace BLE edema.  Pulses: 2+ and symmetric all extremities.  Neurologic: Grossly intact.    Medications:   potassium chloride  40 mEq Oral Q4H    torsemide  40 mg Oral Daily    rivaroxaban  15 mg Oral Daily with lunch    latanoprost  1 drop Ophthalmic Nightly    metoprolol succinate ER  150 mg Oral BID     Assessment:    89 year old female with PMH persistent atrial fibrillation, HFrEF, HTN, chronic LBBB, ascending aortic aneurysm who presented with shortness of breath.     Acute on chronic HFrEF  Volume overloaded on admission with shortness of breath  -possibly 2/2 ischemic cardiomyopathy with known hx aortic aneurysm and WMA on echo but pt refused LHC due to goals of care  -LVEF 30-35%  on echo last admission with severe hypokinesis of entire inferior wall, akinesis of anteroseptal and inferoseptal walls  -proBNP 43,936 on admission, CXR revealed mild pulmonary interstitial edema and right basilar pleural effusion   -diuresing with IV lasix BID with good response  -I/Os net neg ~4.4L, 1.35L UOP past 24 hours, weight up 2#  -Cr trending down with diuresis, nephrology following  -third HF admission in one month, should consider cardioMEMS for reduction in admissions and volume control  -GDMT: lasix, toprol, not on ACE/ARB/MRA/ARNI due to DEJAH    DEJAH   GFR 26, Cr 1.53 currently  -recent admission with DEJAH, discharge cr 1.68, GFR 29  -nephrology following,holding nephrotoxic meds  -renal US unremarkable last admission, repeat this admission with no evidence of hydronephrosis or hepatobiliary dilatation    Persistent atrial fibrillation   Rates controlled on toprol  -OAC with xarelto  -CHADS-VASc= 6 for age, gender, HTN, HF, aortic aneurysm    HTN-controlled    Hypokalemia  improved  -replete per protocol    Hypernatremia-na 147    PVCs  NSVT  NSVT increasing in frequency  -EF 30-35%, possibly 2/2 ischemic, discussed right and left heart cath, pt and family refused  -Keep K>4  -long discussion (>20 mins) at bedside with patient, daughter, and granddaughter regarding the increasing frequency of NSVT, may be related to ischemia but we cannot infer without additional information. They stated they are not against a RLHC, but do not want to proceed with it \"now\" and would prefer she \"gets stronger at rehab\" before doing it. Advised about her increased risk of ventricular arrythmias and SCD with current monitor findings and low EF. Discussed WCD as protection until a plan is determined. They are willing to consider and will let us know about final decision. Also advised that if they decide not to proceed, may need to reconsider her code status with her increased SCD risk. Case and plan discussed with   María      Plan:  -continue torsemide 40mg daily  -monitor strict I/Os, daily weights, daily BMP  -Continue toprol, xarelto  -long cardioMEMS discussion with patient and family at bedside, educational information given  -pt and family refusing right and left heart cath at this time, risks of sustained ventricular arrythmias, recurrent heart failure exacerbation, and possible need to readdress code status if they do not plan to pursue further workup or treatment, discussed with Dr. Daniel, may consider nuclear stress test       Plan of care discussed with patient and family at bedside, RN, Dr. Daniel.        Michelle Sianz, MSN, APN, FNP-BC, CCK  02/21/25  11:15 AM  [phone] [place]  [phone] [place]      ===============================================    Seen/examined patient independently.  Agree with findings, assessment and plan as outlined above.     Had discussion with patient and family, they would like a conservative approach that avoids invasive procedures.  Telemetry with increased ventricular ectopy including NSVT  Patient reports fatigue, however improved breathing.  Ongoing O2 requirements however.    In regards to chronic HFrEF and unclear cardiomyopathy, patient is improving and has been transitioned to oral diuretics with torsemide 40 mg daily.  There has been significant improvement in congestive nephropathy with improved serum creatinine.  proBNP has significantly down trended to 25K from 44K on admission.  Will continue to monitor I/O's and repeat BMP/proBNP prior to discharge as patient/family do not want a RHC.  Want to ensure she continues to remain net negative on oral diuretics.  Appreciate nephrology assistance. Needs optimization of GDMT, discussed with nephrology and will be starting SGLT2i.     In regards to the ventricular ectopy/NSVT, patient has been asymptomatic.  Likely related to underlying cardiomyopathy, possible ischemic.  Patient is on near maximal beta-blockade with  metoprolol succinate 150 mg twice daily.  Would recommend ischemic evaluation and LifeVest in the setting of cardiomyopathy, however per patient and family discussion she does not want to proceed with ischemic evaluation and would not be agreeable towards any invasive procedures including ICD if warranted.  As such we will continue medical management conservatively as per goals of care.    Rest of plan is as above.    I have personally performed the medical decision making in its entirety.     Ray Daniel, DO

## 2025-02-21 NOTE — PROGRESS NOTES
Progress Note     Shyla Wheeler Patient Status:  Inpatient    1935 MRN [id number]   Location [facility] 2W/SW Attending Kierra Velazquez MD   Hosp Day # 4 PCP Ray Goldman MD     Chief Complaint: Acute exacerbation CHF, acute on chronic respiratory failure with hypoxia    Subjective:     S: Patient feeling okay.  Still very tired.  No worsening sob.  On PO lasix. Agreeable to cath if needed but wants to talk to her daughter. On 3L o2.       No other acute complaints or other nursing concerns or overnight events    Review of Systems:   10 point ROS completed and was negative, except for pertinent positive and negatives stated in subjective.    Objective:   Vital signs:  Temp:  [97.6 °F (36.4 °C)-97.8 °F (36.6 °C)] 97.8 °F (36.6 °C)  Pulse:  [] 90  Resp:  [18] 18  BP: ()/(70-99) 96/77  SpO2:  [93 %-96 %] 95 %    Wt Readings from Last 6 Encounters:   25 157 lb 9.6 oz (71.5 kg)   25 169 lb 12.8 oz (77 kg)   25 167 lb 8 oz (76 kg)   23 171 lb (77.6 kg)   21 173 lb (78.5 kg)   21 170 lb (77.1 kg)         Physical Exam:      Gen: No acute distress  Pulm: Lungs clear, normal respiratory effort  CV: Heart with regular rate and rhythm  Abd: Abdomen soft, nontender, nondistended, bowel sounds present  Neuro: No acute focal deficits  MSK: moves extremities  Skin: Warm and dry  Psych: Normal affect  Ext: +le edema - improving slowly      Results:   Diagnostic Data:      Labs:    Labs Last 24 Hours:   BMP     CBC    Other     Na 147 Cl 102 BUN 63 Glu 95   Hb -   PTT - Procal -   K 3.9 CO2 36.0 Cr 1.73   WBC - >< PLT -  INR - CRP -   Renal Lytes Endo    Hct -   Trop - D dim -   eGFR - Ca 8.1 POC Gluc  -    LFT   pBNP - Lactic -   eGFR AA - PO4 - A1c -   AST - APk - Prot -  LDL -     Mg 2.3 TSH -   ALT - T kal - Alb -        COVID-19 Lab Results    COVID-19  Lab Results   Component Value Date    COVID19 Not Detected 2025    COVID19 Not Detected 2025     COVID19 Not Detected 01/18/2025       Pro-Calcitonin  No results for input(s): \"PCT\" in the last 168 hours.    Cardiac  Recent Labs   Lab 02/16/25  2113   PBNP 43,936*       Creatinine Kinase  No results for input(s): \"CK\" in the last 168 hours.    Inflammatory Markers  No results for input(s): \"CRP\", \"PATRIZIA\", \"LDH\", \"DDIMER\" in the last 168 hours.    Imaging: Imaging data reviewed in Epic.    Medications:    [START ON 2/21/2025] torsemide  40 mg Oral Daily    rivaroxaban  15 mg Oral Daily with lunch    latanoprost  1 drop Ophthalmic Nightly    metoprolol succinate ER  150 mg Oral BID       Assessment & Plan:   ASSESSMENT / PLAN:     Acute on chronic respiratory failure with hypoxia  CHF exacerbation  -on 3L NC (was on 1 to 2 L at home since recent discharge on 2/8/2025 for pneumonia)   -Diurese as renal function permits  -CHF protocol-on po lasix now  -Cardiology consult-diuresis per cardiology  -SLP ordered  -Patient stable to transfer out of ICU  -renal consulted due to slight increase in creatinine    Acute kidney injury on chronic kidney disease, possible cardiorenal syndrome?  -Nephrology consulted  -will continue diuresis, Cr improved, on po lasix now     Elevated transaminases, worsening may be due to hepatic congestion from CHF  -RUQ ultrasound reviewed  - no acute process - lft trending down  -Hold statins     HTN  A-fib  Anxiety  -Resume home meds as appropriate  -Continue Xarelto, metoprolol     HLD  -Hold statins given elevated LFTs        Quality:  DVT Prophylaxis: Xarelto  CODE status: full  HILLARY: TBD      Coordinated care with providers and counseling re: treatment plan and workup     Supplementary Documentation:       MDM: high    **Certification      PHYSICIAN Certification of Need for Inpatient Hospitalization - Initial Certification    Patient will require inpatient services that will reasonably be expected to span two midnight's based on the clinical documentation in H+P.   Based on patients  current state of illness, I anticipate that, after discharge, patient will require TBD.       Kierra Velazquez MD

## 2025-02-21 NOTE — PROGRESS NOTES
[facility]  part of [facility]    Progress Note    Shyla Wheeler Patient Status:  Inpatient    1935 MRN [id number]   Location [facility] 3W/SW Attending Kierra Velazquez MD   Hosp Day # 4 PCP Ray Goldman MD       Subjective:   Shyla Wheeler is a(n) 89 year old female     ROS:     Constitutional: Feeling much better  ENMT:  Negative for ear drainage, hearing loss and nasal drainage  Eyes:  Negative for eye discharge and vision loss  Cardiovascular:  Negative for chest pain, sob, irregular heartbeat/palpitations  Respiratory: Denies shortness of breath  Gastrointestinal:  Negative for abdominal pain, constipation, decreased appetite, diarrhea and vomiting  Genitourinary:  Negative for dysuria and hematuria  Endocrine:  Negative for abnormal sleep patterns, increased activity, polydipsia and polyphagia  Hema/Lymph:  Negative for easy bleeding and easy bruising  Integumentary:  Negative for pruritus and rash  Musculoskeletal:  Negative for bone/joint symptoms  Neurological:  Negative for gait disturbance  Psychiatric:  Negative for inappropriate interaction and psychiatric symptoms      Vitals:    25 1730   BP: 96/77   Pulse: 90   Resp: 18   Temp: 97.8 °F (36.6 °C)           PHYSICAL EXAM:   Constitutional: appears well hydrated alert and responsive no acute distress noted  Head/Face: normocephalic  Eyes/Vision: normal extraocular motion is intact  Nose/Mouth/Throat:mucous membranes are moist and no oral lesions are noted  Neck/Thyroid: neck is supple without adenopathy  Lymphatic: no abnormal cervical, supraclavicular adenopathy is noted  Respiratory:  lungs are clear to auscultation bilaterally, normal respiratory effort  Cardiovascular: regular rate and rhythm no murmurs, gallups, or rubs  Abdomen: soft, non-tender, non-distended, BS normal  Vascular: well perfused femoral, and pedal pulses normal  Skin/Hair: no unusual rashes present, no abnormal bruising  noted  Back/Spine: no abnormalities noted  Musculoskeletal: full ROM all extremities good strength  no deformities  Extremities: Trace edema  Neurological:  Grossly normal    Results:     Laboratory Data:  Lab Results   Component Value Date    WBC 7.6 02/19/2025    HGB 12.6 02/19/2025    HCT 38.3 02/19/2025    .0 02/19/2025    CREATSERUM 1.73 (H) 02/20/2025    BUN 63 (H) 02/20/2025     (H) 02/20/2025    K 3.9 02/20/2025     02/20/2025    CO2 36.0 (H) 02/20/2025    GLU 95 02/20/2025    CA 8.1 (L) 02/20/2025    ALB 3.5 02/19/2025    ALKPHO 296 (H) 02/19/2025    BILT 1.0 02/19/2025    TP 5.8 02/19/2025     (H) 02/19/2025     (H) 02/19/2025    PTT 24.4 01/18/2025    INR 1.36 (H) 02/17/2025    T4F 1.1 08/05/2024    TSH 4.740 02/17/2025    MG 2.3 02/20/2025    PHOS 3.2 01/31/2025    B12 562 08/05/2024       Imaging:  [unfilled]   No results found.      ASSESSMENT/PLAN:   Assessment       #1 congestive heart failure doing well good urine output weight coming down switch to oral torsemide to 40 mg/day  #2 CKD 3 creatinine down to 1.7 improving good diuresis labs otherwise okay watch sodium is up to 147  Continue present plan         2/20/2025  Tono Calero MD

## 2025-02-21 NOTE — PLAN OF CARE
Problem: Patient Centered Care  Goal: Patient preferences are identified and integrated in the patient's plan of care  Description: Interventions:  - What would you like us to know as we care for you?   - Provide timely, complete, and accurate information to patient/family  - Incorporate patient and family knowledge, values, beliefs, and cultural backgrounds into the planning and delivery of care  - Encourage patient/family to participate in care and decision-making at the level they choose  - Honor patient and family perspectives and choices  Outcome: Progressing     Problem: Patient/Family Goals  Goal: Patient/Family Long Term Goal  Description: Patient's Long Term Goal:     Interventions:  -   - See additional Care Plan goals for specific interventions  Outcome: Progressing  Goal: Patient/Family Short Term Goal  Description: Patient's Short Term Goal:     Interventions:   -   - See additional Care Plan goals for specific interventions  Outcome: Progressing     Problem: CARDIOVASCULAR - ADULT  Goal: Maintains optimal cardiac output and hemodynamic stability  Description: INTERVENTIONS:  - Monitor vital signs, rhythm, and trends  - Monitor for bleeding, hypotension and signs of decreased cardiac output  - Evaluate effectiveness of vasoactive medications to optimize hemodynamic stability  - Monitor arterial and/or venous puncture sites for bleeding and/or hematoma  - Assess quality of pulses, skin color and temperature  - Assess for signs of decreased coronary artery perfusion - ex. Angina  - Evaluate fluid balance, assess for edema, trend weights  Outcome: Progressing  Goal: Absence of cardiac arrhythmias or at baseline  Description: INTERVENTIONS:  - Continuous cardiac monitoring, monitor vital signs, obtain 12 lead EKG if indicated  - Evaluate effectiveness of antiarrhythmic and heart rate control medications as ordered  - Initiate emergency measures for life threatening arrhythmias  - Monitor electrolytes and  administer replacement therapy as ordered  Outcome: Progressing     Problem: RESPIRATORY - ADULT  Goal: Achieves optimal ventilation and oxygenation  Description: INTERVENTIONS:  - Assess for changes in respiratory status  - Assess for changes in mentation and behavior  - Position to facilitate oxygenation and minimize respiratory effort  - Oxygen supplementation based on oxygen saturation or ABGs  - Provide Smoking Cessation handout, if applicable  - Encourage broncho-pulmonary hygiene including cough, deep breathe, Incentive Spirometry  - Assess the need for suctioning and perform as needed  - Assess and instruct to report SOB or any respiratory difficulty  - Respiratory Therapy support as indicated  - Manage/alleviate anxiety  - Monitor for signs/symptoms of CO2 retention  Outcome: Progressing     Problem: METABOLIC/FLUID AND ELECTROLYTES - ADULT  Goal: Electrolytes maintained within normal limits  Description: INTERVENTIONS:  - Monitor labs and rhythm and assess patient for signs and symptoms of electrolyte imbalances  - Administer electrolyte replacement as ordered  - Monitor response to electrolyte replacements, including rhythm and repeat lab results as appropriate  - Fluid restriction as ordered  - Instruct patient on fluid and nutrition restrictions as appropriate  Outcome: Progressing  Goal: Hemodynamic stability and optimal renal function maintained  Description: INTERVENTIONS:  - Monitor labs and assess for signs and symptoms of volume excess or deficit  - Monitor intake, output and patient weight  - Monitor urine specific gravity, serum osmolarity and serum sodium as indicated or ordered  - Monitor response to interventions for patient's volume status, including labs, urine output, blood pressure (other measures as available)  - Encourage oral intake as appropriate  - Instruct patient on fluid and nutrition restrictions as appropriate  Outcome: Progressing     Pt alert and oriented X 3-4. Pt on 2 L  nasal cannula. No complaints throughout the day. Safety precautions in place, call light within reach.

## 2025-02-21 NOTE — PROGRESS NOTES
Progress Note     Shyla Wheeler Patient Status:  Inpatient    1935 MRN [id number]   Location [facility] 2W/SW Attending Kierra Velazquez MD   Hosp Day # 5 PCP Ray Goldman MD     Chief Complaint: Acute exacerbation CHF, acute on chronic respiratory failure with hypoxia    Subjective:     S: Patient feeling okay.  Plan for life vest if agreeable.       No other acute complaints or other nursing concerns or overnight events    Review of Systems:   10 point ROS completed and was negative, except for pertinent positive and negatives stated in subjective.    Objective:   Vital signs:  Temp:  [97.3 °F (36.3 °C)-97.8 °F (36.6 °C)] 97.3 °F (36.3 °C)  Pulse:  [82-91] 90  Resp:  [18] 18  BP: ()/(54-77) 109/69  SpO2:  [93 %-98 %] 93 %    Wt Readings from Last 6 Encounters:   25 159 lb 11.2 oz (72.4 kg)   25 169 lb 12.8 oz (77 kg)   25 167 lb 8 oz (76 kg)   23 171 lb (77.6 kg)   21 173 lb (78.5 kg)   21 170 lb (77.1 kg)         Physical Exam:      Gen: No acute distress  Pulm: Lungs clear, normal respiratory effort  CV: Heart with regular rate and rhythm  Abd: Abdomen soft, nontender, nondistended, bowel sounds present  Neuro: No acute focal deficits  MSK: moves extremities  Skin: Warm and dry  Psych: Normal affect  Ext: +le edema - improving      Results:   Diagnostic Data:      Labs:    Labs Last 24 Hours:   BMP     CBC    Other     Na 143 Cl 102 BUN 58 Glu 130   Hb -   PTT - Procal -   K 3.6 CO2 32.0 Cr 1.53   WBC - >< PLT -  INR - CRP -   Renal Lytes Endo    Hct -   Trop - D dim -   eGFR - Ca 8.3 POC Gluc  -    LFT   pBNP 25,556 Lactic -   eGFR AA - PO4 - A1c -   AST - APk - Prot -  LDL -     Mg - TSH -   ALT - T kal - Alb -        COVID-19 Lab Results    COVID-19  Lab Results   Component Value Date    COVID19 Not Detected 2025    COVID19 Not Detected 2025    COVID19 Not Detected 2025       Pro-Calcitonin  No results for input(s): \"PCT\" in the last  168 hours.    Cardiac  Recent Labs   Lab 02/21/25  0926   PBNP 25,556*       Creatinine Kinase  No results for input(s): \"CK\" in the last 168 hours.    Inflammatory Markers  No results for input(s): \"CRP\", \"PATRIZIA\", \"LDH\", \"DDIMER\" in the last 168 hours.    Imaging: Imaging data reviewed in Epic.    Medications:    potassium chloride  40 mEq Oral Q4H    torsemide  40 mg Oral Daily    rivaroxaban  15 mg Oral Daily with lunch    latanoprost  1 drop Ophthalmic Nightly    metoprolol succinate ER  150 mg Oral BID       Assessment & Plan:   ASSESSMENT / PLAN:     Acute on chronic respiratory failure with hypoxia  CHF exacerbation  -on 3L NC (was on 1 to 2 L at home since recent discharge on 2/8/2025 for pneumonia)   -Diurese as renal function permits  -CHF protocol-on po torsemide  -Cardiology consult-diuresis per cardiology  -SLP ordered  -renal consulted due to slight increase in creatinine, cr better    Acute kidney injury on chronic kidney disease, possible cardiorenal syndrome? improved  -Nephrology consulted  -will continue diuresis per cardio/renal, Cr improved, on po torsemide      Elevated transaminases, worsening may be due to hepatic congestion from CHF  -RUQ ultrasound reviewed  - no acute process - lft trending down  -Hold statins     HTN  A-fib  Anxiety  -Resume home meds as appropriate  -Continue Xarelto, metoprolol     HLD  -Hold statins given elevated LFTs        Quality:  DVT Prophylaxis: Xarelto  CODE status: full  HILLARY: TBD      Coordinated care with providers and counseling re: treatment plan and workup     Supplementary Documentation:       MDM: high    **Certification      PHYSICIAN Certification of Need for Inpatient Hospitalization - Initial Certification    Patient will require inpatient services that will reasonably be expected to span two midnight's based on the clinical documentation in H+P.   Based on patients current state of illness, I anticipate that, after discharge, patient will require  TBD.       Kierra Velazquez MD

## 2025-02-21 NOTE — PLAN OF CARE
Problem: Patient Centered Care  Goal: Patient preferences are identified and integrated in the patient's plan of care  Description: Interventions:  - What would you like us to know as we care for you? From home grabiel   - Provide timely, complete, and accurate information to patient/family  - Incorporate patient and family knowledge, values, beliefs, and cultural backgrounds into the planning and delivery of care  - Encourage patient/family to participate in care and decision-making at the level they choose  - Honor patient and family perspectives and choices  Outcome: Progressing     Problem: Patient/Family Goals  Goal: Patient/Family Long Term Goal  Description: Patient's Long Term Goal: to go home     Interventions:  - follow MD orders  - See additional Care Plan goals for specific interventions  Outcome: Progressing  Goal: Patient/Family Short Term Goal  Description: Patient's Short Term Goal: manage sob     Interventions:   - follow MD orders  - See additional Care Plan goals for specific interventions  Outcome: Progressing     Problem: CARDIOVASCULAR - ADULT  Goal: Maintains optimal cardiac output and hemodynamic stability  Description: INTERVENTIONS:  - Monitor vital signs, rhythm, and trends  - Monitor for bleeding, hypotension and signs of decreased cardiac output  - Evaluate effectiveness of vasoactive medications to optimize hemodynamic stability  - Monitor arterial and/or venous puncture sites for bleeding and/or hematoma  - Assess quality of pulses, skin color and temperature  - Assess for signs of decreased coronary artery perfusion - ex. Angina  - Evaluate fluid balance, assess for edema, trend weights  Outcome: Progressing  Goal: Absence of cardiac arrhythmias or at baseline  Description: INTERVENTIONS:  - Continuous cardiac monitoring, monitor vital signs, obtain 12 lead EKG if indicated  - Evaluate effectiveness of antiarrhythmic and heart rate control medications as ordered  - Initiate emergency  measures for life threatening arrhythmias  - Monitor electrolytes and administer replacement therapy as ordered  Outcome: Progressing     Problem: RESPIRATORY - ADULT  Goal: Achieves optimal ventilation and oxygenation  Description: INTERVENTIONS:  - Assess for changes in respiratory status  - Assess for changes in mentation and behavior  - Position to facilitate oxygenation and minimize respiratory effort  - Oxygen supplementation based on oxygen saturation or ABGs  - Provide Smoking Cessation handout, if applicable  - Encourage broncho-pulmonary hygiene including cough, deep breathe, Incentive Spirometry  - Assess the need for suctioning and perform as needed  - Assess and instruct to report SOB or any respiratory difficulty  - Respiratory Therapy support as indicated  - Manage/alleviate anxiety  - Monitor for signs/symptoms of CO2 retention  Outcome: Progressing     Problem: METABOLIC/FLUID AND ELECTROLYTES - ADULT  Goal: Electrolytes maintained within normal limits  Description: INTERVENTIONS:  - Monitor labs and rhythm and assess patient for signs and symptoms of electrolyte imbalances  - Administer electrolyte replacement as ordered  - Monitor response to electrolyte replacements, including rhythm and repeat lab results as appropriate  - Fluid restriction as ordered  - Instruct patient on fluid and nutrition restrictions as appropriate  Outcome: Progressing  Goal: Hemodynamic stability and optimal renal function maintained  Description: INTERVENTIONS:  - Monitor labs and assess for signs and symptoms of volume excess or deficit  - Monitor intake, output and patient weight  - Monitor urine specific gravity, serum osmolarity and serum sodium as indicated or ordered  - Monitor response to interventions for patient's volume status, including labs, urine output, blood pressure (other measures as available)  - Encourage oral intake as appropriate  - Instruct patient on fluid and nutrition restrictions as  appropriate  Outcome: Progressing

## 2025-02-21 NOTE — SPIRITUAL CARE NOTE
Spiritual Care Visit Note    Patient Name: Shyla Wheeler Date of Spiritual Care Visit: 25   : 1935 Primary Dx: Acute on chronic congestive heart failure, unspecified heart failure type (HCC)       Referred By: Referral From:     Spiritual Care Taxonomy:    Intended Effects: Build relationship of care and support    Methods: Encourage self care;Encourage sharing of feelings;Offer spiritual/Jehovah's witness support    Interventions: Acknowledge current situation;Acknowledge response to difficult experience;Active listening;Ask guided questions;Ask guided questions about steven;Ask questions to bring forth feelings;[place] for care team member(s);Explain  role;Discuss plan of care;Prayer for healing;Provide hospitality    Visit Type/Summary:     - Spiritual Care: Consulted with RN prior to visit. Offered empathic listening and emotional support. Patient and family expressed appreciation for  visit. Provided information regarding how to contact Spiritual Care and left a Spiritual Care information card. Offered prayer for strength and rehabilitation.  remains available as needed for follow up.    Spiritual Care support can be requested via an Epic consult. For urgent/immediate needs, please contact the On Call  at: [place]: ext [phone]    Chaplain Resident, Shea Lacey.

## 2025-02-22 LAB
ALBUMIN SERPL-MCNC: 3.9 G/DL (ref 3.2–4.8)
ANION GAP SERPL CALC-SCNC: 8 MMOL/L (ref 0–18)
BASOPHILS # BLD AUTO: 0.02 X10(3) UL (ref 0–0.2)
BASOPHILS NFR BLD AUTO: 0.3 %
BUN BLD-MCNC: 54 MG/DL (ref 9–23)
BUN/CREAT SERPL: 33.8 (ref 10–20)
CALCIUM BLD-MCNC: 8.7 MG/DL (ref 8.7–10.4)
CHLORIDE SERPL-SCNC: 102 MMOL/L (ref 98–112)
CO2 SERPL-SCNC: 35 MMOL/L (ref 21–32)
CREAT BLD-MCNC: 1.6 MG/DL
DEPRECATED RDW RBC AUTO: 60.4 FL (ref 35.1–46.3)
EGFRCR SERPLBLD CKD-EPI 2021: 31 ML/MIN/1.73M2 (ref 60–?)
EOSINOPHIL # BLD AUTO: 0.33 X10(3) UL (ref 0–0.7)
EOSINOPHIL NFR BLD AUTO: 4.6 %
ERYTHROCYTE [DISTWIDTH] IN BLOOD BY AUTOMATED COUNT: 17 % (ref 11–15)
GLUCOSE BLD-MCNC: 140 MG/DL (ref 70–99)
HCT VFR BLD AUTO: 43.4 %
HGB BLD-MCNC: 13.2 G/DL
IMM GRANULOCYTES # BLD AUTO: 0.02 X10(3) UL (ref 0–1)
IMM GRANULOCYTES NFR BLD: 0.3 %
LYMPHOCYTES # BLD AUTO: 0.72 X10(3) UL (ref 1–4)
LYMPHOCYTES NFR BLD AUTO: 10 %
MAGNESIUM SERPL-MCNC: 2 MG/DL (ref 1.6–2.6)
MCH RBC QN AUTO: 29.8 PG (ref 26–34)
MCHC RBC AUTO-ENTMCNC: 30.4 G/DL (ref 31–37)
MCV RBC AUTO: 98 FL
MONOCYTES # BLD AUTO: 0.39 X10(3) UL (ref 0.1–1)
MONOCYTES NFR BLD AUTO: 5.4 %
NEUTROPHILS # BLD AUTO: 5.74 X10 (3) UL (ref 1.5–7.7)
NEUTROPHILS # BLD AUTO: 5.74 X10(3) UL (ref 1.5–7.7)
NEUTROPHILS NFR BLD AUTO: 79.4 %
OSMOLALITY SERPL CALC.SUM OF ELEC: 317 MOSM/KG (ref 275–295)
PHOSPHATE SERPL-MCNC: 2.9 MG/DL (ref 2.4–5.1)
PLATELET # BLD AUTO: 227 10(3)UL (ref 150–450)
POTASSIUM SERPL-SCNC: 4.9 MMOL/L (ref 3.5–5.1)
RBC # BLD AUTO: 4.43 X10(6)UL
SODIUM SERPL-SCNC: 145 MMOL/L (ref 136–145)
WBC # BLD AUTO: 7.2 X10(3) UL (ref 4–11)

## 2025-02-22 PROCEDURE — 99233 SBSQ HOSP IP/OBS HIGH 50: CPT | Performed by: INTERNAL MEDICINE

## 2025-02-22 PROCEDURE — 99233 SBSQ HOSP IP/OBS HIGH 50: CPT | Performed by: HOSPITALIST

## 2025-02-22 RX ORDER — POTASSIUM CHLORIDE 1500 MG/1
40 TABLET, EXTENDED RELEASE ORAL EVERY 4 HOURS
Status: DISCONTINUED | OUTPATIENT
Start: 2025-02-22 | End: 2025-02-22

## 2025-02-22 NOTE — PROGRESS NOTES
[facility]  part of [facility]    Progress Note    Shyla Wheeler Patient Status:  Inpatient    1935 MRN [id number]   Location [facility] 3W/SW Attending Kierra Velazquez MD   Hosp Day # 5 PCP Ray Goldman MD       Subjective:   Shyla Wheeler is a(n) 89 year old female     ROS:     Constitutional: Feels tired  ENMT:  Negative for ear drainage, hearing loss and nasal drainage  Eyes:  Negative for eye discharge and vision loss  Cardiovascular:  Negative for chest pain, having some arrhythmias on the monitor  Respiratory: Breathing reasonably well on 2 L  Gastrointestinal:  Negative for abdominal pain, constipation, decreased appetite, diarrhea and vomiting  Genitourinary:  Negative for dysuria and hematuria  Endocrine:  Negative for abnormal sleep patterns, increased activity, polydipsia and polyphagia  Hema/Lymph:  Negative for easy bleeding and easy bruising  Integumentary:  Negative for pruritus and rash  Musculoskeletal:  Negative for bone/joint symptoms  Neurological:  Negative for gait disturbance  Psychiatric:  Negative for inappropriate interaction and psychiatric symptoms      Vitals:    25 1408   BP: 97/67   Pulse: 87   Resp: 18   Temp: 97.4 °F (36.3 °C)           PHYSICAL EXAM:   Constitutional: appears well hydrated alert and responsive no acute distress noted  Head/Face: normocephalic  Eyes/Vision: normal extraocular motion is intact  Nose/Mouth/Throat:mucous membranes are moist and no oral lesions are noted  Neck/Thyroid: neck is supple without adenopathy  Lymphatic: no abnormal cervical, supraclavicular adenopathy is noted  Respiratory:  lungs are clear to auscultation bilaterally, normal respiratory effort  Cardiovascular irregular rhythm  Abdomen: soft, non-tender, non-distended, BS normal  Vascular: well perfused femoral, and pedal pulses normal  Skin/Hair: no unusual rashes present, no abnormal bruising noted  Back/Spine: no abnormalities  noted  Musculoskeletal: full ROM all extremities good strength  no deformities  Extremities: 1+ edema  Neurological:  Grossly normal    Results:     Laboratory Data:  Lab Results   Component Value Date    WBC 7.6 02/19/2025    HGB 12.6 02/19/2025    HCT 38.3 02/19/2025    .0 02/19/2025    CREATSERUM 1.53 (H) 02/21/2025    BUN 58 (H) 02/21/2025     02/21/2025    K 3.6 02/21/2025     02/21/2025    CO2 32.0 02/21/2025     (H) 02/21/2025    CA 8.3 (L) 02/21/2025    ALB 3.5 02/19/2025    ALKPHO 296 (H) 02/19/2025    BILT 1.0 02/19/2025    TP 5.8 02/19/2025     (H) 02/19/2025     (H) 02/19/2025    PTT 24.4 01/18/2025    INR 1.36 (H) 02/17/2025    T4F 1.1 08/05/2024    TSH 4.740 02/17/2025    MG 2.3 02/20/2025    PHOS 3.2 01/31/2025    B12 562 08/05/2024       Imaging:  [unfilled]   No results found.      ASSESSMENT/PLAN:   Assessment       #1 renal sufficiency creatinine excellent  1.53 today electrolytes okay BNP high at 25,000  Although coming down also having arrhythmias discussed with Dr. Daniel  Who is very helpful defer his decision about LifeVest/defibrillator to patient     And family I did discuss them with will replace potassium  Now on torsemide 40/day we will add Jardiance 10 mg/day to help with diuresis continue to follow no discharge yet  2/21/2025  Tono Calero MD

## 2025-02-22 NOTE — PLAN OF CARE
Bed locked in low position, belongings in reach, bed/chair alarm on, call light in reach. Frequent rounding done, all patient needs met. Non-slip socks on/at bedside. Patient sleeping well tonight, slightly confused. MD added Jardiance, first dose given last night. Family continues to refuse cath and life vest at this time.    Problem: Patient Centered Care  Goal: Patient preferences are identified and integrated in the patient's plan of care  Description: Interventions:  - What would you like us to know as we care for you? I feel so weak.  - Provide timely, complete, and accurate information to patient/family  - Incorporate patient and family knowledge, values, beliefs, and cultural backgrounds into the planning and delivery of care  - Encourage patient/family to participate in care and decision-making at the level they choose  - Honor patient and family perspectives and choices  Outcome: Progressing     Problem: CARDIOVASCULAR - ADULT  Goal: Maintains optimal cardiac output and hemodynamic stability  Description: INTERVENTIONS:  - Monitor vital signs, rhythm, and trends  - Monitor for bleeding, hypotension and signs of decreased cardiac output  - Evaluate effectiveness of vasoactive medications to optimize hemodynamic stability  - Monitor arterial and/or venous puncture sites for bleeding and/or hematoma  - Assess quality of pulses, skin color and temperature  - Assess for signs of decreased coronary artery perfusion - ex. Angina  - Evaluate fluid balance, assess for edema, trend weights  Outcome: Progressing  Goal: Absence of cardiac arrhythmias or at baseline  Description: INTERVENTIONS:  - Continuous cardiac monitoring, monitor vital signs, obtain 12 lead EKG if indicated  - Evaluate effectiveness of antiarrhythmic and heart rate control medications as ordered  - Initiate emergency measures for life threatening arrhythmias  - Monitor electrolytes and administer replacement therapy as ordered  Outcome:  Progressing     Problem: RESPIRATORY - ADULT  Goal: Achieves optimal ventilation and oxygenation  Description: INTERVENTIONS:  - Assess for changes in respiratory status  - Assess for changes in mentation and behavior  - Position to facilitate oxygenation and minimize respiratory effort  - Oxygen supplementation based on oxygen saturation or ABGs  - Provide Smoking Cessation handout, if applicable  - Encourage broncho-pulmonary hygiene including cough, deep breathe, Incentive Spirometry  - Assess the need for suctioning and perform as needed  - Assess and instruct to report SOB or any respiratory difficulty  - Respiratory Therapy support as indicated  - Manage/alleviate anxiety  - Monitor for signs/symptoms of CO2 retention  Outcome: Progressing     Problem: METABOLIC/FLUID AND ELECTROLYTES - ADULT  Goal: Electrolytes maintained within normal limits  Description: INTERVENTIONS:  - Monitor labs and rhythm and assess patient for signs and symptoms of electrolyte imbalances  - Administer electrolyte replacement as ordered  - Monitor response to electrolyte replacements, including rhythm and repeat lab results as appropriate  - Fluid restriction as ordered  - Instruct patient on fluid and nutrition restrictions as appropriate  Outcome: Progressing  Goal: Hemodynamic stability and optimal renal function maintained  Description: INTERVENTIONS:  - Monitor labs and assess for signs and symptoms of volume excess or deficit  - Monitor intake, output and patient weight  - Monitor urine specific gravity, serum osmolarity and serum sodium as indicated or ordered  - Monitor response to interventions for patient's volume status, including labs, urine output, blood pressure (other measures as available)  - Encourage oral intake as appropriate  - Instruct patient on fluid and nutrition restrictions as appropriate  Outcome: Progressing

## 2025-02-22 NOTE — PLAN OF CARE
PtSaeed Mansfield is A&Ox 4. Lives at home alone. Patient is ambulating using 1 assist with a walker. Patient isn't experiencing pain. Continent of bowel. Incontinent of bladder, purewick. Call light within reach, fall precautions maintained.     Plan is discharge to [facility] once medically cleared.    Problem: Patient Centered Care  Goal: Patient preferences are identified and integrated in the patient's plan of care  Description: Interventions:  - What would you like us to know as we care for you? I live at home alone  - Provide timely, complete, and accurate information to patient/family  - Incorporate patient and family knowledge, values, beliefs, and cultural backgrounds into the planning and delivery of care  - Encourage patient/family to participate in care and decision-making at the level they choose  - Honor patient and family perspectives and choices  Outcome: Progressing     Problem: CARDIOVASCULAR - ADULT  Goal: Maintains optimal cardiac output and hemodynamic stability  Description: INTERVENTIONS:  - Monitor vital signs, rhythm, and trends  - Monitor for bleeding, hypotension and signs of decreased cardiac output  - Evaluate effectiveness of vasoactive medications to optimize hemodynamic stability  - Monitor arterial and/or venous puncture sites for bleeding and/or hematoma  - Assess quality of pulses, skin color and temperature  - Assess for signs of decreased coronary artery perfusion - ex. Angina  - Evaluate fluid balance, assess for edema, trend weights  Outcome: Progressing  Goal: Absence of cardiac arrhythmias or at baseline  Description: INTERVENTIONS:  - Continuous cardiac monitoring, monitor vital signs, obtain 12 lead EKG if indicated  - Evaluate effectiveness of antiarrhythmic and heart rate control medications as ordered  - Initiate emergency measures for life threatening arrhythmias  - Monitor electrolytes and administer replacement therapy as ordered  Outcome: Progressing     Problem: RESPIRATORY -  ADULT  Goal: Achieves optimal ventilation and oxygenation  Description: INTERVENTIONS:  - Assess for changes in respiratory status  - Assess for changes in mentation and behavior  - Position to facilitate oxygenation and minimize respiratory effort  - Oxygen supplementation based on oxygen saturation or ABGs  - Provide Smoking Cessation handout, if applicable  - Encourage broncho-pulmonary hygiene including cough, deep breathe, Incentive Spirometry  - Assess the need for suctioning and perform as needed  - Assess and instruct to report SOB or any respiratory difficulty  - Respiratory Therapy support as indicated  - Manage/alleviate anxiety  - Monitor for signs/symptoms of CO2 retention  Outcome: Progressing     Problem: METABOLIC/FLUID AND ELECTROLYTES - ADULT  Goal: Electrolytes maintained within normal limits  Description: INTERVENTIONS:  - Monitor labs and rhythm and assess patient for signs and symptoms of electrolyte imbalances  - Administer electrolyte replacement as ordered  - Monitor response to electrolyte replacements, including rhythm and repeat lab results as appropriate  - Fluid restriction as ordered  - Instruct patient on fluid and nutrition restrictions as appropriate  Outcome: Progressing  Goal: Hemodynamic stability and optimal renal function maintained  Description: INTERVENTIONS:  - Monitor labs and assess for signs and symptoms of volume excess or deficit  - Monitor intake, output and patient weight  - Monitor urine specific gravity, serum osmolarity and serum sodium as indicated or ordered  - Monitor response to interventions for patient's volume status, including labs, urine output, blood pressure (other measures as available)  - Encourage oral intake as appropriate  - Instruct patient on fluid and nutrition restrictions as appropriate  Outcome: Progressing

## 2025-02-22 NOTE — PROGRESS NOTES
Progress Note  Shyla Wheeler Patient Status:  Inpatient    1935 MRN [id number]   Location [facility] 3W/SW Attending Kierra Velazquez MD   Hosp Day # 6 PCP Ray Goldman MD     Subjective:  Confused overnight, but now up in chair and oriented to me. Denies cp, sob. Mucous production this am. Elevated Hrs with activity, controlled at rest.     Objective:  /90 (BP Location: Right arm)   Pulse 92   Temp 98.1 °F (36.7 °C) (Oral)   Resp 18   Wt 159 lb 4.8 oz (72.3 kg)   SpO2 96%   BMI 26.51 kg/m²     Telemetry: afib, underlying lbbb, pvcs      Intake/Output:    Intake/Output Summary (Last 24 hours) at 2025 06  Last data filed at 2025 0603  Gross per 24 hour   Intake 640 ml   Output 350 ml   Net 290 ml       Last 3 Weights   25 0500 159 lb 4.8 oz (72.3 kg)   25 0455 159 lb 11.2 oz (72.4 kg)   25 0504 157 lb 9.6 oz (71.5 kg)   25 0500 157 lb 1.6 oz (71.3 kg)   25 0400 158 lb 11.7 oz (72 kg)   25 0009 158 lb 4.6 oz (71.8 kg)   25 1757 162 lb (73.5 kg)   25 0421 169 lb 12.8 oz (77 kg)   25 0505 171 lb (77.6 kg)   25 0658 169 lb (76.7 kg)   25 0416 169 lb 6.4 oz (76.8 kg)   25 0530 171 lb 9.6 oz (77.8 kg)   25 0514 170 lb 3.2 oz (77.2 kg)   25 0500 170 lb 8 oz (77.3 kg)   25 0400 171 lb 9.6 oz (77.8 kg)   25 2154 168 lb 6.4 oz (76.4 kg)   25 1740 162 lb (73.5 kg)   25 0313 167 lb 8 oz (76 kg)   25 0501 163 lb 6.4 oz (74.1 kg)   25 1457 156 lb 6.4 oz (70.9 kg)   25 1451 156 lb 6.4 oz (70.9 kg)   25 0956 161 lb 9.6 oz (73.3 kg)   25 0929 162 lb (73.5 kg)       Labs:  Recent Labs   Lab 25  0746 25  1446 25  0716 25  0926   *  --  95 130*   BUN 74*  --  63* 58*   CREATSERUM 1.87*  --  1.73* 1.53*   EGFRCR 26*  --  28* 33*   CA 8.1*  --  8.1* 8.3*   *  --  147* 143   K 3.4*  3.4* 4.1 3.9 3.6     --  102 102    CO2 31.0  --  36.0* 32.0     Recent Labs   Lab 02/16/25  1822 02/17/25  0839 02/19/25  0746   RBC 4.14 4.27 3.99   HGB 12.7 13.0 12.6   HCT 40.3 41.9 38.3   MCV 97.3 98.1 96.0   MCH 30.7 30.4 31.6   MCHC 31.5 31.0 32.9   RDW 17.2* 17.6* 16.8*   NEPRELIM 6.96 7.40  --    WBC 8.2 8.8 7.6   .0 192.0 211.0         Recent Labs   Lab 02/16/25  2351 02/17/25  0638   TROPHS 47* 42*       Review of Systems   Cardiovascular: Negative.    Respiratory:  Positive for cough and sputum production.        Physical Exam:    Gen: alert, oriented x 3, NAD  Heent: pupils equal, reactive. Mucous membranes moist.   Neck: no jvd  Cardiac: irregularly irregular normal S1,S2  Lungs: rhonchi right base, on nc  Abd: soft, NT/ND +bs  Ext: no sig ble edema  Skin: Warm, dry  Neuro: No focal deficits        Medications:     empagliflozin  10 mg Oral Daily    torsemide  40 mg Oral Daily    rivaroxaban  15 mg Oral Daily with lunch    latanoprost  1 drop Ophthalmic Nightly    metoprolol succinate ER  150 mg Oral BID             Assessment:  Acute on chronic HFrEF presents with sob, vol OL  May be due to ischemic cardiomyopathy but refused LHC due to goals.   Known aortic aneurysm   Echo 1/20/25-LVEF 30-35%, severe hypokinesis of inferior wall, akinesis of anteroseptal and inferoseptal walls.   Probnp 15448 on admit-->30607  CXR with pulm edema.   3rd HF admission in 1 month. CardioMEMS consideration.   Not on acei/arb/arni/mra due to renal function. Jardiance added.  DEJAH, renal following, creat appears back near baseline at this time.   Persistent Afib, rates controlled  Bb, Xarelto  [id number]  HTN-controlled  Hypernatremia-resolved  Hypokalemia-resolved  NSVT, PVCs with increasing frequency.   As above, have refused LHC at this time. Will consider at later date.   Offered wearable defib at MD, will consider and inform team of final decision. However, they would not be interested in ICD if needed. Therefore, conservative management at  this time.   HL-LDL 56    Plan:  Renal function much improved. Negative about 4L since admit and 3#. On oral torsemide, renal following  Cont bb, Xarelto and [id number]  As outlined, plan for conservative management at this time. Confirmed with patient this am does not want invasive procedures and declines wearable defibrillator.     Plan of care discussed with patient, RN.    Lesly Palmer, APRN  2/22/2025  6:22 AM        L3  Seen and examined bedside. Agree with the present management, will sign off call if needs to be seen as needed    Renal function is improving -4 L since admission.  Continue oral torsemide.  Continue beta-blocker Xarelto and Jardiance  Conservative management only.  Thank you for allowing me to participate in the care of your patient, feel free to contact me if you have any questions.    Eladio Alvarado MD  [place] [facility] [place]  Interventional Cardiology.

## 2025-02-22 NOTE — PROGRESS NOTES
[facility]  part of [facility]    Progress Note    Shyla Wheeler Patient Status:  Inpatient    1935 MRN [id number]   Location [facility] 3W/SW Attending Latanya Díaz MD   Hosp Day # 6 PCP Ray Goldman MD     Chief Complaint:     Acute on chronic congestive heart failure.    Subjective:   Subjective:    Patient seen and examined this morning  Slightly confused family refusing cath and life vest.  Objective:   Blood pressure 104/69, pulse 96, temperature 98.1 °F (36.7 °C), temperature source Oral, resp. rate 23, weight 159 lb 4.8 oz (72.3 kg), SpO2 97%, not currently breastfeeding.  Physical Exam    General: Patient is alert and oriented x3 does not appear to be in acute distress at this time  HEENT: EOMI PERRLA, atraumatic normocephalic  Cardiac: S1-S2 appreciated  Lungs: Good air entry bilaterally clear to auscultation  Abdomen: Soft nontender nondistended positive bowel sounds  Ext: Peripheral pulses are positive + edema      Results:   Lab Results   Component Value Date    WBC 7.2 2025    HGB 13.2 2025    HCT 43.4 2025    .0 2025    CREATSERUM 1.60 (H) 2025    BUN 54 (H) 2025     2025    K 4.9 2025     2025    CO2 35.0 (H) 2025     (H) 2025    CA 8.7 2025    ALB 3.9 2025    ALKPHO 296 (H) 2025    BILT 1.0 2025    TP 5.8 2025     (H) 2025     (H) 2025    PTT 24.4 2025    INR 1.36 (H) 2025    T4F 1.1 2024    TSH 4.740 2025    MG 2.0 2025    PHOS 2.9 2025    TROPHS 42 (HH) 2025    B12 562 2024       No results found.        Assessment & Plan:      Acute on chronic respiratory failure with hypoxia  CHF exacerbation  -on 3L NC (was on 1 to 2 L at home since recent discharge on 2025 for pneumonia)   -Diurese as renal function permits  -CHF protocol-on po torsemide  -Cardiology  consult-diuresis per cardiology  -SLP ordered  -renal consulted due to slight increase in creatinine, cr better     Acute kidney injury on chronic kidney disease, possible cardiorenal syndrome? improved  -Nephrology consulted  -will continue diuresis per cardio/renal, Cr improved, on po torsemide      Elevated transaminases, worsening may be due to hepatic congestion from CHF  -RUQ ultrasound reviewed  - no acute process - lft trending down  -Hold statins     HTN  A-fib  Anxiety  -Resume home meds as appropriate  -Continue Xarelto, metoprolol     HLD  -Hold statins given elevated LFTs        Quality:  DVT Prophylaxis: Xarelto  CODE status: full  HILLARY: TBD    Global A/P  -family refusing cath and life vest  -appreciate recs from cardiology and nephrology  -continue to monitor closely  - 4L (-) since admit.   -conservative management  -Reviewed previous consultant notes  -Reviewed CBC, BMP, Mag, and Phos  -Reviewed tests ordered  -Repeat labs in am  -MDM: High, severe exacerbation of chronic illness posing a threat to life. IV medications requiring close inpatient monitoring.       Latanya Díaz MD  2/22/2025

## 2025-02-23 LAB
ANION GAP SERPL CALC-SCNC: 6 MMOL/L (ref 0–18)
BASOPHILS # BLD AUTO: 0.02 X10(3) UL (ref 0–0.2)
BASOPHILS NFR BLD AUTO: 0.3 %
BUN BLD-MCNC: 41 MG/DL (ref 9–23)
BUN/CREAT SERPL: 27.3 (ref 10–20)
CALCIUM BLD-MCNC: 8.6 MG/DL (ref 8.7–10.4)
CHLORIDE SERPL-SCNC: 102 MMOL/L (ref 98–112)
CO2 SERPL-SCNC: 34 MMOL/L (ref 21–32)
CREAT BLD-MCNC: 1.5 MG/DL
DEPRECATED RDW RBC AUTO: 59 FL (ref 35.1–46.3)
EGFRCR SERPLBLD CKD-EPI 2021: 33 ML/MIN/1.73M2 (ref 60–?)
EOSINOPHIL # BLD AUTO: 0.38 X10(3) UL (ref 0–0.7)
EOSINOPHIL NFR BLD AUTO: 5.9 %
ERYTHROCYTE [DISTWIDTH] IN BLOOD BY AUTOMATED COUNT: 16.6 % (ref 11–15)
GLUCOSE BLD-MCNC: 108 MG/DL (ref 70–99)
HCT VFR BLD AUTO: 41 %
HGB BLD-MCNC: 12.9 G/DL
IMM GRANULOCYTES # BLD AUTO: 0.02 X10(3) UL (ref 0–1)
IMM GRANULOCYTES NFR BLD: 0.3 %
LYMPHOCYTES # BLD AUTO: 0.74 X10(3) UL (ref 1–4)
LYMPHOCYTES NFR BLD AUTO: 11.5 %
MAGNESIUM SERPL-MCNC: 2.1 MG/DL (ref 1.6–2.6)
MCH RBC QN AUTO: 30.9 PG (ref 26–34)
MCHC RBC AUTO-ENTMCNC: 31.5 G/DL (ref 31–37)
MCV RBC AUTO: 98.3 FL
MONOCYTES # BLD AUTO: 0.42 X10(3) UL (ref 0.1–1)
MONOCYTES NFR BLD AUTO: 6.6 %
NEUTROPHILS # BLD AUTO: 4.83 X10 (3) UL (ref 1.5–7.7)
NEUTROPHILS # BLD AUTO: 4.83 X10(3) UL (ref 1.5–7.7)
NEUTROPHILS NFR BLD AUTO: 75.4 %
OSMOLALITY SERPL CALC.SUM OF ELEC: 305 MOSM/KG (ref 275–295)
PHOSPHATE SERPL-MCNC: 3.5 MG/DL (ref 2.4–5.1)
PLATELET # BLD AUTO: 208 10(3)UL (ref 150–450)
POTASSIUM SERPL-SCNC: 3.8 MMOL/L (ref 3.5–5.1)
RBC # BLD AUTO: 4.17 X10(6)UL
SODIUM SERPL-SCNC: 142 MMOL/L (ref 136–145)
WBC # BLD AUTO: 6.4 X10(3) UL (ref 4–11)

## 2025-02-23 PROCEDURE — 99239 HOSP IP/OBS DSCHRG MGMT >30: CPT | Performed by: HOSPITALIST

## 2025-02-23 RX ORDER — POTASSIUM CHLORIDE 1500 MG/1
40 TABLET, EXTENDED RELEASE ORAL ONCE
Status: COMPLETED | OUTPATIENT
Start: 2025-02-23 | End: 2025-02-23

## 2025-02-23 RX ORDER — FUROSEMIDE 80 MG/1
80 TABLET ORAL DAILY
Qty: 30 TABLET | Refills: 0 | Status: SHIPPED | OUTPATIENT
Start: 2025-02-24

## 2025-02-23 NOTE — PLAN OF CARE
Problem: Patient Centered Care  Goal: Patient preferences are identified and integrated in the patient's plan of care  Description: Interventions:  - What would you like us to know as we care for you? From home alone  - Provide timely, complete, and accurate information to patient/family  - Incorporate patient and family knowledge, values, beliefs, and cultural backgrounds into the planning and delivery of care  - Encourage patient/family to participate in care and decision-making at the level they choose  - Honor patient and family perspectives and choices  Outcome: Completed     Problem: Patient/Family Goals  Goal: Patient/Family Long Term Goal  Description: Patient's Long Term Goal: to gain more strength    Interventions:  - ADELE, PT/OT  - See additional Care Plan goals for specific interventions  Outcome: Completed  Goal: Patient/Family Short Term Goal  Description: Patient's Short Term Goal: be less fluid overloaded    Interventions:   - Diuresis, monitor labs and electrolytes  - See additional Care Plan goals for specific interventions  Outcome: Completed     Problem: CARDIOVASCULAR - ADULT  Goal: Maintains optimal cardiac output and hemodynamic stability  Description: INTERVENTIONS:  - Monitor vital signs, rhythm, and trends  - Monitor for bleeding, hypotension and signs of decreased cardiac output  - Evaluate effectiveness of vasoactive medications to optimize hemodynamic stability  - Monitor arterial and/or venous puncture sites for bleeding and/or hematoma  - Assess quality of pulses, skin color and temperature  - Assess for signs of decreased coronary artery perfusion - ex. Angina  - Evaluate fluid balance, assess for edema, trend weights  Outcome: Completed  Goal: Absence of cardiac arrhythmias or at baseline  Description: INTERVENTIONS:  - Continuous cardiac monitoring, monitor vital signs, obtain 12 lead EKG if indicated  - Evaluate effectiveness of antiarrhythmic and heart rate control medications as  ordered  - Initiate emergency measures for life threatening arrhythmias  - Monitor electrolytes and administer replacement therapy as ordered  Outcome: Completed     Problem: RESPIRATORY - ADULT  Goal: Achieves optimal ventilation and oxygenation  Description: INTERVENTIONS:  - Assess for changes in respiratory status  - Assess for changes in mentation and behavior  - Position to facilitate oxygenation and minimize respiratory effort  - Oxygen supplementation based on oxygen saturation or ABGs  - Provide Smoking Cessation handout, if applicable  - Encourage broncho-pulmonary hygiene including cough, deep breathe, Incentive Spirometry  - Assess the need for suctioning and perform as needed  - Assess and instruct to report SOB or any respiratory difficulty  - Respiratory Therapy support as indicated  - Manage/alleviate anxiety  - Monitor for signs/symptoms of CO2 retention  Outcome: Completed     Problem: METABOLIC/FLUID AND ELECTROLYTES - ADULT  Goal: Electrolytes maintained within normal limits  Description: INTERVENTIONS:  - Monitor labs and rhythm and assess patient for signs and symptoms of electrolyte imbalances  - Administer electrolyte replacement as ordered  - Monitor response to electrolyte replacements, including rhythm and repeat lab results as appropriate  - Fluid restriction as ordered  - Instruct patient on fluid and nutrition restrictions as appropriate  Outcome: Completed  Goal: Hemodynamic stability and optimal renal function maintained  Description: INTERVENTIONS:  - Monitor labs and assess for signs and symptoms of volume excess or deficit  - Monitor intake, output and patient weight  - Monitor urine specific gravity, serum osmolarity and serum sodium as indicated or ordered  - Monitor response to interventions for patient's volume status, including labs, urine output, blood pressure (other measures as available)  - Encourage oral intake as appropriate  - Instruct patient on fluid and nutrition  restrictions as appropriate  Outcome: Completed

## 2025-02-23 NOTE — PROGRESS NOTES
[facility] [place]  part of [facility]    Progress Note    Shyla Wheeler Patient Status:  Inpatient    1935 MRN [id number]   Location [facility] 3W/SW Attending Latanya Díaz MD   Hosp Day # 6 PCP Ray Goldman MD       Subjective:   Shyla Wheeler is a(n) 89 year old female     Doing okay up in bathroom   Eyes:  Negative for eye discharge and vision loss  Cardiovascular:  Negative for chest pain, sob, irregular heartbeat/palpitations  Respiratory:  Negative for cough, dyspnea and wheezing  Gastrointestinal:  Negative for abdominal pain, constipation, decreased appetite, diarrhea and vomiting  Genitourinary:  Negative for dysuria and hematuria  Endocrine:  Negative for abnormal sleep patterns, increased activity, polydipsia and polyphagia  Hema/Lymph:  Negative for easy bleeding and easy bruising  Integumentary:  Negative for pruritus and rash  Musculoskeletal:  Negative for bone/joint symptoms  Neurological:  Negative for gait disturbance  Psychiatric:  Negative for inappropriate interaction and psychiatric symptoms      Vitals:    25 1702   BP: 109/76   Pulse: 95   Resp: 20   Temp: 97.8 °F (36.6 °C)           PHYSICAL EXAM:   Constitutional: appears  chromnically ill  Head/Face  Eyes/Vision: normal extraocular motion is intact  Nose/Mouth/Throat:mucous membranes are moist and no oral lesions are noted  Neck/Thyroid: neck is supple without adenopathy  Lymphatic: no abnormal cervical, supraclavicular adenopathy is noted  Respiratory:  lungs are clear to auscultation bilaterally, normal respiratory effort  Cardiovascular: regular rate and rhythm no murmurs, gallups, or rubs  Abdomen: soft, non-tender, non-distended, BS normal  Vascular: well perfused femoral, and pedal pulses normal  Skin/Hair: no unusual rashes present, no abnormal bruising noted  Back/Spine: no abnormalities noted  Musculoskeletal: full ROM all extremities good strength  no deformities  Extremities:  trace  Neurological:  Grossly normal    Results:     Laboratory Data:  Lab Results   Component Value Date    WBC 7.2 02/22/2025    HGB 13.2 02/22/2025    HCT 43.4 02/22/2025    .0 02/22/2025    CREATSERUM 1.60 (H) 02/22/2025    BUN 54 (H) 02/22/2025     02/22/2025    K 4.9 02/22/2025     02/22/2025    CO2 35.0 (H) 02/22/2025     (H) 02/22/2025    CA 8.7 02/22/2025    ALB 3.9 02/22/2025    ALKPHO 296 (H) 02/19/2025    BILT 1.0 02/19/2025    TP 5.8 02/19/2025     (H) 02/19/2025     (H) 02/19/2025    PTT 24.4 01/18/2025    INR 1.36 (H) 02/17/2025    T4F 1.1 08/05/2024    TSH 4.740 02/17/2025    MG 2.0 02/22/2025    PHOS 2.9 02/22/2025    B12 562 08/05/2024       Imaging:  @[email or web address]@   No results found.      ASSESSMENT/PLAN:   Assessment       #1 renal insufficiency creatinine stable 1.53 will sign off patient does not want Jardiance because of family issues in the past we will  Currently on torsemide 40 mg/day     Review 2400 mg salt restriction and 50 ounce fluid restriction with the granddaughters most likely home tomorrow will follow-up with me in the office in about a month as well as cardiology  Use of Entresto to be determined by cardiology as an outpatient  Issue of LifeVest to be determined by family but right now they do not seem to lean towards doing it  Will sign off please call if any questions discussed with granddaughters today  2/22/2025  Tono Calero MD

## 2025-02-23 NOTE — CM/SW NOTE
02/23/25 1000   Discharge disposition   Expected discharge disposition subacute   Post Acute Care Provider [facility]   Discharge transportation [facility]     TITI confirmed with Dr. Dill and RN Yosi that pt is medically ready for discharge today.  DC order placed.    TITI discussed with liaison Pauly from [facility] to arrange a time for discharge.  TITI scheduled [facility] for 1:00 PM.  RN is aware of discharge time and location and will inform patient/ family.     RN to attach IP Transfer Report to After Visit Summary packet for transfer to [facility].     TITI confirmed PASRR screen was completed.    PLAN: DC to [facility] via [facility] at 1:00 PM.  PCS completed.    RN # to report: [phone] room 2225    Darshana SAGASTUME MSW, LSW  Discharge Planner

## 2025-02-23 NOTE — PLAN OF CARE
Problem: Patient Centered Care  Goal: Patient preferences are identified and integrated in the patient's plan of care  Description: Interventions:  - What would you like us to know as we care for you? From home alone  - Provide timely, complete, and accurate information to patient/family  - Incorporate patient and family knowledge, values, beliefs, and cultural backgrounds into the planning and delivery of care  - Encourage patient/family to participate in care and decision-making at the level they choose  - Honor patient and family perspectives and choices  Outcome: Progressing     Problem: Patient/Family Goals  Goal: Patient/Family Long Term Goal  Description: Patient's Long Term Goal: to gain more strength    Interventions:  - ADELE, PT/OT  - See additional Care Plan goals for specific interventions  Outcome: Progressing  Goal: Patient/Family Short Term Goal  Description: Patient's Short Term Goal: be less fluid overloaded    Interventions:   - Diuresis, monitor labs and electrolytes  - See additional Care Plan goals for specific interventions  Outcome: Progressing     Problem: CARDIOVASCULAR - ADULT  Goal: Maintains optimal cardiac output and hemodynamic stability  Description: INTERVENTIONS:  - Monitor vital signs, rhythm, and trends  - Monitor for bleeding, hypotension and signs of decreased cardiac output  - Evaluate effectiveness of vasoactive medications to optimize hemodynamic stability  - Monitor arterial and/or venous puncture sites for bleeding and/or hematoma  - Assess quality of pulses, skin color and temperature  - Assess for signs of decreased coronary artery perfusion - ex. Angina  - Evaluate fluid balance, assess for edema, trend weights  Outcome: Progressing  Goal: Absence of cardiac arrhythmias or at baseline  Description: INTERVENTIONS:  - Continuous cardiac monitoring, monitor vital signs, obtain 12 lead EKG if indicated  - Evaluate effectiveness of antiarrhythmic and heart rate control  medications as ordered  - Initiate emergency measures for life threatening arrhythmias  - Monitor electrolytes and administer replacement therapy as ordered  Outcome: Progressing     Problem: RESPIRATORY - ADULT  Goal: Achieves optimal ventilation and oxygenation  Description: INTERVENTIONS:  - Assess for changes in respiratory status  - Assess for changes in mentation and behavior  - Position to facilitate oxygenation and minimize respiratory effort  - Oxygen supplementation based on oxygen saturation or ABGs  - Provide Smoking Cessation handout, if applicable  - Encourage broncho-pulmonary hygiene including cough, deep breathe, Incentive Spirometry  - Assess the need for suctioning and perform as needed  - Assess and instruct to report SOB or any respiratory difficulty  - Respiratory Therapy support as indicated  - Manage/alleviate anxiety  - Monitor for signs/symptoms of CO2 retention  Outcome: Progressing     Problem: METABOLIC/FLUID AND ELECTROLYTES - ADULT  Goal: Electrolytes maintained within normal limits  Description: INTERVENTIONS:  - Monitor labs and rhythm and assess patient for signs and symptoms of electrolyte imbalances  - Administer electrolyte replacement as ordered  - Monitor response to electrolyte replacements, including rhythm and repeat lab results as appropriate  - Fluid restriction as ordered  - Instruct patient on fluid and nutrition restrictions as appropriate  Outcome: Progressing  Goal: Hemodynamic stability and optimal renal function maintained  Description: INTERVENTIONS:  - Monitor labs and assess for signs and symptoms of volume excess or deficit  - Monitor intake, output and patient weight  - Monitor urine specific gravity, serum osmolarity and serum sodium as indicated or ordered  - Monitor response to interventions for patient's volume status, including labs, urine output, blood pressure (other measures as available)  - Encourage oral intake as appropriate  - Instruct patient on  fluid and nutrition restrictions as appropriate  Outcome: Progressing

## 2025-02-23 NOTE — DISCHARGE PLANNING
Pt Shyla is cleared for discharge. Pt is to follow up with nephrology and cardiology after discharged from rehab . Discharge instructions reviewed with pt. Pt expressed understanding and did not have questions. Tele monitor removed. IV removed. Pt has received their belongings and [facility] .    Report given to Gaston Tang.

## 2025-02-23 NOTE — DISCHARGE SUMMARY
[facility]  part of [facility]    Discharge Summary    Shyla Wheeler Patient Status:  Inpatient    1935 MRN [id number]   Location [facility] 3W/SW Attending Marissa Dill MD   Hosp Day # 7 PCP Ray Goldman MD     Date of Admission: 2025   Date of Discharge: 2025    Hospital Discharge Diagnoses: Acute on chronic CHF     Lace+ Score: 83  59-90 High Risk  29-58 Medium Risk  0-28   Low Risk.    TCM Follow-Up Recommendation:  LACE > 58: High Risk of readmission after discharge from the hospital.        Admitting Diagnosis: Acute on chronic congestive heart failure, unspecified heart failure type (HCC) [I50.9]  Acute on chronic hypoxic respiratory failure (HCC) [J96.21]    Disposition: Home    Discharge Diagnosis: .Principal Problem:    Acute on chronic congestive heart failure, unspecified heart failure type (HCC)  Active Problems:    Acute on chronic hypoxic respiratory failure (HCC)    Stage 3b chronic kidney disease (HCC)      Hospital Course:   Reason for Admission:   Per nocturnist  Shyla Wheeler is a 89 year old female with history of HTN, HLD, heart failure, A-fib, anxiety presents to the ED today for evaluation of chest pain, shortness of breath and fatigue.  Was hospitalized between  -25 for acute hypoxemic respiratory failure due to heart failure and possible pneumonia.  During that hospitalization she was also diagnosed with new onset A-fib.  Also acute kidney injury.  Completed course of antibiotics.  Echocardiogram showed EF 30 to 35% which was decreased from prior.  Per family, since discharge she has been compliant with medications.  She was doing very well up until yesterday.  Also note that she has had mild intermittent episodes of confusion.  She has been using home oxygen intermittently.  She has not reported any pain.  No nausea, vomiting or diarrhea.     At presentation today she is tachypneic, tachycardic  Labs are creatinine 2.35, elevated  transaminases.  COVID/RSV/influenza negative  CXR: CHF/fluid overload.  Large left retrocardiac hiatal hernia.     IV Lasix, nebs initiated in the ED.  Cardiology consulted.  Patient will be admitted for further treatment.    Discharge Physical Exam:   Physical Exam:    General: No acute distress.   Respiratory: Clear to auscultation bilaterally. No wheezes. No rhonchi.  Cardiovascular: S1, S2. Regular rate and rhythm. No murmurs, rubs or gallops.   Abdomen: Soft, nontender, nondistended.  Positive bowel sounds. No rebound or guarding.  Neurologic: No focal neurological deficits.   Musculoskeletal: Moves all extremities.    Hospital Course:     Acute on chronic respiratory failure with hypoxia  CHF exacerbation  -on 2L NC (was on 1 to 2 L at home since recent discharge on 2/8/2025 for pneumonia)   -Diurese as renal function permits  -CHF protocol-on po torsemide  -Cardiology consult-diuresis per cardiology- discharge on torsemide- due to insurance it was changed to lasix 80 mg daily   -renal consulted due to slight increase in creatinine, cr better     Acute kidney injury on chronic kidney disease, possible cardiorenal syndrome? improved  -Nephrology consulted  -will continue diuresis per cardio/renal, Cr improved     Elevated transaminases, worsening may be due to hepatic congestion from CHF  -RUQ ultrasound reviewed  - no acute process - lft trending down  -Hold statins     HTN  A-fib  Anxiety  -Resume home meds as appropriate  -Continue Xarelto, metoprolol     HLD  -Hold statins given elevated LFTs        Quality:  DVT Prophylaxis: Xarelto  CODE status: full  HILLARY: TBD       Complications: none    Consultants         Provider   Role Specialty     Tono Calero MD      Consulting Physician NEPHROLOGY     Edin Gallardo MD      Consulting Physician Cardiovascular Diseases     Mare Hull      Consulting Physician Licensed Counselor            Pending Labs       Order Current Status    Basic Metabolic Panel  (8) In process    Magnesium In process    Phosphorus In process            Discharge Plan:   Discharge Condition: Stable    Current Discharge Medication List        Home Meds - Modified    Details   furosemide 80 MG Oral Tab Take 1 tablet (80 mg total) by mouth daily.           Home Meds - Unchanged    Details   multivitamin Oral Tab Take 1 tablet by mouth daily.      acetaminophen 325 MG Oral Tab Take 1-2 tablets (325-650 mg total) by mouth every 6 (six) hours as needed for Pain.      metoprolol succinate  MG Oral Tablet 24 Hr Take 1.5 tablets (150 mg total) by mouth in the morning and 1.5 tablets (150 mg total) before bedtime.      RIVAROXABAN 15 MG Oral Tab Take 1 tablet (15 mg total) by mouth daily with lunch.      pravastatin 40 MG Oral Tab Take 1 tablet (40 mg total) by mouth nightly.      Calcium Carb-Cholecalciferol 600-200 MG-UNIT Oral Tab Take 1 tablet by mouth 2 (two) times daily.      TRAVATAN Z 0.004 % Ophthalmic Solution Place 1 drop into both eyes at bedtime.                 Discharge Diet: As tolerated    Discharge Activity: As tolerated       Discharge Medications        CHANGE how you take these medications        Instructions Prescription details   furosemide 80 MG Tabs  Commonly known as: Lasix  Start taking on: February 24, 2025  What changed:   medication strength  how much to take      Take 1 tablet (80 mg total) by mouth daily.   Quantity: 30 tablet  Refills: 0            CONTINUE taking these medications        Instructions Prescription details   acetaminophen 325 MG Tabs  Commonly known as: Tylenol      Take 1-2 tablets (325-650 mg total) by mouth every 6 (six) hours as needed for Pain.   Refills: 0     Calcium Carb-Cholecalciferol 600-200 MG-UNIT Tabs      Take 1 tablet by mouth 2 (two) times daily.   Refills: 0     metoprolol succinate  MG Tb24  Commonly known as: Toprol XL      Take 1.5 tablets (150 mg total) by mouth in the morning and 1.5 tablets (150 mg total) before  bedtime.   Quantity: 60 tablet  Refills: 3     multivitamin Tabs      Take 1 tablet by mouth daily.   Refills: 0     pravastatin 40 MG Tabs  Commonly known as: Pravachol      Take 1 tablet (40 mg total) by mouth nightly.   Quantity: 90 tablet  Refills: 1     rivaroxaban 15 MG Tabs  Commonly known as: Xarelto      Take 1 tablet (15 mg total) by mouth daily with lunch.   Quantity: 30 tablet  Refills: 1     Travatan Z 0.004 % Soln  Generic drug: travoprost (MARLEY free)      Place 1 drop into both eyes at bedtime.   Refills: 0               Where to Get Your Medications        These medications were sent to [id number] - [place], [place] - [address] [phone], [phone]  [address], [facility] [address]      Phone: [phone]   furosemide 80 MG Tabs         Follow up:      Follow-up Information       [facility]. Schedule an appointment as soon as possible for a visit.    Specialty: Multi-Specialty  Why: after rehab  Contact information:  [address]  [facility] [id number] [phone]  Additional information:  Your appointment is located at [address] in [place], [place].  Please park in the Yellow lot and go through the [facility] entrance.  Then proceed to [address].      Masks are optional for all patients and visitors, unless otherwise indicated.             Ray Kaye, DO Follow up in 1 week(s).    Specialty: Cardiovascular Diseases  Why: Office will call you to schedule the appointment  Contact information:  [address]   [address]  [facility] [address] [phone]                             Follow up Labs and imaging:         Other Discharge Instructions:         Resume services with [facility]  [phone]    Going Home Instructions  In this section you will find the tools which will guide you through the first few days after you leave the hospital. Continued use of these tools will help you develop the skills necessary to keep  your heart failure under control.     Home Care Instructions Following Heart Failure - the most important things to do every day include:   Weigh yourself and review the “Self-Check Plan” sheet every morning.   Call your cardiologist office if you are in the “Pay Attention-Use Caution” (yellow zone) or “Medical Alert-Warning!” (red zone) as outlined in the Self-Check Plan sheet.  Take your medicines as prescribed.  Limit your sodium (salt) intake.  Know when to call your cardiologist, primary doctor, or nurse.  Know when to seek emergency care.      Things for You to Remember:   1. See your doctor or healthcare provider as written on your discharge instructions.  It is important that you attend this appointment to make sure your symptoms are under control.     2. Your recommended sodium intake is 7056-5732 mg daily.    3.  Weigh yourself every day.    4. Some exercise and activity is important to help keep your heart functioning and strong. Unless instructed not to exercise, you may walk at a slow to moderate pace for 10-15 minutes 2-3 days per week to start. Pace your activity to prevent shortness of breath or fatigue. Stop exercising if you develop chest pain, lightheadedness, or significant shortness of breath.       Call Your Cardiologist If:   You gain 2-3 pounds in one day or 5 pounds in one week.  You have more difficulty breathing.  You are getting more tired with normal activity.  You are more short of breath lying down, or awaken at night short of breath.  You have swelling of your feet or legs.  You urinate less often during the day and more often at night.  You have cramps in your legs.  You have blurred vision or see yellowish-green halos around objects of lights.    Go to the Emergency Room If:   You have pain or tightness in your chest  You are extremely short of breath  You are coughing up pink-frothy mucus  You are traveling and develop symptoms of worsening heart failure      ** Please follow up  with your cardiologist or Advanced Practice Provider as written on your discharge instructions. If you are not provided with an appointment, let your nurse know so you can get an appointment**          Time spent:  > 30 minutes    CATHERINE PRICE MD  2/23/2025

## 2025-02-26 VITALS
TEMPERATURE: 98 F | HEART RATE: 93 BPM | DIASTOLIC BLOOD PRESSURE: 69 MMHG | RESPIRATION RATE: 23 BRPM | WEIGHT: 152 LBS | SYSTOLIC BLOOD PRESSURE: 105 MMHG | OXYGEN SATURATION: 98 % | BODY MASS INDEX: 25 KG/M2

## 2025-03-05 ENCOUNTER — APPOINTMENT (OUTPATIENT)
Dept: GENERAL RADIOLOGY | Age: 89
DRG: 871 | End: 2025-03-05
Attending: EMERGENCY MEDICINE

## 2025-03-05 ENCOUNTER — HOSPITAL ENCOUNTER (INPATIENT)
Age: 89
Discharge: NURSING, REHAB OR LONG-TERM CARE | DRG: 871 | End: 2025-03-05
Attending: EMERGENCY MEDICINE | Admitting: INTERNAL MEDICINE

## 2025-03-05 ENCOUNTER — APPOINTMENT (OUTPATIENT)
Dept: CT IMAGING | Age: 89
DRG: 871 | End: 2025-03-05
Attending: EMERGENCY MEDICINE

## 2025-03-05 DIAGNOSIS — I48.91 ATRIAL FIBRILLATION, UNSPECIFIED TYPE  (CMD): ICD-10-CM

## 2025-03-05 DIAGNOSIS — R06.09 DYSPNEA ON EXERTION: ICD-10-CM

## 2025-03-05 DIAGNOSIS — N17.9 ACUTE KIDNEY INJURY (CMD): Primary | ICD-10-CM

## 2025-03-05 DIAGNOSIS — I10 PRIMARY HYPERTENSION: ICD-10-CM

## 2025-03-05 DIAGNOSIS — N18.30 STAGE 3 CHRONIC KIDNEY DISEASE, UNSPECIFIED WHETHER STAGE 3A OR 3B CKD  (CMD): ICD-10-CM

## 2025-03-05 DIAGNOSIS — E78.5 DYSLIPIDEMIA: ICD-10-CM

## 2025-03-05 DIAGNOSIS — N17.9 AKI (ACUTE KIDNEY INJURY) (CMD): ICD-10-CM

## 2025-03-05 DIAGNOSIS — Z79.01 CHRONIC ANTICOAGULATION: ICD-10-CM

## 2025-03-05 DIAGNOSIS — R53.1 GENERALIZED WEAKNESS: ICD-10-CM

## 2025-03-05 DIAGNOSIS — I71.21 ANEURYSM OF ASCENDING AORTA WITHOUT RUPTURE (CMD): ICD-10-CM

## 2025-03-05 PROBLEM — I71.20 THORACIC AORTIC ANEURYSM (CMD): Status: ACTIVE | Noted: 2025-01-01

## 2025-03-05 LAB
ALBUMIN SERPL-MCNC: 3.1 G/DL (ref 3.4–5)
ALP SERPL-CCNC: 149 UNITS/L (ref 45–117)
ALT SERPL-CCNC: 105 UNITS/L
ANION GAP SERPL CALC-SCNC: 16 MMOL/L (ref 7–19)
APPEARANCE UR: ABNORMAL
AST SERPL-CCNC: 68 UNITS/L
BACTERIA #/AREA URNS HPF: ABNORMAL /HPF
BASE EXCESS / DEFICIT, VENOUS - RESPIRATORY: 12 MMOL/L (ref -2–2)
BASOPHILS # BLD: 0 K/MCL (ref 0–0.3)
BASOPHILS NFR BLD: 0 %
BDY SITE: ABNORMAL
BILIRUB CONJ SERPL-MCNC: 0.6 MG/DL (ref 0–0.2)
BILIRUB SERPL-MCNC: 1.3 MG/DL (ref 0.2–1)
BILIRUB UR QL STRIP: NEGATIVE
BODY TEMPERATURE: 37 DEGREES C
BUN SERPL-MCNC: 60 MG/DL (ref 6–20)
BUN/CREAT SERPL: 27 (ref 7–25)
CA-I BLD-SCNC: 1.15 MMOL/L (ref 1.15–1.29)
CALCIUM SERPL-MCNC: 9.5 MG/DL (ref 8.4–10.2)
CHLORIDE BLD-SCNC: 94 MMOL/L (ref 97–110)
CHLORIDE SERPL-SCNC: 97 MMOL/L (ref 97–110)
CO2 SERPL-SCNC: 26 MMOL/L (ref 21–32)
COHGB MFR BLDV: 0.8 %
COLOR UR: YELLOW
CONDITION: ABNORMAL
CREAT SERPL-MCNC: 2.21 MG/DL (ref 0.51–0.95)
DEPRECATED RDW RBC: 55.7 FL (ref 39–50)
EGFRCR SERPLBLD CKD-EPI 2021: 21 ML/MIN/{1.73_M2}
EOSINOPHIL # BLD: 0 K/MCL (ref 0–0.5)
EOSINOPHIL NFR BLD: 0 %
ERYTHROCYTE [DISTWIDTH] IN BLOOD: 17.2 % (ref 11–15)
FASTING DURATION TIME PATIENT: ABNORMAL H
FLUAV RNA RESP QL NAA+PROBE: NOT DETECTED
FLUBV RNA RESP QL NAA+PROBE: NOT DETECTED
GLUCOSE BLD-MCNC: 139 MG/DL (ref 65–99)
GLUCOSE BLDC GLUCOMTR-MCNC: 137 MG/DL (ref 70–99)
GLUCOSE SERPL-MCNC: 127 MG/DL (ref 70–99)
GLUCOSE UR STRIP-MCNC: NEGATIVE MG/DL
HCO3 BLDV-SCNC: 37 MMOL/L (ref 22–28)
HCT VFR BLD CALC: 40.5 % (ref 36–46.5)
HCT VFR BLD CALC: 41 % (ref 36–46.5)
HGB BLD-MCNC: 13.3 G/DL (ref 12–15.5)
HGB BLD-MCNC: 13.6 G/DL (ref 12–15.5)
HGB UR QL STRIP: ABNORMAL
HYALINE CASTS #/AREA URNS LPF: ABNORMAL /LPF
IMM GRANULOCYTES # BLD AUTO: 0 K/MCL (ref 0–0.2)
IMM GRANULOCYTES # BLD: 1 %
KETONES UR STRIP-MCNC: NEGATIVE MG/DL
LACTATE BLDV-SCNC: 3.2 MMOL/L (ref 0–2)
LACTATE BLDV-SCNC: 3.6 MMOL/L (ref 0–2)
LACTATE BLDV-SCNC: 4.1 MMOL/L (ref 0–2)
LEUKOCYTE ESTERASE UR QL STRIP: ABNORMAL
LYMPHOCYTES # BLD: 1.1 K/MCL (ref 1–4)
LYMPHOCYTES NFR BLD: 15 %
MCH RBC QN AUTO: 30.4 PG (ref 26–34)
MCHC RBC AUTO-ENTMCNC: 32.8 G/DL (ref 32–36.5)
MCV RBC AUTO: 92.5 FL (ref 78–100)
METHGB MFR BLDMV: 0.4 %
MONOCYTES # BLD: 0.6 K/MCL (ref 0.3–0.9)
MONOCYTES NFR BLD: 9 %
NEUTROPHILS # BLD: 5.5 K/MCL (ref 1.8–7.7)
NEUTROPHILS NFR BLD: 75 %
NITRITE UR QL STRIP: NEGATIVE
NRBC BLD MANUAL-RTO: 1 /100 WBC
NT-PROBNP SERPL-MCNC: ABNORMAL PG/ML
OXYHGB MFR BLDV: 38.2 % (ref 60–80)
PCO2 BLDV: 46 MM HG (ref 38–51)
PH BLDV: 7.51 UNITS (ref 7.35–7.45)
PH UR STRIP: 5 [PH] (ref 5–7)
PLATELET # BLD AUTO: 186 K/MCL (ref 140–450)
PO2 BLDV: 25 MM HG (ref 35–42)
POTASSIUM BLD-SCNC: 5.5 MMOL/L (ref 3.4–5.1)
POTASSIUM SERPL-SCNC: 4.5 MMOL/L (ref 3.4–5.1)
PROT SERPL-MCNC: 6.7 G/DL (ref 6.4–8.2)
PROT UR STRIP-MCNC: ABNORMAL MG/DL
QRS-INTERVAL (MSEC): 152
QT-INTERVAL (MSEC): 414
QTC: 526
R AXIS (DEGREES): -38
RAINBOW EXTRA TUBES HOLD SPECIMEN: NORMAL
RAINBOW EXTRA TUBES HOLD SPECIMEN: NORMAL
RBC # BLD: 4.38 MIL/MCL (ref 4–5.2)
RBC #/AREA URNS HPF: ABNORMAL /HPF
REPORT TEXT: NORMAL
RSV AG NPH QL IA.RAPID: NOT DETECTED
SAO2 % BLDV: 7 %
SAO2 DF BLDV: 39 % (ref 60–80)
SARS-COV-2 RNA RESP QL NAA+PROBE: NOT DETECTED
SERVICE CMNT-IMP: NORMAL
SERVICE CMNT-IMP: NORMAL
SODIUM BLD-SCNC: 133 MMOL/L (ref 135–145)
SODIUM SERPL-SCNC: 134 MMOL/L (ref 135–145)
SP GR UR STRIP: 1.01 (ref 1–1.03)
SQUAMOUS #/AREA URNS HPF: ABNORMAL /HPF
T AXIS (DEGREES): 138
TROPONIN I SERPL DL<=0.01 NG/ML-MCNC: 38 NG/L
UROBILINOGEN UR STRIP-MCNC: 0.2 MG/DL
VENTRICULAR RATE EKG/MIN (BPM): 97
WBC # BLD: 7.3 K/MCL (ref 4.2–11)
WBC #/AREA URNS HPF: >100 /HPF

## 2025-03-05 PROCEDURE — 10002800 HB RX 250 W HCPCS: Performed by: EMERGENCY MEDICINE

## 2025-03-05 PROCEDURE — 82947 ASSAY GLUCOSE BLOOD QUANT: CPT

## 2025-03-05 PROCEDURE — 93010 ELECTROCARDIOGRAM REPORT: CPT | Performed by: INTERNAL MEDICINE

## 2025-03-05 PROCEDURE — 10004651 HB RX, NO CHARGE ITEM: Performed by: INTERNAL MEDICINE

## 2025-03-05 PROCEDURE — 81001 URINALYSIS AUTO W/SCOPE: CPT | Performed by: EMERGENCY MEDICINE

## 2025-03-05 PROCEDURE — 82570 ASSAY OF URINE CREATININE: CPT | Performed by: INTERNAL MEDICINE

## 2025-03-05 PROCEDURE — 96374 THER/PROPH/DIAG INJ IV PUSH: CPT

## 2025-03-05 PROCEDURE — 71045 X-RAY EXAM CHEST 1 VIEW: CPT

## 2025-03-05 PROCEDURE — 99291 CRITICAL CARE FIRST HOUR: CPT

## 2025-03-05 PROCEDURE — 82805 BLOOD GASES W/O2 SATURATION: CPT

## 2025-03-05 PROCEDURE — 80076 HEPATIC FUNCTION PANEL: CPT | Performed by: EMERGENCY MEDICINE

## 2025-03-05 PROCEDURE — 74176 CT ABD & PELVIS W/O CONTRAST: CPT

## 2025-03-05 PROCEDURE — 84300 ASSAY OF URINE SODIUM: CPT | Performed by: INTERNAL MEDICINE

## 2025-03-05 PROCEDURE — 0241U COVID/FLU/RSV PANEL: CPT | Performed by: EMERGENCY MEDICINE

## 2025-03-05 PROCEDURE — 83605 ASSAY OF LACTIC ACID: CPT | Performed by: INTERNAL MEDICINE

## 2025-03-05 PROCEDURE — 10002807 HB RX 258: Performed by: INTERNAL MEDICINE

## 2025-03-05 PROCEDURE — 87040 BLOOD CULTURE FOR BACTERIA: CPT | Performed by: EMERGENCY MEDICINE

## 2025-03-05 PROCEDURE — 96375 TX/PRO/DX INJ NEW DRUG ADDON: CPT

## 2025-03-05 PROCEDURE — 84484 ASSAY OF TROPONIN QUANT: CPT | Performed by: EMERGENCY MEDICINE

## 2025-03-05 PROCEDURE — 80048 BASIC METABOLIC PNL TOTAL CA: CPT | Performed by: EMERGENCY MEDICINE

## 2025-03-05 PROCEDURE — 99223 1ST HOSP IP/OBS HIGH 75: CPT | Performed by: INTERNAL MEDICINE

## 2025-03-05 PROCEDURE — 85025 COMPLETE CBC W/AUTO DIFF WBC: CPT | Performed by: EMERGENCY MEDICINE

## 2025-03-05 PROCEDURE — 83605 ASSAY OF LACTIC ACID: CPT | Performed by: EMERGENCY MEDICINE

## 2025-03-05 PROCEDURE — 85018 HEMOGLOBIN: CPT

## 2025-03-05 PROCEDURE — 82330 ASSAY OF CALCIUM: CPT

## 2025-03-05 PROCEDURE — 82435 ASSAY OF BLOOD CHLORIDE: CPT

## 2025-03-05 PROCEDURE — 10002801 HB RX 250 W/O HCPCS: Performed by: EMERGENCY MEDICINE

## 2025-03-05 PROCEDURE — 10004651 HB RX, NO CHARGE ITEM: Performed by: EMERGENCY MEDICINE

## 2025-03-05 PROCEDURE — 82962 GLUCOSE BLOOD TEST: CPT

## 2025-03-05 PROCEDURE — 83880 ASSAY OF NATRIURETIC PEPTIDE: CPT | Performed by: EMERGENCY MEDICINE

## 2025-03-05 PROCEDURE — 10000002 HB ROOM CHARGE MED SURG

## 2025-03-05 PROCEDURE — 10002807 HB RX 258: Performed by: EMERGENCY MEDICINE

## 2025-03-05 PROCEDURE — 71250 CT THORAX DX C-: CPT

## 2025-03-05 PROCEDURE — 36415 COLL VENOUS BLD VENIPUNCTURE: CPT | Performed by: EMERGENCY MEDICINE

## 2025-03-05 PROCEDURE — 87086 URINE CULTURE/COLONY COUNT: CPT | Performed by: INTERNAL MEDICINE

## 2025-03-05 PROCEDURE — 93005 ELECTROCARDIOGRAM TRACING: CPT | Performed by: EMERGENCY MEDICINE

## 2025-03-05 PROCEDURE — 96361 HYDRATE IV INFUSION ADD-ON: CPT

## 2025-03-05 RX ORDER — ACETAMINOPHEN 325 MG/1
650 TABLET ORAL EVERY 4 HOURS PRN
Status: DISCONTINUED | OUTPATIENT
Start: 2025-03-05 | End: 2025-03-11 | Stop reason: HOSPADM

## 2025-03-05 RX ORDER — METOPROLOL SUCCINATE 100 MG/1
1.5 TABLET, EXTENDED RELEASE ORAL 2 TIMES DAILY
COMMUNITY

## 2025-03-05 RX ORDER — PRAVASTATIN SODIUM 40 MG
40 TABLET ORAL AT BEDTIME
COMMUNITY

## 2025-03-05 RX ORDER — AMOXICILLIN 250 MG
2 CAPSULE ORAL DAILY PRN
Status: DISCONTINUED | OUTPATIENT
Start: 2025-03-05 | End: 2025-03-11 | Stop reason: HOSPADM

## 2025-03-05 RX ORDER — ACETAMINOPHEN 650 MG/1
650 SUPPOSITORY RECTAL EVERY 4 HOURS PRN
Status: DISCONTINUED | OUTPATIENT
Start: 2025-03-05 | End: 2025-03-11 | Stop reason: HOSPADM

## 2025-03-05 RX ORDER — ONDANSETRON 4 MG/1
4 TABLET, FILM COATED ORAL EVERY 4 HOURS PRN
COMMUNITY

## 2025-03-05 RX ORDER — PROCHLORPERAZINE EDISYLATE 5 MG/ML
5 INJECTION INTRAMUSCULAR; INTRAVENOUS EVERY 4 HOURS PRN
Status: DISCONTINUED | OUTPATIENT
Start: 2025-03-05 | End: 2025-03-11 | Stop reason: HOSPADM

## 2025-03-05 RX ORDER — BUMETANIDE 0.25 MG/ML
2 INJECTION, SOLUTION INTRAMUSCULAR; INTRAVENOUS DAILY
Status: DISCONTINUED | OUTPATIENT
Start: 2025-03-06 | End: 2025-03-05

## 2025-03-05 RX ORDER — ONDANSETRON 2 MG/ML
4 INJECTION INTRAMUSCULAR; INTRAVENOUS 2 TIMES DAILY PRN
Status: DISCONTINUED | OUTPATIENT
Start: 2025-03-05 | End: 2025-03-11 | Stop reason: HOSPADM

## 2025-03-05 RX ORDER — LATANOPROST 50 UG/ML
1 SOLUTION/ DROPS OPHTHALMIC NIGHTLY
COMMUNITY

## 2025-03-05 RX ORDER — BUMETANIDE 0.25 MG/ML
2 INJECTION, SOLUTION INTRAMUSCULAR; INTRAVENOUS ONCE
Status: COMPLETED | OUTPATIENT
Start: 2025-03-05 | End: 2025-03-05

## 2025-03-05 RX ORDER — BUMETANIDE 0.25 MG/ML
2 INJECTION, SOLUTION INTRAMUSCULAR; INTRAVENOUS DAILY
Status: DISCONTINUED | OUTPATIENT
Start: 2025-03-06 | End: 2025-03-06

## 2025-03-05 RX ORDER — 0.9 % SODIUM CHLORIDE 0.9 %
2 VIAL (ML) INJECTION EVERY 12 HOURS SCHEDULED
Status: DISCONTINUED | OUTPATIENT
Start: 2025-03-05 | End: 2025-03-11 | Stop reason: HOSPADM

## 2025-03-05 RX ORDER — BUMETANIDE 2 MG/1
2 TABLET ORAL 3 TIMES DAILY
COMMUNITY

## 2025-03-05 RX ORDER — 0.9 % SODIUM CHLORIDE 0.9 %
10 VIAL (ML) INJECTION PRN
Status: DISCONTINUED | OUTPATIENT
Start: 2025-03-05 | End: 2025-03-11 | Stop reason: HOSPADM

## 2025-03-05 RX ORDER — POLYETHYLENE GLYCOL 3350 17 G/17G
17 POWDER, FOR SOLUTION ORAL DAILY PRN
Status: DISCONTINUED | OUTPATIENT
Start: 2025-03-05 | End: 2025-03-11 | Stop reason: HOSPADM

## 2025-03-05 RX ORDER — ACETAMINOPHEN 325 MG/1
650 TABLET ORAL EVERY 6 HOURS PRN
COMMUNITY

## 2025-03-05 RX ORDER — MAGNESIUM HYDROXIDE/ALUMINUM HYDROXICE/SIMETHICONE 120; 1200; 1200 MG/30ML; MG/30ML; MG/30ML
30 SUSPENSION ORAL EVERY 4 HOURS PRN
Status: DISCONTINUED | OUTPATIENT
Start: 2025-03-05 | End: 2025-03-11 | Stop reason: HOSPADM

## 2025-03-05 RX ADMIN — ACETAMINOPHEN 650 MG: 325 TABLET ORAL at 21:31

## 2025-03-05 RX ADMIN — SODIUM CHLORIDE 500 ML: 9 INJECTION, SOLUTION INTRAVENOUS at 21:29

## 2025-03-05 RX ADMIN — SODIUM CHLORIDE, PRESERVATIVE FREE 2 ML: 5 INJECTION INTRAVENOUS at 21:27

## 2025-03-05 RX ADMIN — SODIUM CHLORIDE 500 ML: 9 INJECTION, SOLUTION INTRAVENOUS at 14:31

## 2025-03-05 RX ADMIN — WATER 2000 MG: 1 INJECTION INTRAMUSCULAR; INTRAVENOUS; SUBCUTANEOUS at 14:31

## 2025-03-05 RX ADMIN — BUMETANIDE 2 MG: 0.25 INJECTION INTRAMUSCULAR; INTRAVENOUS at 15:51

## 2025-03-05 RX ADMIN — SODIUM CHLORIDE, PRESERVATIVE FREE 2 ML: 5 INJECTION INTRAVENOUS at 14:30

## 2025-03-05 SDOH — SOCIAL STABILITY: SOCIAL NETWORK
HOW OFTEN DO YOU SEE OR TALK TO PEOPLE THAT YOU CARE ABOUT AND FEEL CLOSE TO? (FOR EXAMPLE: TALKING TO FRIENDS ON THE PHONE, VISITING FRIENDS OR FAMILY, GOING TO CHURCH OR CLUB MEETINGS): 5 OR MORE TIMES A WEEK

## 2025-03-05 SDOH — ECONOMIC STABILITY: GENERAL: WOULD YOU LIKE HELP WITH ANY OF THE FOLLOWING NEEDS?: I DON'T WANT HELP WITH ANY OF THESE

## 2025-03-05 SDOH — ECONOMIC STABILITY: INCOME INSECURITY: IN THE PAST 12 MONTHS, HAS THE ELECTRIC, GAS, OIL, OR WATER COMPANY THREATENED TO SHUT OFF SERVICE IN YOUR HOME?: NO

## 2025-03-05 SDOH — SOCIAL STABILITY: SOCIAL INSECURITY: HOW OFTEN DOES ANYONE, INCLUDING FAMILY AND FRIENDS, PHYSICALLY HURT YOU?: NEVER

## 2025-03-05 SDOH — ECONOMIC STABILITY: TRANSPORTATION INSECURITY
IN THE PAST 12 MONTHS, HAS LACK OF RELIABLE TRANSPORTATION KEPT YOU FROM MEDICAL APPOINTMENTS, MEETINGS, WORK OR FROM GETTING THINGS NEEDED FOR DAILY LIVING?: NO

## 2025-03-05 SDOH — SOCIAL STABILITY: SOCIAL INSECURITY: HOW OFTEN DOES ANYONE, INCLUDING FAMILY AND FRIENDS, SCREAM OR CURSE AT YOU?: NEVER

## 2025-03-05 SDOH — ECONOMIC STABILITY: HOUSING INSECURITY: DO YOU HAVE PROBLEMS WITH ANY OF THE FOLLOWING?: NONE OF THE ABOVE

## 2025-03-05 SDOH — ECONOMIC STABILITY: FOOD INSECURITY: WITHIN THE PAST 12 MONTHS, THE FOOD YOU BOUGHT JUST DIDN'T LAST AND YOU DIDN'T HAVE MONEY TO GET MORE.: NEVER TRUE

## 2025-03-05 SDOH — SOCIAL STABILITY: SOCIAL INSECURITY: HOW OFTEN DOES ANYONE, INCLUDING FAMILY AND FRIENDS, THREATEN YOU WITH HARM?: NEVER

## 2025-03-05 SDOH — SOCIAL STABILITY: SOCIAL INSECURITY: HOW OFTEN DOES ANYONE, INCLUDING FAMILY AND FRIENDS, INSULT OR TALK DOWN TO YOU?: NEVER

## 2025-03-05 SDOH — ECONOMIC STABILITY: HOUSING INSECURITY: WHAT IS YOUR LIVING SITUATION TODAY?: ASSISTED LIVING

## 2025-03-05 SDOH — ECONOMIC STABILITY: HOUSING INSECURITY: WHAT IS YOUR LIVING SITUATION TODAY?: I HAVE A STEADY PLACE TO LIVE

## 2025-03-05 SDOH — HEALTH STABILITY: PHYSICAL HEALTH: DO YOU HAVE DIFFICULTY DRESSING OR BATHING?: YES

## 2025-03-05 SDOH — ECONOMIC STABILITY: GENERAL

## 2025-03-05 SDOH — HEALTH STABILITY: GENERAL
BECAUSE OF A PHYSICAL, MENTAL, OR EMOTIONAL CONDITION, DO YOU HAVE SERIOUS DIFFICULTY CONCENTRATING, REMEMBERING OR MAKING DECISIONS?: NO

## 2025-03-05 SDOH — HEALTH STABILITY: PHYSICAL HEALTH: DO YOU HAVE SERIOUS DIFFICULTY WALKING OR CLIMBING STAIRS?: YES

## 2025-03-05 SDOH — HEALTH STABILITY: GENERAL: BECAUSE OF A PHYSICAL, MENTAL, OR EMOTIONAL CONDITION, DO YOU HAVE DIFFICULTY DOING ERRANDS ALONE?: NO

## 2025-03-05 ASSESSMENT — PATIENT HEALTH QUESTIONNAIRE - PHQ9
SUM OF ALL RESPONSES TO PHQ9 QUESTIONS 1 AND 2: 0
1. LITTLE INTEREST OR PLEASURE IN DOING THINGS: NOT AT ALL
IS PATIENT ABLE TO COMPLETE PHQ2 OR PHQ9: YES
2. FEELING DOWN, DEPRESSED OR HOPELESS: NOT AT ALL
CLINICAL INTERPRETATION OF PHQ2 SCORE: NO FURTHER SCREENING NEEDED
SUM OF ALL RESPONSES TO PHQ9 QUESTIONS 1 AND 2: 0

## 2025-03-05 ASSESSMENT — ACTIVITIES OF DAILY LIVING (ADL)
FEEDING: INDEPENDENT
RECENT_DECLINE_ADL: YES, DECLINE IN BATHING/DRESSING/FEEDING, COLLABORATE WITH PROVIDER (T);YES, DECLINE IN AMBULATION/TRANSFERRING, COLLABORATE WITH PROVIDER (T)
BATHING: NEEDS ASSISTANCE
ADL_SCORE: 7
ADL_SHORT_OF_BREATH: YES
ADL_BEFORE_ADMISSION: NEEDS/REQUIRES ASSISTANCE
DRESSING: NEEDS ASSISTANCE
TOILETING: NEEDS ASSISTANCE

## 2025-03-05 ASSESSMENT — ORIENTATION MEMORY CONCENTRATION TEST (OMCT)
OMCT SCORE: 10
REPEAT THE NAME AND ADDRESS I ASKED YOU TO REMEMBER: CORRECT
WHAT YEAR IS IT NOW (MUST BE EXACT): CORRECT
SAY THE MONTHS IN REVERSE ORDER STARTING WITH LAST MONTH: 2 OR MORE ERRORS
COUNT BACKWARDS FROM 20 TO 1: CORRECT
WHAT MONTH IS IT NOW: INCORRECT
WHAT TIME IS IT (NO WATCH OR CLOCK): INCORRECT
OMCT INTERPRETATION: 7-10: MILD COGNITIVE IMPAIRMENT

## 2025-03-05 ASSESSMENT — COLUMBIA-SUICIDE SEVERITY RATING SCALE - C-SSRS
1. WITHIN THE PAST MONTH, HAVE YOU WISHED YOU WERE DEAD OR WISHED YOU COULD GO TO SLEEP AND NOT WAKE UP?: NO
2. HAVE YOU ACTUALLY HAD ANY THOUGHTS OF KILLING YOURSELF?: NO
6. HAVE YOU EVER DONE ANYTHING, STARTED TO DO ANYTHING, OR PREPARED TO DO ANYTHING TO END YOUR LIFE?: NO
IS THE PATIENT ABLE TO COMPLETE C-SSRS: YES

## 2025-03-05 ASSESSMENT — PAIN SCALES - GENERAL
PAINLEVEL_OUTOF10: 0
PAINLEVEL_OUTOF10: 5
PAINLEVEL_OUTOF10: 0
PAINLEVEL_OUTOF10: 0

## 2025-03-05 ASSESSMENT — LIFESTYLE VARIABLES
HOW OFTEN DO YOU HAVE 6 OR MORE DRINKS ON ONE OCCASION: NEVER
HOW OFTEN DO YOU HAVE A DRINK CONTAINING ALCOHOL: NEVER
ALCOHOL_USE_STATUS: NO OR LOW RISK WITH VALIDATED TOOL
AUDIT-C TOTAL SCORE: 0
HOW MANY STANDARD DRINKS CONTAINING ALCOHOL DO YOU HAVE ON A TYPICAL DAY: 0,1 OR 2

## 2025-03-06 ENCOUNTER — APPOINTMENT (OUTPATIENT)
Dept: CARDIOLOGY | Age: 89
DRG: 871 | End: 2025-03-06
Attending: INTERNAL MEDICINE

## 2025-03-06 LAB
ALBUMIN SERPL-MCNC: 3 G/DL (ref 3.4–5)
ALBUMIN SERPL-MCNC: 3 G/DL (ref 3.4–5)
ALBUMIN/GLOB SERPL: 0.9 {RATIO} (ref 1–2.4)
ALBUMIN/GLOB SERPL: 0.9 {RATIO} (ref 1–2.4)
ALP SERPL-CCNC: 137 UNITS/L (ref 45–117)
ALP SERPL-CCNC: 145 UNITS/L (ref 45–117)
ALT SERPL-CCNC: 114 UNITS/L
ALT SERPL-CCNC: 115 UNITS/L
ANION GAP SERPL CALC-SCNC: 17 MMOL/L (ref 7–19)
AORTIC VALVE AREA (AVA): 0.93
AORTIC VALVE AREA: 1.57
ASCENDING AORTA (AAD): 8
AST SERPL-CCNC: 78 UNITS/L
AST SERPL-CCNC: 81 UNITS/L
AV MEAN GRADIENT (AVMG): 4
AV MEAN VELOCITY (AVMV): 0.84
AV PEAK GRADIENT (AVPG): 7
AV PEAK VELOCITY (AVPV): 1.28
AV STENOSIS SEVERITY TEXT: NORMAL
AVI LVOT PEAK GRADIENT (LVOTMG): 1.1
BASOPHILS # BLD: 0 K/MCL (ref 0–0.3)
BASOPHILS NFR BLD: 0 %
BILIRUB SERPL-MCNC: 0.9 MG/DL (ref 0.2–1)
BILIRUB SERPL-MCNC: 1.2 MG/DL (ref 0.2–1)
BUN SERPL-MCNC: 65 MG/DL (ref 6–20)
BUN SERPL-MCNC: 68 MG/DL (ref 6–20)
BUN SERPL-MCNC: 73 MG/DL (ref 6–20)
BUN SERPL-MCNC: 74 MG/DL (ref 6–20)
BUN/CREAT SERPL: 26 (ref 7–25)
BUN/CREAT SERPL: 27 (ref 7–25)
BUN/CREAT SERPL: 27 (ref 7–25)
BUN/CREAT SERPL: 29 (ref 7–25)
CALCIUM SERPL-MCNC: 9.1 MG/DL (ref 8.4–10.2)
CALCIUM SERPL-MCNC: 9.2 MG/DL (ref 8.4–10.2)
CALCIUM SERPL-MCNC: 9.4 MG/DL (ref 8.4–10.2)
CALCIUM SERPL-MCNC: 9.6 MG/DL (ref 8.4–10.2)
CHLORIDE SERPL-SCNC: 97 MMOL/L (ref 97–110)
CHLORIDE SERPL-SCNC: 98 MMOL/L (ref 97–110)
CHOLEST SERPL-MCNC: 126 MG/DL
CHOLEST/HDLC SERPL: 2.6 {RATIO}
CO2 SERPL-SCNC: 24 MMOL/L (ref 21–32)
CO2 SERPL-SCNC: 25 MMOL/L (ref 21–32)
CREAT SERPL-MCNC: 2.53 MG/DL (ref 0.51–0.95)
CREAT SERPL-MCNC: 2.54 MG/DL (ref 0.51–0.95)
CREAT SERPL-MCNC: 2.54 MG/DL (ref 0.51–0.95)
CREAT SERPL-MCNC: 2.69 MG/DL (ref 0.51–0.95)
CREAT UR-MCNC: 98.2 MG/DL
DEPRECATED RDW RBC: 56.6 FL (ref 39–50)
EGFRCR SERPLBLD CKD-EPI 2021: 17 ML/MIN/{1.73_M2}
EGFRCR SERPLBLD CKD-EPI 2021: 18 ML/MIN/{1.73_M2}
EOSINOPHIL # BLD: 0 K/MCL (ref 0–0.5)
EOSINOPHIL NFR BLD: 0 %
EOSINOPHIL NFR URNS MANUAL: 2 %
EOSINOPHIL URNS QL WRIGHT STN: PRESENT
ERYTHROCYTE [DISTWIDTH] IN BLOOD: 16.9 % (ref 11–15)
FASTING DURATION TIME PATIENT: ABNORMAL H
GLOBULIN SER-MCNC: 3.2 G/DL (ref 2–4)
GLOBULIN SER-MCNC: 3.4 G/DL (ref 2–4)
GLUCOSE SERPL-MCNC: 113 MG/DL (ref 70–99)
GLUCOSE SERPL-MCNC: 115 MG/DL (ref 70–99)
GLUCOSE SERPL-MCNC: 118 MG/DL (ref 70–99)
GLUCOSE SERPL-MCNC: 122 MG/DL (ref 70–99)
HCT VFR BLD CALC: 42.2 % (ref 36–46.5)
HDLC SERPL-MCNC: 48 MG/DL
HGB BLD-MCNC: 13.4 G/DL (ref 12–15.5)
IMM GRANULOCYTES # BLD AUTO: 0 K/MCL (ref 0–0.2)
IMM GRANULOCYTES # BLD: 0 %
INTERVENTRICULAR SEPTUM IN END DIASTOLE (IVSD): 1.62
LACTATE BLDV-SCNC: 2.8 MMOL/L (ref 0–2)
LACTATE BLDV-SCNC: 3.9 MMOL/L (ref 0–2)
LACTATE BLDV-SCNC: 3.9 MMOL/L (ref 0–2)
LACTATE BLDV-SCNC: 4 MMOL/L (ref 0–2)
LDLC SERPL CALC-MCNC: 63 MG/DL
LEFT INTERNAL DIMENSION IN SYSTOLE (LVSD): 1.3
LEFT VENTRICULAR INTERNAL DIMENSION IN DIASTOLE (LVDD): 4.3
LEFT VENTRICULAR POSTERIOR WALL IN END DIASTOLE (LVPW): 5
LV EF: NORMAL %
LVOT 2D (LVOTD): 9.6
LVOT VTI (LVOTVTI): 0.71
LYMPHOCYTES # BLD: 1 K/MCL (ref 1–4)
LYMPHOCYTES NFR BLD: 9 %
MAGNESIUM SERPL-MCNC: 2.2 MG/DL (ref 1.7–2.4)
MCH RBC QN AUTO: 29.8 PG (ref 26–34)
MCHC RBC AUTO-ENTMCNC: 31.8 G/DL (ref 32–36.5)
MCV RBC AUTO: 94 FL (ref 78–100)
MONOCYTES # BLD: 0.7 K/MCL (ref 0.3–0.9)
MONOCYTES NFR BLD: 6 %
MV PEAK A VELOCITY (MVPAV): 179
MV PEAK E VELOCITY (MVPEV): 0.3
NEUTROPHILS # BLD: 9 K/MCL (ref 1.8–7.7)
NEUTROPHILS NFR BLD: 85 %
NONHDLC SERPL-MCNC: 78 MG/DL
NRBC BLD MANUAL-RTO: 1 /100 WBC
PLATELET # BLD AUTO: 181 K/MCL (ref 140–450)
POTASSIUM SERPL-SCNC: 4.5 MMOL/L (ref 3.4–5.1)
POTASSIUM SERPL-SCNC: 4.5 MMOL/L (ref 3.4–5.1)
POTASSIUM SERPL-SCNC: 4.6 MMOL/L (ref 3.4–5.1)
POTASSIUM SERPL-SCNC: 4.8 MMOL/L (ref 3.4–5.1)
PROT SERPL-MCNC: 6.2 G/DL (ref 6.4–8.2)
PROT SERPL-MCNC: 6.4 G/DL (ref 6.4–8.2)
RBC # BLD: 4.49 MIL/MCL (ref 4–5.2)
RV END SYSTOLIC LONGITUDINAL STRAIN FREE WALL (RVGS): 2.1
SODIUM SERPL-SCNC: 133 MMOL/L (ref 135–145)
SODIUM SERPL-SCNC: 134 MMOL/L (ref 135–145)
SODIUM SERPL-SCNC: 134 MMOL/L (ref 135–145)
SODIUM SERPL-SCNC: 135 MMOL/L (ref 135–145)
SODIUM UR-SCNC: 32 MMOL/L
TRICUSPID VALVE ANNULAR PEAK VELOCITY (TVAPV): 26
TRICUSPID VALVE PEAK REGURGITATION VELOCITY (TRPV): 5.2
TRIGL SERPL-MCNC: 75 MG/DL
TROPONIN I SERPL DL<=0.01 NG/ML-MCNC: 37 NG/L
TSH SERPL-ACNC: 4.73 MCUNITS/ML (ref 0.35–5)
TV ESTIMATED RIGHT ARTERIAL PRESSURE (RAP): 12.4
WBC # BLD: 10.8 K/MCL (ref 4.2–11)

## 2025-03-06 PROCEDURE — 84484 ASSAY OF TROPONIN QUANT: CPT | Performed by: INTERNAL MEDICINE

## 2025-03-06 PROCEDURE — P9045 ALBUMIN (HUMAN), 5%, 250 ML: HCPCS | Performed by: INTERNAL MEDICINE

## 2025-03-06 PROCEDURE — 83605 ASSAY OF LACTIC ACID: CPT | Performed by: INTERNAL MEDICINE

## 2025-03-06 PROCEDURE — 80061 LIPID PANEL: CPT | Performed by: INTERNAL MEDICINE

## 2025-03-06 PROCEDURE — 83605 ASSAY OF LACTIC ACID: CPT | Performed by: STUDENT IN AN ORGANIZED HEALTH CARE EDUCATION/TRAINING PROGRAM

## 2025-03-06 PROCEDURE — 84443 ASSAY THYROID STIM HORMONE: CPT | Performed by: INTERNAL MEDICINE

## 2025-03-06 PROCEDURE — 10002800 HB RX 250 W HCPCS: Performed by: INTERNAL MEDICINE

## 2025-03-06 PROCEDURE — 94660 CPAP INITIATION&MGMT: CPT

## 2025-03-06 PROCEDURE — 83735 ASSAY OF MAGNESIUM: CPT | Performed by: INTERNAL MEDICINE

## 2025-03-06 PROCEDURE — 80048 BASIC METABOLIC PNL TOTAL CA: CPT | Performed by: INTERNAL MEDICINE

## 2025-03-06 PROCEDURE — 80053 COMPREHEN METABOLIC PANEL: CPT | Performed by: INTERNAL MEDICINE

## 2025-03-06 PROCEDURE — 36415 COLL VENOUS BLD VENIPUNCTURE: CPT | Performed by: INTERNAL MEDICINE

## 2025-03-06 PROCEDURE — 10002801 HB RX 250 W/O HCPCS: Performed by: INTERNAL MEDICINE

## 2025-03-06 PROCEDURE — 76376 3D RENDER W/INTRP POSTPROCES: CPT | Performed by: INTERNAL MEDICINE

## 2025-03-06 PROCEDURE — 10004180 HB COUNTER-TRANSPORT

## 2025-03-06 PROCEDURE — 85025 COMPLETE CBC W/AUTO DIFF WBC: CPT | Performed by: INTERNAL MEDICINE

## 2025-03-06 PROCEDURE — 10002803 HB RX 637: Performed by: INTERNAL MEDICINE

## 2025-03-06 PROCEDURE — 99233 SBSQ HOSP IP/OBS HIGH 50: CPT | Performed by: INTERNAL MEDICINE

## 2025-03-06 PROCEDURE — 93306 TTE W/DOPPLER COMPLETE: CPT | Performed by: INTERNAL MEDICINE

## 2025-03-06 PROCEDURE — 87205 SMEAR GRAM STAIN: CPT | Performed by: INTERNAL MEDICINE

## 2025-03-06 PROCEDURE — 10000002 HB ROOM CHARGE MED SURG

## 2025-03-06 PROCEDURE — 76376 3D RENDER W/INTRP POSTPROCES: CPT

## 2025-03-06 PROCEDURE — 10004651 HB RX, NO CHARGE ITEM: Performed by: EMERGENCY MEDICINE

## 2025-03-06 PROCEDURE — 99223 1ST HOSP IP/OBS HIGH 75: CPT | Performed by: INTERNAL MEDICINE

## 2025-03-06 RX ORDER — ALBUMIN HUMAN 50 G/1000ML
12.5 SOLUTION INTRAVENOUS EVERY 6 HOURS SCHEDULED
Status: DISCONTINUED | OUTPATIENT
Start: 2025-03-06 | End: 2025-03-06

## 2025-03-06 RX ORDER — ALBUMIN HUMAN 50 G/1000ML
12.5 SOLUTION INTRAVENOUS EVERY 6 HOURS SCHEDULED
Status: COMPLETED | OUTPATIENT
Start: 2025-03-06 | End: 2025-03-08

## 2025-03-06 RX ORDER — BUMETANIDE 0.25 MG/ML
2 INJECTION, SOLUTION INTRAMUSCULAR; INTRAVENOUS ONCE
Status: COMPLETED | OUTPATIENT
Start: 2025-03-06 | End: 2025-03-06

## 2025-03-06 RX ADMIN — SODIUM CHLORIDE, PRESERVATIVE FREE 2 ML: 5 INJECTION INTRAVENOUS at 20:58

## 2025-03-06 RX ADMIN — BUMETANIDE 2 MG: 0.25 INJECTION INTRAMUSCULAR; INTRAVENOUS at 06:55

## 2025-03-06 RX ADMIN — WATER 2000 MG: 1 INJECTION INTRAMUSCULAR; INTRAVENOUS; SUBCUTANEOUS at 10:30

## 2025-03-06 RX ADMIN — ALBUMIN HUMAN 12.5 G: 0.05 INJECTION, SOLUTION INTRAVENOUS at 14:20

## 2025-03-06 RX ADMIN — RIVAROXABAN 15 MG: 15 TABLET, FILM COATED ORAL at 17:19

## 2025-03-06 RX ADMIN — ALBUMIN HUMAN 12.5 G: 0.05 INJECTION, SOLUTION INTRAVENOUS at 17:18

## 2025-03-06 RX ADMIN — BUMETANIDE 2 MG: 0.25 INJECTION INTRAMUSCULAR; INTRAVENOUS at 20:59

## 2025-03-06 RX ADMIN — BUMETANIDE 2 MG: 0.25 INJECTION INTRAMUSCULAR; INTRAVENOUS at 11:01

## 2025-03-06 SDOH — ECONOMIC STABILITY: HOUSING INSECURITY: WHAT IS YOUR LIVING SITUATION TODAY?: I HAVE A STEADY PLACE TO LIVE

## 2025-03-06 ASSESSMENT — PAIN SCALES - GENERAL
PAINLEVEL_OUTOF10: 0
PAINLEVEL_OUTOF10: 0

## 2025-03-06 ASSESSMENT — COGNITIVE AND FUNCTIONAL STATUS - GENERAL
DO YOU HAVE SERIOUS DIFFICULTY WALKING OR CLIMBING STAIRS: YES
DO YOU HAVE DIFFICULTY DRESSING OR BATHING: NO

## 2025-03-07 LAB
ALBUMIN SERPL-MCNC: 3.3 G/DL (ref 3.4–5)
ALP SERPL-CCNC: 130 UNITS/L (ref 45–117)
ALT SERPL-CCNC: 99 UNITS/L
ANION GAP SERPL CALC-SCNC: 14 MMOL/L (ref 7–19)
AST SERPL-CCNC: 65 UNITS/L
BASE EXCESS / DEFICIT, ARTERIAL - RESPIRATORY: 8 MMOL/L (ref -2–3)
BDY SITE: ABNORMAL
BILIRUB CONJ SERPL-MCNC: 0.5 MG/DL (ref 0–0.2)
BILIRUB SERPL-MCNC: 0.9 MG/DL (ref 0.2–1)
BODY TEMPERATURE: 37 DEGREES C
BUN SERPL-MCNC: 75 MG/DL (ref 6–20)
BUN/CREAT SERPL: 29 (ref 7–25)
CALCIUM SERPL-MCNC: 8.9 MG/DL (ref 8.4–10.2)
CHLORIDE SERPL-SCNC: 98 MMOL/L (ref 97–110)
CO2 SERPL-SCNC: 29 MMOL/L (ref 21–32)
CONDITION: ABNORMAL
CREAT SERPL-MCNC: 2.61 MG/DL (ref 0.51–0.95)
EGFRCR SERPLBLD CKD-EPI 2021: 17 ML/MIN/{1.73_M2}
FASTING DURATION TIME PATIENT: ABNORMAL H
GLUCOSE SERPL-MCNC: 97 MG/DL (ref 70–99)
HCO3 BLDA-SCNC: 30 MMOL/L (ref 22–28)
LACTATE BLDV-SCNC: 1.9 MMOL/L (ref 0–2)
LACTATE BLDV-SCNC: 2 MMOL/L (ref 0–2)
LACTATE BLDV-SCNC: 2.4 MMOL/L (ref 0–2)
LACTATE BLDV-SCNC: 2.4 MMOL/L (ref 0–2)
PCO2 BLDA: 30 MM HG (ref 32–45)
PH BLDA: 7.6 UNITS (ref 7.35–7.45)
PO2 BLDA: 76 MM HG (ref 83–108)
POTASSIUM SERPL-SCNC: 3.8 MMOL/L (ref 3.4–5.1)
PROT SERPL-MCNC: 6.2 G/DL (ref 6.4–8.2)
SAO2 % BLDA: 97 % (ref 95–99)
SODIUM SERPL-SCNC: 137 MMOL/L (ref 135–145)

## 2025-03-07 PROCEDURE — 10002803 HB RX 637: Performed by: INTERNAL MEDICINE

## 2025-03-07 PROCEDURE — 82803 BLOOD GASES ANY COMBINATION: CPT

## 2025-03-07 PROCEDURE — 10004281 HB COUNTER-STAFF TIME PER 15 MIN

## 2025-03-07 PROCEDURE — 10004651 HB RX, NO CHARGE ITEM: Performed by: EMERGENCY MEDICINE

## 2025-03-07 PROCEDURE — 80076 HEPATIC FUNCTION PANEL: CPT | Performed by: INTERNAL MEDICINE

## 2025-03-07 PROCEDURE — 10002800 HB RX 250 W HCPCS: Performed by: INTERNAL MEDICINE

## 2025-03-07 PROCEDURE — 92610 EVALUATE SWALLOWING FUNCTION: CPT

## 2025-03-07 PROCEDURE — P9045 ALBUMIN (HUMAN), 5%, 250 ML: HCPCS | Performed by: INTERNAL MEDICINE

## 2025-03-07 PROCEDURE — 97530 THERAPEUTIC ACTIVITIES: CPT

## 2025-03-07 PROCEDURE — 80048 BASIC METABOLIC PNL TOTAL CA: CPT | Performed by: INTERNAL MEDICINE

## 2025-03-07 PROCEDURE — 10004651 HB RX, NO CHARGE ITEM: Performed by: INTERNAL MEDICINE

## 2025-03-07 PROCEDURE — 10006031 HB ROOM CHARGE TELEMETRY

## 2025-03-07 PROCEDURE — 83605 ASSAY OF LACTIC ACID: CPT | Performed by: INTERNAL MEDICINE

## 2025-03-07 PROCEDURE — 94660 CPAP INITIATION&MGMT: CPT

## 2025-03-07 PROCEDURE — 36415 COLL VENOUS BLD VENIPUNCTURE: CPT | Performed by: INTERNAL MEDICINE

## 2025-03-07 PROCEDURE — 99233 SBSQ HOSP IP/OBS HIGH 50: CPT | Performed by: INTERNAL MEDICINE

## 2025-03-07 PROCEDURE — 97165 OT EVAL LOW COMPLEX 30 MIN: CPT

## 2025-03-07 PROCEDURE — 97162 PT EVAL MOD COMPLEX 30 MIN: CPT

## 2025-03-07 PROCEDURE — 10004180 HB COUNTER-TRANSPORT

## 2025-03-07 PROCEDURE — 10002801 HB RX 250 W/O HCPCS: Performed by: INTERNAL MEDICINE

## 2025-03-07 PROCEDURE — 92611 MOTION FLUOROSCOPY/SWALLOW: CPT

## 2025-03-07 RX ORDER — HYDRALAZINE HYDROCHLORIDE 10 MG/1
10 TABLET, FILM COATED ORAL EVERY EVENING
Status: DISCONTINUED | OUTPATIENT
Start: 2025-03-07 | End: 2025-03-11 | Stop reason: HOSPADM

## 2025-03-07 RX ORDER — BUMETANIDE 0.25 MG/ML
2 INJECTION, SOLUTION INTRAMUSCULAR; INTRAVENOUS
Status: DISCONTINUED | OUTPATIENT
Start: 2025-03-07 | End: 2025-03-08

## 2025-03-07 RX ORDER — ISOSORBIDE DINITRATE 10 MG/1
5 TABLET ORAL 2 TIMES DAILY
Status: DISCONTINUED | OUTPATIENT
Start: 2025-03-07 | End: 2025-03-11 | Stop reason: HOSPADM

## 2025-03-07 RX ADMIN — ALBUMIN HUMAN 12.5 G: 0.05 INJECTION, SOLUTION INTRAVENOUS at 05:39

## 2025-03-07 RX ADMIN — ALBUMIN HUMAN 12.5 G: 0.05 INJECTION, SOLUTION INTRAVENOUS at 23:13

## 2025-03-07 RX ADMIN — WATER 2000 MG: 1 INJECTION INTRAMUSCULAR; INTRAVENOUS; SUBCUTANEOUS at 09:55

## 2025-03-07 RX ADMIN — RIVAROXABAN 15 MG: 15 TABLET, FILM COATED ORAL at 17:47

## 2025-03-07 RX ADMIN — AMIODARONE HYDROCHLORIDE 150 MG: 1.5 INJECTION, SOLUTION INTRAVENOUS at 10:56

## 2025-03-07 RX ADMIN — SODIUM CHLORIDE, PRESERVATIVE FREE 2 ML: 5 INJECTION INTRAVENOUS at 20:58

## 2025-03-07 RX ADMIN — ISOSORBIDE DINITRATE 5 MG: 10 TABLET ORAL at 11:28

## 2025-03-07 RX ADMIN — ALBUMIN HUMAN 12.5 G: 0.05 INJECTION, SOLUTION INTRAVENOUS at 17:45

## 2025-03-07 RX ADMIN — ACETAMINOPHEN 650 MG: 325 TABLET ORAL at 19:48

## 2025-03-07 RX ADMIN — ALBUMIN HUMAN 12.5 G: 0.05 INJECTION, SOLUTION INTRAVENOUS at 01:00

## 2025-03-07 RX ADMIN — BUMETANIDE 2 MG: 0.25 INJECTION INTRAMUSCULAR; INTRAVENOUS at 17:54

## 2025-03-07 RX ADMIN — ISOSORBIDE DINITRATE 5 MG: 10 TABLET ORAL at 20:58

## 2025-03-07 RX ADMIN — AMIODARONE HYDROCHLORIDE 1 MG/MIN: 1.8 INJECTION, SOLUTION INTRAVENOUS at 11:24

## 2025-03-07 RX ADMIN — AMIODARONE HYDROCHLORIDE 0.5 MG/MIN: 1.8 INJECTION, SOLUTION INTRAVENOUS at 17:50

## 2025-03-07 RX ADMIN — ALBUMIN HUMAN 12.5 G: 0.05 INJECTION, SOLUTION INTRAVENOUS at 13:36

## 2025-03-07 RX ADMIN — BUMETANIDE 2 MG: 0.25 INJECTION INTRAMUSCULAR; INTRAVENOUS at 10:55

## 2025-03-07 ASSESSMENT — COGNITIVE AND FUNCTIONAL STATUS - GENERAL
APPLIED_COGNITIVE_RAW_SCORE: 8
BASIC_MOBILITY_RAW_SCORE: 8
TAKING CARE OF COMPLICATED TASKS: UNABLE
HELP NEEDED FOR PERSONAL GROOMING: A LITTLE
DAILY_ACTIVITY_CONVERTED_SCORE: 29.04
DAILY_ACTIVITY_RAW_SCORE: 11
HELP NEEDED DRESSING REGULAR LOWER BODY CLOTHING: TOTAL
REMEMBERING TO TAKE MEDICATION: A LITTLE
UNDERSTANDING 10 TO 15 MIN SPEECH: A LITTLE
REMEMBERING TO TAKE MEDICATION: UNABLE
HELP NEEDED FOR BATHING: A LOT
REMEMBERING 5 ERRANDS WITH NO LIST: UNABLE
TAKING CARE OF COMPLICATED TASKS: A LOT
APPLIED_COGNITIVE_CONVERTED_SCORE: 19.32
REMEMBERING WHERE THINGS ARE: UNABLE
REMEMBERING WHERE THINGS ARE: A LITTLE
HELP NEEDED FOR TOILETING: A LOT
APPLIED_COGNITIVE_CONVERTED_SCORE: 36.52
HELP NEEDED DRESSING REGULAR UPPER BODY CLOTHING: A LOT
FOLLOWS FAMILIAR CONVERSATION: A LITTLE
BASIC_MOBILITY_CONVERTED_SCORE: 22.61
REMEMBERING 5 ERRANDS WITH NO LIST: A LOT
APPLIED_COGNITIVE_RAW_SCORE: 17
UNDERSTANDING 10 TO 15 MIN SPEECH: UNABLE

## 2025-03-07 ASSESSMENT — PAIN SCALES - GENERAL: PAINLEVEL_OUTOF10: 5

## 2025-03-07 ASSESSMENT — ACTIVITIES OF DAILY LIVING (ADL): PRIOR_ADL: MODIFIED INDEPENDENT

## 2025-03-08 LAB
ANION GAP SERPL CALC-SCNC: 16 MMOL/L (ref 7–19)
BASOPHILS # BLD: 0 K/MCL (ref 0–0.3)
BASOPHILS NFR BLD: 0 %
BUN SERPL-MCNC: 69 MG/DL (ref 6–20)
BUN/CREAT SERPL: 29 (ref 7–25)
CALCIUM SERPL-MCNC: 8.2 MG/DL (ref 8.4–10.2)
CHLORIDE SERPL-SCNC: 96 MMOL/L (ref 97–110)
CO2 SERPL-SCNC: 27 MMOL/L (ref 21–32)
CREAT SERPL-MCNC: 2.35 MG/DL (ref 0.51–0.95)
DEPRECATED RDW RBC: 55.3 FL (ref 39–50)
EGFRCR SERPLBLD CKD-EPI 2021: 19 ML/MIN/{1.73_M2}
EOSINOPHIL # BLD: 0.1 K/MCL (ref 0–0.5)
EOSINOPHIL NFR BLD: 2 %
ERYTHROCYTE [DISTWIDTH] IN BLOOD: 16.7 % (ref 11–15)
FASTING DURATION TIME PATIENT: ABNORMAL H
GLUCOSE SERPL-MCNC: 124 MG/DL (ref 70–99)
HCT VFR BLD CALC: 35.8 % (ref 36–46.5)
HGB BLD-MCNC: 11.6 G/DL (ref 12–15.5)
IMM GRANULOCYTES # BLD AUTO: 0 K/MCL (ref 0–0.2)
IMM GRANULOCYTES # BLD: 0 %
LACTATE BLDV-SCNC: 2.2 MMOL/L (ref 0–2)
LACTATE BLDV-SCNC: 2.2 MMOL/L (ref 0–2)
LACTATE BLDV-SCNC: 2.3 MMOL/L (ref 0–2)
LACTATE BLDV-SCNC: 2.5 MMOL/L (ref 0–2)
LYMPHOCYTES # BLD: 0.8 K/MCL (ref 1–4)
LYMPHOCYTES NFR BLD: 11 %
MCH RBC QN AUTO: 30.1 PG (ref 26–34)
MCHC RBC AUTO-ENTMCNC: 32.4 G/DL (ref 32–36.5)
MCV RBC AUTO: 92.7 FL (ref 78–100)
MONOCYTES # BLD: 0.4 K/MCL (ref 0.3–0.9)
MONOCYTES NFR BLD: 6 %
NEUTROPHILS # BLD: 5.4 K/MCL (ref 1.8–7.7)
NEUTROPHILS NFR BLD: 81 %
NRBC BLD MANUAL-RTO: 1 /100 WBC
PLATELET # BLD AUTO: 146 K/MCL (ref 140–450)
POTASSIUM SERPL-SCNC: 3.5 MMOL/L (ref 3.4–5.1)
RBC # BLD: 3.86 MIL/MCL (ref 4–5.2)
SODIUM SERPL-SCNC: 135 MMOL/L (ref 135–145)
WBC # BLD: 6.7 K/MCL (ref 4.2–11)

## 2025-03-08 PROCEDURE — 10006031 HB ROOM CHARGE TELEMETRY

## 2025-03-08 PROCEDURE — 85025 COMPLETE CBC W/AUTO DIFF WBC: CPT | Performed by: INTERNAL MEDICINE

## 2025-03-08 PROCEDURE — 10004180 HB COUNTER-TRANSPORT

## 2025-03-08 PROCEDURE — 10002803 HB RX 637: Performed by: INTERNAL MEDICINE

## 2025-03-08 PROCEDURE — 10004651 HB RX, NO CHARGE ITEM: Performed by: INTERNAL MEDICINE

## 2025-03-08 PROCEDURE — 99233 SBSQ HOSP IP/OBS HIGH 50: CPT | Performed by: INTERNAL MEDICINE

## 2025-03-08 PROCEDURE — 80048 BASIC METABOLIC PNL TOTAL CA: CPT | Performed by: INTERNAL MEDICINE

## 2025-03-08 PROCEDURE — 83605 ASSAY OF LACTIC ACID: CPT | Performed by: INTERNAL MEDICINE

## 2025-03-08 PROCEDURE — 10002800 HB RX 250 W HCPCS: Performed by: INTERNAL MEDICINE

## 2025-03-08 PROCEDURE — 10002801 HB RX 250 W/O HCPCS: Performed by: INTERNAL MEDICINE

## 2025-03-08 PROCEDURE — 36415 COLL VENOUS BLD VENIPUNCTURE: CPT | Performed by: INTERNAL MEDICINE

## 2025-03-08 PROCEDURE — 10004281 HB COUNTER-STAFF TIME PER 15 MIN

## 2025-03-08 PROCEDURE — 10002803 HB RX 637: Performed by: NURSE PRACTITIONER

## 2025-03-08 PROCEDURE — 10004651 HB RX, NO CHARGE ITEM: Performed by: EMERGENCY MEDICINE

## 2025-03-08 PROCEDURE — 92526 ORAL FUNCTION THERAPY: CPT

## 2025-03-08 RX ORDER — METOPROLOL SUCCINATE 50 MG/1
50 TABLET, EXTENDED RELEASE ORAL 2 TIMES DAILY
Status: DISCONTINUED | OUTPATIENT
Start: 2025-03-08 | End: 2025-03-09

## 2025-03-08 RX ORDER — AMIODARONE HYDROCHLORIDE 200 MG/1
200 TABLET ORAL DAILY
Status: DISCONTINUED | OUTPATIENT
Start: 2025-03-08 | End: 2025-03-10

## 2025-03-08 RX ORDER — BUMETANIDE 1 MG/1
1 TABLET ORAL
Status: DISCONTINUED | OUTPATIENT
Start: 2025-03-08 | End: 2025-03-11 | Stop reason: HOSPADM

## 2025-03-08 RX ADMIN — ISOSORBIDE DINITRATE 5 MG: 10 TABLET ORAL at 20:30

## 2025-03-08 RX ADMIN — RIVAROXABAN 15 MG: 15 TABLET, FILM COATED ORAL at 17:57

## 2025-03-08 RX ADMIN — WATER 2000 MG: 1 INJECTION INTRAMUSCULAR; INTRAVENOUS; SUBCUTANEOUS at 08:55

## 2025-03-08 RX ADMIN — ACETAMINOPHEN 650 MG: 325 TABLET ORAL at 00:10

## 2025-03-08 RX ADMIN — AMIODARONE HYDROCHLORIDE 200 MG: 200 TABLET ORAL at 15:14

## 2025-03-08 RX ADMIN — BUMETANIDE 2 MG: 0.25 INJECTION INTRAMUSCULAR; INTRAVENOUS at 08:55

## 2025-03-08 RX ADMIN — METOPROLOL SUCCINATE 50 MG: 50 TABLET, EXTENDED RELEASE ORAL at 20:30

## 2025-03-08 RX ADMIN — SODIUM CHLORIDE, PRESERVATIVE FREE 2 ML: 5 INJECTION INTRAVENOUS at 20:31

## 2025-03-08 RX ADMIN — SENNOSIDES AND DOCUSATE SODIUM 2 TABLET: 50; 8.6 TABLET ORAL at 17:57

## 2025-03-08 RX ADMIN — HYDRALAZINE HYDROCHLORIDE 10 MG: 10 TABLET ORAL at 17:57

## 2025-03-08 RX ADMIN — BUMETANIDE 1 MG: 1 TABLET ORAL at 17:56

## 2025-03-08 RX ADMIN — AMIODARONE HYDROCHLORIDE 0.5 MG/MIN: 1.8 INJECTION, SOLUTION INTRAVENOUS at 03:55

## 2025-03-08 RX ADMIN — ISOSORBIDE DINITRATE 5 MG: 10 TABLET ORAL at 13:05

## 2025-03-08 RX ADMIN — SODIUM CHLORIDE, PRESERVATIVE FREE 2 ML: 5 INJECTION INTRAVENOUS at 08:56

## 2025-03-08 ASSESSMENT — COGNITIVE AND FUNCTIONAL STATUS - GENERAL
REMEMBERING WHERE THINGS ARE: UNABLE
UNDERSTANDING 10 TO 15 MIN SPEECH: UNABLE
REMEMBERING TO TAKE MEDICATION: UNABLE
APPLIED_COGNITIVE_RAW_SCORE: 8
APPLIED_COGNITIVE_CONVERTED_SCORE: 19.32
REMEMBERING 5 ERRANDS WITH NO LIST: UNABLE
TAKING CARE OF COMPLICATED TASKS: UNABLE
FOLLOWS FAMILIAR CONVERSATION: A LITTLE

## 2025-03-08 ASSESSMENT — PAIN SCALES - GENERAL
PAINLEVEL_OUTOF10: 6
PAINLEVEL_OUTOF10: 0
PAINLEVEL_OUTOF10: 6
PAINLEVEL_OUTOF10: 0

## 2025-03-09 VITALS
OXYGEN SATURATION: 100 % | HEART RATE: 95 BPM | WEIGHT: 167.99 LBS | HEIGHT: 64 IN | DIASTOLIC BLOOD PRESSURE: 80 MMHG | BODY MASS INDEX: 28.68 KG/M2 | RESPIRATION RATE: 16 BRPM | TEMPERATURE: 97.5 F | SYSTOLIC BLOOD PRESSURE: 128 MMHG

## 2025-03-09 LAB
ANION GAP SERPL CALC-SCNC: 11 MMOL/L (ref 7–19)
BACTERIA BLD CULT: NORMAL
BACTERIA BLD CULT: NORMAL
BACTERIA UR CULT: ABNORMAL
BUN SERPL-MCNC: 66 MG/DL (ref 6–20)
BUN/CREAT SERPL: 32 (ref 7–25)
CALCIUM SERPL-MCNC: 7.9 MG/DL (ref 8.4–10.2)
CHLORIDE SERPL-SCNC: 97 MMOL/L (ref 97–110)
CO2 SERPL-SCNC: 31 MMOL/L (ref 21–32)
CREAT SERPL-MCNC: 2.06 MG/DL (ref 0.51–0.95)
EGFRCR SERPLBLD CKD-EPI 2021: 23 ML/MIN/{1.73_M2}
FASTING DURATION TIME PATIENT: ABNORMAL H
GLUCOSE SERPL-MCNC: 114 MG/DL (ref 70–99)
LACTATE BLDV-SCNC: 2.1 MMOL/L (ref 0–2)
LACTATE BLDV-SCNC: 2.1 MMOL/L (ref 0–2)
POTASSIUM SERPL-SCNC: 3.2 MMOL/L (ref 3.4–5.1)
SODIUM SERPL-SCNC: 136 MMOL/L (ref 135–145)

## 2025-03-09 PROCEDURE — 36415 COLL VENOUS BLD VENIPUNCTURE: CPT | Performed by: INTERNAL MEDICINE

## 2025-03-09 PROCEDURE — 10002801 HB RX 250 W/O HCPCS: Performed by: INTERNAL MEDICINE

## 2025-03-09 PROCEDURE — 10006031 HB ROOM CHARGE TELEMETRY

## 2025-03-09 PROCEDURE — 10002800 HB RX 250 W HCPCS: Performed by: INTERNAL MEDICINE

## 2025-03-09 PROCEDURE — 99233 SBSQ HOSP IP/OBS HIGH 50: CPT | Performed by: INTERNAL MEDICINE

## 2025-03-09 PROCEDURE — 10002803 HB RX 637: Performed by: NURSE PRACTITIONER

## 2025-03-09 PROCEDURE — 10002803 HB RX 637: Performed by: INTERNAL MEDICINE

## 2025-03-09 PROCEDURE — 10004651 HB RX, NO CHARGE ITEM: Performed by: EMERGENCY MEDICINE

## 2025-03-09 PROCEDURE — 80048 BASIC METABOLIC PNL TOTAL CA: CPT | Performed by: INTERNAL MEDICINE

## 2025-03-09 PROCEDURE — 83605 ASSAY OF LACTIC ACID: CPT | Performed by: INTERNAL MEDICINE

## 2025-03-09 RX ORDER — METOPROLOL SUCCINATE 50 MG/1
100 TABLET, EXTENDED RELEASE ORAL 2 TIMES DAILY
Status: DISCONTINUED | OUTPATIENT
Start: 2025-03-09 | End: 2025-03-11 | Stop reason: HOSPADM

## 2025-03-09 RX ADMIN — AMIODARONE HYDROCHLORIDE 200 MG: 200 TABLET ORAL at 08:29

## 2025-03-09 RX ADMIN — ISOSORBIDE DINITRATE 5 MG: 10 TABLET ORAL at 12:18

## 2025-03-09 RX ADMIN — METOPROLOL SUCCINATE 100 MG: 50 TABLET, EXTENDED RELEASE ORAL at 20:06

## 2025-03-09 RX ADMIN — ISOSORBIDE DINITRATE 5 MG: 10 TABLET ORAL at 20:06

## 2025-03-09 RX ADMIN — BUMETANIDE 1 MG: 1 TABLET ORAL at 08:29

## 2025-03-09 RX ADMIN — SODIUM CHLORIDE, PRESERVATIVE FREE 2 ML: 5 INJECTION INTRAVENOUS at 08:32

## 2025-03-09 RX ADMIN — HYDRALAZINE HYDROCHLORIDE 10 MG: 10 TABLET ORAL at 17:44

## 2025-03-09 RX ADMIN — SODIUM CHLORIDE, PRESERVATIVE FREE 2 ML: 5 INJECTION INTRAVENOUS at 20:07

## 2025-03-09 RX ADMIN — BUMETANIDE 1 MG: 1 TABLET ORAL at 17:44

## 2025-03-09 RX ADMIN — METOPROLOL SUCCINATE 100 MG: 50 TABLET, EXTENDED RELEASE ORAL at 08:29

## 2025-03-09 RX ADMIN — RIVAROXABAN 15 MG: 15 TABLET, FILM COATED ORAL at 17:44

## 2025-03-09 RX ADMIN — WATER 2000 MG: 1 INJECTION INTRAMUSCULAR; INTRAVENOUS; SUBCUTANEOUS at 08:32

## 2025-03-09 ASSESSMENT — PAIN SCALES - GENERAL
PAINLEVEL_OUTOF10: 0
PAINLEVEL_OUTOF10: 0

## 2025-03-10 LAB
ALBUMIN SERPL-MCNC: 3.5 G/DL (ref 3.4–5)
ALP SERPL-CCNC: 208 UNITS/L (ref 45–117)
ALT SERPL-CCNC: 127 UNITS/L
ANION GAP SERPL CALC-SCNC: 13 MMOL/L (ref 7–19)
AST SERPL-CCNC: 76 UNITS/L
BACTERIA BLD CULT: NORMAL
BACTERIA BLD CULT: NORMAL
BASOPHILS # BLD: 0 K/MCL (ref 0–0.3)
BASOPHILS NFR BLD: 0 %
BILIRUB CONJ SERPL-MCNC: 0.4 MG/DL (ref 0–0.2)
BILIRUB SERPL-MCNC: 0.8 MG/DL (ref 0.2–1)
BUN SERPL-MCNC: 63 MG/DL (ref 6–20)
BUN/CREAT SERPL: 36 (ref 7–25)
CALCIUM SERPL-MCNC: 7.5 MG/DL (ref 8.4–10.2)
CHLORIDE SERPL-SCNC: 100 MMOL/L (ref 97–110)
CO2 SERPL-SCNC: 30 MMOL/L (ref 21–32)
CREAT SERPL-MCNC: 1.77 MG/DL (ref 0.51–0.95)
DEPRECATED RDW RBC: 58.3 FL (ref 39–50)
EGFRCR SERPLBLD CKD-EPI 2021: 27 ML/MIN/{1.73_M2}
EOSINOPHIL # BLD: 0.2 K/MCL (ref 0–0.5)
EOSINOPHIL NFR BLD: 3 %
ERYTHROCYTE [DISTWIDTH] IN BLOOD: 16.9 % (ref 11–15)
FASTING DURATION TIME PATIENT: ABNORMAL H
GLUCOSE SERPL-MCNC: 104 MG/DL (ref 70–99)
HCT VFR BLD CALC: 37.8 % (ref 36–46.5)
HGB BLD-MCNC: 12.1 G/DL (ref 12–15.5)
IMM GRANULOCYTES # BLD AUTO: 0 K/MCL (ref 0–0.2)
IMM GRANULOCYTES # BLD: 0 %
LYMPHOCYTES # BLD: 0.6 K/MCL (ref 1–4)
LYMPHOCYTES NFR BLD: 9 %
MCH RBC QN AUTO: 30.4 PG (ref 26–34)
MCHC RBC AUTO-ENTMCNC: 32 G/DL (ref 32–36.5)
MCV RBC AUTO: 95 FL (ref 78–100)
MONOCYTES # BLD: 0.5 K/MCL (ref 0.3–0.9)
MONOCYTES NFR BLD: 7 %
NEUTROPHILS # BLD: 5.3 K/MCL (ref 1.8–7.7)
NEUTROPHILS NFR BLD: 81 %
NRBC BLD MANUAL-RTO: 1 /100 WBC
PLATELET # BLD AUTO: 146 K/MCL (ref 140–450)
POTASSIUM SERPL-SCNC: 3.2 MMOL/L (ref 3.4–5.1)
PROT SERPL-MCNC: 6.6 G/DL (ref 6.4–8.2)
RBC # BLD: 3.98 MIL/MCL (ref 4–5.2)
SODIUM SERPL-SCNC: 140 MMOL/L (ref 135–145)
WBC # BLD: 6.6 K/MCL (ref 4.2–11)

## 2025-03-10 PROCEDURE — 10002800 HB RX 250 W HCPCS: Performed by: INTERNAL MEDICINE

## 2025-03-10 PROCEDURE — 10002803 HB RX 637: Performed by: STUDENT IN AN ORGANIZED HEALTH CARE EDUCATION/TRAINING PROGRAM

## 2025-03-10 PROCEDURE — 80076 HEPATIC FUNCTION PANEL: CPT | Performed by: STUDENT IN AN ORGANIZED HEALTH CARE EDUCATION/TRAINING PROGRAM

## 2025-03-10 PROCEDURE — 10002801 HB RX 250 W/O HCPCS: Performed by: INTERNAL MEDICINE

## 2025-03-10 PROCEDURE — 10002803 HB RX 637: Performed by: INTERNAL MEDICINE

## 2025-03-10 PROCEDURE — 10002803 HB RX 637: Performed by: NURSE PRACTITIONER

## 2025-03-10 PROCEDURE — 99222 1ST HOSP IP/OBS MODERATE 55: CPT | Performed by: STUDENT IN AN ORGANIZED HEALTH CARE EDUCATION/TRAINING PROGRAM

## 2025-03-10 PROCEDURE — 36415 COLL VENOUS BLD VENIPUNCTURE: CPT | Performed by: INTERNAL MEDICINE

## 2025-03-10 PROCEDURE — G0316 PROLONGED IP OR OBS CARE EM SERVICE BEYOND PRIM SERVICE EA ADD 15 MIN: HCPCS | Performed by: NURSE PRACTITIONER

## 2025-03-10 PROCEDURE — 80048 BASIC METABOLIC PNL TOTAL CA: CPT | Performed by: INTERNAL MEDICINE

## 2025-03-10 PROCEDURE — 99233 SBSQ HOSP IP/OBS HIGH 50: CPT | Performed by: INTERNAL MEDICINE

## 2025-03-10 PROCEDURE — 99223 1ST HOSP IP/OBS HIGH 75: CPT | Performed by: NURSE PRACTITIONER

## 2025-03-10 PROCEDURE — 10004180 HB COUNTER-TRANSPORT

## 2025-03-10 PROCEDURE — 85025 COMPLETE CBC W/AUTO DIFF WBC: CPT | Performed by: INTERNAL MEDICINE

## 2025-03-10 PROCEDURE — 10004651 HB RX, NO CHARGE ITEM: Performed by: EMERGENCY MEDICINE

## 2025-03-10 PROCEDURE — 10006031 HB ROOM CHARGE TELEMETRY

## 2025-03-10 PROCEDURE — 92526 ORAL FUNCTION THERAPY: CPT

## 2025-03-10 PROCEDURE — 99497 ADVNCD CARE PLAN 30 MIN: CPT | Performed by: NURSE PRACTITIONER

## 2025-03-10 RX ORDER — POTASSIUM CHLORIDE 1500 MG/1
40 TABLET, EXTENDED RELEASE ORAL ONCE
Status: COMPLETED | OUTPATIENT
Start: 2025-03-10 | End: 2025-03-10

## 2025-03-10 RX ORDER — 0.9 % SODIUM CHLORIDE 0.9 %
10 VIAL (ML) INJECTION PRN
Status: DISCONTINUED | OUTPATIENT
Start: 2025-03-10 | End: 2025-03-11 | Stop reason: HOSPADM

## 2025-03-10 RX ORDER — AMIODARONE HYDROCHLORIDE 200 MG/1
200 TABLET ORAL 2 TIMES DAILY
Status: DISCONTINUED | OUTPATIENT
Start: 2025-03-10 | End: 2025-03-11 | Stop reason: HOSPADM

## 2025-03-10 RX ADMIN — BUMETANIDE 1 MG: 1 TABLET ORAL at 09:42

## 2025-03-10 RX ADMIN — AMIODARONE HYDROCHLORIDE 200 MG: 200 TABLET ORAL at 09:42

## 2025-03-10 RX ADMIN — AMIODARONE HYDROCHLORIDE 200 MG: 200 TABLET ORAL at 20:56

## 2025-03-10 RX ADMIN — METOPROLOL SUCCINATE 100 MG: 50 TABLET, EXTENDED RELEASE ORAL at 09:41

## 2025-03-10 RX ADMIN — METOPROLOL SUCCINATE 100 MG: 50 TABLET, EXTENDED RELEASE ORAL at 20:55

## 2025-03-10 RX ADMIN — ISOSORBIDE DINITRATE 5 MG: 10 TABLET ORAL at 20:56

## 2025-03-10 RX ADMIN — WATER 2000 MG: 1 INJECTION INTRAMUSCULAR; INTRAVENOUS; SUBCUTANEOUS at 09:41

## 2025-03-10 RX ADMIN — SODIUM CHLORIDE, PRESERVATIVE FREE 2 ML: 5 INJECTION INTRAVENOUS at 09:42

## 2025-03-10 RX ADMIN — ISOSORBIDE DINITRATE 5 MG: 10 TABLET ORAL at 11:22

## 2025-03-10 RX ADMIN — POTASSIUM CHLORIDE 40 MEQ: 1500 TABLET, EXTENDED RELEASE ORAL at 11:22

## 2025-03-10 RX ADMIN — SODIUM CHLORIDE, PRESERVATIVE FREE 2 ML: 5 INJECTION INTRAVENOUS at 21:02

## 2025-03-10 RX ADMIN — HYDRALAZINE HYDROCHLORIDE 10 MG: 10 TABLET ORAL at 18:08

## 2025-03-10 RX ADMIN — RIVAROXABAN 15 MG: 15 TABLET, FILM COATED ORAL at 18:08

## 2025-03-10 RX ADMIN — BUMETANIDE 1 MG: 1 TABLET ORAL at 18:08

## 2025-03-10 ASSESSMENT — PAIN SCALES - GENERAL
PAINLEVEL_OUTOF10: 0
PAINLEVEL_OUTOF10: 0

## 2025-03-10 ASSESSMENT — COGNITIVE AND FUNCTIONAL STATUS - GENERAL
FOLLOWS FAMILIAR CONVERSATION: A LITTLE
APPLIED_COGNITIVE_CONVERTED_SCORE: 19.32
APPLIED_COGNITIVE_RAW_SCORE: 8
TAKING CARE OF COMPLICATED TASKS: UNABLE
REMEMBERING WHERE THINGS ARE: UNABLE
REMEMBERING TO TAKE MEDICATION: UNABLE
REMEMBERING 5 ERRANDS WITH NO LIST: UNABLE
UNDERSTANDING 10 TO 15 MIN SPEECH: UNABLE

## 2025-03-10 ASSESSMENT — ENCOUNTER SYMPTOMS
FATIGUE: 1
SHORTNESS OF BREATH: 0
NUMBNESS: 0
ACTIVITY CHANGE: 0
CONSTIPATION: 0
VOMITING: 0
UNEXPECTED WEIGHT CHANGE: 0
CONFUSION: 0
NAUSEA: 0
ABDOMINAL PAIN: 0
COUGH: 0
TROUBLE SWALLOWING: 0
APPETITE CHANGE: 0
DIARRHEA: 0
HEADACHES: 0
WEAKNESS: 0

## 2025-03-11 VITALS
HEART RATE: 113 BPM | HEIGHT: 64 IN | OXYGEN SATURATION: 97 % | WEIGHT: 166.23 LBS | BODY MASS INDEX: 28.38 KG/M2 | TEMPERATURE: 97.5 F | DIASTOLIC BLOOD PRESSURE: 65 MMHG | RESPIRATION RATE: 20 BRPM | SYSTOLIC BLOOD PRESSURE: 115 MMHG

## 2025-03-11 LAB
ANION GAP SERPL CALC-SCNC: 22 MMOL/L (ref 7–19)
BASOPHILS # BLD: 0 K/MCL (ref 0–0.3)
BASOPHILS NFR BLD: 0 %
BUN SERPL-MCNC: 73 MG/DL (ref 6–20)
BUN/CREAT SERPL: 35 (ref 7–25)
CALCIUM SERPL-MCNC: 8.4 MG/DL (ref 8.4–10.2)
CHLORIDE SERPL-SCNC: 99 MMOL/L (ref 97–110)
CO2 SERPL-SCNC: 22 MMOL/L (ref 21–32)
CREAT SERPL-MCNC: 2.09 MG/DL (ref 0.51–0.95)
DEPRECATED RDW RBC: 61.8 FL (ref 39–50)
EGFRCR SERPLBLD CKD-EPI 2021: 22 ML/MIN/{1.73_M2}
EOSINOPHIL # BLD: 0.1 K/MCL (ref 0–0.5)
EOSINOPHIL NFR BLD: 1 %
ERYTHROCYTE [DISTWIDTH] IN BLOOD: 17.2 % (ref 11–15)
FASTING DURATION TIME PATIENT: ABNORMAL H
GLUCOSE BLDC GLUCOMTR-MCNC: 49 MG/DL (ref 70–99)
GLUCOSE SERPL-MCNC: 55 MG/DL (ref 70–99)
HCT VFR BLD CALC: 44.8 % (ref 36–46.5)
HGB BLD-MCNC: 13.5 G/DL (ref 12–15.5)
IMM GRANULOCYTES # BLD AUTO: 0 K/MCL (ref 0–0.2)
IMM GRANULOCYTES # BLD: 1 %
LYMPHOCYTES # BLD: 0.9 K/MCL (ref 1–4)
LYMPHOCYTES NFR BLD: 13 %
MCH RBC QN AUTO: 29.6 PG (ref 26–34)
MCHC RBC AUTO-ENTMCNC: 30.1 G/DL (ref 32–36.5)
MCV RBC AUTO: 98.2 FL (ref 78–100)
MONOCYTES # BLD: 0.4 K/MCL (ref 0.3–0.9)
MONOCYTES NFR BLD: 6 %
NEUTROPHILS # BLD: 5.6 K/MCL (ref 1.8–7.7)
NEUTROPHILS NFR BLD: 79 %
NRBC BLD MANUAL-RTO: 2 /100 WBC
PLATELET # BLD AUTO: 167 K/MCL (ref 140–450)
POTASSIUM SERPL-SCNC: 4.5 MMOL/L (ref 3.4–5.1)
RBC # BLD: 4.56 MIL/MCL (ref 4–5.2)
SODIUM SERPL-SCNC: 138 MMOL/L (ref 135–145)
WBC # BLD: 7 K/MCL (ref 4.2–11)

## 2025-03-11 PROCEDURE — 36415 COLL VENOUS BLD VENIPUNCTURE: CPT | Performed by: INTERNAL MEDICINE

## 2025-03-11 PROCEDURE — 85025 COMPLETE CBC W/AUTO DIFF WBC: CPT | Performed by: INTERNAL MEDICINE

## 2025-03-11 PROCEDURE — 10004180 HB COUNTER-TRANSPORT

## 2025-03-11 PROCEDURE — 80048 BASIC METABOLIC PNL TOTAL CA: CPT | Performed by: INTERNAL MEDICINE

## 2025-03-11 RX ORDER — DEXTROSE MONOHYDRATE 25 G/50ML
INJECTION, SOLUTION INTRAVENOUS
Status: DISCONTINUED
Start: 2025-03-11 | End: 2025-03-11 | Stop reason: HOSPADM

## 2025-03-31 ENCOUNTER — APPOINTMENT (OUTPATIENT)
Dept: CARDIOLOGY | Age: 89
End: 2025-03-31

## 2025-04-17 RX ORDER — PRAVASTATIN SODIUM 40 MG
40 TABLET ORAL NIGHTLY
Qty: 90 TABLET | Refills: 0 | OUTPATIENT
Start: 2025-04-17

## (undated) NOTE — MR AVS SNAPSHOT
[facility] [place] [address] [address]  [phone]               Thank you for choosing us for your health care visit with José Christine MD.  We are glad to serve you and happy to provide you with this summary o [facility]  Diagnostics Main [facility] Parking) (Yellow Parking)  [address]   [address] [facility],  [address] [place], [address] [facility]  [address] Take 1 tablet by mouth daily.            Pravastatin Sodium 40 MG Tabs   TAKE ONE TABLET BY MOUTH EVERY NIGHT AT BEDTIME   Commonly known as:  PRAVACHOL           STOOL SOFTENER & LAXATIVE 8.6-50 MG Tabs   Generic drug:  Sennosides-Docusate Sodium   Take 1

## (undated) NOTE — LETTER
12/17/19        [address] [place] [address]      Dear Deya Precise records indicate that you have outstanding lab work and or testing that was ordered for you and has not yet been completed:  Orders Placed This Encounter      TEQUILA DOWNEY

## (undated) NOTE — ED AVS SNAPSHOT
Zaheer Mcginnis   MRN: [id number]    Department:  [facility] Emergency Department   Date of Visit:  9/16/2019           Disclosure     Insurance plans vary and the physician(s) referred by the ER may not be covered by your plan.  Please contact yo within the next three months to obtain basic health screening including reassessment of your blood pressure.     IF THERE IS ANY CHANGE OR WORSENING OF YOUR CONDITION, CALL YOUR PRIMARY CARE PHYSICIAN AT ONCE OR RETURN IMMEDIATELY TO THE EMERGENCY DEPARTMEN

## (undated) NOTE — LETTER
03/22/18        [address] [place] [address]      Dear Deya Precise records indicate that you have outstanding lab work and or testing that was ordered for you and has not yet been completed:          TSH [E]      Thyroxine, Free [E]

## (undated) NOTE — Clinical Note
Initial assessment completed with patient. message sent to office to assist in scheduling.  Patient agrees to additional follow-up calls from nurse care manager.  Thank you!

## (undated) NOTE — LETTER
04/23/18        [address] [place] [address]      Dear Sheeba Tarango records indicate that you have outstanding lab work and or testing that was ordered for you and has not yet been completed:          TSH [E]      Thyroxine, Free [E]

## (undated) NOTE — LETTER
12/22/17        [address] [place] [address]      Dear Thor Sánchez records indicate that you have outstanding lab work and or testing that was ordered for you and has not yet been completed:          TSH [E]      Thyroxine, Free [E]

## (undated) NOTE — MR AVS SNAPSHOT
[facility] - [place] [address] [address] [id number] [phone]               Thank you for choosing us for your health care visit with Emigdio Angeles MD.  We are glad to serve you and happy to provide you with this summary o Generic drug:  Sennosides-Docusate Sodium   Take 1 tablet by mouth daily. TRAVATAN Z 0.004 % Soln   Generic drug:  Travoprost (MARLEY Free)   daily.                 Where to Get Your Medications      These medications were sent to [address] - [place] Moderation of alcohol consumption Men: limit to <= 2 drinks* per day. Women and lighter weight persons: limit to <= 1 drink* per day. Your blood pressure indicates you may be at-risk for Hypertension.    Please consider the following Lifestyle Sb active are less likely to develop some chronic diseases than adults who are inactive.      HOW TO GET STARTED: HOW TO STAY MOTIVATED:   Start activities slowly and build up over time Do what you like   Get your heart pumping – brisk walking, biking, swimmin

## (undated) NOTE — LETTER
04/29/19        [address] [place] [address]      Dear Sheeba Tarango records indicate that you have outstanding lab work and or testing that was ordered for you and has not yet been completed:  Orders Placed This [address]